# Patient Record
Sex: FEMALE | Race: BLACK OR AFRICAN AMERICAN | NOT HISPANIC OR LATINO | Employment: OTHER | URBAN - METROPOLITAN AREA
[De-identification: names, ages, dates, MRNs, and addresses within clinical notes are randomized per-mention and may not be internally consistent; named-entity substitution may affect disease eponyms.]

---

## 2018-08-09 ENCOUNTER — HOSPITAL ENCOUNTER (OUTPATIENT)
Dept: RADIOLOGY | Facility: OTHER | Age: 78
Discharge: HOME | End: 2018-08-09
Attending: ANESTHESIOLOGY
Payer: MEDICARE

## 2018-08-09 ENCOUNTER — OFFICE VISIT (OUTPATIENT)
Dept: PAIN MEDICINE | Facility: CLINIC | Age: 78
End: 2018-08-09
Attending: ANESTHESIOLOGY
Payer: MEDICARE

## 2018-08-09 VITALS
BODY MASS INDEX: 30.87 KG/M2 | HEART RATE: 82 BPM | HEIGHT: 62 IN | SYSTOLIC BLOOD PRESSURE: 134 MMHG | DIASTOLIC BLOOD PRESSURE: 80 MMHG | WEIGHT: 167.75 LBS | TEMPERATURE: 97 F

## 2018-08-09 DIAGNOSIS — M47.816 LUMBAR FACET ARTHROPATHY: ICD-10-CM

## 2018-08-09 DIAGNOSIS — M47.819 SPONDYLOSIS WITHOUT MYELOPATHY: ICD-10-CM

## 2018-08-09 DIAGNOSIS — M54.15 RADICULOPATHY OF THORACOLUMBAR REGION: ICD-10-CM

## 2018-08-09 DIAGNOSIS — M47.9 OSTEOARTHRITIS OF SPINE, UNSPECIFIED SPINAL OSTEOARTHRITIS COMPLICATION STATUS, UNSPECIFIED SPINAL REGION: ICD-10-CM

## 2018-08-09 DIAGNOSIS — M47.817 SPONDYLOSIS OF LUMBOSACRAL JOINT WITHOUT MYELOPATHY: ICD-10-CM

## 2018-08-09 DIAGNOSIS — M51.36 DDD (DEGENERATIVE DISC DISEASE), LUMBAR: ICD-10-CM

## 2018-08-09 DIAGNOSIS — M51.36 DDD (DEGENERATIVE DISC DISEASE), LUMBAR: Primary | ICD-10-CM

## 2018-08-09 DIAGNOSIS — M17.11 ARTHRITIS OF RIGHT KNEE: ICD-10-CM

## 2018-08-09 DIAGNOSIS — M46.1 SACROILIITIS: ICD-10-CM

## 2018-08-09 PROCEDURE — 72114 X-RAY EXAM L-S SPINE BENDING: CPT | Mod: TC,FY

## 2018-08-09 PROCEDURE — 73521 X-RAY EXAM HIPS BI 2 VIEWS: CPT | Mod: TC,FY

## 2018-08-09 PROCEDURE — 99213 OFFICE O/P EST LOW 20 MIN: CPT | Mod: GC,S$GLB,, | Performed by: ANESTHESIOLOGY

## 2018-08-09 PROCEDURE — 99999 PR PBB SHADOW E&M-EST. PATIENT-LVL III: CPT | Mod: PBBFAC,,, | Performed by: ANESTHESIOLOGY

## 2018-08-09 PROCEDURE — 72114 X-RAY EXAM L-S SPINE BENDING: CPT | Mod: 26,,, | Performed by: RADIOLOGY

## 2018-08-09 PROCEDURE — 72050 X-RAY EXAM NECK SPINE 4/5VWS: CPT | Mod: TC,FY

## 2018-08-09 PROCEDURE — 3079F DIAST BP 80-89 MM HG: CPT | Mod: CPTII,S$GLB,, | Performed by: ANESTHESIOLOGY

## 2018-08-09 PROCEDURE — 3075F SYST BP GE 130 - 139MM HG: CPT | Mod: CPTII,S$GLB,, | Performed by: ANESTHESIOLOGY

## 2018-08-09 PROCEDURE — 73521 X-RAY EXAM HIPS BI 2 VIEWS: CPT | Mod: 26,,, | Performed by: RADIOLOGY

## 2018-08-09 PROCEDURE — 72050 X-RAY EXAM NECK SPINE 4/5VWS: CPT | Mod: 26,,, | Performed by: RADIOLOGY

## 2018-08-09 RX ORDER — DICLOFENAC SODIUM 10 MG/G
2 GEL TOPICAL 4 TIMES DAILY PRN
Qty: 3 TUBE | Refills: 1 | Status: SHIPPED | OUTPATIENT
Start: 2018-08-09 | End: 2019-04-17

## 2018-08-09 RX ORDER — ALPRAZOLAM 0.25 MG/1
0.25 TABLET ORAL NIGHTLY PRN
COMMUNITY
End: 2018-11-06

## 2018-08-09 NOTE — PROGRESS NOTES
Chronic patient Established Note (Follow up visit)      SUBJECTIVE:    Tamiko Youssef presents to the clinic for a follow-up appointment for lower back pain. Since the last visit, Tamiko Youssef states the pain has been worsening. Current pain intensity is 8/10.      She had a bilateral L4 TFESI in march 2014 that provided significant relief. She has been doing home physical exercises 2-3 times per week. She states that she has difficulty completing the exercises 2/2 significant pain. Her increasing pain has been a barrier to her being able to complete her exercises. She states that she has worsening pain in the middle of her back that radiates down her left leg to her knee. She has been rubbing various creams including a lidocaine based cream.  Continues to take Gabapentin 300 mg BID.        Pain Disability Index Review:  Last 3 PDI Scores 8/9/2018 10/9/2015 9/9/2015   Pain Disability Index (PDI) 56 34 53       Pain Medications:    - Opioids: Lorcet (Hydrocodone/Acetaminophen)  - Adjuvant Medications: Neurontin (Gabapentin)  - Anti-Coagulants: Aspirin  - Others: See med list    Opioid Contract: yes     report:  Reviewed and consistent with medication use as prescribed.    Pain Procedures: 3/28/14, 3/13/13 Bilateral L4 TRANSFORAMINAL EPIDURAL STEROID INJECTION     Physical Therapy/Home Exercise: no    Imaging: X-Ray Lumbar Complete With Flex And Ext     Narrative     Lumbar spine 5 views with flexion and extension.    Findings: Seven views.  The lumbar spinal alignment is significant for grade 1 anterolisthesis of L4 in relation to L5.  There is mild loss of disk space height with degenerative endplate change identified from L3-4 through L5-S1 with accompanying facet   hypertrophy most pronounced at L5-S1.  There is no instability on flexion-extension.  There is no fracture, dislocation, or bone erosion.      Impression      As above.      Electronically signed by: PATRICIA EDGAR MD  Date: 09/09/15  Time: 09:50       MRI Thoracic Spine Without Contrast     Narrative     MRI thoracic spine without contrast.    Findings: There is a mild dextroscoliosis of the thoracic spine.  The vertebral body heights are satisfactorily maintained.  There is loss of disk height with degenerative endplate change throughout the thoracic spine.  There are mild disk bulges   identified at T2-3, T3-4, T4-5, T8-9, and a mild disk osteophyte complex at T11-12.  This is mild central canal narrowing at T11-12.  The visualized portion of the intra-abdominal content is significant for a 2.8-cm left-sided renal cyst.  There is no   evidence of an acute marrow replacement process such as tumor or infection.  There is no fracture.  The spinal cord has a normal appearance.  There is no intradural abnormality identified.      Impression      Mild multilevel degenerative change with mild central canal stenosis at T11-12.      Electronically signed by: PATRICIA EDGAR MD  Date: 03/27/14  Time: 14:57      MRI Lumbar Spine Without Contrast    Narrative     Procedure:  Non-contrast MRI of the lumbosacral spine    Technique: sagittal T1, T2 and STIR and axial T1 and T2 images of the lumbosacral spine without contrast.     Comparison: CT abdomen and pelvis on 1/11/3    Findings: Partially included in the  imaging is left renal lesion.  Please see CT report for further details.    There is trace 1-2 mm of anterior listhesis of L4 on L5 and L5 on S1.  . The lumbar vertebral body heights, contours and bone marrow signal is within normal limits and without evidence for acute fracture or subluxation. There is degenerative disk disease   with disk desiccation all levels there is present degenerative changes are more prominent in the lower thoracic spine partially included in this study specifically at T11/T12 level with moderate to severe height loss.        The distal spinal cord and conus is normal in signal and contour the tip of the conus approximates the inferior  [id number].    T11/T12 including the sagittal field only there is a posterior discussed by complex with slight retrolisthesis of T11 on T12 with probable mild center canal stenosis and mild bilateral neural foraminal stenosis.    T12/L1 through L1/L2: No significant disc bulge, central canal or neural foraminal stenosis.     L2/L3: No significant disc bulge, central canal or neural foraminal stenosis. No significant disc bulge, central canal or neural foraminal stenosis.    L3/L4: Small disk bulge with ligamentum flavum hypertrophy without significant central canal stenosis with mild neural foraminal stenosis bilaterally.    L4/L5: Bulging disk with ligamentum flavum hypertrophy and facet joint arthropathy without significant central canal stenosis with mild neural foraminal stenosis.      L5/S1: Bulging disk with facet joint arthropathy without significant central canal stenosis with mild bilateral foraminal stenosis.      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      Impression       Mild spondylo-degenerative change of the lumbosacral spine as detailed above without significant central canal stenosis.      Please note there is degenerative change at the lower thoracic spine specifically at the T11/T12 level with posterior disk osteophyte complex resulting in probable mild central canal and mild bilateral foraminal stenosis.    See above for additional details.      Electronically signed by: ALEKSANDER POLLACK DO  Date: 04/09/13  Time: 10:58          Allergies:   Review of patient's allergies indicates:   Allergen Reactions    Naprosyn [naproxen]     Norco [hydrocodone-acetaminophen]     Tramadol        Current Medications:   Current Outpatient Prescriptions   Medication Sig Dispense Refill    albuterol (PROVENTIL) 2.5 mg /3 mL (0.083 %) nebulizer solution Take 2.5 mg by nebulization every 6 (six) hours as needed.      ALPRAZolam (XANAX) 0.25 MG tablet Take 0.25 mg by mouth nightly as needed  for Anxiety.      amlodipine (NORVASC) 10 MG tablet Take 10 mg by mouth once daily.      aspirin (ECOTRIN) 81 MG EC tablet Take 81 mg by mouth once daily.      BOOSTRIX TDAP 2.5-8-5 Lf-mcg-Lf/0.5mL Syrg injection       diclofenac sodium (VOLTAREN) 1 % Gel Apply 2 g topically 4 (four) times daily as needed. 3 Tube 1    gabapentin (NEURONTIN) 300 MG capsule TAKE 1 CAPSULE BY MOUTH THREE TIMES DAILY 270 capsule 2    hydrocodone-acetaminophen 7.5-325mg (NORCO) 7.5-325 mg per tablet   0    latanoprost 0.005 % ophthalmic solution   3    mirtazapine (REMERON) 15 MG tablet   2    pravastatin (PRAVACHOL) 20 MG tablet   1    SYMBICORT 80-4.5 mcg/actuation HFAA   6    walker Misc 1 application by Misc.(Non-Drug; Combo Route) route once daily. 1 each 0     No current facility-administered medications for this visit.        REVIEW OF SYSTEMS:    Constitutional: Positive for unexpected weight change.   HENT: Positive for headache.   Respiratory: Positive for cough, shortness of breath and wheezing.  (asthma)  Gastrointestinal: Positive for nausea and constipation.   Musculoskeletal: Positive for back pain, gait problem and stiffness.   Neurological: Positive for tingling and numbness.     MEDICAL, SURGICAL, FAMILY, SOCIAL HX: reviewed    Past Medical History:  Past Medical History:   Diagnosis Date    Asthma     Hypertension        Past Surgical History:  Past Surgical History:   Procedure Laterality Date    CHOLECYSTECTOMY      EYE SURGERY      HYSTERECTOMY      NECK SURGERY      cyst removed       Family History:  History reviewed. No pertinent family history.    Social History:  Social History     Social History    Marital status: Single     Spouse name: N/A    Number of children: N/A    Years of education: N/A     Social History Main Topics    Smoking status: Never Smoker    Smokeless tobacco: None    Alcohol use No    Drug use: Unknown    Sexual activity: Not Asked     Other Topics Concern    None  "    Social History Narrative    None       OBJECTIVE:    /80   Pulse 82   Temp 97.3 °F (36.3 °C)   Ht 5' 2" (1.575 m)   Wt 76.1 kg (167 lb 12.3 oz)   BMI 30.69 kg/m²     PHYSICAL EXAMINATION:    Constitutional: She is oriented to person, place, and time. She appears well-developed and well-nourished.   HENT:   Head: Normocephalic and atraumatic.   Eyes: Conjunctivae and EOM are normal. Pupils are equal, round, and reactive to light.   Neck: Normal range of motion. Neck supple.   Cardiovascular: Normal rate and regular rhythm.    Pulmonary/Chest: Effort normal and breath sounds normal.   Abdominal: Soft.   Musculoskeletal:        Right hip: She exhibits decreased range of motion and tenderness.        Left hip: She exhibits decreased range of motion and tenderness.        Lumbar back: She exhibits decreased range of motion, tenderness, bony tenderness and pain (facet loading b/l, L>R).   Positive FABERS b/l   Neurological: She is alert and oriented to person, place, and time. She has normal strength and normal reflexes.   Skin: Skin is warm and dry. No rash noted. No erythema.   Psychiatric: She has a normal mood and affect. Her behavior is normal. Judgment and thought content normal.     ASSESSMENT: 77 y.o. year old female with Back, hip, and cervical pain is consistent with      1. DDD (degenerative disc disease), lumbar  X-Ray Lumbar Complete With Flex And Ext    X-Ray Cervical Spine Complete 5 view    X-Ray Hips Bilateral 2 View Incl AP Pelvis   2. Radiculopathy of thoracolumbar region  X-Ray Lumbar Complete With Flex And Ext    X-Ray Cervical Spine Complete 5 view    X-Ray Hips Bilateral 2 View Incl AP Pelvis   3. Osteoarthritis of spine, unspecified spinal osteoarthritis complication status, unspecified spinal region  X-Ray Lumbar Complete With Flex And Ext    X-Ray Cervical Spine Complete 5 view    X-Ray Hips Bilateral 2 View Incl AP Pelvis   4. Spondylosis without myelopathy  X-Ray Lumbar Complete " With Flex And Ext    X-Ray Cervical Spine Complete 5 view    X-Ray Hips Bilateral 2 View Incl AP Pelvis   5. 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 Lumbar facet arthropathy  diclofenac sodium (VOLTAREN) 1 % Gel   7. Sacroiliitis  diclofenac sodium (VOLTAREN) 1 % Gel   8. Arthritis of right knee  diclofenac sodium (VOLTAREN) 1 % Gel         PLAN:     - I have stressed the importance of physical activity and a home exercise plan to help with pain and improve health.  - Patient can continue with medications for now since they are providing benefits, using them appropriately, and without side effects.  - Plan for repeat bilateral L4-5 TFESI  - RTC 2-3 weeks after injection  - Increase gabapentin to three tablets of 300 mg qday. Discussed that she can trial taking one before dinner and two before bed time to reduce daytime drowsiness.   - Continue physical therapy exercises.   - Xrays of her hip and lumbar/cervical spine.   - Counseled patient regarding the importance of activity modification, constant sleeping habits and physical therapy.    The above plan and management options were discussed at length with patient. Patient is in agreement with the above and verbalized understanding.    Jacobo Russo    I have personally reviewed the history and exam of this patient and agree with the resident/fellow/NPs note as stated above.    Obdulio Leon MD    08/09/2018

## 2018-08-09 NOTE — H&P (VIEW-ONLY)
Chronic patient Established Note (Follow up visit)      SUBJECTIVE:    Tamiko Youssef presents to the clinic for a follow-up appointment for lower back pain. Since the last visit, Tamiko Youssef states the pain has been worsening. Current pain intensity is 8/10.      She had a bilateral L4 TFESI in march 2014 that provided significant relief. She has been doing home physical exercises 2-3 times per week. She states that she has difficulty completing the exercises 2/2 significant pain. Her increasing pain has been a barrier to her being able to complete her exercises. She states that she has worsening pain in the middle of her back that radiates down her left leg to her knee. She has been rubbing various creams including a lidocaine based cream.  Continues to take Gabapentin 300 mg BID.        Pain Disability Index Review:  Last 3 PDI Scores 8/9/2018 10/9/2015 9/9/2015   Pain Disability Index (PDI) 56 34 53       Pain Medications:    - Opioids: Lorcet (Hydrocodone/Acetaminophen)  - Adjuvant Medications: Neurontin (Gabapentin)  - Anti-Coagulants: Aspirin  - Others: See med list    Opioid Contract: yes     report:  Reviewed and consistent with medication use as prescribed.    Pain Procedures: 3/28/14, 3/13/13 Bilateral L4 TRANSFORAMINAL EPIDURAL STEROID INJECTION     Physical Therapy/Home Exercise: no    Imaging: X-Ray Lumbar Complete With Flex And Ext     Narrative     Lumbar spine 5 views with flexion and extension.    Findings: Seven views.  The lumbar spinal alignment is significant for grade 1 anterolisthesis of L4 in relation to L5.  There is mild loss of disk space height with degenerative endplate change identified from L3-4 through L5-S1 with accompanying facet   hypertrophy most pronounced at L5-S1.  There is no instability on flexion-extension.  There is no fracture, dislocation, or bone erosion.      Impression      As above.      Electronically signed by: PATRICIA EDGAR MD  Date: 09/09/15  Time: 09:50       MRI Thoracic Spine Without Contrast     Narrative     MRI thoracic spine without contrast.    Findings: There is a mild dextroscoliosis of the thoracic spine.  The vertebral body heights are satisfactorily maintained.  There is loss of disk height with degenerative endplate change throughout the thoracic spine.  There are mild disk bulges   identified at T2-3, T3-4, T4-5, T8-9, and a mild disk osteophyte complex at T11-12.  This is mild central canal narrowing at T11-12.  The visualized portion of the intra-abdominal content is significant for a 2.8-cm left-sided renal cyst.  There is no   evidence of an acute marrow replacement process such as tumor or infection.  There is no fracture.  The spinal cord has a normal appearance.  There is no intradural abnormality identified.      Impression      Mild multilevel degenerative change with mild central canal stenosis at T11-12.      Electronically signed by: PATRICIA EDGAR MD  Date: 03/27/14  Time: 14:57      MRI Lumbar Spine Without Contrast    Narrative     Procedure:  Non-contrast MRI of the lumbosacral spine    Technique: sagittal T1, T2 and STIR and axial T1 and T2 images of the lumbosacral spine without contrast.     Comparison: CT abdomen and pelvis on 1/11/3    Findings: Partially included in the  imaging is left renal lesion.  Please see CT report for further details.    There is trace 1-2 mm of anterior listhesis of L4 on L5 and L5 on S1.  . The lumbar vertebral body heights, contours and bone marrow signal is within normal limits and without evidence for acute fracture or subluxation. There is degenerative disk disease   with disk desiccation all levels there is present degenerative changes are more prominent in the lower thoracic spine partially included in this study specifically at T11/T12 level with moderate to severe height loss.        The distal spinal cord and conus is normal in signal and contour the tip of the conus approximates the inferior  [id number].    T11/T12 including the sagittal field only there is a posterior discussed by complex with slight retrolisthesis of T11 on T12 with probable mild center canal stenosis and mild bilateral neural foraminal stenosis.    T12/L1 through L1/L2: No significant disc bulge, central canal or neural foraminal stenosis.     L2/L3: No significant disc bulge, central canal or neural foraminal stenosis. No significant disc bulge, central canal or neural foraminal stenosis.    L3/L4: Small disk bulge with ligamentum flavum hypertrophy without significant central canal stenosis with mild neural foraminal stenosis bilaterally.    L4/L5: Bulging disk with ligamentum flavum hypertrophy and facet joint arthropathy without significant central canal stenosis with mild neural foraminal stenosis.      L5/S1: Bulging disk with facet joint arthropathy without significant central canal stenosis with mild bilateral foraminal stenosis.      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      Impression       Mild spondylo-degenerative change of the lumbosacral spine as detailed above without significant central canal stenosis.      Please note there is degenerative change at the lower thoracic spine specifically at the T11/T12 level with posterior disk osteophyte complex resulting in probable mild central canal and mild bilateral foraminal stenosis.    See above for additional details.      Electronically signed by: ALEKSANDER POLLACK DO  Date: 04/09/13  Time: 10:58          Allergies:   Review of patient's allergies indicates:   Allergen Reactions    Naprosyn [naproxen]     Norco [hydrocodone-acetaminophen]     Tramadol        Current Medications:   Current Outpatient Prescriptions   Medication Sig Dispense Refill    albuterol (PROVENTIL) 2.5 mg /3 mL (0.083 %) nebulizer solution Take 2.5 mg by nebulization every 6 (six) hours as needed.      ALPRAZolam (XANAX) 0.25 MG tablet Take 0.25 mg by mouth nightly as needed  for Anxiety.      amlodipine (NORVASC) 10 MG tablet Take 10 mg by mouth once daily.      aspirin (ECOTRIN) 81 MG EC tablet Take 81 mg by mouth once daily.      BOOSTRIX TDAP 2.5-8-5 Lf-mcg-Lf/0.5mL Syrg injection       diclofenac sodium (VOLTAREN) 1 % Gel Apply 2 g topically 4 (four) times daily as needed. 3 Tube 1    gabapentin (NEURONTIN) 300 MG capsule TAKE 1 CAPSULE BY MOUTH THREE TIMES DAILY 270 capsule 2    hydrocodone-acetaminophen 7.5-325mg (NORCO) 7.5-325 mg per tablet   0    latanoprost 0.005 % ophthalmic solution   3    mirtazapine (REMERON) 15 MG tablet   2    pravastatin (PRAVACHOL) 20 MG tablet   1    SYMBICORT 80-4.5 mcg/actuation HFAA   6    walker Misc 1 application by Misc.(Non-Drug; Combo Route) route once daily. 1 each 0     No current facility-administered medications for this visit.        REVIEW OF SYSTEMS:    Constitutional: Positive for unexpected weight change.   HENT: Positive for headache.   Respiratory: Positive for cough, shortness of breath and wheezing.  (asthma)  Gastrointestinal: Positive for nausea and constipation.   Musculoskeletal: Positive for back pain, gait problem and stiffness.   Neurological: Positive for tingling and numbness.     MEDICAL, SURGICAL, FAMILY, SOCIAL HX: reviewed    Past Medical History:  Past Medical History:   Diagnosis Date    Asthma     Hypertension        Past Surgical History:  Past Surgical History:   Procedure Laterality Date    CHOLECYSTECTOMY      EYE SURGERY      HYSTERECTOMY      NECK SURGERY      cyst removed       Family History:  History reviewed. No pertinent family history.    Social History:  Social History     Social History    Marital status: Single     Spouse name: N/A    Number of children: N/A    Years of education: N/A     Social History Main Topics    Smoking status: Never Smoker    Smokeless tobacco: None    Alcohol use No    Drug use: Unknown    Sexual activity: Not Asked     Other Topics Concern    None  "    Social History Narrative    None       OBJECTIVE:    /80   Pulse 82   Temp 97.3 °F (36.3 °C)   Ht 5' 2" (1.575 m)   Wt 76.1 kg (167 lb 12.3 oz)   BMI 30.69 kg/m²     PHYSICAL EXAMINATION:    Constitutional: She is oriented to person, place, and time. She appears well-developed and well-nourished.   HENT:   Head: Normocephalic and atraumatic.   Eyes: Conjunctivae and EOM are normal. Pupils are equal, round, and reactive to light.   Neck: Normal range of motion. Neck supple.   Cardiovascular: Normal rate and regular rhythm.    Pulmonary/Chest: Effort normal and breath sounds normal.   Abdominal: Soft.   Musculoskeletal:        Right hip: She exhibits decreased range of motion and tenderness.        Left hip: She exhibits decreased range of motion and tenderness.        Lumbar back: She exhibits decreased range of motion, tenderness, bony tenderness and pain (facet loading b/l, L>R).   Positive FABERS b/l   Neurological: She is alert and oriented to person, place, and time. She has normal strength and normal reflexes.   Skin: Skin is warm and dry. No rash noted. No erythema.   Psychiatric: She has a normal mood and affect. Her behavior is normal. Judgment and thought content normal.     ASSESSMENT: 77 y.o. year old female with Back, hip, and cervical pain is consistent with      1. DDD (degenerative disc disease), lumbar  X-Ray Lumbar Complete With Flex And Ext    X-Ray Cervical Spine Complete 5 view    X-Ray Hips Bilateral 2 View Incl AP Pelvis   2. Radiculopathy of thoracolumbar region  X-Ray Lumbar Complete With Flex And Ext    X-Ray Cervical Spine Complete 5 view    X-Ray Hips Bilateral 2 View Incl AP Pelvis   3. Osteoarthritis of spine, unspecified spinal osteoarthritis complication status, unspecified spinal region  X-Ray Lumbar Complete With Flex And Ext    X-Ray Cervical Spine Complete 5 view    X-Ray Hips Bilateral 2 View Incl AP Pelvis   4. Spondylosis without myelopathy  X-Ray Lumbar Complete " With Flex And Ext    X-Ray Cervical Spine Complete 5 view    X-Ray Hips Bilateral 2 View Incl AP Pelvis   5. 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 Lumbar facet arthropathy  diclofenac sodium (VOLTAREN) 1 % Gel   7. Sacroiliitis  diclofenac sodium (VOLTAREN) 1 % Gel   8. Arthritis of right knee  diclofenac sodium (VOLTAREN) 1 % Gel         PLAN:     - I have stressed the importance of physical activity and a home exercise plan to help with pain and improve health.  - Patient can continue with medications for now since they are providing benefits, using them appropriately, and without side effects.  - Plan for repeat bilateral L4-5 TFESI  - RTC 2-3 weeks after injection  - Increase gabapentin to three tablets of 300 mg qday. Discussed that she can trial taking one before dinner and two before bed time to reduce daytime drowsiness.   - Continue physical therapy exercises.   - Xrays of her hip and lumbar/cervical spine.   - Counseled patient regarding the importance of activity modification, constant sleeping habits and physical therapy.    The above plan and management options were discussed at length with patient. Patient is in agreement with the above and verbalized understanding.    Jacobo Russo    I have personally reviewed the history and exam of this patient and agree with the resident/fellow/NPs note as stated above.    Obdulio Leon MD    08/09/2018

## 2018-08-22 ENCOUNTER — HOSPITAL ENCOUNTER (OUTPATIENT)
Facility: OTHER | Age: 78
Discharge: HOME | End: 2018-08-22
Attending: ANESTHESIOLOGY | Admitting: ANESTHESIOLOGY
Payer: MEDICARE

## 2018-08-22 VITALS
RESPIRATION RATE: 18 BRPM | BODY MASS INDEX: 30.91 KG/M2 | HEIGHT: 62 IN | OXYGEN SATURATION: 97 % | SYSTOLIC BLOOD PRESSURE: 129 MMHG | WEIGHT: 168 LBS | DIASTOLIC BLOOD PRESSURE: 63 MMHG | TEMPERATURE: 98 F | HEART RATE: 75 BPM

## 2018-08-22 DIAGNOSIS — G89.29 CHRONIC PAIN: ICD-10-CM

## 2018-08-22 DIAGNOSIS — M47.817 SPONDYLOSIS OF LUMBOSACRAL JOINT WITHOUT MYELOPATHY: ICD-10-CM

## 2018-08-22 DIAGNOSIS — M51.36 DDD (DEGENERATIVE DISC DISEASE), LUMBAR: Primary | ICD-10-CM

## 2018-08-22 DIAGNOSIS — M54.16 LUMBAR RADICULOPATHY: ICD-10-CM

## 2018-08-22 PROCEDURE — 25500020 PHARM REV CODE 255: Performed by: ANESTHESIOLOGY

## 2018-08-22 PROCEDURE — 63600175 PHARM REV CODE 636 W HCPCS: Performed by: ANESTHESIOLOGY

## 2018-08-22 PROCEDURE — 64483 NJX AA&/STRD TFRM EPI L/S 1: CPT | Mod: 50 | Performed by: ANESTHESIOLOGY

## 2018-08-22 PROCEDURE — 99152 MOD SED SAME PHYS/QHP 5/>YRS: CPT | Mod: ,,, | Performed by: ANESTHESIOLOGY

## 2018-08-22 PROCEDURE — 64483 NJX AA&/STRD TFRM EPI L/S 1: CPT | Mod: 50,,, | Performed by: ANESTHESIOLOGY

## 2018-08-22 PROCEDURE — 25000003 PHARM REV CODE 250: Performed by: STUDENT IN AN ORGANIZED HEALTH CARE EDUCATION/TRAINING PROGRAM

## 2018-08-22 PROCEDURE — 25000003 PHARM REV CODE 250: Performed by: ANESTHESIOLOGY

## 2018-08-22 RX ORDER — MIDAZOLAM HYDROCHLORIDE 1 MG/ML
INJECTION INTRAMUSCULAR; INTRAVENOUS
Status: DISCONTINUED | OUTPATIENT
Start: 2018-08-22 | End: 2018-08-22 | Stop reason: HOSPADM

## 2018-08-22 RX ORDER — LIDOCAINE HYDROCHLORIDE 10 MG/ML
INJECTION INFILTRATION; PERINEURAL
Status: DISCONTINUED | OUTPATIENT
Start: 2018-08-22 | End: 2018-08-22 | Stop reason: HOSPADM

## 2018-08-22 RX ORDER — LIDOCAINE HYDROCHLORIDE 5 MG/ML
INJECTION, SOLUTION INFILTRATION; INTRAVENOUS
Status: DISCONTINUED | OUTPATIENT
Start: 2018-08-22 | End: 2018-08-22 | Stop reason: HOSPADM

## 2018-08-22 RX ORDER — FENTANYL CITRATE 50 UG/ML
INJECTION, SOLUTION INTRAMUSCULAR; INTRAVENOUS
Status: DISCONTINUED | OUTPATIENT
Start: 2018-08-22 | End: 2018-08-22 | Stop reason: HOSPADM

## 2018-08-22 RX ORDER — DEXAMETHASONE SODIUM PHOSPHATE 10 MG/ML
INJECTION INTRAMUSCULAR; INTRAVENOUS
Status: DISCONTINUED | OUTPATIENT
Start: 2018-08-22 | End: 2018-08-22 | Stop reason: HOSPADM

## 2018-08-22 RX ORDER — SODIUM CHLORIDE 9 MG/ML
500 INJECTION, SOLUTION INTRAVENOUS CONTINUOUS
Status: DISCONTINUED | OUTPATIENT
Start: 2018-08-22 | End: 2018-08-22 | Stop reason: HOSPADM

## 2018-08-22 NOTE — INTERVAL H&P NOTE
The patient has been examined and the H&P has been reviewed:    I concur with the findings and no changes have occurred since H&P was written.    Anesthesia/Surgery risks, benefits and alternative options discussed and understood by patient/family.          Active Hospital Problems    Diagnosis  POA    Chronic pain [G89.29]  Yes      Resolved Hospital Problems   No resolved problems to display.

## 2018-08-22 NOTE — DISCHARGE INSTRUCTIONS
Thank you for allowing us to care for you today. You may receive a survey about the care we provided. Your feedback is valuable and helps us provide excellent care throughout the community.     Home Care Instructions for Pain Management:    1. DIET:   You may resume your normal diet today.   2. BATHING:   You may shower with luke warm water. No tub baths or anything that will soak injection sites under water for the next 24 hours.  3. DRESSING:   You may remove your bandage today.   4. ACTIVITY LEVEL:   You may resume your normal activities 24 hrs after your procedure. Nothing strenuous today.  5. MEDICATIONS:   You may resume your normal medications today. To restart blood thinners, ask your doctor.  6. DRIVING    If you have received any sedatives by mouth today, you may not drive for 12 hours.    If you have received any sedation through your IV, you may not drive for 24 hrs.   7. SPECIAL INSTRUCTIONS:   No heat to the injection site for 24 hrs including, hot bath or shower, heating pad, moist heat, or hot tubs.    Use ice pack to injection site for any pain or discomfort.  Apply ice packs for 20 minute intervals as needed.    IF you have diabetes, be sure to monitor your blood sugar more closely. IF your injection contained steroids your blood sugar levels may become higher than normal.    If you are still having pain upon discharge:  Your pain may improve over the next 48 hours. The anesthetic (numbing medication) works immediately to 48 hours. IF your injection contained a steroid (anti-inflammatory medication), it takes approximately 3 days to start feeling relief and 7-10 days to see your greatest results from the medication. It is possible you may need subsequent injections. This would be discussed at your follow up appointment with pain management or your referring doctor.      PLEASE CALL YOUR DOCTOR IF:  1. Redness or swelling around the injection site.  2. Fever of 101 degrees or more  3. Drainage  (pus) from the injection site.  4. For any continuous bleeding (some dried blood over the incision is normal.)    FOR EMERGENCIES:   If any unusual problems or difficulties occur during clinic hours, call [phone] or [phone]. Adult Procedural Sedation Instructions    Recovery After Procedural Sedation (Adult)  You have been given medicine by vein to make you sleep during your surgery. This may have included both a pain medicine and sleeping medicine. Most of the effects have worn off. But you may still have some drowsiness for the next 6 to 8 hours.  Home care  Follow these guidelines when you get home:  · For the next 8 hours, you should be watched by a responsible adult. This person should make sure your condition is not getting worse.  · Don't drink any alcohol for the next 24 hours.  · Don't drive, operate dangerous machinery, or make important business or personal decisions during the next 24 hours.  Note: Your healthcare provider may tell you not to take any medicine by mouth for pain or sleep in the next 4 hours. These medicines may react with the medicines you were given in the hospital. This could cause a much stronger response than usual.  Follow-up care  Follow up with your healthcare provider if you are not alert and back to your usual level of activity within 12 hours.  When to seek medical advice  Call your healthcare provider right away if any of these occur:  · Drowsiness gets worse  · Weakness or dizziness gets worse  · Repeated vomiting  · You can't be awakened   Date Last Reviewed: 10/18/2016  © 1071-8222 [employer]. [address] [place] [address], [place] [address]. All rights reserved. This information is not intended as a substitute for professional medical care. Always follow your healthcare professional's instructions.

## 2018-08-22 NOTE — OP NOTE
Patient Name: Tamiko Youssef  MRN: [id number]    INFORMED CONSENT: The procedure, risks, benefits and options were discussed with patient. There are no contraindications to the procedure. The patient expressed understanding and agreed to proceed. The personnel performing the procedure was discussed. I verify that I personally obtained Tamiko's consent prior to the start of the procedure and the signed consent can be found on the patient's chart.    Procedure Date: 08/22/2018    Anesthesia: Topical    Pre Procedure diagnosis: Lumbar radiculopathy [M54.16]  DDD (degenerative disc disease), lumbar [M51.36]  1. DDD (degenerative disc disease), lumbar    2. 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 Lumbar radiculopathy    4. Chronic pain      Post-Procedure diagnosis: SAME      Sedation: Yes - Fentanyl 50 mcg and Midazolam 1 mg    PROCEDURE:Bilateral L4-5   TRANSFORAMINAL EPIDURAL STEROID INJECTION        DESCRIPTION OF PROCEDURE: The patient was brought to the procedure room. After performing time out IV access was obtained prior to the procedure. The patient was positioned prone on the fluoroscopy table. Continuous hemodynamic monitoring was initiated including blood pressure, EKG, and pulse oximetry. . The skin was prepped with chlorhexidine three times and draped in a sterile fashion. Skin anesthesia was achieved using 3 mL of lidocaine 1% over the respective injection site.     An oblique fluoroscopic view was obtained, with the superior articular process of the inferior vertebral body aligned with the pedicle. The tip of a 22-gauge 3.5-inch Quincke-type spinal needle was advanced toward the 6 oclock position of the pedicle under intermittent fluoroscopic guidance. Confirmation of proper needle position was made with AP, oblique, and lateral fluoroscopic views. Negative aspiration for blood or CSF was confirmed. 2 mL of Omnipaque 300 was injected. Live fluoroscopic imaging revealed a clear outline of the  spinal nerve with proximal spread of agent through the neural foramen into the anterior epidural space. A total combination of 3 mL of Lidocaine 0.5% and 10 mg decadron was injected at each level. Contrast spread was noted from L3 to L5 level. There was no pain on injection. The needle was removed and bleeding was nil.  A sterile dressing was applied. Tamiko was taken back to the recovery room for further observation.     Blood Loss: Nill  Specimen: None    Obdulio Leon MD

## 2018-08-22 NOTE — DISCHARGE SUMMARY
Discharge Note  Short Stay      SUMMARY     Admit Date: 8/22/2018    Attending Physician: Obdulio Leon      Discharge Physician: Obdulio Leon      Discharge Date: 8/22/2018 12:03 PM    Procedure(s) (LRB):  INJECTION,STEROID,EPIDURAL,TRANSFORAMINAL APPROACH (Bilateral)    Final Diagnosis: Lumbar radiculopathy [M54.16]  DDD (degenerative disc disease), lumbar [M51.36]    Disposition: Home or self care    Patient Instructions:   Current Discharge Medication List      CONTINUE these medications which have NOT CHANGED    Details   albuterol (PROVENTIL) 2.5 mg /3 mL (0.083 %) nebulizer solution Take 2.5 mg by nebulization every 6 (six) hours as needed.      ALPRAZolam (XANAX) 0.25 MG tablet Take 0.25 mg by mouth nightly as needed for Anxiety.      amlodipine (NORVASC) 10 MG tablet Take 10 mg by mouth once daily.      aspirin (ECOTRIN) 81 MG EC tablet Take 81 mg by mouth once daily.      BOOSTRIX TDAP 2.5-8-5 Lf-mcg-Lf/0.5mL Syrg injection       diclofenac sodium (VOLTAREN) 1 % Gel Apply 2 g topically 4 (four) times daily as needed.  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; Lumbar facet arthropathy; Sacroiliitis; Arthritis of right knee      gabapentin (NEURONTIN) 300 MG capsule TAKE 1 CAPSULE BY MOUTH THREE TIMES DAILY  Qty: 270 capsule, Refills: 2    Comments: **Patient requests 90 days supply**      hydrocodone-acetaminophen 7.5-325mg (NORCO) 7.5-325 mg per tablet Refills: 0      latanoprost 0.005 % ophthalmic solution Refills: 3      mirtazapine (REMERON) 15 MG tablet Refills: 2      pravastatin (PRAVACHOL) 20 MG tablet Refills: 1      SYMBICORT 80-4.5 mcg/actuation HFAA Refills: 6      walker Misc 1 application by Misc.(Non-Drug; Combo Route) route once daily.  Qty: 1 each, Refills: 0    Comments: lumbosacral neuritis wheelchair walker with a seat                 Discharge Diagnosis: Lumbar radiculopathy [M54.16]  DDD (degenerative disc disease),  lumbar [M51.36]  Condition on Discharge: Stable with no complications to procedure   Diet on Discharge: Same as before.  Activity: as per instruction sheet.  Discharge to: Home with a responsible adult.  Follow up: 2-4 weeks

## 2018-08-22 NOTE — INTERVAL H&P NOTE
"The patient has been examined and the H&P has been reviewed:    I concur with the findings and no changes have occurred since H&P was written.    Anesthesia/Surgery risks, benefits and alternative options discussed and understood by patient/family.    HPI    Mrs. Youssef is a 77 year old female with a past medical history of lumbar DDD, thoracolumbar radiculopathy, osteoarthritis, spondylosis without myelopathy, spondylosis of the lumbosacral joint, lumbar facet arthropathy, and sacroiliitis who presents for b/l TF @ L4.     PMHx, PSHx, Allergies, Medications reviewed in epic    ROS negative except pain complaints in HPI    OBJECTIVE:    BP (!) 161/80 (BP Location: Right arm, Patient Position: Lying)   Pulse 88   Temp 98.2 °F (36.8 °C) (Oral)   Resp 18   Ht 5' 2" (1.575 m)   Wt 76.2 kg (168 lb)   SpO2 98%   BMI 30.73 kg/m²     PHYSICAL EXAMINATION:    GENERAL: Well appearing, in no acute distress, alert and oriented x3.  PSYCH:  Mood and affect appropriate.  SKIN: Skin color, texture, turgor normal, no rashes or lesions.  CV: RRR with palpation of the radial artery.  PULM: No evidence of respiratory difficulty, symmetric chest rise. Clear to auscultation.  NEURO: Cranial nerves grossly intact.    Plan:    Proceed with procedure as planned    Jacobo Russo  08/22/2018        Active Hospital Problems    Diagnosis  POA    Chronic pain [G89.29]  Yes      Resolved Hospital Problems   No resolved problems to display.     "

## 2018-09-06 ENCOUNTER — OFFICE VISIT (OUTPATIENT)
Dept: SPINE | Facility: CLINIC | Age: 78
End: 2018-09-06
Attending: ANESTHESIOLOGY
Payer: MEDICARE

## 2018-09-06 VITALS — HEIGHT: 62 IN | WEIGHT: 168 LBS | BODY MASS INDEX: 30.91 KG/M2

## 2018-09-06 DIAGNOSIS — M53.3 SACROILIAC JOINT PAIN: ICD-10-CM

## 2018-09-06 DIAGNOSIS — M47.816 LUMBAR SPONDYLOSIS: Primary | ICD-10-CM

## 2018-09-06 DIAGNOSIS — M51.36 DDD (DEGENERATIVE DISC DISEASE), LUMBAR: ICD-10-CM

## 2018-09-06 PROCEDURE — 1101F PT FALLS ASSESS-DOCD LE1/YR: CPT | Mod: CPTII,,, | Performed by: NURSE PRACTITIONER

## 2018-09-06 PROCEDURE — 99999 PR PBB SHADOW E&M-EST. PATIENT-LVL III: CPT | Mod: PBBFAC,,, | Performed by: NURSE PRACTITIONER

## 2018-09-06 PROCEDURE — 99214 OFFICE O/P EST MOD 30 MIN: CPT | Mod: S$PBB,,, | Performed by: NURSE PRACTITIONER

## 2018-09-06 PROCEDURE — 99213 OFFICE O/P EST LOW 20 MIN: CPT | Mod: PBBFAC | Performed by: NURSE PRACTITIONER

## 2018-09-06 RX ORDER — MELOXICAM 7.5 MG/1
7.5 TABLET ORAL DAILY
Qty: 30 TABLET | Refills: 2 | Status: SHIPPED | OUTPATIENT
Start: 2018-09-06 | End: 2018-11-06

## 2018-09-06 NOTE — PROGRESS NOTES
Chronic patient Established Note (Follow up visit)      SUBJECTIVE:    Tamiko Youssef presents to the clinic for a follow-up appointment for lower back pain.  She is s/p bilateral L4 TF SYLVIA with 75% relief of leg pain.  Today, she is complaining of an aching pain across the back.  She denies any radiation.  It prevents her from standing for prolonged periods of time.  She does report that she completed PT in the past which helped.  She tried increasing her Gabapentin but states that it causes significant sedation.  Since the last visit, Tamiko Youssef states the pain has been worsening. Current pain intensity is 8/10.    Pain Disability Index Review:  Last 3 PDI Scores 9/6/2018 8/9/2018 10/9/2015   Pain Disability Index (PDI) 56 56 34       Pain Medications:  Gabapentin 300 mg BID    Opioid Contract: yes     report:  Reviewed and consistent with medication use as prescribed.    Pain Procedures:   3/28/14, 3/13/13 Bilateral L4 TF SYLVIA  8/22/18 Bilateral L4 TF SYLVIA- 75% relief    Physical Therapy/Home Exercise: no    Imaging:     Narrative     EXAMINATION:  XR LUMBAR SPINE 5 VIEW WITH FLEX AND EXT    CLINICAL HISTORY:  Low back pain, >6wks conservative tx, persistent-progressive sx, surgical candidate;  Other intervertebral disc degeneration, lumbar region    TECHNIQUE:  Five views of the lumbar spine plus flexion extension views were performed.    COMPARISON:  09/09/2015.    FINDINGS:  There is 3 mm anterolisthesis of L3 on L4, L4 on L5, and L5 on S1.  No translational instability is noted on the flexion and extension radiographs.  Vertebral body heights are well maintained.  There is mild disc space narrowing present at the L3-4, L4-5, and L5-S1 levels.  There is facet arthropathy within the mid to lower lumbar spine and lumbosacral junction.  There is no evidence for spondylolysis.  No abnormal paraspinal masses are evident.  Sacroiliac joints appear unremarkable.      Impression       3 mm anterolisthesis of L3  on L4, L4 and L5, and L5 on S1.  This appears to be degenerative in etiology with no evidence for translational instability.    Lumbar spondylosis.-     Narrative     EXAMINATION:  XR HIPS BILATERAL 2 VIEW INCL AP PELVIS    CLINICAL HISTORY:  Other intervertebral disc degeneration, lumbar region    TECHNIQUE:  AP view of the pelvis and frogleg lateral views of both hips were performed.    COMPARISON:  03/20/2014.    FINDINGS:  The bones are intact.  There is no evidence for acute fracture or bone destruction.  There are mild symmetric degenerative changes of the hips.  Sacroiliac joints appear unremarkable.  There are degenerative changes within the lower lumbar spine.  Soft tissues are unremarkable.      Impression       No evidence for acute fracture, bone destruction, or dislocation.         MRI Thoracic Spine Without Contrast     Narrative     MRI thoracic spine without contrast.    Findings: There is a mild dextroscoliosis of the thoracic spine.  The vertebral body heights are satisfactorily maintained.  There is loss of disk height with degenerative endplate change throughout the thoracic spine.  There are mild disk bulges   identified at T2-3, T3-4, T4-5, T8-9, and a mild disk osteophyte complex at T11-12.  This is mild central canal narrowing at T11-12.  The visualized portion of the intra-abdominal content is significant for a 2.8-cm left-sided renal cyst.  There is no   evidence of an acute marrow replacement process such as tumor or infection.  There is no fracture.  The spinal cord has a normal appearance.  There is no intradural abnormality identified.      Impression      Mild multilevel degenerative change with mild central canal stenosis at T11-12.      Electronically signed by: PATRICIA EDGAR MD  Date: 03/27/14  Time: 14:57      MRI Lumbar Spine Without Contrast    Narrative     Procedure:  Non-contrast MRI of the lumbosacral spine    Technique: sagittal T1, T2 and STIR and axial T1 and T2 images of the  lumbosacral spine without contrast.     Comparison: CT abdomen and pelvis on 1/11/3    Findings: Partially included in the  imaging is left renal lesion.  Please see CT report for further details.    There is trace 1-2 mm of anterior listhesis of L4 on L5 and L5 on S1.  . The lumbar vertebral body heights, contours and bone marrow signal is within normal limits and without evidence for acute fracture or subluxation. There is degenerative disk disease   with disk desiccation all levels there is present degenerative changes are more prominent in the lower thoracic spine partially included in this study specifically at T11/T12 level with moderate to severe height loss.        The distal spinal cord and conus is normal in signal and contour the tip of the conus approximates the inferior [id number].    T11/T12 including the sagittal field only there is a posterior discussed by complex with slight retrolisthesis of T11 on T12 with probable mild center canal stenosis and mild bilateral neural foraminal stenosis.    T12/L1 through L1/L2: No significant disc bulge, central canal or neural foraminal stenosis.     L2/L3: No significant disc bulge, central canal or neural foraminal stenosis. No significant disc bulge, central canal or neural foraminal stenosis.    L3/L4: Small disk bulge with ligamentum flavum hypertrophy without significant central canal stenosis with mild neural foraminal stenosis bilaterally.    L4/L5: Bulging disk with ligamentum flavum hypertrophy and facet joint arthropathy without significant central canal stenosis with mild neural foraminal stenosis.      L5/S1: Bulging disk with facet joint arthropathy without significant central canal stenosis with mild bilateral foraminal stenosis.      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      Impression       Mild spondylo-degenerative change of the lumbosacral spine as detailed above without significant central canal  stenosis.      Please note there is degenerative change at the lower thoracic spine specifically at the T11/T12 level with posterior disk osteophyte complex resulting in probable mild central canal and mild bilateral foraminal stenosis.    See above for additional details.      Electronically signed by: ALEKSANDER POLLACK DO  Date: 04/09/13  Time: 10:58          Allergies:   Review of patient's allergies indicates:   Allergen Reactions    Naprosyn [naproxen]     Norco [hydrocodone-acetaminophen]     Tramadol        Current Medications:   Current Outpatient Medications   Medication Sig Dispense Refill    albuterol (PROVENTIL) 2.5 mg /3 mL (0.083 %) nebulizer solution Take 2.5 mg by nebulization every 6 (six) hours as needed.      ALPRAZolam (XANAX) 0.25 MG tablet Take 0.25 mg by mouth nightly as needed for Anxiety.      amlodipine (NORVASC) 10 MG tablet Take 10 mg by mouth once daily.      BOOSTRIX TDAP 2.5-8-5 Lf-mcg-Lf/0.5mL Syrg injection       gabapentin (NEURONTIN) 300 MG capsule TAKE 1 CAPSULE BY MOUTH THREE TIMES DAILY 270 capsule 2    hydrocodone-acetaminophen 7.5-325mg (NORCO) 7.5-325 mg per tablet   0    latanoprost 0.005 % ophthalmic solution   3    mirtazapine (REMERON) 15 MG tablet   2    pravastatin (PRAVACHOL) 20 MG tablet   1    SYMBICORT 80-4.5 mcg/actuation HFAA   6    walker Misc 1 application by Misc.(Non-Drug; Combo Route) route once daily. 1 each 0    aspirin (ECOTRIN) 81 MG EC tablet Take 81 mg by mouth once daily.      diclofenac sodium (VOLTAREN) 1 % Gel Apply 2 g topically 4 (four) times daily as needed. 3 Tube 1     No current facility-administered medications for this visit.        REVIEW OF SYSTEMS:    Constitutional: Positive for unexpected weight change.   HENT: Positive for headache.   Respiratory: Positive for cough, shortness of breath and wheezing.  (asthma)  Gastrointestinal: Positive for constipation.   Musculoskeletal: Positive for back pain, gait problem and stiffness.  "    MEDICAL, SURGICAL, FAMILY, SOCIAL HX: reviewed    Past Medical History:  Past Medical History:   Diagnosis Date    Asthma     Hypertension        Past Surgical History:  Past Surgical History:   Procedure Laterality Date    CHOLECYSTECTOMY      EYE SURGERY      HYSTERECTOMY      NECK SURGERY      cyst removed       Family History:  History reviewed. No pertinent family history.    Social History:  Social History     Socioeconomic History    Marital status: Single     Spouse name: None    Number of children: None    Years of education: None    Highest education level: None   Social Needs    Financial resource strain: None    Food insecurity - worry: None    Food insecurity - inability: None    Transportation needs - medical: None    Transportation needs - non-medical: None   Occupational History    None   Tobacco Use    Smoking status: Never Smoker   Substance and Sexual Activity    Alcohol use: No    Drug use: None    Sexual activity: None   Other Topics Concern    None   Social History Narrative    None       OBJECTIVE:    Ht 5' 2" (1.575 m)   Wt 76.2 kg (168 lb)   BMI 30.73 kg/m²     PHYSICAL EXAMINATION:    OBJECTIVE:    Ht 5' 2" (1.575 m)   Wt 76.2 kg (168 lb)   BMI 30.73 kg/m²     PHYSICAL EXAMINATION:    GENERAL: Well appearing, in no acute distress, alert and oriented x3.  PSYCH:  Mood and affect appropriate.  SKIN: Skin color, texture, turgor normal, no rashes or lesions.  HEAD/FACE:  Normocephalic, atraumatic. Cranial nerves grossly intact.  CV: RRR with palpation of the radial artery.  PULM: No evidence of respiratory difficulty, symmetric chest rise.  GI:  Soft and non-tender.  BACK: Straight leg raising in the sitting and supine positions is negative to radicular pain.  There is pain with palpation over lumbar facet joints.  Limited ROM on extension.  Positive facet loading bilaterally.  There is pain with palpation to bilateral SI joints.  REGINA is positive on the " right.  EXTREMITIES: Peripheral joint ROM is full and pain free without obvious instability or laxity in all four extremities. No deformities, edema, or skin discoloration. Good capillary refill.  MUSCULOSKELETAL:  Bilateral upper and lower extremity strength is normal and symmetric.  No atrophy or tone abnormalities are noted.  NEURO: Bilateral upper and lower extremity coordination and muscle stretch reflexes are physiologic and symmetric.  Plantar response are downgoing. No clonus.  No loss of sensation is noted.  GAIT: Antalgic.      ASSESSMENT: 77 y.o. year old female with lower back pain consistent with the following diagnoses:     1. Lumbar spondylosis  Ambulatory consult to [facility]   2. Sacroiliac joint pain  Ambulatory consult to [facility]   3. DDD (degenerative disc disease), lumbar  Ambulatory consult to [facility]         PLAN:     - I have stressed the importance of physical activity and a home exercise plan to help with pain and improve health.    - Recent XRAY results reviewed with patient today.    - She is s/p bilateral L4 TF SYLVIA with significant benefit for leg pain.  Her pain today seems mainly due to facet arthropathy.  We discussed MBBs to be followed by [facility] if diagnostic.  She declined at this time secondary to financial obligations.    - I will start the patient in the [facility] Program.    - Can continue Gabapentin 300 mg BID.    - Start Mobic 7.5 mg QD PRN daily.  Recommend lowest dose for shortest duration possible. Pt denies hx of GI ulcers or bleeds, no blood thinners, so significant known cardiac disease, no kidney disease.     - Counseled patient regarding the importance of activity modification, constant sleeping habits and physical therapy.    - RTC in 2-3 months or sooner if needed.      The above plan and management options were discussed at length with patient. Patient is in agreement with the above and verbalized understanding.    Nicole ROJAS  FERNANDO Avery  09/06/2018

## 2018-09-17 ENCOUNTER — TELEPHONE (OUTPATIENT)
Dept: PAIN MEDICINE | Facility: CLINIC | Age: 78
End: 2018-09-17

## 2018-09-17 NOTE — TELEPHONE ENCOUNTER
Staff contacted and spoke to patient regarding her message.     She reports she took the medication three times and the third time. She notice the rash on her thighs. She stopped the medication on Thursday and her rash has went away.

## 2018-09-17 NOTE — TELEPHONE ENCOUNTER
----- Message from Sonali Brown sent at 9/17/2018  1:20 PM CDT -----            Name of Who is Calling:GARY VILLANUEVA [[id number]]      What is the request in detail:meloxicam (MOBIC) 7.5 MG tablet, after taking this medication the pt broke out in a rash. Please call and advise      Can the clinic reply by [facility]: no    What Number to Call Back if not in [facility]: [phone]

## 2018-10-09 ENCOUNTER — CLINICAL SUPPORT (OUTPATIENT)
Dept: REHABILITATION | Facility: OTHER | Age: 78
End: 2018-10-09
Attending: NURSE PRACTITIONER
Payer: MEDICARE

## 2018-10-09 DIAGNOSIS — R53.1 WEAKNESS: ICD-10-CM

## 2018-10-09 DIAGNOSIS — M53.86 DECREASED ROM OF LUMBAR SPINE: ICD-10-CM

## 2018-10-09 DIAGNOSIS — Z74.09 MOBILITY IMPAIRED: ICD-10-CM

## 2018-10-09 PROCEDURE — G8978 MOBILITY CURRENT STATUS: HCPCS | Mod: CL

## 2018-10-09 PROCEDURE — 97162 PT EVAL MOD COMPLEX 30 MIN: CPT

## 2018-10-09 PROCEDURE — 97110 THERAPEUTIC EXERCISES: CPT

## 2018-10-09 PROCEDURE — G8979 MOBILITY GOAL STATUS: HCPCS | Mod: CL

## 2018-10-09 NOTE — PLAN OF CARE
[facility]     Name: Tamiko Youssef  Clinic Number: [id number]      Diagnosis:   Encounter Diagnoses   Name Primary?    Mobility impaired     Decreased ROM of lumbar spine     Weakness         Medical Diagnoses:  M47.816 (ICD-10-CM) - Lumbar spondylosis  M53.3 (ICD-10-CM) - Sacroiliac joint pain  M51.36 (ICD-10-CM) - DDD (degenerative disc disease), lumbar        Physician: Nicole Avery,*     Treatment Orders: PT Eval and Treat    Past Medical History:   Diagnosis Date    Asthma     Hypertension      Current Outpatient Medications   Medication Sig    albuterol (PROVENTIL) 2.5 mg /3 mL (0.083 %) nebulizer solution Take 2.5 mg by nebulization every 6 (six) hours as needed.    ALPRAZolam (XANAX) 0.25 MG tablet Take 0.25 mg by mouth nightly as needed for Anxiety.    amlodipine (NORVASC) 10 MG tablet Take 10 mg by mouth once daily.    aspirin (ECOTRIN) 81 MG EC tablet Take 81 mg by mouth once daily.    BOOSTRIX TDAP 2.5-8-5 Lf-mcg-Lf/0.5mL Syrg injection     diclofenac sodium (VOLTAREN) 1 % Gel Apply 2 g topically 4 (four) times daily as needed.    gabapentin (NEURONTIN) 300 MG capsule TAKE 1 CAPSULE BY MOUTH THREE TIMES DAILY    hydrocodone-acetaminophen 7.5-325mg (NORCO) 7.5-325 mg per tablet     latanoprost 0.005 % ophthalmic solution     meloxicam (MOBIC) 7.5 MG tablet Take 1 tablet (7.5 mg total) by mouth once daily. Take with food.    mirtazapine (REMERON) 15 MG tablet     pravastatin (PRAVACHOL) 20 MG tablet     SYMBICORT 80-4.5 mcg/actuation HFAA     walker Misc 1 application by Misc.(Non-Drug; Combo Route) route once daily.     No current facility-administered medications for this visit.      Review of patient's allergies indicates:   Allergen Reactions    Mobic [meloxicam] Rash    Naprosyn [naproxen]     Norco [hydrocodone-acetaminophen]     Tramadol        Precautions: Fall;  3 mm anterolisthesis of L3 on L4, L4 on L5, and L5 on S1      Pattern of pain determined: 1 PEN    Evaluation Date: 10/9/2018  Authorization Period Expiration: 10/09/2018 to 12/09/2018    Plan of Care Expiration: 10/9/18 to 1/9/19  Reassessment Due: 11/9/18  Visit # / Visits authorized: 1/ 12    Time In: 1315  Time Out: 1430  Total Billable Time: 75 minutes     HISTORY     History of Present Illness: Patient arrives to PT and c/o of severe back pain. It has been going for a long time, and it was gotten a lot worst. She recently had a steroid injection (see below), and she does not have sharp like like before but she has significant aching now. She reports sharp pain down to L thigh and numbness on her feet (but she can feel the ground when she walks).  Patient c/o a soft but very tender knot on her anterior R  Shin region (TTP). She c/o of soreness at B GS (TTP). She has severe pain and difficulty with sit<>stand transfers, bed mobility, and walking (uses a SPC today, but has a RW and the FWW)). She sleeps better on her sides, laying on her back hurts a lot. Patient lives in a 6 story building (on the 5th floor) and has an elevator to go up and down (very severe difficulty with stairs). She takes Hydrocodone to relieve the pain slight, [facility] puts her to sleep. Patient has a 3 mm anterolisthesis of L3 on L4, L4 on L5, and L5 on S1, per imaging studies. She feels comfortable sitting with a pillow behind her back in a chair.       MD's Notes:  Tamiko Youssef presents to the clinic for a follow-up appointment for lower back pain.  She is s/p bilateral L4 TF SYLVIA (transforaminal epidural steroid injection ) with 75% relief of leg pain.  Today, she is complaining of an aching pain across the back.  She denies any radiation.  It prevents her from standing for prolonged periods of time.  She does report that she completed PT in the past which helped.  She tried increasing her Gabapentin but states that it causes significant sedation.  Since the last visit, Tamiko Youssef states the  pain has been worsening. Current pain intensity is 8/10.    Diagnostic Tests:    XR LUMBAR SPINE 5 VIEW WITH FLEX AND EXT  CLINICAL HISTORY:  Low back pain, >6wks conservative tx, persistent-progressive sx, surgical candidate;  Other intervertebral disc degeneration, lumbar region    TECHNIQUE:  Five views of the lumbar spine plus flexion extension views were performed.    COMPARISON:  09/09/2015.    FINDINGS:  There is 3 mm anterolisthesis of L3 on L4, L4 on L5, and L5 on S1.  No translational instability is noted on the flexion and extension radiographs.  Vertebral body heights are well maintained.  There is mild disc space narrowing present at the L3-4, L4-5, and L5-S1 levels.  There is facet arthropathy within the mid to lower lumbar spine and lumbosacral junction.  There is no evidence for spondylolysis.  No abnormal paraspinal masses are evident.  Sacroiliac joints appear unremarkable.     Impression      3 mm anterolisthesis of L3 on L4, L4 and L5, and L5 on S1.  This appears to be degenerative in etiology with no evidence for translational instability.    Lumbar spondylosis.-       XR HIPS BILATERAL 2 VIEW INCL AP PELVIS    CLINICAL HISTORY:  Other intervertebral disc degeneration, lumbar region    TECHNIQUE:  AP view of the pelvis and frogleg lateral views of both hips were performed.    COMPARISON:  03/20/2014.    FINDINGS:  The bones are intact.  There is no evidence for acute fracture or bone destruction.  There are mild symmetric degenerative changes of the hips.  Sacroiliac joints appear unremarkable.  There are degenerative changes within the lower lumbar spine.  Soft tissues are unremarkable.     Impression      No evidence for acute fracture, bone destruction, or dislocation.     MRI Thoracic Spine Without Contrast       Findings: There is a mild dextroscoliosis of the thoracic spine.  The vertebral body heights are satisfactorily maintained.  There is loss of disk height with degenerative endplate  change throughout the thoracic spine.  There are mild disk bulges   identified at T2-3, T3-4, T4-5, T8-9, and a mild disk osteophyte complex at T11-12.  This is mild central canal narrowing at T11-12.  The visualized portion of the intra-abdominal content is significant for a 2.8-cm left-sided renal cyst.  There is no   evidence of an acute marrow replacement process such as tumor or infection.  There is no fracture.  The spinal cord has a normal appearance.  There is no intradural abnormality identified.     Impression      Mild multilevel degenerative change with mild central canal stenosis at T11-12.    Electronically signed by: PATRICIA EDGAR MD  Date: 03/27/14  Time: 14:57      MRI Lumbar Spine Without Contrast      Procedure:  Non-contrast MRI of the lumbosacral spine    Technique: sagittal T1, T2 and STIR and axial T1 and T2 images of the lumbosacral spine without contrast.     Comparison: CT abdomen and pelvis on 1/11/3    Findings: Partially included in the  imaging is left renal lesion.  Please see CT report for further details.    There is trace 1-2 mm of anterior listhesis of L4 on L5 and L5 on S1.  . The lumbar vertebral body heights, contours and bone marrow signal is within normal limits and without evidence for acute fracture or subluxation. There is degenerative disk disease   with disk desiccation all levels there is present degenerative changes are more prominent in the lower thoracic spine partially included in this study specifically at T11/T12 level with moderate to severe height loss.      The distal spinal cord and conus is normal in signal and contour the tip of the conus approximates the inferior F1mpoyx.    T11/T12 including the sagittal field only there is a posterior discussed by complex with slight retrolisthesis of T11 on T12 with probable mild center canal stenosis and mild bilateral neural foraminal stenosis.    T12/L1 through L1/L2: No significant disc bulge, central canal or  neural foraminal stenosis.     L2/L3: No significant disc bulge, central canal or neural foraminal stenosis. No significant disc bulge, central canal or neural foraminal stenosis.    L3/L4: Small disk bulge with ligamentum flavum hypertrophy without significant central canal stenosis with mild neural foraminal stenosis bilaterally.    L4/L5: Bulging disk with ligamentum flavum hypertrophy and facet joint arthropathy without significant central canal stenosis with mild neural foraminal stenosis.      L5/S1: Bulging disk with facet joint arthropathy without significant central canal stenosis with mild bilateral foraminal stenosis.      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     Impression       Mild spondylo-degenerative change of the lumbosacral spine as detailed above without significant central canal stenosis.      Please note there is degenerative change at the lower thoracic spine specifically at the T11/T12 level with posterior disk osteophyte complex resulting in probable mild central canal and mild bilateral foraminal stenosis.    See above for additional details.       Pain Scale: Tamiko rates pain on a scale of 0-10 to be 10 at worst; 8 currently; 6 at best using VAS.   Pain location: Low back is in more pain constantly.    Aggravating factors: Prolonged sitting, standing, walking, supine laying.  Easing Factors: medication, laying on side, ice pack, heat (less effective).  Disturbed Sleep: Severely (2-3 hours of sleep a night)    Pattern of pain questions:  1.  Where is your pain the worst? Low back  2.  Is your pain constant or intermittent? Constant   3.  Does bending forward make your typical pain worse? yes  4.  Since the start of your back pain, has there been a change in your bowel or bladder? Yes, but does not feel like it is related to back (longtime).  5.  What can't you do now that you use to be able to do? Sleep, walk, basic transfers    Prior Treatment: low back and  Hip, 4-5 years ago.   Prior functional status: Had limitations but manageable.   DME owned/used: SPC, RW and FWW  Occupation:  Retired   Leisure: sewing, crotchet and read                      Pts goals:  Be able to be more active and move better with less pain.     Red Flag Screening:   Cough  Sneeze  Strain: (+)  Bladder/ bowel: (+)  Falls: (+) Last year crossing the street  Night pain: (+)  Unexplained weight loss: (--)  General health: Fair    OBJECTIVE     Postural examination/scapula alignment: Slouched posture     Joint integrity: 3 mm anterolisthesis of L3 on L4, L4 on L5, and L5 on S1    Skin integrity/palpation: No bruises or cuts; however soft but very tender knot on her anterior R  Shin region (TTP). She c/o of soreness at B GS (TTP).    Edema: None today; (she takes fluid pills)  Sitting: Slouch  Standing: forward trunk lean  Correction of posture: better with lumbar roll, but placed at mid back region uncomfortable at lumbar region)    MOVEMENT LOSS    ROM Loss   Flexion major loss   Extension major loss   Side bending Right major loss   Side bending Left major loss   Rotation Right major loss   Rotation Left major loss     Gross Lower Extremity Strength: 3+/5 to 4/5    5x Sit<>stand test for functional legs strength: 24 seconds with B UE's use; limited by LBP, LE's weakness and balance disturbance.     Timed Up and Go for mobility, balance, leg strength and falls risk: 25 seconds with SPC, and displayed mod balance disturbance with transition, and mod instability with turns and pivots.      GAIT:  Assistive Device used: straight cane  Level of Assistance: independent  Patient displays the following gait deviations:  unsteady gait, decreased step length, decreased weight shift, antalgic gait and decreased pelvic rotation and stride length..     Special Tests:   Test Name  Test Result   Prone Instability Test (+)   SI Joint Provocation Test (+)   Straight Leg Raise  DNT   Neural Tension Test  DNT    Crossed Straight Leg Raise DNT   Walking on toes (--)   Walking on heels  (--)       NEUROLOGICAL SCREENING     Sensory deficit: Normal sensation B LE's    Reflexes:    Left Right   Patella Tendon 1+ 1+   Achilles Tendon 1+ 1+     Babinski and Clonus: DNT    REPEATED TEST MOVEMENTS:  Repeated Flexion in Standing produced   Repeated Extension in Standing produced   Repeated Flexion in lying Unable to lying on back due to pain   Repeated Extension in lying  Unable to lying on stomach due to pain       STATIC TESTS   Sitting slouched  no worse   Sitting erect no worse  no better   Standing slouched no worse  no better   Standing erect  no worse   Lying prone in extension  Unable to position   Long sitting   worse       Baseline Isometric Testing on Med X equipment: Testing administered by PT  Date of testing: 10/9/18  ROM 9-30 deg   Max Peak Torque 66    Min Peak Torque 18    Flex/Ext Ratio 3.66   % below normative data 70   Counter weight 301   femur 6   Seat pad 2       CMS Impairment/Limitation/Restriction for FOTO Lumbar Survey    Therapist reviewed FOTO scores for Taimko Youssef on 10/9/2018.   FOTO documents entered into [facility] - see Media section.    Limitation Score: 76%  Category: Mobility    Current : CL = least 60% but < 80% impaired, limited or restricted  Goal: CL = least 60% but < 80% impaired, limited or restricted 60%  Discharge:            Treatment       PT Evaluation Completed? Yes  Discussed Plan of Care with patient: Yes      Home Exercise Program as follows:   Handouts were given to the patient. Pt demo fair understanding of the education provided. Tamiko demonstrated fair return demonstration of activities.     - Patient received education regarding proper posture and body mechanics.  Patient was given top 10 tips handout which discusses posture seated, standing, lifting correctly, components of exercise, importance of nutrition and hydration, and importance of sleep.  - [place],  recommended, and purchase information was provided.    - Patient received a handout regarding anticipated muscular soreness following the isometric test and strategies for management were reviewed with patient including stretching, using ice and scheduled rest.     HEP:  Seated Icing 2-3x/day  Seated Tr A activation with 5 sec Holds 10 reps 2-3x/day    Pt was instructed in and performed the following:   Cardiovascular exercise and therapeutic exercise to improve posture, lumbar/cervical ROM, strength, and muscular endurance as follows:     Tamiko received therapeutic exercises to develop/improve posture, lumbar/cervical ROM, strength and muscular endurance for 30 minutes including the following exercises:     [facility] 10/9/2018   Visit Number 1   VAS Pain Rating 6   Lumbar Extension Seat Pad 2   Femur Restraint 6   Top Dead Center 24   Counterweight 301   Lumbar Flexion 30   Lumbar Extension 9   Lumbar Peak Torque 66   Min Torque 18   Test Percent Below Normative Data 70   Lumbar Weight 30   Repetitions 0   Rating of Perceived Exertion 0   Ice - Sitting 10       Tamiko received the following manual therapy techniques:  were applied to the: low back for 00 minutes.      Assessment     This is a 77 y.o. female referred to [facility] and presents with a medical diagnoses of Lumbar spondylosis, Sacroiliac joint pain, and DDD (degenerative disc disease), lumbar. She has a 3 mm anterolisthesis of L3 on L4, L4 on L5, and L5 on S1, per imaging studies and demonstrates limitations as described below in the problem list. Pt rehab potential is Fair to good. Pt presents with moderate to severe difficulty and pain with basic transfers, ambulation capacity, and bed mobility. Patient presents with major limitations with Lumbar active ROM, gross LE's strength and functional legs strength. She completed the 5x Sit<>stand test for functional legs strength at 24 seconds, with B UE's use; and reported increased LBP,  displaying LE's weakness and balance disturbances. She completed the Timed Up and Go for mobility, balance, leg strength and falls risk at 25 seconds with SPC, and displayed mod balance disturbance with transitions, and mod instability with turns and pivots. Patient is a high falls risk given the above findings. She uses her SPC outside of her house, but she also owns a RW with a sit and a FWW. Sh was instructed to use the RW while outside of her home (she lives on a 5th floor of an apartment building and uses elevators). Patient presented with significant back extensor weakness, at 70% below normative data. Her peak torque was 66 ft-lbs and min was at 18 ft-lbs, indication poor activation and strength. Patient will benefit greatly from this program, in order to improve capacity for mobility/ambulation, improve functional activity tolerance and functional legs strength and balance.     Pain Pattern: 1 PEN    Patient received education on the [facility] program, purpose of the isometric test, progression of back strengthening as well as wellness approach and systemic strengthening.  Details of the program were discussed.  Reviewed that patient should feel support/pressure from med ex restraints but no pain or discomfort and patient expressed understanding.    Based on the above history and physical examination an active physical therapy program is recommended.  Pt will continue to benefit from skilled outpatient physical therapy to address the deficits listed below in the chart, provide pt/family education and to maximize pt's level of independence in the home and community environment. .     No environmental, cultural, spiritual, developmental or education needs expressed or noted    Medical necessity is demonstrated by the following problem list.    Pt presents with the following impairments:     History  Co-morbidities and personal factors that may impact the plan of care Co-morbidities:   advanced age ;  Multiple co-morbidities.    Thoracic or lumbosacral neuritis or radiculitis     Spondylolysis of lumbar region     Spondylosis of lumbosacral joint without myelopathy     Lumbar facet arthropathy     Sacroiliitis     3 mm anterolisthesis of L3 on L4, L4 on L5, and L5 on S1    Lumbar radiculopathy     Arthritis of right knee     Chronic pain           Personal Factors:   age  lifestyle; sedentary     moderate   Examination  Body Structures and Functions, activity limitations and participation restrictions that may impact the plan of care Body Regions:   back  lower extremities  trunk    Body Systems:    ROM  strength  balance  gait  transfers  transitions    Participation Restrictions:   Mod to Severely limited with mobility    Activity limitations:   Learning and applying knowledge  no deficits    General Tasks and Commands  no deficits    Communication  no deficits    Mobility  lifting and carrying objects  walking  driving (bike, car, motorcycle)  Basic transfers, stairs.    Self care  washing oneself (bathing, drying, washing hands)  caring for body parts (brushing teeth, shaving, grooming)  toileting  dressing  looking after one's health    Domestic Life  shopping  cooking  doing house work (cleaning house, washing dishes, laundry)  assisting others    Interactions/Relationships  basic interpersonal interactions    Life Areas  no deficits    Community and Social Life  recreation and leisure         moderate   Clinical Presentation stable and uncomplicated moderate   Decision Making/ Complexity Score: moderate       GOALS: Pt is in agreement with the following goals.    Short term goals:  6 weeks or 10 visits   1.  Pt will demonstrate increased lumbar ROM by at least 3 degrees from the initial ROM value with improvements noted in functional ROM and ability to perform ADLs  2.  Pt will demonstrate increased maximum isometric torque value by 10% when compared to the initial value resulting in improved ability to  "perform bending, lifting, and carrying activities safely, confidently.    3.  Patient report a reduction in worst pain score by 1-2 points for improved tolerance during work and recreational activities  4.  Pt able to perform HEP correctly with minimal cueing or supervision for therapist      Long term goals: 13 weeks or 20 visits   1. Pt will demonstrate increased lumbar ROM by at least 6 degrees from initial ROM value, resulting in improved ability to perform functional fwd bending while standing and sitting.   2. Pt will demonstrate increased maximum isometric torque value by 20% when compared to the initial value resulting in improved ability to perform bending, lifting, and carrying activities safely, confidently.  3. Pt to demonstrate ability to independently control and reduce their pain through posture positioning and mechanical movements throughout a typical day.  4.  Patient will demonstrate improved overall function per FOTO Survey to CL = least 60% but < 80% impaired, limited or restricted score or less.  5. Patient will improve 5x STS test for functional mobility at 18 seconds or less w/o UE's, in order to demonstrate improve strength  6. Patient will improve Tmed Up and Go for mobility, balance, leg strength and falls risk to 20 seconds or less, with least restrictive AD, and displaying decreased balance disturbance with transitions, and decreased instability with turns and pivots.    Plan   Outpatient physical therapy 2x week for 13 weeks or 20 visits to include the following:   - Patient education  - Therapeutic exercise  - Manual therapy  - Performance testing   - Neuromuscular Re-education  - Therapeutic activity   - Modalities    Pt may be seen by PTA as part of the rehabilitation team.     Therapist: Renae Zarate, PT  10/9/2018    "I certify the need for these services furnished under this plan of treatment and while under my care."    ____________________________________  Physician/Referring " Practitioner    _______________  Date of Signature

## 2018-10-10 ENCOUNTER — OFFICE VISIT (OUTPATIENT)
Dept: OPTOMETRY | Facility: CLINIC | Age: 78
End: 2018-10-10
Payer: MEDICARE

## 2018-10-10 DIAGNOSIS — H40.1131 PRIMARY OPEN ANGLE GLAUCOMA (POAG) OF BOTH EYES, MILD STAGE: Primary | ICD-10-CM

## 2018-10-10 DIAGNOSIS — Z96.1 PSEUDOPHAKIA OF BOTH EYES: ICD-10-CM

## 2018-10-10 PROCEDURE — 99999 PR PBB SHADOW E&M-EST. PATIENT-LVL II: CPT | Mod: PBBFAC,,, | Performed by: OPTOMETRIST

## 2018-10-10 PROCEDURE — 99212 OFFICE O/P EST SF 10 MIN: CPT | Mod: PBBFAC | Performed by: OPTOMETRIST

## 2018-10-10 PROCEDURE — 92004 COMPRE OPH EXAM NEW PT 1/>: CPT | Mod: S$PBB,,, | Performed by: OPTOMETRIST

## 2018-10-10 RX ORDER — LATANOPROST 50 UG/ML
1 SOLUTION/ DROPS OPHTHALMIC NIGHTLY
Qty: 3 BOTTLE | Refills: 3 | Status: SHIPPED | OUTPATIENT
Start: 2018-10-10

## 2018-10-10 NOTE — PROGRESS NOTES
Assessment /Plan     For exam results, see Encounter Report.    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  Dispense: 3 Bottle; Refill: 3    Pseudophakia of both eyes          1.  Continue Latanoprost QHS OU--refills sent to pharmacy.  Sent for old records.    2.  Bifocal rx given.  Eye health normal OU.        RTC 3 months for IOP check and review old records.                 10/12/18:  Received old records.  Last OCT done 5/30/18.  Last HVF done 7/14/18.

## 2018-10-12 ENCOUNTER — CLINICAL SUPPORT (OUTPATIENT)
Dept: REHABILITATION | Facility: OTHER | Age: 78
End: 2018-10-12
Attending: NURSE PRACTITIONER
Payer: MEDICARE

## 2018-10-12 DIAGNOSIS — Z74.09 MOBILITY IMPAIRED: ICD-10-CM

## 2018-10-12 DIAGNOSIS — R53.1 WEAKNESS: ICD-10-CM

## 2018-10-12 DIAGNOSIS — M53.86 DECREASED ROM OF LUMBAR SPINE: ICD-10-CM

## 2018-10-12 PROCEDURE — 97110 THERAPEUTIC EXERCISES: CPT

## 2018-10-12 NOTE — PROGRESS NOTES
[facility] Treatment      Name: Tamiko Youssef  Clinic Number: [id number]  Date of Treatment: 10/12/2018   Diagnosis:   Encounter Diagnoses   Name Primary?    Mobility impaired     Decreased ROM of lumbar spine     Weakness      Physician: Nicole Avery,*    Pain pattern determined: 1 PEN  Plan of care signed: 10/10/18   Time in: 1040am  Time Out: 1130am  Total Treatment time: 50  Precautions: Fall;  3 mm anterolisthesis of L3 on L4, L4 on L5, and L5 on S1, HTN  Visit #: 2    POC due:1/09/19  Reassessment due:11/09/18    Subjective   Tamiko reports she is having spasms on the left side today.   Pt reports she has tried to perform HEP    Patient reports their pain to be 8/10 on a 0-10 scale with 0 being no pain and 10 being the worst pain imaginable.    Pain Location: LLB/buttocks   Occupation:  Retired   Leisure: sewing, crotchet and read                      Pts goals:  Be able to be more active and move better with less pain.        Objective     Baseline Isometric Testing on Med X equipment: Testing administered by PT  Date of testing: 10/9/18  ROM 9-30 deg   Max Peak Torque 66    Min Peak Torque 18    Flex/Ext Ratio 3.66   % below normative data 70   Counter weight 301   femur 6   Seat pad 2         CMS Impairment/Limitation/Restriction for FOTO Lumbar Survey     Therapist reviewed FOTO scores for Tamiko Youssef on 10/9/2018.   FOTO documents entered into [employer] - see Media section.     Limitation Score: 76%  Category: Mobility     Current : CL = least 60% but < 80% impaired, limited or restricted  Goal: CL = least 60% but < 80% impaired, limited or restricted 60%  Discharge:              Treatment    Pt was instructed in and performed the following:     Tamiko received therapeutic exercises to develop/improved posture, cardiovascular endurance, muscular endurance, lumbar/cervical ROM, strength and muscular endurance for 30 minutes including the following exercises:     [facility]  Therapy 10/12/2018   Visit Number 2   VAS Pain Rating 8   Time 3   Flexion in Sitting 10   Lumbar Extension Seat Pad -   Femur Restraint -   Top Dead Center -   Counterweight -   Lumbar Flexion -   Lumbar Extension -   Lumbar Peak Torque -   Min Torque -   Test Percent Below Normative Data -   Lumbar Weight 30   Repetitions 15   Rating of Perceived Exertion 4   Ice - Sitting 10     Flex in sitting with ball 10x  Attempt seated trunk rotation    Peripheral muscle strengthening which included 1 set of 15-20 repetitions at a slow, controlled 7 second per rep pace focused on strengthening supporting musculature for improved body mechanics and functional mobility.  Pt and therapist focused on proper form during treatment to ensure optimal strengthening of each targeted muscle group.  Machines were utilized including torso rotation, leg extension, leg curl, chest press, upright row. Tricep extension, bicep curl, leg press, and hip abduction added on third visit.       Home Exercise Program as follows:   Seated Tr A activation with 5 sec Holds 10 reps 2-3x/day     Handouts were given to the patient. Pt demo fair understanding of the education provided. Tamiko demonstrated fair return demonstration of activities.     Lumbar roll use compliance: unknown  Additional exercises taught this treatment session: added seated flexion    Assessment     Pt tolerated treatment fair today.  Only able to tolerate 3 min of CV activity on the recumbent bike due to fatigue.  Reviewed TA exercise in sitting, max verbal/tactile cues needed . Continue to review.  Added gentle fwd flexion in sitting with swissball.  Encouraged pt to continue stretching at home and to ice frequently to help decrease pain.  Pt understood.  Pt had some SOB during peripheral strengthening, frequent rest breaks required.  Patient is making good progress towards established goals.  Pt will continue to benefit from skilled outpatient physical therapy to address the  deficits stated in the impairment chart, provide pt/family education and to maximize pt's level of independence in the home and community environment.       Pt's spiritual, cultural and educational needs considered and pt agreeable to plan of care and goals as stated below:     Medical necessity is demonstrated by the following problem list.    Pt presents with the following impairments:      History  Co-morbidities and personal factors that may impact the plan of care Co-morbidities:   advanced age ; Multiple co-morbidities.     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      Lumbar facet arthropathy      Sacroiliitis      3 mm anterolisthesis of L3 on L4, L4 on L5, and L5 on S1     Lumbar radiculopathy      Arthritis of right knee      Chronic pain               Personal Factors:   age  lifestyle; sedentary       moderate   Examination  Body Structures and Functions, activity limitations and participation restrictions that may impact the plan of care Body Regions:   back  lower extremities  trunk     Body Systems:    ROM  strength  balance  gait  transfers  transitions     Participation Restrictions:   Mod to Severely limited with mobility     Activity limitations:   Learning and applying knowledge  no deficits     General Tasks and Commands  no deficits     Communication  no deficits     Mobility  lifting and carrying objects  walking  driving (bike, car, motorcycle)  Basic transfers, stairs.     Self care  washing oneself (bathing, drying, washing hands)  caring for body parts (brushing teeth, shaving, grooming)  toileting  dressing  looking after one's health     Domestic Life  shopping  cooking  doing house work (cleaning house, washing dishes, laundry)  assisting others     Interactions/Relationships  basic interpersonal interactions     Life Areas  no deficits     Community and Social Life  recreation and  leisure             moderate   Clinical Presentation stable and uncomplicated moderate   Decision Making/ Complexity Score: moderate         GOALS: Pt is in agreement with the following goals.     Short term goals:  6 weeks or 10 visits   1.  Pt will demonstrate increased lumbar ROM by at least 3 degrees from the initial ROM value with improvements noted in functional ROM and ability to perform ADLs  2.  Pt will demonstrate increased maximum isometric torque value by 10% when compared to the initial value resulting in improved ability to perform bending, lifting, and carrying activities safely, confidently.     3.  Patient report a reduction in worst pain score by 1-2 points for improved tolerance during work and recreational activities  4.  Pt able to perform HEP correctly with minimal cueing or supervision for therapist        Long term goals: 13 weeks or 20 visits   1. Pt will demonstrate increased lumbar ROM by at least 6 degrees from initial ROM value, resulting in improved ability to perform functional fwd bending while standing and sitting.   2. Pt will demonstrate increased maximum isometric torque value by 20% when compared to the initial value resulting in improved ability to perform bending, lifting, and carrying activities safely, confidently.  3. Pt to demonstrate ability to independently control and reduce their pain through posture positioning and mechanical movements throughout a typical day.  4.  Patient will demonstrate improved overall function per FOTO Survey to CL = least 60% but < 80% impaired, limited or restricted score or less.  5. Patient will improve 5x STS test for functional mobility at 18 seconds or less w/o UE's, in order to demonstrate improve strength  6. Patient will improve Tmed Up and Go for mobility, balance, leg strength and falls risk to 20 seconds or less, with least restrictive AD, and displaying decreased balance disturbance with transitions, and decreased instability with  turns and pivots.         Plan   Continue with established Plan of Care towards established PT goals.

## 2018-11-06 ENCOUNTER — OFFICE VISIT (OUTPATIENT)
Dept: PAIN MEDICINE | Facility: CLINIC | Age: 78
End: 2018-11-06
Payer: MEDICARE

## 2018-11-06 VITALS
RESPIRATION RATE: 18 BRPM | BODY MASS INDEX: 31.65 KG/M2 | DIASTOLIC BLOOD PRESSURE: 76 MMHG | HEART RATE: 95 BPM | SYSTOLIC BLOOD PRESSURE: 139 MMHG | HEIGHT: 62 IN | TEMPERATURE: 98 F | WEIGHT: 172 LBS

## 2018-11-06 DIAGNOSIS — M54.16 LUMBAR RADICULOPATHY: ICD-10-CM

## 2018-11-06 DIAGNOSIS — M47.816 LUMBAR FACET ARTHROPATHY: ICD-10-CM

## 2018-11-06 DIAGNOSIS — M79.10 MYALGIA: ICD-10-CM

## 2018-11-06 DIAGNOSIS — M47.817 SPONDYLOSIS OF LUMBOSACRAL JOINT WITHOUT MYELOPATHY: Primary | ICD-10-CM

## 2018-11-06 DIAGNOSIS — M51.36 DDD (DEGENERATIVE DISC DISEASE), LUMBAR: ICD-10-CM

## 2018-11-06 PROCEDURE — 3078F DIAST BP <80 MM HG: CPT | Mod: CPTII,S$GLB,, | Performed by: NURSE PRACTITIONER

## 2018-11-06 PROCEDURE — 99999 PR PBB SHADOW E&M-EST. PATIENT-LVL III: CPT | Mod: PBBFAC,,, | Performed by: NURSE PRACTITIONER

## 2018-11-06 PROCEDURE — 20553 NJX 1/MLT TRIGGER POINTS 3/>: CPT | Mod: S$GLB,,, | Performed by: NURSE PRACTITIONER

## 2018-11-06 PROCEDURE — 99213 OFFICE O/P EST LOW 20 MIN: CPT | Mod: 25,S$GLB,, | Performed by: NURSE PRACTITIONER

## 2018-11-06 PROCEDURE — 1101F PT FALLS ASSESS-DOCD LE1/YR: CPT | Mod: CPTII,S$GLB,, | Performed by: NURSE PRACTITIONER

## 2018-11-06 PROCEDURE — 3075F SYST BP GE 130 - 139MM HG: CPT | Mod: CPTII,S$GLB,, | Performed by: NURSE PRACTITIONER

## 2018-11-06 RX ORDER — DOXEPIN HYDROCHLORIDE 50 MG/G
CREAM TOPICAL
COMMUNITY
Start: 2018-10-22 | End: 2019-04-17

## 2018-11-06 RX ORDER — BUPIVACAINE HYDROCHLORIDE 2.5 MG/ML
9 INJECTION, SOLUTION EPIDURAL; INFILTRATION; INTRACAUDAL ONCE
Status: COMPLETED | OUTPATIENT
Start: 2018-11-06 | End: 2018-11-06

## 2018-11-06 RX ORDER — METHYLPREDNISOLONE ACETATE 40 MG/ML
40 INJECTION, SUSPENSION INTRA-ARTICULAR; INTRALESIONAL; INTRAMUSCULAR; SOFT TISSUE ONCE
Status: COMPLETED | OUTPATIENT
Start: 2018-11-06 | End: 2018-11-06

## 2018-11-06 RX ORDER — CITALOPRAM 10 MG/1
TABLET ORAL
Refills: 2 | COMMUNITY
Start: 2018-07-30 | End: 2019-04-17

## 2018-11-06 RX ORDER — FUROSEMIDE 20 MG/1
TABLET ORAL
Refills: 2 | COMMUNITY
Start: 2018-10-25

## 2018-11-06 RX ORDER — ALBUTEROL SULFATE 90 UG/1
AEROSOL, METERED RESPIRATORY (INHALATION)
Refills: 1 | COMMUNITY
Start: 2018-10-22 | End: 2021-07-23 | Stop reason: SDUPTHER

## 2018-11-06 RX ADMIN — METHYLPREDNISOLONE ACETATE 40 MG: 40 INJECTION, SUSPENSION INTRA-ARTICULAR; INTRALESIONAL; INTRAMUSCULAR; SOFT TISSUE at 10:11

## 2018-11-06 RX ADMIN — BUPIVACAINE HYDROCHLORIDE 22.5 MG: 2.5 INJECTION, SOLUTION EPIDURAL; INFILTRATION; INTRACAUDAL at 10:11

## 2018-11-06 NOTE — PROGRESS NOTES
Chronic patient Established Note (Follow up visit)      SUBJECTIVE:    Tamiko Youssef presents to the clinic for a follow-up appointment for lower back pain.  Her pain today is primarily located across the lower and middle back.  She is not currently having radiation.  She previously had benefit with bilateral L4 TF SYLVIA with 75% relief of leg pain.  She recently started [facility] which she is tolerating well.  We discussed lumbar MBBs but she was worried about her out of pocket expense.  She stopped Mobic because it caused a rash.  Since the last visit, Tamiko Youssef states the pain has been worsening. Current pain intensity is 8/10.    Pain Disability Index Review:  Last 3 PDI Scores 11/6/2018 9/6/2018 8/9/2018   Pain Disability Index (PDI) 50 56 56       Pain Medications:  Gabapentin 300 mg BID    Opioid Contract: yes     report:  Reviewed and consistent with medication use as prescribed.    Pain Procedures:   3/28/14, 3/13/13 Bilateral L4 TF SYLVIA  8/22/18 Bilateral L4 TF SYLVIA- 75% relief    Physical Therapy/Home Exercise: no    Imaging:     Narrative     EXAMINATION:  XR LUMBAR SPINE 5 VIEW WITH FLEX AND EXT    CLINICAL HISTORY:  Low back pain, >6wks conservative tx, persistent-progressive sx, surgical candidate;  Other intervertebral disc degeneration, lumbar region    TECHNIQUE:  Five views of the lumbar spine plus flexion extension views were performed.    COMPARISON:  09/09/2015.    FINDINGS:  There is 3 mm anterolisthesis of L3 on L4, L4 on L5, and L5 on S1.  No translational instability is noted on the flexion and extension radiographs.  Vertebral body heights are well maintained.  There is mild disc space narrowing present at the L3-4, L4-5, and L5-S1 levels.  There is facet arthropathy within the mid to lower lumbar spine and lumbosacral junction.  There is no evidence for spondylolysis.  No abnormal paraspinal masses are evident.  Sacroiliac joints appear unremarkable.      Impression       3 mm  anterolisthesis of L3 on L4, L4 and L5, and L5 on S1.  This appears to be degenerative in etiology with no evidence for translational instability.    Lumbar spondylosis.-     Narrative     EXAMINATION:  XR HIPS BILATERAL 2 VIEW INCL AP PELVIS    CLINICAL HISTORY:  Other intervertebral disc degeneration, lumbar region    TECHNIQUE:  AP view of the pelvis and frogleg lateral views of both hips were performed.    COMPARISON:  03/20/2014.    FINDINGS:  The bones are intact.  There is no evidence for acute fracture or bone destruction.  There are mild symmetric degenerative changes of the hips.  Sacroiliac joints appear unremarkable.  There are degenerative changes within the lower lumbar spine.  Soft tissues are unremarkable.      Impression       No evidence for acute fracture, bone destruction, or dislocation.         MRI Thoracic Spine Without Contrast     Narrative     MRI thoracic spine without contrast.    Findings: There is a mild dextroscoliosis of the thoracic spine.  The vertebral body heights are satisfactorily maintained.  There is loss of disk height with degenerative endplate change throughout the thoracic spine.  There are mild disk bulges   identified at T2-3, T3-4, T4-5, T8-9, and a mild disk osteophyte complex at T11-12.  This is mild central canal narrowing at T11-12.  The visualized portion of the intra-abdominal content is significant for a 2.8-cm left-sided renal cyst.  There is no   evidence of an acute marrow replacement process such as tumor or infection.  There is no fracture.  The spinal cord has a normal appearance.  There is no intradural abnormality identified.      Impression      Mild multilevel degenerative change with mild central canal stenosis at T11-12.      Electronically signed by: PATRICIA EDGAR MD  Date: 03/27/14  Time: 14:57      MRI Lumbar Spine Without Contrast    Narrative     Procedure:  Non-contrast MRI of the lumbosacral spine    Technique: sagittal T1, T2 and STIR and axial  T1 and T2 images of the lumbosacral spine without contrast.     Comparison: CT abdomen and pelvis on 1/11/3    Findings: Partially included in the  imaging is left renal lesion.  Please see CT report for further details.    There is trace 1-2 mm of anterior listhesis of L4 on L5 and L5 on S1.  . The lumbar vertebral body heights, contours and bone marrow signal is within normal limits and without evidence for acute fracture or subluxation. There is degenerative disk disease   with disk desiccation all levels there is present degenerative changes are more prominent in the lower thoracic spine partially included in this study specifically at T11/T12 level with moderate to severe height loss.        The distal spinal cord and conus is normal in signal and contour the tip of the conus approximates the inferior [id number].    T11/T12 including the sagittal field only there is a posterior discussed by complex with slight retrolisthesis of T11 on T12 with probable mild center canal stenosis and mild bilateral neural foraminal stenosis.    T12/L1 through L1/L2: No significant disc bulge, central canal or neural foraminal stenosis.     L2/L3: No significant disc bulge, central canal or neural foraminal stenosis. No significant disc bulge, central canal or neural foraminal stenosis.    L3/L4: Small disk bulge with ligamentum flavum hypertrophy without significant central canal stenosis with mild neural foraminal stenosis bilaterally.    L4/L5: Bulging disk with ligamentum flavum hypertrophy and facet joint arthropathy without significant central canal stenosis with mild neural foraminal stenosis.      L5/S1: Bulging disk with facet joint arthropathy without significant central canal stenosis with mild bilateral foraminal stenosis.      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      Impression       Mild spondylo-degenerative change of the lumbosacral spine as detailed above without significant  central canal stenosis.      Please note there is degenerative change at the lower thoracic spine specifically at the T11/T12 level with posterior disk osteophyte complex resulting in probable mild central canal and mild bilateral foraminal stenosis.    See above for additional details.      Electronically signed by: ALEKSANDER POLLACK DO  Date: 04/09/13  Time: 10:58          Allergies:   Review of patient's allergies indicates:   Allergen Reactions    Naprosyn [naproxen]     Norco [hydrocodone-acetaminophen]     Tramadol        Current Medications:   Current Outpatient Medications   Medication Sig Dispense Refill    albuterol (PROVENTIL) 2.5 mg /3 mL (0.083 %) nebulizer solution Take 2.5 mg by nebulization every 6 (six) hours as needed.      ALPRAZolam (XANAX) 0.25 MG tablet Take 0.25 mg by mouth nightly as needed for Anxiety.      amlodipine (NORVASC) 10 MG tablet Take 10 mg by mouth once daily.      aspirin (ECOTRIN) 81 MG EC tablet Take 81 mg by mouth once daily.      BOOSTRIX TDAP 2.5-8-5 Lf-mcg-Lf/0.5mL Syrg injection       diclofenac sodium (VOLTAREN) 1 % Gel Apply 2 g topically 4 (four) times daily as needed. 3 Tube 1    gabapentin (NEURONTIN) 300 MG capsule TAKE 1 CAPSULE BY MOUTH THREE TIMES DAILY 270 capsule 2    hydrocodone-acetaminophen 7.5-325mg (NORCO) 7.5-325 mg per tablet   0    latanoprost 0.005 % ophthalmic solution Place 1 drop into both eyes every evening. 3 Bottle 3    meloxicam (MOBIC) 7.5 MG tablet Take 1 tablet (7.5 mg total) by mouth once daily. Take with food. 30 tablet 2    mirtazapine (REMERON) 15 MG tablet   2    pravastatin (PRAVACHOL) 20 MG tablet   1    SYMBICORT 80-4.5 mcg/actuation HFAA   6    walker Misc 1 application by Misc.(Non-Drug; Combo Route) route once daily. 1 each 0     No current facility-administered medications for this visit.        REVIEW OF SYSTEMS:    Constitutional: Positive for unexpected weight change.   HENT: Positive for headache.   Respiratory:  "Positive for intermittent shortness of breath and wheezing.  (asthma)  Gastrointestinal: Positive for constipation.   Musculoskeletal: Positive for back pain, gait problem and stiffness.     MEDICAL, SURGICAL, FAMILY, SOCIAL HX: reviewed    Past Medical History:  Past Medical History:   Diagnosis Date    Asthma     Hypertension        Past Surgical History:  Past Surgical History:   Procedure Laterality Date    CHOLECYSTECTOMY      SYLVIA-TRANSFORAMINAL Bilateral 3/28/2014    [facility]    EYE SURGERY      HYSTERECTOMY      INJECTION,STEROID,EPIDURAL,TRANSFORAMINAL APPROACH Bilateral 8/22/2018    [facility] at [facility]    INJECTION-STEROID-EPIDURAL-TRANSFORAMINAL Bilateral 3/13/2013    Performed by Uri [facility] at [facility]    NECK SURGERY      cyst removed       Family History:  History reviewed. No pertinent family history.    Social History:  Social History     Socioeconomic History    Marital status: Single     Spouse name: None    Number of children: None    Years of education: None    Highest education level: None   Social Needs    Financial resource strain: None    Food insecurity - worry: None    Food insecurity - inability: None    Transportation needs - medical: None    Transportation needs - non-medical: None   Occupational History    None   Tobacco Use    Smoking status: Never Smoker   Substance and Sexual Activity    Alcohol use: No    Drug use: None    Sexual activity: None   Other Topics Concern    None   Social History Narrative    None       OBJECTIVE:    /76   Pulse 95   Temp 98.3 °F (36.8 °C) (Oral)   Resp 18   Ht 5' 2" (1.575 m)   Wt 78 kg (172 lb)   BMI 31.46 kg/m²     PHYSICAL EXAMINATION:    GENERAL: Well appearing, in no acute distress, alert and oriented x3.  PSYCH:  Mood and affect appropriate.  SKIN: Skin color, texture, turgor normal, no rashes or lesions.  HEAD/FACE:  " Normocephalic, atraumatic. Cranial nerves grossly intact.  CV: RRR with palpation of the radial artery.  PULM: No evidence of respiratory difficulty, symmetric chest rise.  GI:  Soft and non-tender.  BACK: Straight leg raising in the sitting and supine positions is negative to radicular pain.  Painful palpation to thoracic paraspinals.  There is pain with palpation over lumbar facet joints and paraspinals.  Limited ROM on extension and flexion.  Positive facet loading bilaterally.  There is pain with palpation to bilateral SI joints.  REGINA is positive bilaterally.  EXTREMITIES: Peripheral joint ROM is full and pain free without obvious instability or laxity in all four extremities. No deformities, edema, or skin discoloration. Good capillary refill.  MUSCULOSKELETAL:  Bilateral upper and lower extremity strength is normal and symmetric.  No atrophy or tone abnormalities are noted.  NEURO: Bilateral upper and lower extremity coordination and muscle stretch reflexes are physiologic and symmetric.  Plantar response are downgoing. No clonus.  No loss of sensation is noted.  GAIT: Antalgic- ambulates with walker.      ASSESSMENT: 77 y.o. year old female with lower back pain consistent with the following diagnoses:     1. 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 Lumbar radiculopathy     3. Lumbar facet arthropathy     4. DDD (degenerative disc disease), lumbar           PLAN:     - I have stressed the importance of physical activity and a home exercise plan to help with pain and improve health.    - Previous imaging was reviewed and discussed with the patient today.    - She will continue with [facility] Program.    - Can continue Gabapentin 300 mg BID.    - Will give TPIs today as below.  Consider bilateral L2,3,4,5 MBBs which she declined today.    - Counseled patient regarding the importance of activity modification, constant sleeping habits and physical therapy.    - RTC in 2 months or sooner if  needed.      The above plan and management options were discussed at length with patient. Patient is in agreement with the above and verbalized understanding.    FERNANDO Navarrete  11/06/2018     Trigger Point Injection:   The procedure was discussed with the patient including complications of nerve damage,  bleeding, infection, and failure of pain relief.   Trigger points were identified by palpation and marked. Alcohol prep of sites done. A mixture of 9mL 0.25% bupivacaine +40mg Depo-Medrol was prepared (10 mL total).   A 27-gauge needle was advanced to the point of maximal tenderness, and medication was injected after negative aspiration. All sites done in the same manner. Patient tolerated the procedure well and without complications. Sites injected included: bilateral thoracic paraspinals, bilateral lumbar paraspinals and area surrounding SI joints (6 sites total).

## 2018-11-08 ENCOUNTER — CLINICAL SUPPORT (OUTPATIENT)
Dept: REHABILITATION | Facility: OTHER | Age: 78
End: 2018-11-08
Attending: NURSE PRACTITIONER
Payer: MEDICARE

## 2018-11-08 DIAGNOSIS — Z74.09 MOBILITY IMPAIRED: ICD-10-CM

## 2018-11-08 DIAGNOSIS — R53.1 WEAKNESS: ICD-10-CM

## 2018-11-08 DIAGNOSIS — M53.86 DECREASED ROM OF LUMBAR SPINE: ICD-10-CM

## 2018-11-08 PROCEDURE — 97110 THERAPEUTIC EXERCISES: CPT

## 2018-11-08 NOTE — PROGRESS NOTES
[facility]      Name: Tamiko Youssef  Clinic Number: [id number]  Date of Treatment: 11/08/2018   Diagnosis:   No diagnosis found.  Physician: Nicole Avery,*    Pain pattern determined: 1 PEN  Plan of care signed: 10/10/18   Time in: 1030  Time Out: 1130  Total Treatment time: 60  Precautions: Fall;  3 mm anterolisthesis of L3 on L4, L4 on L5, and L5 on S1, HTN  Visit #: 3    POC due:1/09/19  Reassessment due:11/09/18 Completed 11/8/18  Next Due: 12/8/18      Subjective   Tamiko arrives to PT today and reports having cortisone injections at her back this past Tuesday. She reports feeling much better. She rates pain at 5/10 currently.     Patient reports their pain to be 5/10 on a 0-10 scale with 0 being no pain and 10 being the worst pain imaginable.    Pain Location: LLB/buttocks   Occupation:  Retired   Leisure: sewing, crotchet and read                      Pts goals:  Be able to be more active and move better with less pain.        Objective     MOVEMENT LOSS 11/8/18     ROM Loss   Flexion min loss   Extension major loss   Side bending Right mod loss   Side bending Left mod loss   Rotation Right min loss   Rotation Left min loss      11/8/18  Gross Lower Extremity Strength: 3+/5 to 4/5  5x Sit<>stand test for functional legs strength: 24 seconds with B UE's use; limited by LBP, LE's weakness and balance disturbance.     Baseline Isometric Testing on Med X equipment: Testing administered by PT  Date of testing: 10/9/18  ROM 9-30 deg   Max Peak Torque 66    Min Peak Torque 18    Flex/Ext Ratio 3.66   % below normative data 70   Counter weight 301   femur 6   Seat pad 2         CMS Impairment/Limitation/Restriction for FOTO Lumbar Survey     Therapist reviewed FOTO scores for Tamiko Youssef on 10/9/2018.   FOTO documents entered into [employer] - see Media section.     Limitation Score: 76%  Category: Mobility     Current : CL = least 60% but < 80% impaired, limited or  restricted  Goal: CL = least 60% but < 80% impaired, limited or restricted 60%  Discharge:              Treatment    Pt was instructed in and performed the following:     Tamiko received therapeutic exercises to develop/improved posture, cardiovascular endurance, muscular endurance, lumbar/cervical ROM, strength and muscular endurance for 60 minutes including the following exercises:     [facility] 11/8/2018   Visit Number 3   VAS Pain Rating 2   Time 5   Flexion in Sitting 10   Lumbar Extension Seat Pad -   Femur Restraint -   Top Dead Center -   Counterweight -   Lumbar Flexion -   Lumbar Extension -   Lumbar Peak Torque -   Min Torque -   Test Percent Below Normative Data -   Lumbar Weight 30   Repetitions 20   Rating of Perceived Exertion 3   Ice - Sitting 10         Flex in sitting with ball 10x  Attempt seated trunk rotation    Peripheral muscle strengthening which included 1 set of 15-20 repetitions at a slow, controlled 7 second per rep pace focused on strengthening supporting musculature for improved body mechanics and functional mobility.  Pt and therapist focused on proper form during treatment to ensure optimal strengthening of each targeted muscle group.  Machines were utilized including torso rotation, leg extension, leg curl, chest press, upright row. Tricep extension, bicep curl, leg press, and hip abduction added on third visit.       Home Exercise Program as follows:   Seated Tr A activation with 5 sec Holds 10 reps 2-3x/day     Handouts were given to the patient. Pt demo fair understanding of the education provided. Tamiko demonstrated fair return demonstration of activities.     Lumbar roll use compliance: unknown  Additional exercises taught this treatment session: added seated flexion    Assessment       Patient displayed improved exercise tolerance today. She had an injection on her back a few days ago and she is feeling less pain and more mobile at the low back and hips. Pain decreased  to 2/10 following warm up and FIS with ball. She completed 20 reps today, at 30# with an RPE of 3. She had no new concerns apart from SOB with bike. She displayed good improvement with lumbar spine AROM. No changes were observed with functional legs strength with 5x STS test.     Patient is making good progress towards established goals.  Pt will continue to benefit from skilled outpatient physical therapy to address the deficits stated in the impairment chart, provide pt/family education and to maximize pt's level of independence in the home and community environment.       Pt's spiritual, cultural and educational needs considered and pt agreeable to plan of care and goals as stated below:     Medical necessity is demonstrated by the following problem list.    Pt presents with the following impairments:      History  Co-morbidities and personal factors that may impact the plan of care Co-morbidities:   advanced age ; Multiple co-morbidities.     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      Lumbar facet arthropathy      Sacroiliitis      3 mm anterolisthesis of L3 on L4, L4 on L5, and L5 on S1     Lumbar radiculopathy      Arthritis of right knee      Chronic pain               Personal Factors:   age  lifestyle; sedentary       moderate   Examination  Body Structures and Functions, activity limitations and participation restrictions that may impact the plan of care Body Regions:   back  lower extremities  trunk     Body Systems:    ROM  strength  balance  gait  transfers  transitions     Participation Restrictions:   Mod to Severely limited with mobility     Activity limitations:   Learning and applying knowledge  no deficits     General Tasks and Commands  no deficits     Communication  no deficits     Mobility  lifting and carrying objects  walking  driving (bike, car, motorcycle)  Basic transfers,  stairs.     Self care  washing oneself (bathing, drying, washing hands)  caring for body parts (brushing teeth, shaving, grooming)  toileting  dressing  looking after one's health     Domestic Life  shopping  cooking  doing house work (cleaning house, washing dishes, laundry)  assisting others     Interactions/Relationships  basic interpersonal interactions     Life Areas  no deficits     Community and Social Life  recreation and leisure             moderate   Clinical Presentation stable and uncomplicated moderate   Decision Making/ Complexity Score: moderate         GOALS: Pt is in agreement with the following goals.     Short term goals:  6 weeks or 10 visits   1.  Pt will demonstrate increased lumbar ROM by at least 3 degrees from the initial ROM value with improvements noted in functional ROM and ability to perform ADLs  2.  Pt will demonstrate increased maximum isometric torque value by 10% when compared to the initial value resulting in improved ability to perform bending, lifting, and carrying activities safely, confidently.     3.  Patient report a reduction in worst pain score by 1-2 points for improved tolerance during work and recreational activities  4.  Pt able to perform HEP correctly with minimal cueing or supervision for therapist        Long term goals: 13 weeks or 20 visits   1. Pt will demonstrate increased lumbar ROM by at least 6 degrees from initial ROM value, resulting in improved ability to perform functional fwd bending while standing and sitting.   2. Pt will demonstrate increased maximum isometric torque value by 20% when compared to the initial value resulting in improved ability to perform bending, lifting, and carrying activities safely, confidently.  3. Pt to demonstrate ability to independently control and reduce their pain through posture positioning and mechanical movements throughout a typical day.  4.  Patient will demonstrate improved overall function per FOTO Survey to CL =  least 60% but < 80% impaired, limited or restricted score or less.  5. Patient will improve 5x STS test for functional mobility at 18 seconds or less w/o UE's, in order to demonstrate improve strength  6. Patient will improve Tmed Up and Go for mobility, balance, leg strength and falls risk to 20 seconds or less, with least restrictive AD, and displaying decreased balance disturbance with transitions, and decreased instability with turns and pivots.         Plan   Continue with established Plan of Care towards established PT goals.

## 2018-11-13 ENCOUNTER — CLINICAL SUPPORT (OUTPATIENT)
Dept: REHABILITATION | Facility: OTHER | Age: 78
End: 2018-11-13
Attending: NURSE PRACTITIONER
Payer: MEDICARE

## 2018-11-13 DIAGNOSIS — Z74.09 MOBILITY IMPAIRED: ICD-10-CM

## 2018-11-13 DIAGNOSIS — M53.86 DECREASED ROM OF LUMBAR SPINE: ICD-10-CM

## 2018-11-13 DIAGNOSIS — R53.1 WEAKNESS: ICD-10-CM

## 2018-11-13 PROCEDURE — 97110 THERAPEUTIC EXERCISES: CPT

## 2018-11-13 NOTE — PROGRESS NOTES
[facility]      Name: Tamiko Youssef  Clinic Number: [id number]  Date of Treatment: 11/13/2018   Diagnosis:   Encounter Diagnoses   Name Primary?    Mobility impaired     Decreased ROM of lumbar spine     Weakness      Physician: Nicole Avery,*    Pain pattern determined: 1 PEN  Plan of care signed: 10/10/18   Time in: 930  Time Out: 1030  Total Treatment time: 60  Precautions: Fall;  3 mm anterolisthesis of L3 on L4, L4 on L5, and L5 on S1, HTN  Visit #: 4    POC due:1/09/19  Reassessment due:11/09/18 Completed 11/8/18  Next Due: 12/8/18      Subjective   Tamiko arrives to PT today and reports she is tired.  Pain still decreased following shot.    Patient reports their pain to be 3/10 on a 0-10 scale with 0 being no pain and 10 being the worst pain imaginable.    Pain Location: LLB/buttocks   Occupation:  Retired   Leisure: sewing, crotchet and read                      Pts goals:  Be able to be more active and move better with less pain.        Objective     MOVEMENT LOSS 11/8/18     ROM Loss   Flexion min loss   Extension major loss   Side bending Right mod loss   Side bending Left mod loss   Rotation Right min loss   Rotation Left min loss      11/8/18  Gross Lower Extremity Strength: 3+/5 to 4/5  5x Sit<>stand test for functional legs strength: 24 seconds with B UE's use; limited by LBP, LE's weakness and balance disturbance.     Baseline Isometric Testing on Med X equipment: Testing administered by PT  Date of testing: 10/9/18  ROM 9-30 deg   Max Peak Torque 66    Min Peak Torque 18    Flex/Ext Ratio 3.66   % below normative data 70   Counter weight 301   femur 6   Seat pad 2         CMS Impairment/Limitation/Restriction for FOTO Lumbar Survey     Therapist reviewed FOTO scores for Tamiko Youssef on 10/9/2018.   FOTO documents entered into [employer] - see Media section.     Limitation Score: 76%  Category: Mobility     Current : CL = least 60% but < 80% impaired,  limited or restricted  Goal: CL = least 60% but < 80% impaired, limited or restricted 60%  Discharge:              Treatment    Pt was instructed in and performed the following:     Tamiko received therapeutic exercises to develop/improved posture, cardiovascular endurance, muscular endurance, lumbar/cervical ROM, strength and muscular endurance for 60 minutes including the following exercises:       [facility] 11/13/2018   Visit Number 4   VAS Pain Rating 4   Time 6   Flexion in Sitting -   Lumbar Extension Seat Pad -   Femur Restraint -   Top Dead Center -   Counterweight -   Lumbar Flexion -   Lumbar Extension -   Lumbar Peak Torque -   Min Torque -   Test Percent Below Normative Data -   Lumbar Weight 33   Repetitions 20   Rating of Perceived Exertion 3   Ice - Sitting 10         Flex in sitting with ball 10x   seatedTrunk rotation 10x    Peripheral muscle strengthening which included 1 set of 15-20 repetitions at a slow, controlled 7 second per rep pace focused on strengthening supporting musculature for improved body mechanics and functional mobility.  Pt and therapist focused on proper form during treatment to ensure optimal strengthening of each targeted muscle group.  Machines were utilized including torso rotation, leg extension, leg curl, chest press, upright row. Tricep extension, bicep curl, leg press, and hip abduction added on third visit.       Home Exercise Program as follows:   Seated Tr A activation with 5 sec Holds 10 reps 2-3x/day     Handouts were given to the patient. Pt demo fair understanding of the education provided. Tamiko demonstrated fair return demonstration of activities.     Lumbar roll use compliance: unknown  Additional exercises taught this treatment session: added seated flexion    Assessment   Pt tolerated treatment well.  Increased 10% on Med X machine with pt completing 20 reps.  Pt has minor SOB after riding the bike.  Pt increased to 6 minutes today on bike.  COntinue  to gradually progress as tolerated.  Patient is making good progress towards established goals.  Pt will continue to benefit from skilled outpatient physical therapy to address the deficits stated in the impairment chart, provide pt/family education and to maximize pt's level of independence in the home and community environment.       Pt's spiritual, cultural and educational needs considered and pt agreeable to plan of care and goals as stated below:     Medical necessity is demonstrated by the following problem list.    Pt presents with the following impairments:      History  Co-morbidities and personal factors that may impact the plan of care Co-morbidities:   advanced age ; Multiple co-morbidities.     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      Lumbar facet arthropathy      Sacroiliitis      3 mm anterolisthesis of L3 on L4, L4 on L5, and L5 on S1     Lumbar radiculopathy      Arthritis of right knee      Chronic pain               Personal Factors:   age  lifestyle; sedentary       moderate   Examination  Body Structures and Functions, activity limitations and participation restrictions that may impact the plan of care Body Regions:   back  lower extremities  trunk     Body Systems:    ROM  strength  balance  gait  transfers  transitions     Participation Restrictions:   Mod to Severely limited with mobility     Activity limitations:   Learning and applying knowledge  no deficits     General Tasks and Commands  no deficits     Communication  no deficits     Mobility  lifting and carrying objects  walking  driving (bike, car, motorcycle)  Basic transfers, stairs.     Self care  washing oneself (bathing, drying, washing hands)  caring for body parts (brushing teeth, shaving, grooming)  toileting  dressing  looking after one's health     Domestic Life  shopping  cooking  doing house work (cleaning house, washing  dishes, laundry)  assisting others     Interactions/Relationships  basic interpersonal interactions     Life Areas  no deficits     Community and Social Life  recreation and leisure             moderate   Clinical Presentation stable and uncomplicated moderate   Decision Making/ Complexity Score: moderate         GOALS: Pt is in agreement with the following goals.     Short term goals:  6 weeks or 10 visits   1.  Pt will demonstrate increased lumbar ROM by at least 3 degrees from the initial ROM value with improvements noted in functional ROM and ability to perform ADLs  2.  Pt will demonstrate increased maximum isometric torque value by 10% when compared to the initial value resulting in improved ability to perform bending, lifting, and carrying activities safely, confidently.     3.  Patient report a reduction in worst pain score by 1-2 points for improved tolerance during work and recreational activities  4.  Pt able to perform HEP correctly with minimal cueing or supervision for therapist        Long term goals: 13 weeks or 20 visits   1. Pt will demonstrate increased lumbar ROM by at least 6 degrees from initial ROM value, resulting in improved ability to perform functional fwd bending while standing and sitting.   2. Pt will demonstrate increased maximum isometric torque value by 20% when compared to the initial value resulting in improved ability to perform bending, lifting, and carrying activities safely, confidently.  3. Pt to demonstrate ability to independently control and reduce their pain through posture positioning and mechanical movements throughout a typical day.  4.  Patient will demonstrate improved overall function per FOTO Survey to CL = least 60% but < 80% impaired, limited or restricted score or less.  5. Patient will improve 5x STS test for functional mobility at 18 seconds or less w/o UE's, in order to demonstrate improve strength  6. Patient will improve Tmed Up and Go for mobility, balance,  leg strength and falls risk to 20 seconds or less, with least restrictive AD, and displaying decreased balance disturbance with transitions, and decreased instability with turns and pivots.         Plan   Continue with established Plan of Care towards established PT goals.

## 2018-11-15 ENCOUNTER — CLINICAL SUPPORT (OUTPATIENT)
Dept: REHABILITATION | Facility: OTHER | Age: 78
End: 2018-11-15
Attending: NURSE PRACTITIONER
Payer: MEDICARE

## 2018-11-15 DIAGNOSIS — R53.1 WEAKNESS: ICD-10-CM

## 2018-11-15 DIAGNOSIS — Z74.09 MOBILITY IMPAIRED: ICD-10-CM

## 2018-11-15 DIAGNOSIS — M53.86 DECREASED ROM OF LUMBAR SPINE: ICD-10-CM

## 2018-11-15 PROCEDURE — 97110 THERAPEUTIC EXERCISES: CPT

## 2018-11-15 NOTE — PROGRESS NOTES
[facility] Back Physical Therapy Treatment      Name: Tamiko Youssef  Clinic Number: [id number]  Date of Treatment: 11/15/2018   Diagnosis:   Encounter Diagnoses   Name Primary?    Mobility impaired     Decreased ROM of lumbar spine     Weakness      Physician: Nicole Avery,*    Pain pattern determined: 1 PEN  Plan of care signed: 10/10/18   Time in: 1035  Time Out: 1130  Total Treatment time: 55  Precautions: Fall;  3 mm anterolisthesis of L3 on L4, L4 on L5, and L5 on S1, HTN  Visit #: 5    POC due:1/09/19  Reassessment due:11/09/18 Completed 11/8/18  Next Due: 12/8/18      Subjective   Tamiko reports that she was actives yesterday; she did some groceries and laundry, just basics. He LBP is a little aggravated she reports 5/10 today.    Patient reports their pain to be 5/10 on a 0-10 scale with 0 being no pain and 10 being the worst pain imaginable.    Pain Location: LLB/buttocks   Occupation:  Retired   Leisure: sewing, crotchet and read                      Pts goals:  Be able to be more active and move better with less pain.        Objective     MOVEMENT LOSS 11/8/18     ROM Loss   Flexion min loss   Extension major loss   Side bending Right mod loss   Side bending Left mod loss   Rotation Right min loss   Rotation Left min loss      11/8/18  Gross Lower Extremity Strength: 3+/5 to 4/5  5x Sit<>stand test for functional legs strength: 24 seconds with B UE's use; limited by LBP, LE's weakness and balance disturbance.     Baseline Isometric Testing on Med X equipment: Testing administered by PT  Date of testing: 10/9/18  ROM 9-30 deg   Max Peak Torque 66    Min Peak Torque 18    Flex/Ext Ratio 3.66   % below normative data 70   Counter weight 301   femur 6   Seat pad 2         CMS Impairment/Limitation/Restriction for FOTO Lumbar Survey     Therapist reviewed FOTO scores for Tamiko Youssef on 10/9/2018.   FOTO documents entered into "[email or web address] - see Media section.     Limitation Score: 76%  Category:  Mobility     Current : CL = least 60% but < 80% impaired, limited or restricted  Goal: CL = least 60% but < 80% impaired, limited or restricted 60%  Discharge:          CMS Impairment/Limitation/Restriction for FOTO Lumbar Spine Survey                          Status Limitation            G-Code CMS Severity Modifier  Intake                    24% 76%  Predicted              40% 60%                  Goal Status+ CL - At least 60 percent but less than 80 percent  11/15/2018            44% 56% (Visit 5)   Current Status CK - At least 40 percent but less than 60 percent                                                                 D/C Status CK **only report if this is discharge survey    Treatment    Pt was instructed in and performed the following:     Tamiko received therapeutic exercises to develop/improved posture, cardiovascular endurance, muscular endurance, lumbar/cervical ROM, strength and muscular endurance for 55 minutes including the following exercises:     [facility] 11/15/2018   Visit Number 5   VAS Pain Rating 5   Time 5   Flexion in Sitting 10   Lumbar Extension Seat Pad -   Femur Restraint -   Top Dead Center -   Counterweight -   Lumbar Flexion -   Lumbar Extension -   Lumbar Peak Torque -   Min Torque -   Test Percent Below Normative Data -   Lumbar Weight 35   Repetitions 20   Rating of Perceived Exertion 3   Ice - Sitting 10       Flex in sitting with ball 10x   seatedTrunk rotation 10x  Sit<>Stand x8 with 1 UE assist    Peripheral muscle strengthening which included 1 set of 15-20 repetitions at a slow, controlled 7 second per rep pace focused on strengthening supporting musculature for improved body mechanics and functional mobility.  Pt and therapist focused on proper form during treatment to ensure optimal strengthening of each targeted muscle group.  Machines were utilized including torso rotation, leg extension, leg curl, chest press, upright row. Tricep extension, bicep curl, leg  press, and hip abduction added on third visit.       Home Exercise Program as follows:   Seated Tr A activation with 5 sec Holds 10 reps 2-3x/day     Handouts were given to the patient. Pt demo fair understanding of the education provided. Tamiko demonstrated fair return demonstration of activities.     Lumbar roll use compliance: unknown  Additional exercises taught this treatment session: added seated flexion    Assessment     Patient displayed continuous increase of exercise tolerance. She had ~ 5% wt increase to 35# today. She completed 20 reps with an RPE of 3 today. No new c/o were reported at this time. FOTO improved to 56%, from 76%.    Patient is making good progress towards established goals.  Pt will continue to benefit from skilled outpatient physical therapy to address the deficits stated in the impairment chart, provide pt/family education and to maximize pt's level of independence in the home and community environment.       Pt's spiritual, cultural and educational needs considered and pt agreeable to plan of care and goals as stated below:     Medical necessity is demonstrated by the following problem list.    Pt presents with the following impairments:      History  Co-morbidities and personal factors that may impact the plan of care Co-morbidities:   advanced age ; Multiple co-morbidities.     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      Lumbar facet arthropathy      Sacroiliitis      3 mm anterolisthesis of L3 on L4, L4 on L5, and L5 on S1     Lumbar radiculopathy      Arthritis of right knee      Chronic pain               Personal Factors:   age  lifestyle; sedentary       moderate   Examination  Body Structures and Functions, activity limitations and participation restrictions that may impact the plan of care Body Regions:   back  lower extremities  trunk     Body Systems:     ROM  strength  balance  gait  transfers  transitions     Participation Restrictions:   Mod to Severely limited with mobility     Activity limitations:   Learning and applying knowledge  no deficits     General Tasks and Commands  no deficits     Communication  no deficits     Mobility  lifting and carrying objects  walking  driving (bike, car, motorcycle)  Basic transfers, stairs.     Self care  washing oneself (bathing, drying, washing hands)  caring for body parts (brushing teeth, shaving, grooming)  toileting  dressing  looking after one's health     Domestic Life  shopping  cooking  doing house work (cleaning house, washing dishes, laundry)  assisting others     Interactions/Relationships  basic interpersonal interactions     Life Areas  no deficits     Community and Social Life  recreation and leisure             moderate   Clinical Presentation stable and uncomplicated moderate   Decision Making/ Complexity Score: moderate         GOALS: Pt is in agreement with the following goals.     Short term goals:  6 weeks or 10 visits   1.  Pt will demonstrate increased lumbar ROM by at least 3 degrees from the initial ROM value with improvements noted in functional ROM and ability to perform ADLs  2.  Pt will demonstrate increased maximum isometric torque value by 10% when compared to the initial value resulting in improved ability to perform bending, lifting, and carrying activities safely, confidently.     3.  Patient report a reduction in worst pain score by 1-2 points for improved tolerance during work and recreational activities  4.  Pt able to perform HEP correctly with minimal cueing or supervision for therapist        Long term goals: 13 weeks or 20 visits   1. Pt will demonstrate increased lumbar ROM by at least 6 degrees from initial ROM value, resulting in improved ability to perform functional fwd bending while standing and sitting.   2. Pt will demonstrate increased maximum isometric torque value by 20%  when compared to the initial value resulting in improved ability to perform bending, lifting, and carrying activities safely, confidently.  3. Pt to demonstrate ability to independently control and reduce their pain through posture positioning and mechanical movements throughout a typical day.  4.  Patient will demonstrate improved overall function per FOTO Survey to CL = least 60% but < 80% impaired, limited or restricted score or less.  5. Patient will improve 5x STS test for functional mobility at 18 seconds or less w/o UE's, in order to demonstrate improve strength  6. Patient will improve Tmed Up and Go for mobility, balance, leg strength and falls risk to 20 seconds or less, with least restrictive AD, and displaying decreased balance disturbance with transitions, and decreased instability with turns and pivots.         Plan   Continue with established Plan of Care towards established PT goals.

## 2018-11-27 ENCOUNTER — CLINICAL SUPPORT (OUTPATIENT)
Dept: REHABILITATION | Facility: OTHER | Age: 78
End: 2018-11-27
Attending: NURSE PRACTITIONER
Payer: MEDICARE

## 2018-11-27 DIAGNOSIS — R53.1 WEAKNESS: ICD-10-CM

## 2018-11-27 DIAGNOSIS — Z74.09 MOBILITY IMPAIRED: ICD-10-CM

## 2018-11-27 DIAGNOSIS — M53.86 DECREASED ROM OF LUMBAR SPINE: ICD-10-CM

## 2018-11-27 PROCEDURE — 97110 THERAPEUTIC EXERCISES: CPT

## 2018-11-27 NOTE — PROGRESS NOTES
[facility] Back Physical Therapy Treatment      Name: Tamiko Youssef  Clinic Number: [id number]  Date of Treatment: 11/27/2018   Diagnosis:   Encounter Diagnoses   Name Primary?    Mobility impaired     Decreased ROM of lumbar spine     Weakness      Physician: Nicole Avery,*    Pain pattern determined: 1 PEN  Plan of care signed: 10/10/18   Time in: 1:30  Time Out: 2:30  Total Treatment time: 55  Precautions: Fall;  3 mm anterolisthesis of L3 on L4, L4 on L5, and L5 on S1, HTN  Visit #: 6    POC due:1/09/19  Reassessment due:11/09/18 Completed 11/8/18  Next Due: 12/8/18    Face to Face discussion of patient was done between PT and PTA.     Subjective   Tamiko reports that she had no LBP today. She is not doing her HEP daily.  Patient reports their pain to be 5/10 on a 0-10 scale with 0 being no pain and 10 being the worst pain imaginable.    Pain Location: LLB/buttocks   Occupation:  Retired   Leisure: sewing, crotchet and read                      Pts goals:  Be able to be more active and move better with less pain.        Objective     MOVEMENT LOSS 11/8/18     ROM Loss   Flexion min loss   Extension major loss   Side bending Right mod loss   Side bending Left mod loss   Rotation Right min loss   Rotation Left min loss      11/8/18  Gross Lower Extremity Strength: 3+/5 to 4/5  5x Sit<>stand test for functional legs strength: 24 seconds with B UE's use; limited by LBP, LE's weakness and balance disturbance.     Baseline Isometric Testing on Med X equipment: Testing administered by PT  Date of testing: 10/9/18  ROM 9-30 deg   Max Peak Torque 66    Min Peak Torque 18    Flex/Ext Ratio 3.66   % below normative data 70   Counter weight 301   femur 6   Seat pad 2         CMS Impairment/Limitation/Restriction for FOTO Lumbar Survey     Therapist reviewed FOTO scores for Tamiko Youssef on 10/9/2018.   FOTO documents entered into [employer] - see Media section.     Limitation Score: 76%  Category:  Mobility     Current : CL = least 60% but < 80% impaired, limited or restricted  Goal: CL = least 60% but < 80% impaired, limited or restricted 60%  Discharge:          CMS Impairment/Limitation/Restriction for FOTO Lumbar Spine Survey                          Status Limitation            G-Code CMS Severity Modifier  Intake                    24% 76%  Predicted              40% 60%                  Goal Status+ CL - At least 60 percent but less than 80 percent  11/15/2018            44% 56% (Visit 5)   Current Status CK - At least 40 percent but less than 60 percent                                                                 D/C Status CK **only report if this is discharge survey    Treatment    Pt was instructed in and performed the following:     Tamiko received therapeutic exercises to develop/improved posture, cardiovascular endurance, muscular endurance, lumbar/cervical ROM, strength and muscular endurance for 55 minutes including the following exercises:       [facility] 11/27/2018   Visit Number 6   VAS Pain Rating 0   Recumbent Bike Seat Pos. 11   Time 5   Flexion in Sitting 10   Lumbar Extension Seat Pad -   Femur Restraint -   Top Dead Center -   Counterweight -   Lumbar Flexion -   Lumbar Extension -   Lumbar Peak Torque -   Min Torque -   Test Percent Below Normative Data -   Lumbar Weight 38   Repetitions 18   Rating of Perceived Exertion 4   Ice - Sitting 10       Flex in sitting with ball 10x  SeatedTrunk rotation 10x  Sit<>Stand x10 with with use of chair and SBA, no walker    Peripheral muscle strengthening which included 1 set of 15-20 repetitions at a slow, controlled 7 second per rep pace focused on strengthening supporting musculature for improved body mechanics and functional mobility.  Pt and therapist focused on proper form during treatment to ensure optimal strengthening of each targeted muscle group.  Machines were utilized including torso rotation, leg extension, leg curl,  chest press, upright row. Tricep extension, bicep curl, leg press, and hip abduction added on third visit.       Home Exercise Program as follows:   Seated Tr A activation with 5 sec Holds 10 reps 2-3x/day  Seated flexion 10x with theraball  Flex in sitting with ball 10x  SeatedTrunk rotation 10x    Handouts were given to the patient. Pt demo fair understanding of the education provided. Tamiko demonstrated fair return demonstration of activities.     Lumbar roll use compliance: unknown  Additional exercises taught this treatment session: none    Assessment     Patient with no LBP with session. She had ~ 5% wt increase to 38# today. She completed 18 reps with an RPE of 4 today. No new c/o were reported at this time. Educated her on the importance of performing daily stretching. She understood.     Patient is making good progress towards established goals.  Pt will continue to benefit from skilled outpatient physical therapy to address the deficits stated in the impairment chart, provide pt/family education and to maximize pt's level of independence in the home and community environment.       Pt's spiritual, cultural and educational needs considered and pt agreeable to plan of care and goals as stated below:     Medical necessity is demonstrated by the following problem list.    Pt presents with the following impairments:      History  Co-morbidities and personal factors that may impact the plan of care Co-morbidities:   advanced age ; Multiple co-morbidities.     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      Lumbar facet arthropathy      Sacroiliitis      3 mm anterolisthesis of L3 on L4, L4 on L5, and L5 on S1     Lumbar radiculopathy      Arthritis of right knee      Chronic pain               Personal Factors:   age  lifestyle; sedentary       moderate   Examination  Body Structures and Functions, activity  limitations and participation restrictions that may impact the plan of care Body Regions:   back  lower extremities  trunk     Body Systems:    ROM  strength  balance  gait  transfers  transitions     Participation Restrictions:   Mod to Severely limited with mobility     Activity limitations:   Learning and applying knowledge  no deficits     General Tasks and Commands  no deficits     Communication  no deficits     Mobility  lifting and carrying objects  walking  driving (bike, car, motorcycle)  Basic transfers, stairs.     Self care  washing oneself (bathing, drying, washing hands)  caring for body parts (brushing teeth, shaving, grooming)  toileting  dressing  looking after one's health     Domestic Life  shopping  cooking  doing house work (cleaning house, washing dishes, laundry)  assisting others     Interactions/Relationships  basic interpersonal interactions     Life Areas  no deficits     Community and Social Life  recreation and leisure             moderate   Clinical Presentation stable and uncomplicated moderate   Decision Making/ Complexity Score: moderate         GOALS: Pt is in agreement with the following goals.     Short term goals:  6 weeks or 10 visits   1.  Pt will demonstrate increased lumbar ROM by at least 3 degrees from the initial ROM value with improvements noted in functional ROM and ability to perform ADLs  2.  Pt will demonstrate increased maximum isometric torque value by 10% when compared to the initial value resulting in improved ability to perform bending, lifting, and carrying activities safely, confidently.     3.  Patient report a reduction in worst pain score by 1-2 points for improved tolerance during work and recreational activities  4.  Pt able to perform HEP correctly with minimal cueing or supervision for therapist        Long term goals: 13 weeks or 20 visits   1. Pt will demonstrate increased lumbar ROM by at least 6 degrees from initial ROM value, resulting in improved  ability to perform functional fwd bending while standing and sitting.   2. Pt will demonstrate increased maximum isometric torque value by 20% when compared to the initial value resulting in improved ability to perform bending, lifting, and carrying activities safely, confidently.  3. Pt to demonstrate ability to independently control and reduce their pain through posture positioning and mechanical movements throughout a typical day.  4.  Patient will demonstrate improved overall function per FOTO Survey to CL = least 60% but < 80% impaired, limited or restricted score or less.  5. Patient will improve 5x STS test for functional mobility at 18 seconds or less w/o UE's, in order to demonstrate improve strength  6. Patient will improve Tmed Up and Go for mobility, balance, leg strength and falls risk to 20 seconds or less, with least restrictive AD, and displaying decreased balance disturbance with transitions, and decreased instability with turns and pivots.         Plan   Continue with established Plan of Care towards established PT goals.

## 2018-11-29 ENCOUNTER — CLINICAL SUPPORT (OUTPATIENT)
Dept: REHABILITATION | Facility: OTHER | Age: 78
End: 2018-11-29
Attending: NURSE PRACTITIONER
Payer: MEDICARE

## 2018-11-29 DIAGNOSIS — R53.1 WEAKNESS: ICD-10-CM

## 2018-11-29 DIAGNOSIS — M53.86 DECREASED ROM OF LUMBAR SPINE: ICD-10-CM

## 2018-11-29 DIAGNOSIS — Z74.09 MOBILITY IMPAIRED: ICD-10-CM

## 2018-11-29 PROCEDURE — 97110 THERAPEUTIC EXERCISES: CPT

## 2018-11-29 NOTE — PROGRESS NOTES
[facility]      Name: Tamiko Youssef  Clinic Number: [id number]  Date of Treatment: 11/29/2018   Diagnosis:   Encounter Diagnoses   Name Primary?    Mobility impaired     Decreased ROM of lumbar spine     Weakness      Physician: Nicole Avery,*    Pain pattern determined: 1 PEN  Plan of care signed: 10/10/18   Time in: 1:30  Time Out: 2:20  Total Treatment time: 60  Precautions: Fall;  3 mm anterolisthesis of L3 on L4, L4 on L5, and L5 on S1, HTN  Visit #: 7    POC due:1/09/19  Reassessment due:11/09/18 Completed 11/8/18  Next Due: 12/8/18    Face to Face discussion of patient was done between PT and PTA.     Subjective   Tamiko reports that she had no LBP today, but her anterior shin and knees are hurting today from the peripheral leg machines last visit.  Patient reports their pain to be 5/10 on a 0-10 scale with 0 being no pain and 10 being the worst pain imaginable.    Pain Location: LLB/buttocks   Occupation:  Retired   Leisure: sewing, crotchet and read                      Pts goals:  Be able to be more active and move better with less pain.        Objective     MOVEMENT LOSS 11/8/18     ROM Loss   Flexion min loss   Extension major loss   Side bending Right mod loss   Side bending Left mod loss   Rotation Right min loss   Rotation Left min loss      11/8/18  Gross Lower Extremity Strength: 3+/5 to 4/5  5x Sit<>stand test for functional legs strength: 24 seconds with B UE's use; limited by LBP, LE's weakness and balance disturbance.     Baseline Isometric Testing on Med X equipment: Testing administered by PT  Date of testing: 10/9/18  ROM 9-30 deg   Max Peak Torque 66    Min Peak Torque 18    Flex/Ext Ratio 3.66   % below normative data 70   Counter weight 301   femur 6   Seat pad 2         CMS Impairment/Limitation/Restriction for FOTO Lumbar Survey     Therapist reviewed FOTO scores for Tamiko Youssef on 10/9/2018.   FOTO documents entered into [employer] - see Media  section.     Limitation Score: 76%  Category: Mobility     Current : CL = least 60% but < 80% impaired, limited or restricted  Goal: CL = least 60% but < 80% impaired, limited or restricted 60%  Discharge:          CMS Impairment/Limitation/Restriction for FOTO Lumbar Spine Survey                          Status Limitation            G-Code CMS Severity Modifier  Intake                    24% 76%  Predicted              40% 60%                  Goal Status+ CL - At least 60 percent but less than 80 percent  11/15/2018            44% 56% (Visit 5)   Current Status CK - At least 40 percent but less than 60 percent                                                                 D/C Status CK **only report if this is discharge survey    Treatment    Pt was instructed in and performed the following:     Tamiko received therapeutic exercises to develop/improved posture, cardiovascular endurance, muscular endurance, lumbar/cervical ROM, strength and muscular endurance for 50 minutes including the following exercises:     [facility] 11/29/2018   Visit Number 7   VAS Pain Rating 5   Recumbent Bike Seat Pos. 11   Time 10   Flexion in Sitting 10   Lumbar Extension Seat Pad -   Femur Restraint -   Top Dead Center -   Counterweight -   Lumbar Flexion -   Lumbar Extension -   Lumbar Peak Torque -   Min Torque -   Test Percent Below Normative Data -   Lumbar Weight 38   Repetitions 20   Rating of Perceived Exertion 3   Ice - Sitting 10       Flex in sitting with ball 10x  SeatedTrunk rotation 10x  Sit<>Stand x10 with with use of chair and SBA, no walker    Peripheral muscle strengthening which included 1 set of 15-20 repetitions at a slow, controlled 7 second per rep pace focused on strengthening supporting musculature for improved body mechanics and functional mobility.  Pt and therapist focused on proper form during treatment to ensure optimal strengthening of each targeted muscle group.  Machines were utilized including  torso rotation, leg extension, leg curl, chest press, upright row. Tricep extension, bicep curl, leg press, and hip abduction added on third visit.       Home Exercise Program as follows:   Seated Tr A activation with 5 sec Holds 10 reps 2-3x/day  Seated flexion 10x with theraball  Flex in sitting with ball 10x  SeatedTrunk rotation 10x    Handouts were given to the patient. Pt demo fair understanding of the education provided. Tamiko demonstrated fair return demonstration of activities.     Lumbar roll use compliance: unknown  Additional exercises taught this treatment session: none    Assessment     Pt tolerated treatment well today and states her LB is feeling good but her legs are hurting her.  Pt reports pain from anterior knees to anterior shin.  Pt did not perform LE ext or flex today secondary to pain. Pt able to complete 20 reps at 38ft/lbs, inc 5% next visit.  Patient is making good progress towards established goals.  Pt will continue to benefit from skilled outpatient physical therapy to address the deficits stated in the impairment chart, provide pt/family education and to maximize pt's level of independence in the home and community environment.       Pt's spiritual, cultural and educational needs considered and pt agreeable to plan of care and goals as stated below:     Medical necessity is demonstrated by the following problem list.    Pt presents with the following impairments:      History  Co-morbidities and personal factors that may impact the plan of care Co-morbidities:   advanced age ; Multiple co-morbidities.     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      Lumbar facet arthropathy      Sacroiliitis      3 mm anterolisthesis of L3 on L4, L4 on L5, and L5 on S1     Lumbar radiculopathy      Arthritis of right knee      Chronic pain               Personal Factors:   age  lifestyle; sedentary        moderate   Examination  Body Structures and Functions, activity limitations and participation restrictions that may impact the plan of care Body Regions:   back  lower extremities  trunk     Body Systems:    ROM  strength  balance  gait  transfers  transitions     Participation Restrictions:   Mod to Severely limited with mobility     Activity limitations:   Learning and applying knowledge  no deficits     General Tasks and Commands  no deficits     Communication  no deficits     Mobility  lifting and carrying objects  walking  driving (bike, car, motorcycle)  Basic transfers, stairs.     Self care  washing oneself (bathing, drying, washing hands)  caring for body parts (brushing teeth, shaving, grooming)  toileting  dressing  looking after one's health     Domestic Life  shopping  cooking  doing house work (cleaning house, washing dishes, laundry)  assisting others     Interactions/Relationships  basic interpersonal interactions     Life Areas  no deficits     Community and Social Life  recreation and leisure             moderate   Clinical Presentation stable and uncomplicated moderate   Decision Making/ Complexity Score: moderate         GOALS: Pt is in agreement with the following goals.     Short term goals:  6 weeks or 10 visits   1.  Pt will demonstrate increased lumbar ROM by at least 3 degrees from the initial ROM value with improvements noted in functional ROM and ability to perform ADLs  2.  Pt will demonstrate increased maximum isometric torque value by 10% when compared to the initial value resulting in improved ability to perform bending, lifting, and carrying activities safely, confidently.     3.  Patient report a reduction in worst pain score by 1-2 points for improved tolerance during work and recreational activities  4.  Pt able to perform HEP correctly with minimal cueing or supervision for therapist        Long term goals: 13 weeks or 20 visits   1. Pt will demonstrate increased lumbar ROM by  at least 6 degrees from initial ROM value, resulting in improved ability to perform functional fwd bending while standing and sitting.   2. Pt will demonstrate increased maximum isometric torque value by 20% when compared to the initial value resulting in improved ability to perform bending, lifting, and carrying activities safely, confidently.  3. Pt to demonstrate ability to independently control and reduce their pain through posture positioning and mechanical movements throughout a typical day.  4.  Patient will demonstrate improved overall function per FOTO Survey to CL = least 60% but < 80% impaired, limited or restricted score or less.  5. Patient will improve 5x STS test for functional mobility at 18 seconds or less w/o UE's, in order to demonstrate improve strength  6. Patient will improve Tmed Up and Go for mobility, balance, leg strength and falls risk to 20 seconds or less, with least restrictive AD, and displaying decreased balance disturbance with transitions, and decreased instability with turns and pivots.         Plan   Continue with established Plan of Care towards established PT goals.     [facility]      Name: Tamiko Alan [facility] Number: [id number]  Date of Treatment: 11/29/2018   Diagnosis:   Encounter Diagnoses   Name Primary?    Mobility impaired     Decreased ROM of lumbar spine     Weakness      Physician: Nicole Avery,*    Pain pattern determined: 1 PEN  Plan of care signed: 10/10/18   Time in: 1:30  Time Out: 2:30  Total Treatment time: 55  Precautions: Fall;  3 mm anterolisthesis of L3 on L4, L4 on L5, and L5 on S1, HTN  Visit #: 6    POC due:1/09/19  Reassessment due:11/09/18 Completed 11/8/18  Next Due: 12/8/18    Face to Face discussion of patient was done between PT and PTA.     Subjective   Tamiko reports that she had no LBP today. She is not doing her HEP daily.  Patient reports their pain to be 5/10 on a 0-10 scale with 0 being no pain  and 10 being the worst pain imaginable.    Pain Location: LLB/buttocks   Occupation:  Retired   Leisure: sewing, crotchet and read                      Pts goals:  Be able to be more active and move better with less pain.        Objective     MOVEMENT LOSS 11/8/18     ROM Loss   Flexion min loss   Extension major loss   Side bending Right mod loss   Side bending Left mod loss   Rotation Right min loss   Rotation Left min loss      11/8/18  Gross Lower Extremity Strength: 3+/5 to 4/5  5x Sit<>stand test for functional legs strength: 24 seconds with B UE's use; limited by LBP, LE's weakness and balance disturbance.     Baseline Isometric Testing on Med X equipment: Testing administered by PT  Date of testing: 10/9/18  ROM 9-30 deg   Max Peak Torque 66    Min Peak Torque 18    Flex/Ext Ratio 3.66   % below normative data 70   Counter weight 301   femur 6   Seat pad 2         CMS Impairment/Limitation/Restriction for FOTO Lumbar Survey     Therapist reviewed FOTO scores for Tamiko Youssef on 10/9/2018.   FOTO documents entered into [employer] - see Media section.     Limitation Score: 76%  Category: Mobility     Current : CL = least 60% but < 80% impaired, limited or restricted  Goal: CL = least 60% but < 80% impaired, limited or restricted 60%  Discharge:          CMS Impairment/Limitation/Restriction for FOTO Lumbar Spine Survey                          Status Limitation            G-Code CMS Severity Modifier  Intake                    24% 76%  Predicted              40% 60%                  Goal Status+ CL - At least 60 percent but less than 80 percent  11/15/2018            44% 56% (Visit 5)   Current Status CK - At least 40 percent but less than 60 percent                                                                 D/C Status CK **only report if this is discharge survey    Treatment    Pt was instructed in and performed the following:     Tamiko received therapeutic exercises to develop/improved posture,  cardiovascular endurance, muscular endurance, lumbar/cervical ROM, strength and muscular endurance for 55 minutes including the following exercises:       [facility] 11/27/2018   Visit Number 6   VAS Pain Rating 0   Recumbent Bike Seat Pos. 11   Time 5   Flexion in Sitting 10   Lumbar Extension Seat Pad -   Femur Restraint -   Top Dead Center -   Counterweight -   Lumbar Flexion -   Lumbar Extension -   Lumbar Peak Torque -   Min Torque -   Test Percent Below Normative Data -   Lumbar Weight 38   Repetitions 18   Rating of Perceived Exertion 4   Ice - Sitting 10       Flex in sitting with ball 10x  SeatedTrunk rotation 10x  Sit<>Stand x10 with with use of chair and SBA, no walker    Peripheral muscle strengthening which included 1 set of 15-20 repetitions at a slow, controlled 7 second per rep pace focused on strengthening supporting musculature for improved body mechanics and functional mobility.  Pt and therapist focused on proper form during treatment to ensure optimal strengthening of each targeted muscle group.  Machines were utilized including torso rotation, leg extension, leg curl, chest press, upright row. Tricep extension, bicep curl, leg press, and hip abduction added on third visit.       Home Exercise Program as follows:   Seated Tr A activation with 5 sec Holds 10 reps 2-3x/day  Seated flexion 10x with theraball  Flex in sitting with ball 10x  SeatedTrunk rotation 10x    Handouts were given to the patient. Pt demo fair understanding of the education provided. Tamiko demonstrated fair return demonstration of activities.     Lumbar roll use compliance: unknown  Additional exercises taught this treatment session: none    Assessment     Patient with no LBP with session. She had ~ 5% wt increase to 38# today. She completed 18 reps with an RPE of 4 today. No new c/o were reported at this time. Educated her on the importance of performing daily stretching. She understood.     Patient is making good  progress towards established goals.  Pt will continue to benefit from skilled outpatient physical therapy to address the deficits stated in the impairment chart, provide pt/family education and to maximize pt's level of independence in the home and community environment.       Pt's spiritual, cultural and educational needs considered and pt agreeable to plan of care and goals as stated below:     Medical necessity is demonstrated by the following problem list.    Pt presents with the following impairments:      History  Co-morbidities and personal factors that may impact the plan of care Co-morbidities:   advanced age ; Multiple co-morbidities.     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      Lumbar facet arthropathy      Sacroiliitis      3 mm anterolisthesis of L3 on L4, L4 on L5, and L5 on S1     Lumbar radiculopathy      Arthritis of right knee      Chronic pain               Personal Factors:   age  lifestyle; sedentary       moderate   Examination  Body Structures and Functions, activity limitations and participation restrictions that may impact the plan of care Body Regions:   back  lower extremities  trunk     Body Systems:    ROM  strength  balance  gait  transfers  transitions     Participation Restrictions:   Mod to Severely limited with mobility     Activity limitations:   Learning and applying knowledge  no deficits     General Tasks and Commands  no deficits     Communication  no deficits     Mobility  lifting and carrying objects  walking  driving (bike, car, motorcycle)  Basic transfers, stairs.     Self care  washing oneself (bathing, drying, washing hands)  caring for body parts (brushing teeth, shaving, grooming)  toileting  dressing  looking after one's health     Domestic Life  shopping  cooking  doing house work (cleaning house, washing dishes, laundry)  assisting  others     Interactions/Relationships  basic interpersonal interactions     Life Areas  no deficits     Community and Social Life  recreation and leisure             moderate   Clinical Presentation stable and uncomplicated moderate   Decision Making/ Complexity Score: moderate         GOALS: Pt is in agreement with the following goals.     Short term goals:  6 weeks or 10 visits   1.  Pt will demonstrate increased lumbar ROM by at least 3 degrees from the initial ROM value with improvements noted in functional ROM and ability to perform ADLs  2.  Pt will demonstrate increased maximum isometric torque value by 10% when compared to the initial value resulting in improved ability to perform bending, lifting, and carrying activities safely, confidently.     3.  Patient report a reduction in worst pain score by 1-2 points for improved tolerance during work and recreational activities  4.  Pt able to perform HEP correctly with minimal cueing or supervision for therapist        Long term goals: 13 weeks or 20 visits   1. Pt will demonstrate increased lumbar ROM by at least 6 degrees from initial ROM value, resulting in improved ability to perform functional fwd bending while standing and sitting.   2. Pt will demonstrate increased maximum isometric torque value by 20% when compared to the initial value resulting in improved ability to perform bending, lifting, and carrying activities safely, confidently.  3. Pt to demonstrate ability to independently control and reduce their pain through posture positioning and mechanical movements throughout a typical day.  4.  Patient will demonstrate improved overall function per FOTO Survey to CL = least 60% but < 80% impaired, limited or restricted score or less.  5. Patient will improve 5x STS test for functional mobility at 18 seconds or less w/o UE's, in order to demonstrate improve strength  6. Patient will improve Tmed Up and Go for mobility, balance, leg strength and falls risk  to 20 seconds or less, with least restrictive AD, and displaying decreased balance disturbance with transitions, and decreased instability with turns and pivots.         Plan   Continue with established Plan of Care towards established PT goals.     [facility]      Name: Tamiko Youssef  Clinic Number: [id number]  Date of Treatment: 11/29/2018   Diagnosis:   Encounter Diagnoses   Name Primary?    Mobility impaired     Decreased ROM of lumbar spine     Weakness      Physician: Nicole Avery,*    Pain pattern determined: 1 PEN  Plan of care signed: 10/10/18   Time in: 1:30  Time Out: 2:30  Total Treatment time: 55  Precautions: Fall;  3 mm anterolisthesis of L3 on L4, L4 on L5, and L5 on S1, HTN  Visit #: 6    POC due:1/09/19  Reassessment due:11/09/18 Completed 11/8/18  Next Due: 12/8/18    Face to Face discussion of patient was done between PT and PTA.     Subjective   Tamiko reports that she had no LBP today. She is not doing her HEP daily.  Patient reports their pain to be 5/10 on a 0-10 scale with 0 being no pain and 10 being the worst pain imaginable.    Pain Location: LLB/buttocks   Occupation:  Retired   Leisure: sewing, crotchet and read                      Pts goals:  Be able to be more active and move better with less pain.        Objective     MOVEMENT LOSS 11/8/18     ROM Loss   Flexion min loss   Extension major loss   Side bending Right mod loss   Side bending Left mod loss   Rotation Right min loss   Rotation Left min loss      11/8/18  Gross Lower Extremity Strength: 3+/5 to 4/5  5x Sit<>stand test for functional legs strength: 24 seconds with B UE's use; limited by LBP, LE's weakness and balance disturbance.     Baseline Isometric Testing on Med X equipment: Testing administered by PT  Date of testing: 10/9/18  ROM 9-30 deg   Max Peak Torque 66    Min Peak Torque 18    Flex/Ext Ratio 3.66   % below normative data 70   Counter weight 301   femur 6   Seat  pad 2         CMS Impairment/Limitation/Restriction for FOTO Lumbar Survey     Therapist reviewed FOTO scores for Tamiko Youssef on 10/9/2018.   FOTO documents entered into [employer] - see Media section.     Limitation Score: 76%  Category: Mobility     Current : CL = least 60% but < 80% impaired, limited or restricted  Goal: CL = least 60% but < 80% impaired, limited or restricted 60%  Discharge:          CMS Impairment/Limitation/Restriction for FOTO Lumbar Spine Survey                          Status Limitation            G-Code CMS Severity Modifier  Intake                    24% 76%  Predicted              40% 60%                  Goal Status+ CL - At least 60 percent but less than 80 percent  11/15/2018            44% 56% (Visit 5)   Current Status CK - At least 40 percent but less than 60 percent                                                                 D/C Status CK **only report if this is discharge survey    Treatment    Pt was instructed in and performed the following:     Tamiko received therapeutic exercises to develop/improved posture, cardiovascular endurance, muscular endurance, lumbar/cervical ROM, strength and muscular endurance for 55 minutes including the following exercises:       [facility] 11/27/2018   Visit Number 6   VAS Pain Rating 0   Recumbent Bike Seat Pos. 11   Time 5   Flexion in Sitting 10   Lumbar Extension Seat Pad -   Femur Restraint -   Top Dead Center -   Counterweight -   Lumbar Flexion -   Lumbar Extension -   Lumbar Peak Torque -   Min Torque -   Test Percent Below Normative Data -   Lumbar Weight 38   Repetitions 18   Rating of Perceived Exertion 4   Ice - Sitting 10       Flex in sitting with ball 10x  SeatedTrunk rotation 10x  Sit<>Stand x10 with with use of chair and SBA, no walker    Peripheral muscle strengthening which included 1 set of 15-20 repetitions at a slow, controlled 7 second per rep pace focused on strengthening supporting musculature for improved body  mechanics and functional mobility.  Pt and therapist focused on proper form during treatment to ensure optimal strengthening of each targeted muscle group.  Machines were utilized including torso rotation, leg extension, leg curl, chest press, upright row. Tricep extension, bicep curl, leg press, and hip abduction added on third visit.       Home Exercise Program as follows:   Seated Tr A activation with 5 sec Holds 10 reps 2-3x/day  Seated flexion 10x with theraball  Flex in sitting with ball 10x  SeatedTrunk rotation 10x    Handouts were given to the patient. Pt demo fair understanding of the education provided. Tamiko demonstrated fair return demonstration of activities.     Lumbar roll use compliance: unknown  Additional exercises taught this treatment session: none    Assessment     Patient with no LBP with session. She had ~ 5% wt increase to 38# today. She completed 18 reps with an RPE of 4 today. No new c/o were reported at this time. Educated her on the importance of performing daily stretching. She understood.     Patient is making good progress towards established goals.  Pt will continue to benefit from skilled outpatient physical therapy to address the deficits stated in the impairment chart, provide pt/family education and to maximize pt's level of independence in the home and community environment.       Pt's spiritual, cultural and educational needs considered and pt agreeable to plan of care and goals as stated below:     Medical necessity is demonstrated by the following problem list.    Pt presents with the following impairments:      History  Co-morbidities and personal factors that may impact the plan of care Co-morbidities:   advanced age ; Multiple co-morbidities.     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      Lumbar facet arthropathy      Sacroiliitis      3 mm anterolisthesis of L3 on L4, L4  on L5, and L5 on S1     Lumbar radiculopathy      Arthritis of right knee      Chronic pain               Personal Factors:   age  lifestyle; sedentary       moderate   Examination  Body Structures and Functions, activity limitations and participation restrictions that may impact the plan of care Body Regions:   back  lower extremities  trunk     Body Systems:    ROM  strength  balance  gait  transfers  transitions     Participation Restrictions:   Mod to Severely limited with mobility     Activity limitations:   Learning and applying knowledge  no deficits     General Tasks and Commands  no deficits     Communication  no deficits     Mobility  lifting and carrying objects  walking  driving (bike, car, motorcycle)  Basic transfers, stairs.     Self care  washing oneself (bathing, drying, washing hands)  caring for body parts (brushing teeth, shaving, grooming)  toileting  dressing  looking after one's health     Domestic Life  shopping  cooking  doing house work (cleaning house, washing dishes, laundry)  assisting others     Interactions/Relationships  basic interpersonal interactions     Life Areas  no deficits     Community and Social Life  recreation and leisure             moderate   Clinical Presentation stable and uncomplicated moderate   Decision Making/ Complexity Score: moderate         GOALS: Pt is in agreement with the following goals.     Short term goals:  6 weeks or 10 visits   1.  Pt will demonstrate increased lumbar ROM by at least 3 degrees from the initial ROM value with improvements noted in functional ROM and ability to perform ADLs  2.  Pt will demonstrate increased maximum isometric torque value by 10% when compared to the initial value resulting in improved ability to perform bending, lifting, and carrying activities safely, confidently.     3.  Patient report a reduction in worst pain score by 1-2 points for improved tolerance during work and recreational activities  4.  Pt able to perform  HEP correctly with minimal cueing or supervision for therapist        Long term goals: 13 weeks or 20 visits   1. Pt will demonstrate increased lumbar ROM by at least 6 degrees from initial ROM value, resulting in improved ability to perform functional fwd bending while standing and sitting.   2. Pt will demonstrate increased maximum isometric torque value by 20% when compared to the initial value resulting in improved ability to perform bending, lifting, and carrying activities safely, confidently.  3. Pt to demonstrate ability to independently control and reduce their pain through posture positioning and mechanical movements throughout a typical day.  4.  Patient will demonstrate improved overall function per FOTO Survey to CL = least 60% but < 80% impaired, limited or restricted score or less.  5. Patient will improve 5x STS test for functional mobility at 18 seconds or less w/o UE's, in order to demonstrate improve strength  6. Patient will improve Tmed Up and Go for mobility, balance, leg strength and falls risk to 20 seconds or less, with least restrictive AD, and displaying decreased balance disturbance with transitions, and decreased instability with turns and pivots.         Plan   Continue with established Plan of Care towards established PT goals.

## 2018-12-04 ENCOUNTER — CLINICAL SUPPORT (OUTPATIENT)
Dept: REHABILITATION | Facility: OTHER | Age: 78
End: 2018-12-04
Attending: NURSE PRACTITIONER
Payer: MEDICARE

## 2018-12-04 DIAGNOSIS — R53.1 WEAKNESS: ICD-10-CM

## 2018-12-04 DIAGNOSIS — Z74.09 MOBILITY IMPAIRED: ICD-10-CM

## 2018-12-04 DIAGNOSIS — M53.86 DECREASED ROM OF LUMBAR SPINE: ICD-10-CM

## 2018-12-04 PROCEDURE — 97110 THERAPEUTIC EXERCISES: CPT

## 2018-12-04 NOTE — PROGRESS NOTES
[facility] Back Physical Therapy Treatment      Name: Tamiko Youssef  Clinic Number: [id number]  Date of Treatment: 12/04/2018   Diagnosis:   Encounter Diagnoses   Name Primary?    Mobility impaired     Decreased ROM of lumbar spine     Weakness      Physician: Nicole Avery,*    Pain pattern determined: 1 PEN  Plan of care signed: 10/10/18   Time in: 1102am  Time Out: 1200pm  Total Treatment time: 58  Precautions: Fall;  3 mm anterolisthesis of L3 on L4, L4 on L5, and L5 on S1, HTN  Visit #: 8    POC due:1/09/19  Reassessment due:11/09/18 Completed 11/8/18  Next Due: 1/4/19    Face to Face discussion of patient was done between PT and PTA.     Subjective   Tamiko reports that she is feeling better today.  Pt able to ambulate today in clinic without walker  Patient reports their pain to be 0/10 on a 0-10 scale with 0 being no pain and 10 being the worst pain imaginable.    Pain Location: LLB/buttocks   Occupation:  Retired   Leisure: sewing, crotchet and read                      Pts goals:  Be able to be more active and move better with less pain.        Objective     MOVEMENT LOSS 11/8/18     ROM Loss   Flexion min loss   Extension major loss   Side bending Right mod loss   Side bending Left mod loss   Rotation Right min loss   Rotation Left min loss      11/8/18  Gross Lower Extremity Strength: 3+/5 to 4/5  5x Sit<>stand test for functional legs strength: 24 seconds with B UE's use; limited by LBP, LE's weakness and balance disturbance.     Baseline Isometric Testing on Med X equipment: Testing administered by PT  Date of testing: 10/9/18  ROM 9-30 deg   Max Peak Torque 66    Min Peak Torque 18    Flex/Ext Ratio 3.66   % below normative data 70   Counter weight 301   femur 6   Seat pad 2         CMS Impairment/Limitation/Restriction for FOTO Lumbar Survey     Therapist reviewed FOTO scores for Tamiko Youssef on 10/9/2018.   FOTO documents entered into [employer] - see Media section.     Limitation Score:  76%  Category: Mobility     Current : CL = least 60% but < 80% impaired, limited or restricted  Goal: CL = least 60% but < 80% impaired, limited or restricted 60%  Discharge:          CMS Impairment/Limitation/Restriction for FOTO Lumbar Spine Survey                          Status Limitation            G-Code CMS Severity Modifier  Intake                    24% 76%  Predicted              40% 60%                  Goal Status+ CL - At least 60 percent but less than 80 percent  11/15/2018            44% 56% (Visit 5)   Current Status CK - At least 40 percent but less than 60 percent                                                                 D/C Status CK **only report if this is discharge survey    Treatment    Pt was instructed in and performed the following:     Tamiko received therapeutic exercises to develop/improved posture, cardiovascular endurance, muscular endurance, lumbar/cervical ROM, strength and muscular endurance for 58 minutes including the following exercises:   [facility] 12/4/2018   Visit Number 8   VAS Pain Rating 0   Recumbent Bike Seat Pos. -   Time 5   Flexion in Sitting 10   Lumbar Extension Seat Pad -   Femur Restraint -   Top Dead Center -   Counterweight -   Lumbar Flexion -   Lumbar Extension -   Lumbar Peak Torque -   Min Torque -   Test Percent Below Normative Data -   Lumbar Weight 41   Repetitions 15   Rating of Perceived Exertion 3   Ice - Sitting 10         Flex in sitting with ball 10x  SeatedTrunk rotation 10x  Sit<>Stand x10 with with use of chair and SBA, no walker    Peripheral muscle strengthening which included 1 set of 15-20 repetitions at a slow, controlled 7 second per rep pace focused on strengthening supporting musculature for improved body mechanics and functional mobility.  Pt and therapist focused on proper form during treatment to ensure optimal strengthening of each targeted muscle group.  Machines were utilized including torso rotation, leg extension, leg  curl, chest press, upright row. Tricep extension, bicep curl, leg press, and hip abduction added on third visit.       Home Exercise Program as follows:   Seated Tr A activation with 5 sec Holds 10 reps 2-3x/day  Seated flexion 10x with theraball  Flex in sitting with ball 10x  SeatedTrunk rotation 10x    Handouts were given to the patient. Pt demo fair understanding of the education provided. Tamiko demonstrated fair return demonstration of activities.     Lumbar roll use compliance: unknown  Additional exercises taught this treatment session: none    Assessment     Pt tolerated treatment well today and was able to increase 10% on Med X and complete 15 reps.  Pt demonstrates improved posture and gait.  Pt reports she wishes to try LE machines again today.  Pt making excellent progress.  Pt will continue to benefit from skilled outpatient physical therapy to address the deficits stated in the impairment chart, provide pt/family education and to maximize pt's level of independence in the home and community environment.       Pt's spiritual, cultural and educational needs considered and pt agreeable to plan of care and goals as stated below:     Medical necessity is demonstrated by the following problem list.    Pt presents with the following impairments:      History  Co-morbidities and personal factors that may impact the plan of care Co-morbidities:   advanced age ; Multiple co-morbidities.     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      Lumbar facet arthropathy      Sacroiliitis      3 mm anterolisthesis of L3 on L4, L4 on L5, and L5 on S1     Lumbar radiculopathy      Arthritis of right knee      Chronic pain               Personal Factors:   age  lifestyle; sedentary       moderate   Examination  Body Structures and Functions, activity limitations and participation restrictions that may impact the plan of care  Body Regions:   back  lower extremities  trunk     Body Systems:    ROM  strength  balance  gait  transfers  transitions     Participation Restrictions:   Mod to Severely limited with mobility     Activity limitations:   Learning and applying knowledge  no deficits     General Tasks and Commands  no deficits     Communication  no deficits     Mobility  lifting and carrying objects  walking  driving (bike, car, motorcycle)  Basic transfers, stairs.     Self care  washing oneself (bathing, drying, washing hands)  caring for body parts (brushing teeth, shaving, grooming)  toileting  dressing  looking after one's health     Domestic Life  shopping  cooking  doing house work (cleaning house, washing dishes, laundry)  assisting others     Interactions/Relationships  basic interpersonal interactions     Life Areas  no deficits     Community and Social Life  recreation and leisure             moderate   Clinical Presentation stable and uncomplicated moderate   Decision Making/ Complexity Score: moderate         GOALS: Pt is in agreement with the following goals.     Short term goals:  6 weeks or 10 visits   1.  Pt will demonstrate increased lumbar ROM by at least 3 degrees from the initial ROM value with improvements noted in functional ROM and ability to perform ADLs  2.  Pt will demonstrate increased maximum isometric torque value by 10% when compared to the initial value resulting in improved ability to perform bending, lifting, and carrying activities safely, confidently.     3.  Patient report a reduction in worst pain score by 1-2 points for improved tolerance during work and recreational activities  4.  Pt able to perform HEP correctly with minimal cueing or supervision for therapist        Long term goals: 13 weeks or 20 visits   1. Pt will demonstrate increased lumbar ROM by at least 6 degrees from initial ROM value, resulting in improved ability to perform functional fwd bending while standing and sitting.   2. Pt  will demonstrate increased maximum isometric torque value by 20% when compared to the initial value resulting in improved ability to perform bending, lifting, and carrying activities safely, confidently.  3. Pt to demonstrate ability to independently control and reduce their pain through posture positioning and mechanical movements throughout a typical day.  4.  Patient will demonstrate improved overall function per FOTO Survey to CL = least 60% but < 80% impaired, limited or restricted score or less.  5. Patient will improve 5x STS test for functional mobility at 18 seconds or less w/o UE's, in order to demonstrate improve strength  6. Patient will improve Tmed Up and Go for mobility, balance, leg strength and falls risk to 20 seconds or less, with least restrictive AD, and displaying decreased balance disturbance with transitions, and decreased instability with turns and pivots.         Plan   Continue with established Plan of Care towards established PT goals.     [facility]      Name: Tamiko Youssef  [facility] Number: [id number]  Date of Treatment: 12/04/2018   Diagnosis:   Encounter Diagnoses   Name Primary?    Mobility impaired     Decreased ROM of lumbar spine     Weakness      Physician: Nicole Avery,*    Pain pattern determined: 1 PEN  Plan of care signed: 10/10/18   Time in: 1:30  Time Out: 2:30  Total Treatment time: 55  Precautions: Fall;  3 mm anterolisthesis of L3 on L4, L4 on L5, and L5 on S1, HTN  Visit #: 6    POC due:1/09/19  Reassessment due:11/09/18 Completed 11/8/18  Next Due: 12/8/18    Face to Face discussion of patient was done between PT and PTA.     Subjective   Tamiko reports that she had no LBP today. She is not doing her HEP daily.  Patient reports their pain to be 5/10 on a 0-10 scale with 0 being no pain and 10 being the worst pain imaginable.    Pain Location: LLB/buttocks   Occupation:  Retired   Leisure: sewing, crotchet and read                       Pts goals:  Be able to be more active and move better with less pain.        Objective     MOVEMENT LOSS 11/8/18     ROM Loss   Flexion min loss   Extension major loss   Side bending Right mod loss   Side bending Left mod loss   Rotation Right min loss   Rotation Left min loss      11/8/18  Gross Lower Extremity Strength: 3+/5 to 4/5  5x Sit<>stand test for functional legs strength: 24 seconds with B UE's use; limited by LBP, LE's weakness and balance disturbance.     Baseline Isometric Testing on Med X equipment: Testing administered by PT  Date of testing: 10/9/18  ROM 9-30 deg   Max Peak Torque 66    Min Peak Torque 18    Flex/Ext Ratio 3.66   % below normative data 70   Counter weight 301   femur 6   Seat pad 2         CMS Impairment/Limitation/Restriction for FOTO Lumbar Survey     Therapist reviewed FOTO scores for Tamiko Youssef on 10/9/2018.   FOTO documents entered into [employer] - see Media section.     Limitation Score: 76%  Category: Mobility     Current : CL = least 60% but < 80% impaired, limited or restricted  Goal: CL = least 60% but < 80% impaired, limited or restricted 60%  Discharge:          CMS Impairment/Limitation/Restriction for FOTO Lumbar Spine Survey                          Status Limitation            G-Code CMS Severity Modifier  Intake                    24% 76%  Predicted              40% 60%                  Goal Status+ CL - At least 60 percent but less than 80 percent  11/15/2018            44% 56% (Visit 5)   Current Status CK - At least 40 percent but less than 60 percent                                                                 D/C Status CK **only report if this is discharge survey    Treatment    Pt was instructed in and performed the following:     Tamiko received therapeutic exercises to develop/improved posture, cardiovascular endurance, muscular endurance, lumbar/cervical ROM, strength and muscular endurance for 55 minutes including the following exercises:        [facility] 11/27/2018   Visit Number 6   VAS Pain Rating 0   Recumbent Bike Seat Pos. 11   Time 5   Flexion in Sitting 10   Lumbar Extension Seat Pad -   Femur Restraint -   Top Dead Center -   Counterweight -   Lumbar Flexion -   Lumbar Extension -   Lumbar Peak Torque -   Min Torque -   Test Percent Below Normative Data -   Lumbar Weight 38   Repetitions 18   Rating of Perceived Exertion 4   Ice - Sitting 10       Flex in sitting with ball 10x  SeatedTrunk rotation 10x  Sit<>Stand x10 with with use of chair and SBA, no walker    Peripheral muscle strengthening which included 1 set of 15-20 repetitions at a slow, controlled 7 second per rep pace focused on strengthening supporting musculature for improved body mechanics and functional mobility.  Pt and therapist focused on proper form during treatment to ensure optimal strengthening of each targeted muscle group.  Machines were utilized including torso rotation, leg extension, leg curl, chest press, upright row. Tricep extension, bicep curl, leg press, and hip abduction added on third visit.       Home Exercise Program as follows:   Seated Tr A activation with 5 sec Holds 10 reps 2-3x/day  Seated flexion 10x with theraball  Flex in sitting with ball 10x  SeatedTrunk rotation 10x    Handouts were given to the patient. Pt demo fair understanding of the education provided. Tamiko demonstrated fair return demonstration of activities.     Lumbar roll use compliance: unknown  Additional exercises taught this treatment session: none    Assessment     Patient with no LBP with session. She had ~ 5% wt increase to 38# today. She completed 18 reps with an RPE of 4 today. No new c/o were reported at this time. Educated her on the importance of performing daily stretching. She understood.     Patient is making good progress towards established goals.  Pt will continue to benefit from skilled outpatient physical therapy to address the deficits stated in the  impairment chart, provide pt/family education and to maximize pt's level of independence in the home and community environment.       Pt's spiritual, cultural and educational needs considered and pt agreeable to plan of care and goals as stated below:     Medical necessity is demonstrated by the following problem list.    Pt presents with the following impairments:      History  Co-morbidities and personal factors that may impact the plan of care Co-morbidities:   advanced age ; Multiple co-morbidities.     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      Lumbar facet arthropathy      Sacroiliitis      3 mm anterolisthesis of L3 on L4, L4 on L5, and L5 on S1     Lumbar radiculopathy      Arthritis of right knee      Chronic pain               Personal Factors:   age  lifestyle; sedentary       moderate   Examination  Body Structures and Functions, activity limitations and participation restrictions that may impact the plan of care Body Regions:   back  lower extremities  trunk     Body Systems:    ROM  strength  balance  gait  transfers  transitions     Participation Restrictions:   Mod to Severely limited with mobility     Activity limitations:   Learning and applying knowledge  no deficits     General Tasks and Commands  no deficits     Communication  no deficits     Mobility  lifting and carrying objects  walking  driving (bike, car, motorcycle)  Basic transfers, stairs.     Self care  washing oneself (bathing, drying, washing hands)  caring for body parts (brushing teeth, shaving, grooming)  toileting  dressing  looking after one's health     Domestic Life  shopping  cooking  doing house work (cleaning house, washing dishes, laundry)  assisting others     Interactions/Relationships  basic interpersonal interactions     Life Areas  no deficits     Community and Social Life  recreation and leisure             moderate    Clinical Presentation stable and uncomplicated moderate   Decision Making/ Complexity Score: moderate         GOALS: Pt is in agreement with the following goals.     Short term goals:  6 weeks or 10 visits   1.  Pt will demonstrate increased lumbar ROM by at least 3 degrees from the initial ROM value with improvements noted in functional ROM and ability to perform ADLs  2.  Pt will demonstrate increased maximum isometric torque value by 10% when compared to the initial value resulting in improved ability to perform bending, lifting, and carrying activities safely, confidently.     3.  Patient report a reduction in worst pain score by 1-2 points for improved tolerance during work and recreational activities  4.  Pt able to perform HEP correctly with minimal cueing or supervision for therapist        Long term goals: 13 weeks or 20 visits   1. Pt will demonstrate increased lumbar ROM by at least 6 degrees from initial ROM value, resulting in improved ability to perform functional fwd bending while standing and sitting.   2. Pt will demonstrate increased maximum isometric torque value by 20% when compared to the initial value resulting in improved ability to perform bending, lifting, and carrying activities safely, confidently.  3. Pt to demonstrate ability to independently control and reduce their pain through posture positioning and mechanical movements throughout a typical day.  4.  Patient will demonstrate improved overall function per FOTO Survey to CL = least 60% but < 80% impaired, limited or restricted score or less.  5. Patient will improve 5x STS test for functional mobility at 18 seconds or less w/o UE's, in order to demonstrate improve strength  6. Patient will improve Tmed Up and Go for mobility, balance, leg strength and falls risk to 20 seconds or less, with least restrictive AD, and displaying decreased balance disturbance with transitions, and decreased instability with turns and pivots.         Plan    Continue with established Plan of Care towards established PT goals.     [facility]      Name: Tamiko Youssef  Clinic Number: [id number]  Date of Treatment: 12/04/2018   Diagnosis:   Encounter Diagnoses   Name Primary?    Mobility impaired     Decreased ROM of lumbar spine     Weakness      Physician: Nicole Avery,*    Pain pattern determined: 1 PEN  Plan of care signed: 10/10/18   Time in: 1:30  Time Out: 2:30  Total Treatment time: 55  Precautions: Fall;  3 mm anterolisthesis of L3 on L4, L4 on L5, and L5 on S1, HTN  Visit #: 6    POC due:1/09/19  Reassessment due:11/09/18 Completed 11/8/18  Next Due: 12/8/18    Face to Face discussion of patient was done between PT and PTA.     Subjective   Tamiko reports that she had no LBP today. She is not doing her HEP daily.  Patient reports their pain to be 5/10 on a 0-10 scale with 0 being no pain and 10 being the worst pain imaginable.    Pain Location: LLB/buttocks   Occupation:  Retired   Leisure: sewing, crotchet and read                      Pts goals:  Be able to be more active and move better with less pain.        Objective     MOVEMENT LOSS 11/8/18     ROM Loss   Flexion min loss   Extension major loss   Side bending Right mod loss   Side bending Left mod loss   Rotation Right min loss   Rotation Left min loss      11/8/18  Gross Lower Extremity Strength: 3+/5 to 4/5  5x Sit<>stand test for functional legs strength: 24 seconds with B UE's use; limited by LBP, LE's weakness and balance disturbance.     Baseline Isometric Testing on Med X equipment: Testing administered by PT  Date of testing: 10/9/18  ROM 9-30 deg   Max Peak Torque 66    Min Peak Torque 18    Flex/Ext Ratio 3.66   % below normative data 70   Counter weight 301   femur 6   Seat pad 2         CMS Impairment/Limitation/Restriction for FOTO Lumbar Survey     Therapist reviewed FOTO scores for Tamiko Youssef on 10/9/2018.   FOTO documents entered into [employer]  - see Media section.     Limitation Score: 76%  Category: Mobility     Current : CL = least 60% but < 80% impaired, limited or restricted  Goal: CL = least 60% but < 80% impaired, limited or restricted 60%  Discharge:          CMS Impairment/Limitation/Restriction for FOTO Lumbar Spine Survey                          Status Limitation            G-Code CMS Severity Modifier  Intake                    24% 76%  Predicted              40% 60%                  Goal Status+ CL - At least 60 percent but less than 80 percent  11/15/2018            44% 56% (Visit 5)   Current Status CK - At least 40 percent but less than 60 percent                                                                 D/C Status CK **only report if this is discharge survey    Treatment    Pt was instructed in and performed the following:     Tamiko received therapeutic exercises to develop/improved posture, cardiovascular endurance, muscular endurance, lumbar/cervical ROM, strength and muscular endurance for 55 minutes including the following exercises:       [facility] 11/27/2018   Visit Number 6   VAS Pain Rating 0   Recumbent Bike Seat Pos. 11   Time 5   Flexion in Sitting 10   Lumbar Extension Seat Pad -   Femur Restraint -   Top Dead Center -   Counterweight -   Lumbar Flexion -   Lumbar Extension -   Lumbar Peak Torque -   Min Torque -   Test Percent Below Normative Data -   Lumbar Weight 38   Repetitions 18   Rating of Perceived Exertion 4   Ice - Sitting 10       Flex in sitting with ball 10x  SeatedTrunk rotation 10x  Sit<>Stand x10 with with use of chair and SBA, no walker    Peripheral muscle strengthening which included 1 set of 15-20 repetitions at a slow, controlled 7 second per rep pace focused on strengthening supporting musculature for improved body mechanics and functional mobility.  Pt and therapist focused on proper form during treatment to ensure optimal strengthening of each targeted muscle group.  Machines were  utilized including torso rotation, leg extension, leg curl, chest press, upright row. Tricep extension, bicep curl, leg press, and hip abduction added on third visit.       Home Exercise Program as follows:   Seated Tr A activation with 5 sec Holds 10 reps 2-3x/day  Seated flexion 10x with theraball  Flex in sitting with ball 10x  SeatedTrunk rotation 10x    Handouts were given to the patient. Pt demo fair understanding of the education provided. Tamiko demonstrated fair return demonstration of activities.     Lumbar roll use compliance: unknown  Additional exercises taught this treatment session: none    Assessment     Patient with no LBP with session. She had ~ 5% wt increase to 38# today. She completed 18 reps with an RPE of 4 today. No new c/o were reported at this time. Educated her on the importance of performing daily stretching. She understood.     Patient is making good progress towards established goals.  Pt will continue to benefit from skilled outpatient physical therapy to address the deficits stated in the impairment chart, provide pt/family education and to maximize pt's level of independence in the home and community environment.       Pt's spiritual, cultural and educational needs considered and pt agreeable to plan of care and goals as stated below:     Medical necessity is demonstrated by the following problem list.    Pt presents with the following impairments:      History  Co-morbidities and personal factors that may impact the plan of care Co-morbidities:   advanced age ; Multiple co-morbidities.     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      Lumbar facet arthropathy      Sacroiliitis      3 mm anterolisthesis of L3 on L4, L4 on L5, and L5 on S1     Lumbar radiculopathy      Arthritis of right knee      Chronic pain               Personal Factors:   age  lifestyle; sedentary       moderate    Examination  Body Structures and Functions, activity limitations and participation restrictions that may impact the plan of care Body Regions:   back  lower extremities  trunk     Body Systems:    ROM  strength  balance  gait  transfers  transitions     Participation Restrictions:   Mod to Severely limited with mobility     Activity limitations:   Learning and applying knowledge  no deficits     General Tasks and Commands  no deficits     Communication  no deficits     Mobility  lifting and carrying objects  walking  driving (bike, car, motorcycle)  Basic transfers, stairs.     Self care  washing oneself (bathing, drying, washing hands)  caring for body parts (brushing teeth, shaving, grooming)  toileting  dressing  looking after one's health     Domestic Life  shopping  cooking  doing house work (cleaning house, washing dishes, laundry)  assisting others     Interactions/Relationships  basic interpersonal interactions     Life Areas  no deficits     Community and Social Life  recreation and leisure             moderate   Clinical Presentation stable and uncomplicated moderate   Decision Making/ Complexity Score: moderate         GOALS: Pt is in agreement with the following goals.     Short term goals:  6 weeks or 10 visits   1.  Pt will demonstrate increased lumbar ROM by at least 3 degrees from the initial ROM value with improvements noted in functional ROM and ability to perform ADLs  2.  Pt will demonstrate increased maximum isometric torque value by 10% when compared to the initial value resulting in improved ability to perform bending, lifting, and carrying activities safely, confidently.     3.  Patient report a reduction in worst pain score by 1-2 points for improved tolerance during work and recreational activities  4.  Pt able to perform HEP correctly with minimal cueing or supervision for therapist        Long term goals: 13 weeks or 20 visits   1. Pt will demonstrate increased lumbar ROM by at least 6  degrees from initial ROM value, resulting in improved ability to perform functional fwd bending while standing and sitting.   2. Pt will demonstrate increased maximum isometric torque value by 20% when compared to the initial value resulting in improved ability to perform bending, lifting, and carrying activities safely, confidently.  3. Pt to demonstrate ability to independently control and reduce their pain through posture positioning and mechanical movements throughout a typical day.  4.  Patient will demonstrate improved overall function per FOTO Survey to CL = least 60% but < 80% impaired, limited or restricted score or less.  5. Patient will improve 5x STS test for functional mobility at 18 seconds or less w/o UE's, in order to demonstrate improve strength  6. Patient will improve Tmed Up and Go for mobility, balance, leg strength and falls risk to 20 seconds or less, with least restrictive AD, and displaying decreased balance disturbance with transitions, and decreased instability with turns and pivots.         Plan   Continue with established Plan of Care towards established PT goals.

## 2018-12-11 ENCOUNTER — CLINICAL SUPPORT (OUTPATIENT)
Dept: REHABILITATION | Facility: OTHER | Age: 78
End: 2018-12-11
Attending: NURSE PRACTITIONER
Payer: MEDICARE

## 2018-12-11 DIAGNOSIS — R53.1 WEAKNESS: ICD-10-CM

## 2018-12-11 DIAGNOSIS — M53.86 DECREASED ROM OF LUMBAR SPINE: ICD-10-CM

## 2018-12-11 DIAGNOSIS — Z74.09 MOBILITY IMPAIRED: ICD-10-CM

## 2018-12-11 PROCEDURE — 97110 THERAPEUTIC EXERCISES: CPT

## 2018-12-11 NOTE — PROGRESS NOTES
[facility]      Name: Tamiko Youssef  Clinic Number: [id number]  Date of Treatment: 12/11/2018   Diagnosis:   No diagnosis found.  Physician: Nicole Avery,*    Pain pattern determined: 1 PEN  Plan of care signed: 10/10/18   Time in: 0945  Time Out: 1030  Total Treatment time: 445  Precautions: Fall;  3 mm anterolisthesis of L3 on L4, L4 on L5, and L5 on S1, HTN  Visit #: 9    POC due:1/09/19  Reassessment Next Due: 1/4/19    Face to Face discussion of patient was done between PT and PTA.     Subjective   Tamiko arrives to PT today and denies any LBP. She reports it is her knees and legs that are bothering her. She has an MRI for her legs on the 12/19/18 scheduled.    Patient reports their pain to be 0/10 on a 0-10 scale with 0 being no pain and 10 being the worst pain imaginable.    Pain Location: LLB/buttocks     Occupation:  Retired   Leisure: sewing, crotchet and read                      Pts goals:  Be able to be more active and move better with less pain.        Objective     MOVEMENT LOSS 11/8/18     ROM Loss   Flexion min loss   Extension major loss   Side bending Right mod loss   Side bending Left mod loss   Rotation Right min loss   Rotation Left min loss      11/8/18  Gross Lower Extremity Strength: 3+/5 to 4/5  5x Sit<>stand test for functional legs strength: 24 seconds with B UE's use; limited by LBP, LE's weakness and balance disturbance.     Baseline Isometric Testing on Med X equipment: Testing administered by PT  Date of testing: 10/9/18  ROM 9-30 deg   Max Peak Torque 66    Min Peak Torque 18    Flex/Ext Ratio 3.66   % below normative data 70   Counter weight 301   femur 6   Seat pad 2         CMS Impairment/Limitation/Restriction for FOTO Lumbar Survey     Therapist reviewed FOTO scores for Tamiko Youssef on 10/9/2018.   FOTO documents entered into [employer] - see Media section.     Limitation Score: 76%  Category: Mobility     Current : CL = least 60% but <  80% impaired, limited or restricted  Goal: CL = least 60% but < 80% impaired, limited or restricted 60%  Discharge:          CMS Impairment/Limitation/Restriction for FOTO Lumbar Spine Survey                          Status Limitation            G-Code CMS Severity Modifier  Intake                    24% 76%  Predicted              40% 60%                  Goal Status+ CL - At least 60 percent but less than 80 percent  11/15/2018            44% 56% (Visit 5)   Current Status CK - At least 40 percent but less than 60 percent                                                                 D/C Status CK **only report if this is discharge survey    Treatment    Pt was instructed in and performed the following:     Tamiko received therapeutic exercises to develop/improved posture, cardiovascular endurance, muscular endurance, lumbar/cervical ROM, strength and muscular endurance for 45 minutes including the following exercises:     [facility] 12/11/2018   Visit Number 9   VAS Pain Rating -   Recumbent Bike Seat Pos. -   Time -   Flexion in Sitting 10   Lumbar Extension Seat Pad -   Femur Restraint -   Top Dead Center -   Counterweight -   Lumbar Flexion -   Lumbar Extension -   Lumbar Peak Torque -   Min Torque -   Test Percent Below Normative Data -   Lumbar Weight 41   Repetitions 18   Rating of Perceived Exertion 3   Ice - Sitting 10         Flex in sitting with ball 10x  SeatedTrunk rotation 10x  Sit<>Stand x10 with with use of chair and SBA, no walker    Peripheral muscle strengthening which included 1 set of 15-20 repetitions at a slow, controlled 7 second per rep pace focused on strengthening supporting musculature for improved body mechanics and functional mobility.  Pt and therapist focused on proper form during treatment to ensure optimal strengthening of each targeted muscle group.  Machines were utilized including torso rotation, leg extension, leg curl, chest press, upright row. Tricep extension, bicep  curl, leg press, and hip abduction added on third visit.       Home Exercise Program as follows:   Seated Tr A activation with 5 sec Holds 10 reps 2-3x/day  Seated flexion 10x with theraball  Flex in sitting with ball 10x  SeatedTrunk rotation 10x    Handouts were given to the patient. Pt demo fair understanding of the education provided. Tamiko demonstrated fair return demonstration of activities.     Lumbar roll use compliance: unknown  Additional exercises taught this treatment session: none    Assessment     Tamiko displayed improved stability with mobility w/o RW and transfers from sitting, and reported feeling better and stronger overall. She completed 18 reps at 41#, with an RPE of 3 on the lumbar extension machine. She denies any back pain with the session today.  She is progressing well with program and exercise tolerance.    Pt will continue to benefit from skilled outpatient physical therapy to address the deficits stated in the impairment chart, provide pt/family education and to maximize pt's level of independence in the home and community environment.       Pt's spiritual, cultural and educational needs considered and pt agreeable to plan of care and goals as stated below:     Medical necessity is demonstrated by the following problem list.    Pt presents with the following impairments:      History  Co-morbidities and personal factors that may impact the plan of care Co-morbidities:   advanced age ; Multiple co-morbidities.     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      Lumbar facet arthropathy      Sacroiliitis      3 mm anterolisthesis of L3 on L4, L4 on L5, and L5 on S1     Lumbar radiculopathy      Arthritis of right knee      Chronic pain               Personal Factors:   age  lifestyle; sedentary       moderate   Examination  Body Structures and Functions, activity limitations and participation  restrictions that may impact the plan of care Body Regions:   back  lower extremities  trunk     Body Systems:    ROM  strength  balance  gait  transfers  transitions     Participation Restrictions:   Mod to Severely limited with mobility     Activity limitations:   Learning and applying knowledge  no deficits     General Tasks and Commands  no deficits     Communication  no deficits     Mobility  lifting and carrying objects  walking  driving (bike, car, motorcycle)  Basic transfers, stairs.     Self care  washing oneself (bathing, drying, washing hands)  caring for body parts (brushing teeth, shaving, grooming)  toileting  dressing  looking after one's health     Domestic Life  shopping  cooking  doing house work (cleaning house, washing dishes, laundry)  assisting others     Interactions/Relationships  basic interpersonal interactions     Life Areas  no deficits     Community and Social Life  recreation and leisure             moderate   Clinical Presentation stable and uncomplicated moderate   Decision Making/ Complexity Score: moderate         GOALS: Pt is in agreement with the following goals.     Short term goals:  6 weeks or 10 visits   1.  Pt will demonstrate increased lumbar ROM by at least 3 degrees from the initial ROM value with improvements noted in functional ROM and ability to perform ADLs  2.  Pt will demonstrate increased maximum isometric torque value by 10% when compared to the initial value resulting in improved ability to perform bending, lifting, and carrying activities safely, confidently.     3.  Patient report a reduction in worst pain score by 1-2 points for improved tolerance during work and recreational activities  4.  Pt able to perform HEP correctly with minimal cueing or supervision for therapist        Long term goals: 13 weeks or 20 visits   1. Pt will demonstrate increased lumbar ROM by at least 6 degrees from initial ROM value, resulting in improved ability to perform functional  fwd bending while standing and sitting.   2. Pt will demonstrate increased maximum isometric torque value by 20% when compared to the initial value resulting in improved ability to perform bending, lifting, and carrying activities safely, confidently.  3. Pt to demonstrate ability to independently control and reduce their pain through posture positioning and mechanical movements throughout a typical day.  4.  Patient will demonstrate improved overall function per FOTO Survey to CL = least 60% but < 80% impaired, limited or restricted score or less.  5. Patient will improve 5x STS test for functional mobility at 18 seconds or less w/o UE's, in order to demonstrate improve strength  6. Patient will improve Tmed Up and Go for mobility, balance, leg strength and falls risk to 20 seconds or less, with least restrictive AD, and displaying decreased balance disturbance with transitions, and decreased instability with turns and pivots.         Plan   Continue with established Plan of Care towards established PT goals.     [facility] Therapy Treatment      Name: Tamiko Youssef  [facility] Number: [id number]  Date of Treatment: 12/11/2018   Diagnosis:   No diagnosis found.  Physician: Nicole Avery,*    Pain pattern determined: 1 PEN  Plan of care signed: 10/10/18   Time in: 1:30  Time Out: 2:30  Total Treatment time: 55  Precautions: Fall;  3 mm anterolisthesis of L3 on L4, L4 on L5, and L5 on S1, HTN  Visit #: 6    POC due:1/09/19  Reassessment due:11/09/18 Completed 11/8/18  Next Due: 12/8/18    Face to Face discussion of patient was done between PT and PTA.     Subjective   Tamiko reports that she had no LBP today. She is not doing her HEP daily.  Patient reports their pain to be 5/10 on a 0-10 scale with 0 being no pain and 10 being the worst pain imaginable.    Pain Location: LLB/buttocks   Occupation:  Retired   Leisure: sewing, crotchet and read                      Pts goals:  Be able to be more  active and move better with less pain.        Objective     MOVEMENT LOSS 11/8/18     ROM Loss   Flexion min loss   Extension major loss   Side bending Right mod loss   Side bending Left mod loss   Rotation Right min loss   Rotation Left min loss      11/8/18  Gross Lower Extremity Strength: 3+/5 to 4/5  5x Sit<>stand test for functional legs strength: 24 seconds with B UE's use; limited by LBP, LE's weakness and balance disturbance.     Baseline Isometric Testing on Med X equipment: Testing administered by PT  Date of testing: 10/9/18  ROM 9-30 deg   Max Peak Torque 66    Min Peak Torque 18    Flex/Ext Ratio 3.66   % below normative data 70   Counter weight 301   femur 6   Seat pad 2         CMS Impairment/Limitation/Restriction for FOTO Lumbar Survey     Therapist reviewed FOTO scores for Tamiko Youssef on 10/9/2018.   FOTO documents entered into [employer] - see Media section.     Limitation Score: 76%  Category: Mobility     Current : CL = least 60% but < 80% impaired, limited or restricted  Goal: CL = least 60% but < 80% impaired, limited or restricted 60%  Discharge:          CMS Impairment/Limitation/Restriction for FOTO Lumbar Spine Survey                          Status Limitation            G-Code CMS Severity Modifier  Intake                    24% 76%  Predicted              40% 60%                  Goal Status+ CL - At least 60 percent but less than 80 percent  11/15/2018            44% 56% (Visit 5)   Current Status CK - At least 40 percent but less than 60 percent                                                                 D/C Status CK **only report if this is discharge survey    Treatment    Pt was instructed in and performed the following:     Tamiko received therapeutic exercises to develop/improved posture, cardiovascular endurance, muscular endurance, lumbar/cervical ROM, strength and muscular endurance for 55 minutes including the following exercises:       [facility] 11/27/2018   Visit  Number 6   VAS Pain Rating 0   Recumbent Bike Seat Pos. 11   Time 5   Flexion in Sitting 10   Lumbar Extension Seat Pad -   Femur Restraint -   Top Dead Center -   Counterweight -   Lumbar Flexion -   Lumbar Extension -   Lumbar Peak Torque -   Min Torque -   Test Percent Below Normative Data -   Lumbar Weight 38   Repetitions 18   Rating of Perceived Exertion 4   Ice - Sitting 10       Flex in sitting with ball 10x  SeatedTrunk rotation 10x  Sit<>Stand x10 with with use of chair and SBA, no walker    Peripheral muscle strengthening which included 1 set of 15-20 repetitions at a slow, controlled 7 second per rep pace focused on strengthening supporting musculature for improved body mechanics and functional mobility.  Pt and therapist focused on proper form during treatment to ensure optimal strengthening of each targeted muscle group.  Machines were utilized including torso rotation, leg extension, leg curl, chest press, upright row. Tricep extension, bicep curl, leg press, and hip abduction added on third visit.       Home Exercise Program as follows:   Seated Tr A activation with 5 sec Holds 10 reps 2-3x/day  Seated flexion 10x with theraball  Flex in sitting with ball 10x  SeatedTrunk rotation 10x    Handouts were given to the patient. Pt demo fair understanding of the education provided. Tamiko demonstrated fair return demonstration of activities.     Lumbar roll use compliance: unknown  Additional exercises taught this treatment session: none    Assessment     Patient with no LBP with session. She had ~ 5% wt increase to 38# today. She completed 18 reps with an RPE of 4 today. No new c/o were reported at this time. Educated her on the importance of performing daily stretching. She understood.     Patient is making good progress towards established goals.  Pt will continue to benefit from skilled outpatient physical therapy to address the deficits stated in the impairment chart, provide pt/family education and to  maximize pt's level of independence in the home and community environment.       Pt's spiritual, cultural and educational needs considered and pt agreeable to plan of care and goals as stated below:     Medical necessity is demonstrated by the following problem list.    Pt presents with the following impairments:      History  Co-morbidities and personal factors that may impact the plan of care Co-morbidities:   advanced age ; Multiple co-morbidities.     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      Lumbar facet arthropathy      Sacroiliitis      3 mm anterolisthesis of L3 on L4, L4 on L5, and L5 on S1     Lumbar radiculopathy      Arthritis of right knee      Chronic pain               Personal Factors:   age  lifestyle; sedentary       moderate   Examination  Body Structures and Functions, activity limitations and participation restrictions that may impact the plan of care Body Regions:   back  lower extremities  trunk     Body Systems:    ROM  strength  balance  gait  transfers  transitions     Participation Restrictions:   Mod to Severely limited with mobility     Activity limitations:   Learning and applying knowledge  no deficits     General Tasks and Commands  no deficits     Communication  no deficits     Mobility  lifting and carrying objects  walking  driving (bike, car, motorcycle)  Basic transfers, stairs.     Self care  washing oneself (bathing, drying, washing hands)  caring for body parts (brushing teeth, shaving, grooming)  toileting  dressing  looking after one's health     Domestic Life  shopping  cooking  doing house work (cleaning house, washing dishes, laundry)  assisting others     Interactions/Relationships  basic interpersonal interactions     Life Areas  no deficits     Community and Social Life  recreation and leisure             moderate   Clinical Presentation stable and uncomplicated  moderate   Decision Making/ Complexity Score: moderate         GOALS: Pt is in agreement with the following goals.     Short term goals:  6 weeks or 10 visits   1.  Pt will demonstrate increased lumbar ROM by at least 3 degrees from the initial ROM value with improvements noted in functional ROM and ability to perform ADLs  2.  Pt will demonstrate increased maximum isometric torque value by 10% when compared to the initial value resulting in improved ability to perform bending, lifting, and carrying activities safely, confidently.     3.  Patient report a reduction in worst pain score by 1-2 points for improved tolerance during work and recreational activities  4.  Pt able to perform HEP correctly with minimal cueing or supervision for therapist        Long term goals: 13 weeks or 20 visits   1. Pt will demonstrate increased lumbar ROM by at least 6 degrees from initial ROM value, resulting in improved ability to perform functional fwd bending while standing and sitting.   2. Pt will demonstrate increased maximum isometric torque value by 20% when compared to the initial value resulting in improved ability to perform bending, lifting, and carrying activities safely, confidently.  3. Pt to demonstrate ability to independently control and reduce their pain through posture positioning and mechanical movements throughout a typical day.  4.  Patient will demonstrate improved overall function per FOTO Survey to CL = least 60% but < 80% impaired, limited or restricted score or less.  5. Patient will improve 5x STS test for functional mobility at 18 seconds or less w/o UE's, in order to demonstrate improve strength  6. Patient will improve Tmed Up and Go for mobility, balance, leg strength and falls risk to 20 seconds or less, with least restrictive AD, and displaying decreased balance disturbance with transitions, and decreased instability with turns and pivots.         Plan   Continue with established Plan of Care towards  established PT goals.     [facility] Physical Therapy Treatment      Name: Tamiko Youssef  Clinic Number: [id number]  Date of Treatment: 12/11/2018   Diagnosis:   No diagnosis found.  Physician: Nicole Avery,*    Pain pattern determined: 1 PEN  Plan of care signed: 10/10/18   Time in: 1:30  Time Out: 2:30  Total Treatment time: 55  Precautions: Fall;  3 mm anterolisthesis of L3 on L4, L4 on L5, and L5 on S1, HTN  Visit #: 6    POC due:1/09/19  Reassessment due:11/09/18 Completed 11/8/18  Next Due: 12/8/18    Face to Face discussion of patient was done between PT and PTA.     Subjective   Tamiko reports that she had no LBP today. She is not doing her HEP daily.  Patient reports their pain to be 5/10 on a 0-10 scale with 0 being no pain and 10 being the worst pain imaginable.    Pain Location: LLB/buttocks   Occupation:  Retired   Leisure: sewing, crotchet and read                      Pts goals:  Be able to be more active and move better with less pain.        Objective     MOVEMENT LOSS 11/8/18     ROM Loss   Flexion min loss   Extension major loss   Side bending Right mod loss   Side bending Left mod loss   Rotation Right min loss   Rotation Left min loss      11/8/18  Gross Lower Extremity Strength: 3+/5 to 4/5  5x Sit<>stand test for functional legs strength: 24 seconds with B UE's use; limited by LBP, LE's weakness and balance disturbance.     Baseline Isometric Testing on Med X equipment: Testing administered by PT  Date of testing: 10/9/18  ROM 9-30 deg   Max Peak Torque 66    Min Peak Torque 18    Flex/Ext Ratio 3.66   % below normative data 70   Counter weight 301   femur 6   Seat pad 2         CMS Impairment/Limitation/Restriction for FOTO Lumbar Survey     Therapist reviewed FOTO scores for Tamiko Youssef on 10/9/2018.   FOTO documents entered into [employer] - see Media section.     Limitation Score: 76%  Category: Mobility     Current : CL = least 60% but < 80% impaired, limited or  restricted  Goal: CL = least 60% but < 80% impaired, limited or restricted 60%  Discharge:          CMS Impairment/Limitation/Restriction for FOTO Lumbar Spine Survey                          Status Limitation            G-Code CMS Severity Modifier  Intake                    24% 76%  Predicted              40% 60%                  Goal Status+ CL - At least 60 percent but less than 80 percent  11/15/2018            44% 56% (Visit 5)   Current Status CK - At least 40 percent but less than 60 percent                                                                 D/C Status CK **only report if this is discharge survey    Treatment    Pt was instructed in and performed the following:     Tamiko received therapeutic exercises to develop/improved posture, cardiovascular endurance, muscular endurance, lumbar/cervical ROM, strength and muscular endurance for 55 minutes including the following exercises:       [facility] 11/27/2018   Visit Number 6   VAS Pain Rating 0   Recumbent Bike Seat Pos. 11   Time 5   Flexion in Sitting 10   Lumbar Extension Seat Pad -   Femur Restraint -   Top Dead Center -   Counterweight -   Lumbar Flexion -   Lumbar Extension -   Lumbar Peak Torque -   Min Torque -   Test Percent Below Normative Data -   Lumbar Weight 38   Repetitions 18   Rating of Perceived Exertion 4   Ice - Sitting 10       Flex in sitting with ball 10x  SeatedTrunk rotation 10x  Sit<>Stand x10 with with use of chair and SBA, no walker    Peripheral muscle strengthening which included 1 set of 15-20 repetitions at a slow, controlled 7 second per rep pace focused on strengthening supporting musculature for improved body mechanics and functional mobility.  Pt and therapist focused on proper form during treatment to ensure optimal strengthening of each targeted muscle group.  Machines were utilized including torso rotation, leg extension, leg curl, chest press, upright row. Tricep extension, bicep curl, leg press, and  hip abduction added on third visit.       Home Exercise Program as follows:   Seated Tr A activation with 5 sec Holds 10 reps 2-3x/day  Seated flexion 10x with theraball  Flex in sitting with ball 10x  SeatedTrunk rotation 10x    Handouts were given to the patient. Pt demo fair understanding of the education provided. Tamiko demonstrated fair return demonstration of activities.     Lumbar roll use compliance: unknown  Additional exercises taught this treatment session: none    Assessment     Patient with no LBP with session. She had ~ 5% wt increase to 38# today. She completed 18 reps with an RPE of 4 today. No new c/o were reported at this time. Educated her on the importance of performing daily stretching. She understood.     Patient is making good progress towards established goals.  Pt will continue to benefit from skilled outpatient physical therapy to address the deficits stated in the impairment chart, provide pt/family education and to maximize pt's level of independence in the home and community environment.       Pt's spiritual, cultural and educational needs considered and pt agreeable to plan of care and goals as stated below:     Medical necessity is demonstrated by the following problem list.    Pt presents with the following impairments:      History  Co-morbidities and personal factors that may impact the plan of care Co-morbidities:   advanced age ; Multiple co-morbidities.     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      Lumbar facet arthropathy      Sacroiliitis      3 mm anterolisthesis of L3 on L4, L4 on L5, and L5 on S1     Lumbar radiculopathy      Arthritis of right knee      Chronic pain               Personal Factors:   age  lifestyle; sedentary       moderate   Examination  Body Structures and Functions, activity limitations and participation restrictions that may impact the plan of care Body  Regions:   back  lower extremities  trunk     Body Systems:    ROM  strength  balance  gait  transfers  transitions     Participation Restrictions:   Mod to Severely limited with mobility     Activity limitations:   Learning and applying knowledge  no deficits     General Tasks and Commands  no deficits     Communication  no deficits     Mobility  lifting and carrying objects  walking  driving (bike, car, motorcycle)  Basic transfers, stairs.     Self care  washing oneself (bathing, drying, washing hands)  caring for body parts (brushing teeth, shaving, grooming)  toileting  dressing  looking after one's health     Domestic Life  shopping  cooking  doing house work (cleaning house, washing dishes, laundry)  assisting others     Interactions/Relationships  basic interpersonal interactions     Life Areas  no deficits     Community and Social Life  recreation and leisure             moderate   Clinical Presentation stable and uncomplicated moderate   Decision Making/ Complexity Score: moderate         GOALS: Pt is in agreement with the following goals.     Short term goals:  6 weeks or 10 visits   1.  Pt will demonstrate increased lumbar ROM by at least 3 degrees from the initial ROM value with improvements noted in functional ROM and ability to perform ADLs  2.  Pt will demonstrate increased maximum isometric torque value by 10% when compared to the initial value resulting in improved ability to perform bending, lifting, and carrying activities safely, confidently.     3.  Patient report a reduction in worst pain score by 1-2 points for improved tolerance during work and recreational activities  4.  Pt able to perform HEP correctly with minimal cueing or supervision for therapist        Long term goals: 13 weeks or 20 visits   1. Pt will demonstrate increased lumbar ROM by at least 6 degrees from initial ROM value, resulting in improved ability to perform functional fwd bending while standing and sitting.   2. Pt will  demonstrate increased maximum isometric torque value by 20% when compared to the initial value resulting in improved ability to perform bending, lifting, and carrying activities safely, confidently.  3. Pt to demonstrate ability to independently control and reduce their pain through posture positioning and mechanical movements throughout a typical day.  4.  Patient will demonstrate improved overall function per FOTO Survey to CL = least 60% but < 80% impaired, limited or restricted score or less.  5. Patient will improve 5x STS test for functional mobility at 18 seconds or less w/o UE's, in order to demonstrate improve strength  6. Patient will improve Tmed Up and Go for mobility, balance, leg strength and falls risk to 20 seconds or less, with least restrictive AD, and displaying decreased balance disturbance with transitions, and decreased instability with turns and pivots.         Plan   Continue with established Plan of Care towards established PT goals.

## 2018-12-18 ENCOUNTER — CLINICAL SUPPORT (OUTPATIENT)
Dept: REHABILITATION | Facility: OTHER | Age: 78
End: 2018-12-18
Attending: NURSE PRACTITIONER
Payer: MEDICARE

## 2018-12-18 DIAGNOSIS — Z74.09 MOBILITY IMPAIRED: ICD-10-CM

## 2018-12-18 DIAGNOSIS — M53.86 DECREASED ROM OF LUMBAR SPINE: ICD-10-CM

## 2018-12-18 DIAGNOSIS — R53.1 WEAKNESS: ICD-10-CM

## 2018-12-18 PROCEDURE — G8979 MOBILITY GOAL STATUS: HCPCS | Mod: CL

## 2018-12-18 PROCEDURE — 97110 THERAPEUTIC EXERCISES: CPT

## 2018-12-18 PROCEDURE — G8978 MOBILITY CURRENT STATUS: HCPCS | Mod: CL

## 2018-12-18 NOTE — PROGRESS NOTES
"[facility]      Name: Tamiko Alan Youssef  Clinic Number: [id number]  Date of Treatment: 12/18/2018   Diagnosis:   Encounter Diagnoses   Name Primary?    Mobility impaired     Decreased ROM of lumbar spine     Weakness      Physician: Nicole Avery,*    Pain pattern determined: 1 PEN  Plan of care signed: 10/10/18   Time in: 0930  Time Out: 1030  Total Treatment time: 60 minutes  Precautions: Fall;  3 mm anterolisthesis of L3 on L4, L4 on L5, and L5 on S1, HTN  Visit #: 10 [Test day]    POC due:1/09/19  Reassessment Next Due: 1/4/19    Face to Face discussion of patient was done between PT and PTA.     Subjective   Tamiko denies back pain this date, reporting that it has been "feeling better." She does complain of consistent Right knee pain. She also present to therapy this date wearing a mask and reports that she has a "cold" but is willing to undergo testing on the MedX this date.     Patient reports their pain to be 0/10 on a 0-10 scale with 0 being no pain and 10 being the worst pain imaginable.    Pain Location: LLB/buttocks     Occupation:  Retired   Leisure: sewing, crotchet and read                      Pts goals:  Be able to be more active and move better with less pain.        Objective     MOVEMENT LOSS 11/8/18     ROM Loss   Flexion min loss   Extension major loss   Side bending Right mod loss   Side bending Left mod loss   Rotation Right min loss   Rotation Left min loss      11/8/18  Gross Lower Extremity Strength: 3+/5 to 4/5  5x Sit<>stand test for functional legs strength: 24 seconds with B UE's use; limited by LBP, LE's weakness and balance disturbance.     Baseline Isometric Testing on Med X equipment: Testing administered by PT  Date of testing: 10/9/18  ROM 9-30 deg   Max Peak Torque 66    Min Peak Torque 18    Flex/Ext Ratio 3.66   % below normative data 70   Counter weight 301   femur 6   Seat pad 2         CMS Impairment/Limitation/Restriction for FOTO " Lumbar Survey     Therapist reviewed FOTO scores for Tamiko Youssef on 10/9/2018.   FOTO documents entered into [employer] - see Media section.     Limitation Score: 76%  Category: Mobility     Current : CL = least 60% but < 80% impaired, limited or restricted  Goal: CL = least 60% but < 80% impaired, limited or restricted 60%  Discharge:          CMS Impairment/Limitation/Restriction for FOTO Lumbar Spine Survey                          Status      Limitation            G-Code CMS Severity Modifier  Intake                    24%    76%  Predicted              40%    60%                Goal Status+ CL - At least 60 percent but less than 80 percent  11/15/2018            44%   56% (Visit 5)   Current Status CK - At least 40 percent but less than 60 percent    -2018           36%   64%               Current Status CL - At least 60 percent but less than 80 percent                                                                 Goal Status+ CL - At least 60 percent but less than 80 percent    Mid-Point Isometric Testing on Med X equipment: Testing administered by PT  Date of testin19  [facility] 2018   Visit Number 10   VAS Pain Rating 0   Recumbent Bike Seat Pos. -   Time 5   Lumbar Flexion 30   Lumbar Extension 3   Lumbar Peak Torque 112   Min Torque 65   Test Percent Gain in Strength from Initial  196   Ice - Sitting 10     Treatment    Pt was instructed in and performed the following:     Tamiko received therapeutic exercises to develop/improved posture, cardiovascular endurance, muscular endurance, lumbar/cervical ROM, strength and muscular endurance for 45 minutes including the following exercises:     Flex in sitting with ball 10x  SeatedTrunk rotation 10x  Sit<>Stand x10 with with use of chair and SBA, no walker    **Mid-point testing as outlined above.     Peripheral muscle strengthening which included 1 set of 15-20 repetitions at a slow, controlled 7 second per rep pace focused on  "strengthening supporting musculature for improved body mechanics and functional mobility.  Pt and therapist focused on proper form during treatment to ensure optimal strengthening of each targeted muscle group.  Machines were utilized including torso rotation, leg extension, leg curl, chest press, upright row. Tricep extension, bicep curl, leg press, and hip abduction added on third visit.     Home Exercise Program as follows:   Seated Tr A activation with 5 sec Holds 10 reps 2-3x/day  Seated flexion 10x with theraball  Flex in sitting with ball 10x  SeatedTrunk rotation 10x  -Handouts were given to the patient. Pt demo fair understanding of the education provided. Tamiko demonstrated fair return demonstration of activities.     Lumbar roll use compliance: unknown  Additional exercises taught this treatment session: Reviewed HEP    Assessment     Tamiko Alan tolerated session very well this date despite "cold symptoms" and demonstrated good improvements with all MedX testing as listed below:  -Increased tolerable ROM from 9-30d to 3-30d  -196% increase from initial testing  -3% above norms  -Peak Torque at 112# with min at 65#  She is demonstrating improved functional strength with transfers and responds well to stretching/mobility exercises. She will benefit from continued progression of strengthening as tolerated. Due to continued knee pain, patient plans to have an MRI to assess any potential cause and she may benefit from another POC addressing LE symptoms.   Pt will continue to benefit from skilled outpatient physical therapy to address the deficits stated in the impairment chart, provide pt/family education and to maximize pt's level of independence in the home and community environment.       Pt's spiritual, cultural and educational needs considered and pt agreeable to plan of care and goals as stated below:     Medical necessity is demonstrated by the following problem list.    Pt presents with the following " impairments:      History  Co-morbidities and personal factors that may impact the plan of care Co-morbidities:   advanced age ; Multiple co-morbidities.     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      Lumbar facet arthropathy      Sacroiliitis      3 mm anterolisthesis of L3 on L4, L4 on L5, and L5 on S1     Lumbar radiculopathy      Arthritis of right knee      Chronic pain               Personal Factors:   age  lifestyle; sedentary       moderate   Examination  Body Structures and Functions, activity limitations and participation restrictions that may impact the plan of care Body Regions:   back  lower extremities  trunk     Body Systems:    ROM  strength  balance  gait  transfers  transitions     Participation Restrictions:   Mod to Severely limited with mobility     Activity limitations:   Learning and applying knowledge  no deficits     General Tasks and Commands  no deficits     Communication  no deficits     Mobility  lifting and carrying objects  walking  driving (bike, car, motorcycle)  Basic transfers, stairs.     Self care  washing oneself (bathing, drying, washing hands)  caring for body parts (brushing teeth, shaving, grooming)  toileting  dressing  looking after one's health     Domestic Life  shopping  cooking  doing house work (cleaning house, washing dishes, laundry)  assisting others     Interactions/Relationships  basic interpersonal interactions     Life Areas  no deficits     Community and Social Life  recreation and leisure             moderate   Clinical Presentation stable and uncomplicated moderate   Decision Making/ Complexity Score: moderate         GOALS: Pt is in agreement with the following goals.     Short term goals:  6 weeks or 10 visits   1.  Pt will demonstrate increased lumbar ROM by at least 3 degrees from the initial ROM value with improvements noted in functional ROM and  ability to perform ADLs  2.  Pt will demonstrate increased maximum isometric torque value by 10% when compared to the initial value resulting in improved ability to perform bending, lifting, and carrying activities safely, confidently.     3.  Patient report a reduction in worst pain score by 1-2 points for improved tolerance during work and recreational activities  4.  Pt able to perform HEP correctly with minimal cueing or supervision for therapist        Long term goals: 13 weeks or 20 visits   1. Pt will demonstrate increased lumbar ROM by at least 6 degrees from initial ROM value, resulting in improved ability to perform functional fwd bending while standing and sitting.   2. Pt will demonstrate increased maximum isometric torque value by 20% when compared to the initial value resulting in improved ability to perform bending, lifting, and carrying activities safely, confidently.  3. Pt to demonstrate ability to independently control and reduce their pain through posture positioning and mechanical movements throughout a typical day.  4.  Patient will demonstrate improved overall function per FOTO Survey to CL = least 60% but < 80% impaired, limited or restricted score or less.  5. Patient will improve 5x STS test for functional mobility at 18 seconds or less w/o UE's, in order to demonstrate improve strength  6. Patient will improve Tmed Up and Go for mobility, balance, leg strength and falls risk to 20 seconds or less, with least restrictive AD, and displaying decreased balance disturbance with transitions, and decreased instability with turns and pivots.         Plan   Continue with established Plan of Care towards established PT goals.

## 2018-12-20 ENCOUNTER — CLINICAL SUPPORT (OUTPATIENT)
Dept: REHABILITATION | Facility: OTHER | Age: 78
End: 2018-12-20
Attending: NURSE PRACTITIONER
Payer: MEDICARE

## 2018-12-20 DIAGNOSIS — M53.86 DECREASED ROM OF LUMBAR SPINE: ICD-10-CM

## 2018-12-20 DIAGNOSIS — Z74.09 MOBILITY IMPAIRED: ICD-10-CM

## 2018-12-20 DIAGNOSIS — R53.1 WEAKNESS: ICD-10-CM

## 2018-12-20 PROCEDURE — 97110 THERAPEUTIC EXERCISES: CPT

## 2018-12-20 NOTE — PROGRESS NOTES
"[facility]      Name: Tamiko Youssef  Clinic Number: [id number]  Date of Treatment: 12/20/2018   Diagnosis:   Encounter Diagnoses   Name Primary?    Mobility impaired     Decreased ROM of lumbar spine     Weakness      Physician: Nicole Avery,*    Pain pattern determined: 1 PEN  Plan of care signed: 10/10/18   Time in: 0930  Time Out: 1030  Total Treatment time: 60 minutes  Precautions: Fall;  3 mm anterolisthesis of L3 on L4, L4 on L5, and L5 on S1, HTN  Visit #: 11    POC due:1/09/19, Resent 12/20/18, extended POC to 1/19/18  Reassessment Next Due: 1/17/19    Face to Face discussion of patient was done between PT and PTA.     Subjective   Tamiko denies back pain this date, reporting that it has been "feeling better." She does complain of consistent Right knee pain. She reports she still has a cold but is feelingup to exercising as it did make her feel better last time. She denies SOB, lightheadedness, or fever symptoms reported throughout session. Pt liked addition of EIS with LE supported and with standing hip extension.     Patient reports their pain to be 0/10 on a 0-10 scale with 0 being no pain and 10 being the worst pain imaginable.    Pain Location: LLB/buttocks     Occupation:  Retired   Leisure: sewing, crotchet and read                      Pts goals:  Be able to be more active and move better with less pain.        Objective     MOVEMENT LOSS 11/8/18     ROM Loss   Flexion min loss   Extension major loss   Side bending Right mod loss   Side bending Left mod loss   Rotation Right min loss   Rotation Left min loss      11/8/18  Gross Lower Extremity Strength: 3+/5 to 4/5  5x Sit<>stand test for functional legs strength: 24 seconds with B UE's use; limited by LBP, LE's weakness and balance disturbance.     Baseline Isometric Testing on Med X equipment: Testing administered by PT  Date of testing: 10/9/18  ROM 9-30 deg   Max Peak Torque 66    Min Peak Torque 18  "   Flex/Ext Ratio 3.66   % below normative data 70   Counter weight 301   femur 6   Seat pad 2         CMS Impairment/Limitation/Restriction for FOTO Lumbar Survey     Therapist reviewed FOTO scores for Tamiko Youssef on 10/9/2018.   FOTO documents entered into [employer] - see Media section.     Limitation Score: 76%  Category: Mobility     Current : CL = least 60% but < 80% impaired, limited or restricted  Goal: CL = least 60% but < 80% impaired, limited or restricted 60%  Discharge:          CMS Impairment/Limitation/Restriction for FOTO Lumbar Spine Survey                          Status      Limitation            G-Code CMS Severity Modifier  Intake                    24%    76%  Predicted              40%    60%                Goal Status+ CL - At least 60 percent but less than 80 percent  11/15/2018            44%   56% (Visit 5)   Current Status CK - At least 40 percent but less than 60 percent    -2018           36%   64%               Current Status CL - At least 60 percent but less than 80 percent                                                                 Goal Status+ CL - At least 60 percent but less than 80 percent    Mid-Point Isometric Testing on Med X equipment: Testing administered by PT  Date of testin19  [facility] 2018   Visit Number 10   VAS Pain Rating 0   Recumbent Bike Seat Pos. -   Time 5   Lumbar Flexion 30   Lumbar Extension 3   Lumbar Peak Torque 112   Min Torque 65   Test Percent Gain in Strength from Initial  196   Ice - Sitting 10     Treatment    Pt was instructed in and performed the following:     Tamiko received therapeutic exercises to develop/improved posture, cardiovascular endurance, muscular endurance, lumbar/cervical ROM, strength and muscular endurance for 45 minutes including the following exercises:     Flex in sitting with ball 10x  SeatedTrunk rotation 10x  Sit<>Stand x10 with with use of chair and SBA, no walker  EIS with LE supported against  "Mat 10x   Standing hip extension 10x     [facility] 12/20/2018   Visit Number 11   VAS Pain Rating 0   Recumbent Bike Seat Pos. -   Time 5   Flexion in Sitting 10   Lumbar Extension Seat Pad -   Femur Restraint 7   Top Dead Center -   Counterweight -   Lumbar Flexion -   Lumbar Extension -   Lumbar Peak Torque -   Min Torque -   Test Percent Below Normative Data -   Test Percent Gain in Strength from Initial  -   Lumbar Weight 41   Repetitions 20   Rating of Perceived Exertion 7   Ice - Sitting 10           Peripheral muscle strengthening which included 1 set of 15-20 repetitions at a slow, controlled 7 second per rep pace focused on strengthening supporting musculature for improved body mechanics and functional mobility.  Pt and therapist focused on proper form during treatment to ensure optimal strengthening of each targeted muscle group.  Machines were utilized including torso rotation, leg extension, leg curl, chest press, upright row. Tricep extension, bicep curl, leg press, and hip abduction added on third visit.     Home Exercise Program as follows:   Seated Tr A activation with 5 sec Holds 10 reps 2-3x/day  Seated flexion 10x with theraball  Flex in sitting with ball 10x  SeatedTrunk rotation 10x  -Handouts were given to the patient. Pt demo fair understanding of the education provided. Tamiko demonstrated fair return demonstration of activities.     Lumbar roll use compliance: unknown  Additional exercises taught this treatment session  EIS with LE supported against Mat 10x   Standing hip extension 10x     Assessment     Tamiko Alan tolerated session very well this date despite "cold symptoms". Pt continues to demonstrate moderate forward flexion with ambulation. Pt demo'd improved ROM and upright posture following repeated EIS with LE supported. Pt also reported positive glut exertion with standing hip extensions. Pt also tolerated Med X lumbar extension exercise with femur increase to 7 with improved " paraspinal  exertion response but reported 7/10 [place] rating. Plan to maintain weight next session.   She is demonstrating improved functional strength with transfers and responds well to stretching/mobility exercises. She will benefit from continued progression of strengthening as tolerated. Due to continued knee pain, patient plans to have an MRI to assess any potential cause and she may benefit from another POC addressing LE symptoms.   Pt will continue to benefit from skilled outpatient physical therapy to address the deficits stated in the impairment chart, provide pt/family education and to maximize pt's level of independence in the home and community environment.       Pt's spiritual, cultural and educational needs considered and pt agreeable to plan of care and goals as stated below:     Medical necessity is demonstrated by the following problem list.    Pt presents with the following impairments:      History  Co-morbidities and personal factors that may impact the plan of care Co-morbidities:   advanced age ; Multiple co-morbidities.     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      Lumbar facet arthropathy      Sacroiliitis      3 mm anterolisthesis of L3 on L4, L4 on L5, and L5 on S1     Lumbar radiculopathy      Arthritis of right knee      Chronic pain               Personal Factors:   age  lifestyle; sedentary       moderate   Examination  Body Structures and Functions, activity limitations and participation restrictions that may impact the plan of care Body Regions:   back  lower extremities  trunk     Body Systems:    ROM  strength  balance  gait  transfers  transitions     Participation Restrictions:   Mod to Severely limited with mobility     Activity limitations:   Learning and applying knowledge  no deficits     General Tasks and Commands  no deficits     Communication  no  deficits     Mobility  lifting and carrying objects  walking  driving (bike, car, motorcycle)  Basic transfers, stairs.     Self care  washing oneself (bathing, drying, washing hands)  caring for body parts (brushing teeth, shaving, grooming)  toileting  dressing  looking after one's health     Domestic Life  shopping  cooking  doing house work (cleaning house, washing dishes, laundry)  assisting others     Interactions/Relationships  basic interpersonal interactions     Life Areas  no deficits     Community and Social Life  recreation and leisure             moderate   Clinical Presentation stable and uncomplicated moderate   Decision Making/ Complexity Score: moderate         GOALS: Pt is in agreement with the following goals.     Short term goals:  6 weeks or 10 visits   1.  Pt will demonstrate increased lumbar ROM by at least 3 degrees from the initial ROM value with improvements noted in functional ROM and ability to perform ADLs  2.  Pt will demonstrate increased maximum isometric torque value by 10% when compared to the initial value resulting in improved ability to perform bending, lifting, and carrying activities safely, confidently.     3.  Patient report a reduction in worst pain score by 1-2 points for improved tolerance during work and recreational activities  4.  Pt able to perform HEP correctly with minimal cueing or supervision for therapist        Long term goals: 13 weeks or 20 visits   1. Pt will demonstrate increased lumbar ROM by at least 6 degrees from initial ROM value, resulting in improved ability to perform functional fwd bending while standing and sitting.   2. Pt will demonstrate increased maximum isometric torque value by 20% when compared to the initial value resulting in improved ability to perform bending, lifting, and carrying activities safely, confidently.  3. Pt to demonstrate ability to independently control and reduce their pain through posture positioning and mechanical  movements throughout a typical day.  4.  Patient will demonstrate improved overall function per FOTO Survey to CL = least 60% but < 80% impaired, limited or restricted score or less.  5. Patient will improve 5x STS test for functional mobility at 18 seconds or less w/o UE's, in order to demonstrate improve strength  6. Patient will improve Tmed Up and Go for mobility, balance, leg strength and falls risk to 20 seconds or less, with least restrictive AD, and displaying decreased balance disturbance with transitions, and decreased instability with turns and pivots.         Plan   Continue with established Plan of Care towards established PT goals.

## 2018-12-20 NOTE — PLAN OF CARE
"[facility]      Name: Tamiko Youssef  Clinic Number: [id number]  Date of Treatment: 12/20/2018   Diagnosis:   Encounter Diagnoses   Name Primary?    Mobility impaired     Decreased ROM of lumbar spine     Weakness      Physician: Nicole Avery,*    Pain pattern determined: 1 PEN  Plan of care signed: 10/10/18   Time in: 0930  Time Out: 1030  Total Treatment time: 60 minutes  Precautions: Fall;  3 mm anterolisthesis of L3 on L4, L4 on L5, and L5 on S1, HTN  Visit #: 11    POC due:1/09/19, Resent 12/20/18, extended POC to 1/19/18  Reassessment Next Due: 1/17/19    Face to Face discussion of patient was done between PT and PTA.     Subjective   Tamiko denies back pain this date, reporting that it has been "feeling better." She does complain of consistent Right knee pain. She reports she still has a cold but is feelingup to exercising as it did make her feel better last time. She denies SOB, lightheadedness, or fever symptoms reported throughout session. Pt liked addition of EIS with LE supported and with standing hip extension.     Patient reports their pain to be 0/10 on a 0-10 scale with 0 being no pain and 10 being the worst pain imaginable.    Pain Location: LLB/buttocks     Occupation:  Retired   Leisure: sewing, crotchet and read                      Pts goals:  Be able to be more active and move better with less pain.        Objective     MOVEMENT LOSS 11/8/18     ROM Loss   Flexion min loss   Extension major loss   Side bending Right mod loss   Side bending Left mod loss   Rotation Right min loss   Rotation Left min loss      11/8/18  Gross Lower Extremity Strength: 3+/5 to 4/5  5x Sit<>stand test for functional legs strength: 24 seconds with B UE's use; limited by LBP, LE's weakness and balance disturbance.     Baseline Isometric Testing on Med X equipment: Testing administered by PT  Date of testing: 10/9/18  ROM 9-30 deg   Max Peak Torque 66    Min Peak Torque 18  "   Flex/Ext Ratio 3.66   % below normative data 70   Counter weight 301   femur 6   Seat pad 2         CMS Impairment/Limitation/Restriction for FOTO Lumbar Survey     Therapist reviewed FOTO scores for Tamiko Youssef on 10/9/2018.   FOTO documents entered into [employer] - see Media section.     Limitation Score: 76%  Category: Mobility     Current : CL = least 60% but < 80% impaired, limited or restricted  Goal: CL = least 60% but < 80% impaired, limited or restricted 60%  Discharge:          CMS Impairment/Limitation/Restriction for FOTO Lumbar Spine Survey                          Status      Limitation            G-Code CMS Severity Modifier  Intake                    24%    76%  Predicted              40%    60%                Goal Status+ CL - At least 60 percent but less than 80 percent  11/15/2018            44%   56% (Visit 5)   Current Status CK - At least 40 percent but less than 60 percent    -2018           36%   64%               Current Status CL - At least 60 percent but less than 80 percent                                                                 Goal Status+ CL - At least 60 percent but less than 80 percent    [facility] Isometric Testing on Med X equipment: Testing administered by PT  Date of testin19  [facility] 2018   Visit Number 10   VAS Pain Rating 0   Recumbent Bike Seat Pos. -   Time 5   Lumbar Flexion 30   Lumbar Extension 3   Lumbar Peak Torque 112   Min Torque 65   Test Percent Gain in Strength from Initial  196   Ice - Sitting 10     Treatment    Pt was instructed in and performed the following:     Tamiko received therapeutic exercises to develop/improved posture, cardiovascular endurance, muscular endurance, lumbar/cervical ROM, strength and muscular endurance for 45 minutes including the following exercises:     Flex in sitting with ball 10x  SeatedTrunk rotation 10x  Sit<>Stand x10 with with use of chair and SBA, no walker  EIS with LE supported against  "Mat 10x   Standing hip extension 10x     [facility] 12/20/2018   Visit Number 11   VAS Pain Rating 0   Recumbent Bike Seat Pos. -   Time 5   Flexion in Sitting 10   Lumbar Extension Seat Pad -   Femur Restraint 7   Top Dead Center -   Counterweight -   Lumbar Flexion -   Lumbar Extension -   Lumbar Peak Torque -   Min Torque -   Test Percent Below Normative Data -   Test Percent Gain in Strength from Initial  -   Lumbar Weight 41   Repetitions 20   Rating of Perceived Exertion 7   Ice - Sitting 10           Peripheral muscle strengthening which included 1 set of 15-20 repetitions at a slow, controlled 7 second per rep pace focused on strengthening supporting musculature for improved body mechanics and functional mobility.  Pt and therapist focused on proper form during treatment to ensure optimal strengthening of each targeted muscle group.  Machines were utilized including torso rotation, leg extension, leg curl, chest press, upright row. Tricep extension, bicep curl, leg press, and hip abduction added on third visit.     Home Exercise Program as follows:   Seated Tr A activation with 5 sec Holds 10 reps 2-3x/day  Seated flexion 10x with theraball  Flex in sitting with ball 10x  SeatedTrunk rotation 10x  -Handouts were given to the patient. Pt demo fair understanding of the education provided. Tamiko demonstrated fair return demonstration of activities.     Lumbar roll use compliance: unknown  Additional exercises taught this treatment session  EIS with LE supported against Mat 10x   Standing hip extension 10x     Assessment     Tamiko Alan tolerated session very well this date despite "cold symptoms". Pt continues to demonstrate moderate forward flexion with ambulation. Pt demo'd improved ROM and upright posture following repeated EIS with LE supported. Pt also reported positive glut exertion with standing hip extensions. Pt also tolerated Med X lumbar extension exercise with femur increase to 7 with improved " paraspinal  exertion response but reported 7/10 [place] rating. Plan to maintain weight next session.   She is demonstrating improved functional strength with transfers and responds well to stretching/mobility exercises. She will benefit from continued progression of strengthening as tolerated. Due to continued knee pain, patient plans to have an MRI to assess any potential cause and she may benefit from another POC addressing LE symptoms.   Pt will continue to benefit from skilled outpatient physical therapy to address the deficits stated in the impairment chart, provide pt/family education and to maximize pt's level of independence in the home and community environment.       Pt's spiritual, cultural and educational needs considered and pt agreeable to plan of care and goals as stated below:     Medical necessity is demonstrated by the following problem list.    Pt presents with the following impairments:      History  Co-morbidities and personal factors that may impact the plan of care Co-morbidities:   advanced age ; Multiple co-morbidities.     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      Lumbar facet arthropathy      Sacroiliitis      3 mm anterolisthesis of L3 on L4, L4 on L5, and L5 on S1     Lumbar radiculopathy      Arthritis of right knee      Chronic pain               Personal Factors:   age  lifestyle; sedentary       moderate   Examination  Body Structures and Functions, activity limitations and participation restrictions that may impact the plan of care Body Regions:   back  lower extremities  trunk     Body Systems:    ROM  strength  balance  gait  transfers  transitions     Participation Restrictions:   Mod to Severely limited with mobility     Activity limitations:   Learning and applying knowledge  no deficits     General Tasks and Commands  no deficits     Communication  no  deficits     Mobility  lifting and carrying objects  walking  driving (bike, car, motorcycle)  Basic transfers, stairs.     Self care  washing oneself (bathing, drying, washing hands)  caring for body parts (brushing teeth, shaving, grooming)  toileting  dressing  looking after one's health     Domestic Life  shopping  cooking  doing house work (cleaning house, washing dishes, laundry)  assisting others     Interactions/Relationships  basic interpersonal interactions     Life Areas  no deficits     Community and Social Life  recreation and leisure             moderate   Clinical Presentation stable and uncomplicated moderate   Decision Making/ Complexity Score: moderate         GOALS: Pt is in agreement with the following goals.     Short term goals:  6 weeks or 10 visits   1.  Pt will demonstrate increased lumbar ROM by at least 3 degrees from the initial ROM value with improvements noted in functional ROM and ability to perform ADLs  2.  Pt will demonstrate increased maximum isometric torque value by 10% when compared to the initial value resulting in improved ability to perform bending, lifting, and carrying activities safely, confidently.     3.  Patient report a reduction in worst pain score by 1-2 points for improved tolerance during work and recreational activities  4.  Pt able to perform HEP correctly with minimal cueing or supervision for therapist        Long term goals: 13 weeks or 20 visits   1. Pt will demonstrate increased lumbar ROM by at least 6 degrees from initial ROM value, resulting in improved ability to perform functional fwd bending while standing and sitting.   2. Pt will demonstrate increased maximum isometric torque value by 20% when compared to the initial value resulting in improved ability to perform bending, lifting, and carrying activities safely, confidently.  3. Pt to demonstrate ability to independently control and reduce their pain through posture positioning and mechanical  movements throughout a typical day.  4.  Patient will demonstrate improved overall function per FOTO Survey to CL = least 60% but < 80% impaired, limited or restricted score or less.  5. Patient will improve 5x STS test for functional mobility at 18 seconds or less w/o UE's, in order to demonstrate improve strength  6. Patient will improve Tmed Up and Go for mobility, balance, leg strength and falls risk to 20 seconds or less, with least restrictive AD, and displaying decreased balance disturbance with transitions, and decreased instability with turns and pivots.         Plan   Continue with established Plan of Care towards established PT goals.

## 2018-12-26 ENCOUNTER — CLINICAL SUPPORT (OUTPATIENT)
Dept: REHABILITATION | Facility: OTHER | Age: 78
End: 2018-12-26
Attending: NURSE PRACTITIONER
Payer: MEDICARE

## 2018-12-26 DIAGNOSIS — R53.1 WEAKNESS: ICD-10-CM

## 2018-12-26 DIAGNOSIS — Z74.09 MOBILITY IMPAIRED: ICD-10-CM

## 2018-12-26 DIAGNOSIS — M53.86 DECREASED ROM OF LUMBAR SPINE: ICD-10-CM

## 2018-12-26 PROCEDURE — 97110 THERAPEUTIC EXERCISES: CPT

## 2018-12-26 NOTE — PROGRESS NOTES
"[facility] Physical Therapy Treatment      Name: Tamiko Youssef  Clinic Number: [id number]  Date of Treatment: 12/26/2018   Diagnosis:   Encounter Diagnoses   Name Primary?    Mobility impaired     Decreased ROM of lumbar spine     Weakness      Physician: Nicole Avery,*    Pain pattern determined: 1 PEN  Plan of care signed: 10/10/18   Time in: 10:30  Time Out: 11:30  Total Treatment time: 60 minutes  Precautions: Fall;  3 mm anterolisthesis of L3 on L4, L4 on L5, and L5 on S1, HTN  Visit #: 13    POC due:1/09/19, Resent 12/20/18, extended POC to 1/19/18  Reassessment Next Due: 1/17/19    Face to Face discussion of patient was done between PT and PTA.     Subjective   Tamiko denies back pain this date, reporting that it has been "feeling better.1/10 R LE knee pain.    Patient reports their pain to be 0/10 on a 0-10 scale with 0 being no pain and 10 being the worst pain imaginable.    Pain Location: LLB/buttocks     Occupation:  Retired   Leisure: sewing, crotchet and read                      Pts goals:  Be able to be more active and move better with less pain.        Objective     MOVEMENT LOSS 11/8/18     ROM Loss   Flexion min loss   Extension major loss   Side bending Right mod loss   Side bending Left mod loss   Rotation Right min loss   Rotation Left min loss      11/8/18  Gross Lower Extremity Strength: 3+/5 to 4/5  5x Sit<>stand test for functional legs strength: 24 seconds with B UE's use; limited by LBP, LE's weakness and balance disturbance.     Baseline Isometric Testing on Med X equipment: Testing administered by PT  Date of testing: 10/9/18  ROM 9-30 deg   Max Peak Torque 66    Min Peak Torque 18    Flex/Ext Ratio 3.66   % below normative data 70   Counter weight 301   femur 6   Seat pad 2         CMS Impairment/Limitation/Restriction for FOTO Lumbar Survey     Therapist reviewed FOTO scores for Tamiko Youssef on 10/9/2018.   FOTO documents entered into [employer] - see Media " section.     Limitation Score: 76%  Category: Mobility     Current : CL = least 60% but < 80% impaired, limited or restricted  Goal: CL = least 60% but < 80% impaired, limited or restricted 60%  Discharge:          CMS Impairment/Limitation/Restriction for FOTO Lumbar Spine Survey                          Status      Limitation            G-Code CMS Severity Modifier  Intake                    24%    76%  Predicted              40%    60%                Goal Status+ CL - At least 60 percent but less than 80 percent  11/15/2018            44%   56% (Visit 5)   Current Status CK - At least 40 percent but less than 60 percent    -2018           36%   64%               Current Status CL - At least 60 percent but less than 80 percent                                                                 Goal Status+ CL - At least 60 percent but less than 80 percent    [facility] Isometric Testing on Med X equipment: Testing administered by PT  Date of testin19  [facility] 2018   Visit Number 10   VAS Pain Rating 0   Recumbent Bike Seat Pos. -   Time 5   Lumbar Flexion 30   Lumbar Extension 3   Lumbar Peak Torque 112   Min Torque 65   Test Percent Gain in Strength from Initial  196   Ice - Sitting 10     Treatment    Pt was instructed in and performed the following:     Tamiko received therapeutic exercises to develop/improved posture, cardiovascular endurance, muscular endurance, lumbar/cervical ROM, strength and muscular endurance for 45 minutes including the following exercises:   [facility] 2018   Visit Number 12   VAS Pain Rating 0   Treadmill Time (in min.) 10   Speed 5   Incline 0.7   Recumbent Bike Seat Pos. -   Time -   Flexion in Sitting 10   Lumbar Extension Seat Pad -   Femur Restraint -   Top Dead Center -   Counterweight -   Lumbar Flexion -   Lumbar Extension -   Lumbar Peak Torque -   Min Torque -   Test Percent Below Normative Data -   Test Percent Gain in Strength from  Initial  -   Lumbar Weight 45   Repetitions 15   Rating of Perceived Exertion 41   Ice - Sitting 10       Flex in sitting with ball 10x  SeatedTrunk rotation 10x  Sit<>Stand x10 with with use of chair and SBA, no walker  EIS with LE supported against Mat 10x   Standing hip extension 10x           Peripheral muscle strengthening which included 1 set of 15-20 repetitions at a slow, controlled 7 second per rep pace focused on strengthening supporting musculature for improved body mechanics and functional mobility.  Pt and therapist focused on proper form during treatment to ensure optimal strengthening of each targeted muscle group.  Machines were utilized including torso rotation, leg extension, leg curl, chest press, upright row. Tricep extension, bicep curl, leg press, and hip abduction added on third visit.     Home Exercise Program as follows:   Seated Tr A activation with 5 sec Holds 10 reps 2-3x/day  Seated flexion 10x with theraball  Flex in sitting with ball 10x  SeatedTrunk rotation 10x  -Handouts were given to the patient. Pt demo fair understanding of the education provided. Tamiko demonstrated fair return demonstration of activities.     Lumbar roll use compliance: unknown  Additional exercises taught this treatment session  EIS with LE supported against Mat 10x   Standing hip extension 10x     Assessment     Tamiko Alan tolerated session very well today with no increased LBP. Pt continues to demonstrate moderate forward flexion with ambulation.  Pt also tolerated Med X lumbar extension exercise with a weight increase and no c/o LBP.   She is demonstrating improved functional strength with transfers and responds well to stretching/mobility exercises. She will benefit from continued progression of strengthening as tolerated. Due to continued knee pain, patient plans to have an MRI to assess any potential cause and she may benefit from another POC addressing LE symptoms.   Pt will continue to benefit from skilled  outpatient physical therapy to address the deficits stated in the impairment chart, provide pt/family education and to maximize pt's level of independence in the home and community environment.       Pt's spiritual, cultural and educational needs considered and pt agreeable to plan of care and goals as stated below:     Medical necessity is demonstrated by the following problem list.    Pt presents with the following impairments:      History  Co-morbidities and personal factors that may impact the plan of care Co-morbidities:   advanced age ; Multiple co-morbidities.     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      Lumbar facet arthropathy      Sacroiliitis      3 mm anterolisthesis of L3 on L4, L4 on L5, and L5 on S1     Lumbar radiculopathy      Arthritis of right knee      Chronic pain               Personal Factors:   age  lifestyle; sedentary       moderate   Examination  Body Structures and Functions, activity limitations and participation restrictions that may impact the plan of care Body Regions:   back  lower extremities  trunk     Body Systems:    ROM  strength  balance  gait  transfers  transitions     Participation Restrictions:   Mod to Severely limited with mobility     Activity limitations:   Learning and applying knowledge  no deficits     General Tasks and Commands  no deficits     Communication  no deficits     Mobility  lifting and carrying objects  walking  driving (bike, car, motorcycle)  Basic transfers, stairs.     Self care  washing oneself (bathing, drying, washing hands)  caring for body parts (brushing teeth, shaving, grooming)  toileting  dressing  looking after one's health     Domestic Life  shopping  cooking  doing house work (cleaning house, washing dishes, laundry)  assisting others     Interactions/Relationships  basic interpersonal interactions     Life Areas  no  deficits     Community and Social Life  recreation and leisure             moderate   Clinical Presentation stable and uncomplicated moderate   Decision Making/ Complexity Score: moderate         GOALS: Pt is in agreement with the following goals.     Short term goals:  6 weeks or 10 visits   1.  Pt will demonstrate increased lumbar ROM by at least 3 degrees from the initial ROM value with improvements noted in functional ROM and ability to perform ADLs  2.  Pt will demonstrate increased maximum isometric torque value by 10% when compared to the initial value resulting in improved ability to perform bending, lifting, and carrying activities safely, confidently.     3.  Patient report a reduction in worst pain score by 1-2 points for improved tolerance during work and recreational activities  4.  Pt able to perform HEP correctly with minimal cueing or supervision for therapist        Long term goals: 13 weeks or 20 visits   1. Pt will demonstrate increased lumbar ROM by at least 6 degrees from initial ROM value, resulting in improved ability to perform functional fwd bending while standing and sitting.   2. Pt will demonstrate increased maximum isometric torque value by 20% when compared to the initial value resulting in improved ability to perform bending, lifting, and carrying activities safely, confidently.  3. Pt to demonstrate ability to independently control and reduce their pain through posture positioning and mechanical movements throughout a typical day.  4.  Patient will demonstrate improved overall function per FOTO Survey to CL = least 60% but < 80% impaired, limited or restricted score or less.  5. Patient will improve 5x STS test for functional mobility at 18 seconds or less w/o UE's, in order to demonstrate improve strength  6. Patient will improve Tmed Up and Go for mobility, balance, leg strength and falls risk to 20 seconds or less, with least restrictive AD, and displaying decreased balance  disturbance with transitions, and decreased instability with turns and pivots.         Plan   Continue with established Plan of Care towards established PT goals.

## 2019-02-06 ENCOUNTER — OFFICE VISIT (OUTPATIENT)
Dept: OPTOMETRY | Facility: CLINIC | Age: 79
End: 2019-02-06
Payer: MEDICARE

## 2019-02-06 DIAGNOSIS — H57.11 PAIN OF RIGHT EYE: ICD-10-CM

## 2019-02-06 DIAGNOSIS — H40.1131 PRIMARY OPEN ANGLE GLAUCOMA (POAG) OF BOTH EYES, MILD STAGE: Primary | ICD-10-CM

## 2019-02-06 PROCEDURE — 92012 PR EYE EXAM, EST PATIENT,INTERMED: ICD-10-PCS | Mod: S$GLB,,, | Performed by: OPTOMETRIST

## 2019-02-06 PROCEDURE — 92012 INTRM OPH EXAM EST PATIENT: CPT | Mod: S$GLB,,, | Performed by: OPTOMETRIST

## 2019-02-06 PROCEDURE — 99999 PR PBB SHADOW E&M-EST. PATIENT-LVL II: CPT | Mod: PBBFAC,,, | Performed by: OPTOMETRIST

## 2019-02-06 PROCEDURE — 99999 PR PBB SHADOW E&M-EST. PATIENT-LVL II: ICD-10-PCS | Mod: PBBFAC,,, | Performed by: OPTOMETRIST

## 2019-02-06 RX ORDER — FLUTICASONE PROPIONATE AND SALMETEROL 50; 250 UG/1; UG/1
POWDER RESPIRATORY (INHALATION)
Refills: 4 | COMMUNITY
Start: 2019-01-16 | End: 2019-05-15

## 2019-02-06 RX ORDER — PREDNISONE 10 MG/1
TABLET ORAL
Refills: 0 | COMMUNITY
Start: 2018-12-19 | End: 2019-03-25

## 2019-02-06 RX ORDER — ALPRAZOLAM 0.25 MG/1
TABLET ORAL
Refills: 3 | COMMUNITY
Start: 2019-01-23 | End: 2022-08-17 | Stop reason: SDUPTHER

## 2019-02-06 NOTE — PROGRESS NOTES
HPI     Glaucoma      Additional comments: 4 month IOP ck              Comments     Last eye exam was 10/10/18 with Dr. Lizarraga.  Patient states vision OD gets blurry off and on since last visit. Also   gets a sharp pain OD every now and then.   Patient denies diplopia, headaches, flashes/floaters, and pain.    Refresh QAM OU  Latanoprost QHS OU          Last edited by Lindsey Packer on 2/6/2019 10:22 AM. (History)            Assessment /Plan     For exam results, see Encounter Report.    Primary open angle glaucoma (POAG) of both eyes, mild stage    Pain of right eye          1.  Continue Latanoprost QHS OU-good response.  Did receive old records.  Last visual done in the fall of 2018-normal OU.  RTC 4 months for IOP check.  2.  Educated pt pain caused by dryness.  Need to use Refresh more through out the day.

## 2019-03-25 ENCOUNTER — OFFICE VISIT (OUTPATIENT)
Dept: OTOLARYNGOLOGY | Facility: CLINIC | Age: 79
End: 2019-03-25
Payer: MEDICARE

## 2019-03-25 ENCOUNTER — CLINICAL SUPPORT (OUTPATIENT)
Dept: OTOLARYNGOLOGY | Facility: CLINIC | Age: 79
End: 2019-03-25
Payer: MEDICARE

## 2019-03-25 VITALS
HEART RATE: 87 BPM | SYSTOLIC BLOOD PRESSURE: 127 MMHG | TEMPERATURE: 98 F | HEIGHT: 62 IN | DIASTOLIC BLOOD PRESSURE: 72 MMHG | BODY MASS INDEX: 32.64 KG/M2 | WEIGHT: 177.38 LBS

## 2019-03-25 DIAGNOSIS — H93.299 IMPAIRMENT OF SPEECH DISCRIMINATION: ICD-10-CM

## 2019-03-25 DIAGNOSIS — H93.299 REDUCED SPEECH DISCRIMINATION: ICD-10-CM

## 2019-03-25 DIAGNOSIS — H90.3 ASYMMETRICAL SENSORINEURAL HEARING LOSS: Primary | ICD-10-CM

## 2019-03-25 DIAGNOSIS — R29.2 ABNORMAL ACOUSTIC REFLEX: ICD-10-CM

## 2019-03-25 PROCEDURE — 92557 COMPREHENSIVE HEARING TEST: CPT | Mod: S$GLB,,, | Performed by: AUDIOLOGIST-HEARING AID FITTER

## 2019-03-25 PROCEDURE — 99204 OFFICE O/P NEW MOD 45 MIN: CPT | Mod: S$GLB,,, | Performed by: OTOLARYNGOLOGY

## 2019-03-25 PROCEDURE — 92557 PR COMPREHENSIVE HEARING TEST: ICD-10-PCS | Mod: S$GLB,,, | Performed by: AUDIOLOGIST-HEARING AID FITTER

## 2019-03-25 PROCEDURE — 99204 PR OFFICE/OUTPT VISIT, NEW, LEVL IV, 45-59 MIN: ICD-10-PCS | Mod: S$GLB,,, | Performed by: OTOLARYNGOLOGY

## 2019-03-25 PROCEDURE — 3074F SYST BP LT 130 MM HG: CPT | Mod: CPTII,S$GLB,, | Performed by: OTOLARYNGOLOGY

## 2019-03-25 PROCEDURE — 92550 PR TYMPANOMETRY AND REFLEX THRESHOLD MEASUREMENTS: ICD-10-PCS | Mod: S$GLB,,, | Performed by: AUDIOLOGIST-HEARING AID FITTER

## 2019-03-25 PROCEDURE — 1101F PT FALLS ASSESS-DOCD LE1/YR: CPT | Mod: CPTII,S$GLB,, | Performed by: OTOLARYNGOLOGY

## 2019-03-25 PROCEDURE — 3074F PR MOST RECENT SYSTOLIC BLOOD PRESSURE < 130 MM HG: ICD-10-PCS | Mod: CPTII,S$GLB,, | Performed by: OTOLARYNGOLOGY

## 2019-03-25 PROCEDURE — 1101F PR PT FALLS ASSESS DOC 0-1 FALLS W/OUT INJ PAST YR: ICD-10-PCS | Mod: CPTII,S$GLB,, | Performed by: OTOLARYNGOLOGY

## 2019-03-25 PROCEDURE — 3078F DIAST BP <80 MM HG: CPT | Mod: CPTII,S$GLB,, | Performed by: OTOLARYNGOLOGY

## 2019-03-25 PROCEDURE — 3078F PR MOST RECENT DIASTOLIC BLOOD PRESSURE < 80 MM HG: ICD-10-PCS | Mod: CPTII,S$GLB,, | Performed by: OTOLARYNGOLOGY

## 2019-03-25 PROCEDURE — 92550 TYMPANOMETRY & REFLEX THRESH: CPT | Mod: S$GLB,,, | Performed by: AUDIOLOGIST-HEARING AID FITTER

## 2019-03-29 ENCOUNTER — LAB VISIT (OUTPATIENT)
Dept: LAB | Facility: OTHER | Age: 79
End: 2019-03-29
Payer: MEDICARE

## 2019-03-29 DIAGNOSIS — H90.3 ASYMMETRICAL SENSORINEURAL HEARING LOSS: ICD-10-CM

## 2019-03-29 LAB
CREAT SERPL-MCNC: 0.8 MG/DL (ref 0.5–1.4)
EST. GFR  (AFRICAN AMERICAN): >60 ML/MIN/1.73 M^2
EST. GFR  (NON AFRICAN AMERICAN): >60 ML/MIN/1.73 M^2

## 2019-03-29 PROCEDURE — 82565 ASSAY OF CREATININE: CPT

## 2019-03-29 PROCEDURE — 36415 COLL VENOUS BLD VENIPUNCTURE: CPT

## 2019-04-02 ENCOUNTER — HOSPITAL ENCOUNTER (OUTPATIENT)
Dept: RADIOLOGY | Facility: OTHER | Age: 79
Discharge: HOME | End: 2019-04-02
Attending: OTOLARYNGOLOGY
Payer: MEDICARE

## 2019-04-02 DIAGNOSIS — H90.3 ASYMMETRICAL SENSORINEURAL HEARING LOSS: ICD-10-CM

## 2019-04-02 PROCEDURE — 70553 MRI BRAIN STEM W/O & W/DYE: CPT | Mod: TC

## 2019-04-02 PROCEDURE — A9585 GADOBUTROL INJECTION: HCPCS | Performed by: OTOLARYNGOLOGY

## 2019-04-02 PROCEDURE — 70553 MRI BRAIN STEM W/O & W/DYE: CPT | Mod: 26,,, | Performed by: RADIOLOGY

## 2019-04-02 PROCEDURE — 25500020 PHARM REV CODE 255: Performed by: OTOLARYNGOLOGY

## 2019-04-02 PROCEDURE — 70553 MRI IAC/TEMPORAL BONES W W/O CONTRAST: ICD-10-PCS | Mod: 26,,, | Performed by: RADIOLOGY

## 2019-04-02 RX ORDER — GADOBUTROL 604.72 MG/ML
8 INJECTION INTRAVENOUS
Status: COMPLETED | OUTPATIENT
Start: 2019-04-02 | End: 2019-04-02

## 2019-04-02 RX ADMIN — GADOBUTROL 8 ML: 604.72 INJECTION INTRAVENOUS at 12:04

## 2019-04-16 ENCOUNTER — HOSPITAL ENCOUNTER (EMERGENCY)
Facility: OTHER | Age: 79
Discharge: HOME | End: 2019-04-16
Attending: EMERGENCY MEDICINE
Payer: MEDICARE

## 2019-04-16 VITALS
SYSTOLIC BLOOD PRESSURE: 143 MMHG | HEART RATE: 88 BPM | RESPIRATION RATE: 18 BRPM | BODY MASS INDEX: 32.02 KG/M2 | OXYGEN SATURATION: 9 % | TEMPERATURE: 99 F | WEIGHT: 174 LBS | HEIGHT: 62 IN | DIASTOLIC BLOOD PRESSURE: 75 MMHG

## 2019-04-16 DIAGNOSIS — S90.31XD CONTUSION OF RIGHT FOOT, SUBSEQUENT ENCOUNTER: Primary | ICD-10-CM

## 2019-04-16 DIAGNOSIS — M79.673 FOOT PAIN: ICD-10-CM

## 2019-04-16 PROCEDURE — 99283 EMERGENCY DEPT VISIT LOW MDM: CPT

## 2019-04-16 NOTE — ED TRIAGE NOTES
Pt reports injury to R foot while at [facility] last Wednesday. Reports that pain to R foot is constant and severe radiating up to lower back. Pain at rest and with wt bearing. No obvious deformity noted. Pt to R lateral aspect of foot distal to R ankle. Denies taking anything for pain today.

## 2019-04-16 NOTE — DISCHARGE INSTRUCTIONS
1) PLEASE FOLLOW UP WITH YOUR PRIMARY CARE PROVIDER IN 3 DAYS IF YOU ARE NOT SIGNIFICANTLY IMPROVED  2) PLEASE TAKE YOUR MEDICATIONS AS PRESCRIBED. PLEASE USE ICE AND TOPICAL MELOXICAM AS WELL AS YOUR STIFF SOLED WALKING SHOE  EVERY 8 HOURS FOR PAIN FOR THE NEXT 5 DAYS  IT'S NORMAL TO HAVE MORE PAIN THE DAY AFTER THE ACCIDENT. PLEASE PUT ICE PACKS ON 20 minutes ON, and 20 minutes off FOR THE NEXT 3 DAYS  3) RETURN TO THE ED FOR WORSENING SYMPTOMS

## 2019-04-16 NOTE — ED PROVIDER NOTES
"CHIEF COMPLAINT  Chief Complaint   Patient presents with    Foot Pain     Pt c/o right lateral foot pain after having something dropped on her foot in the a store Wednesday. Pt states "That foot pain runs all the way to my back." Pt reports PMH of SYLVIA for lower back pain."       HPI  Tamiko Youssef is a 78 y.o. female who presents with right foot pain that began seven days ago. The patient reports that she was at [facility] and the metal part of a forklift landed on her foot. The patient has a blurry photo of her foot after the the accident, that shows a some ecchymosis to the right foot. The pain is exacerbated with walking. The patient states that at baseline she walks with a walker and that her gait hasn't changed.     Onset: eight days ago  Severity:  moderate  Duration: Constant  Worsened by: Walking  Associated symptoms:    This is the extent of the patient's complaints at this time.       PAST MEDICAL HISTORY  Past Medical History:   Diagnosis Date    Asthma     Cataract     Glaucoma     Hypertension        CURRENT MEDICATIONS    No current facility-administered medications for this encounter.     Current Outpatient Medications:     ADVAIR DISKUS 250-50 mcg/dose diskus inhaler, INHALE 1 PUFF PO INTO LUNGS PO BID. REPLACES SYMBICORT, Disp: , Rfl: 4    albuterol (PROVENTIL) 2.5 mg /3 mL (0.083 %) nebulizer solution, Take 2.5 mg by nebulization every 6 (six) hours as needed., Disp: , Rfl:     ALPRAZolam (XANAX) 0.25 MG tablet, TK 1 T PO QD PRA, Disp: , Rfl: 3    amlodipine (NORVASC) 10 MG tablet, Take 10 mg by mouth once daily., Disp: , Rfl:     aspirin (ECOTRIN) 81 MG EC tablet, Take 81 mg by mouth once daily., Disp: , Rfl:     citalopram (CELEXA) 10 MG tablet, TK 1 T PO ONCE A DAY FOR DEPRESSION, Disp: , Rfl: 2    furosemide (LASIX) 20 MG tablet, TK 1 T PO QD PRF FLUID, Disp: , Rfl: 2    gabapentin (NEURONTIN) 300 MG capsule, TAKE 1 CAPSULE BY MOUTH THREE TIMES DAILY, Disp: 270 capsule, Rfl: 2    " mirtazapine (REMERON) 15 MG tablet, , Disp: , Rfl: 2    pravastatin (PRAVACHOL) 20 MG tablet, , Disp: , Rfl: 1    SYMBICORT 80-4.5 mcg/actuation HFAA, , Disp: , Rfl: 6    VENTOLIN HFA 90 mcg/actuation inhaler, INL 2 PFS PO Q 4 H UTD PRF SOB, Disp: , Rfl: 1    BOOSTRIX TDAP 2.5-8-5 Lf-mcg-Lf/0.5mL Syrg injection, , Disp: , Rfl:     diclofenac sodium (VOLTAREN) 1 % Gel, Apply 2 g topically 4 (four) times daily as needed., Disp: 3 Tube, Rfl: 1    doxepin (ZONALON) 5 % cream, , Disp: , Rfl:     FLUZONE HIGH-DOSE 2018-19, PF, 180 mcg/0.5 mL vaccine, ADM 0.5ML IM UTD, Disp: , Rfl: 0    latanoprost 0.005 % ophthalmic solution, Place 1 drop into both eyes every evening., Disp: 3 Bottle, Rfl: 3    walker Misc, 1 application by Misc.(Non-Drug; Combo Route) route once daily., Disp: 1 each, Rfl: 0    ALLERGIES    Review of patient's allergies indicates:   Allergen Reactions    Mobic [meloxicam] Rash    Naprosyn [naproxen]     Norco [hydrocodone-acetaminophen]     Tramadol        SURGICAL HISTORY    Past Surgical History:   Procedure Laterality Date    CATARACT EXTRACTION W/  INTRAOCULAR LENS IMPLANT Bilateral     CHOLECYSTECTOMY      SYLVIA-TRANSFORAMINAL Bilateral 3/28/2014    [facility]    EYE SURGERY      HYSTERECTOMY      INJECTION,STEROID,EPIDURAL,TRANSFORAMINAL APPROACH Bilateral 8/22/2018    [facility] by [facility] at [facility]    INJECTION-STEROID-EPIDURAL-TRANSFORAMINAL Bilateral 3/13/2013    Performed by Uri [facility] at [facility]    NECK SURGERY      cyst removed       SOCIAL HISTORY    Social History     Socioeconomic History    Marital status: Single     Spouse name: Not on file    Number of children: Not on file    Years of education: Not on file    Highest education level: Not on file   Occupational History    Not on file   Social Needs    Financial resource strain: Not on file    Food insecurity:     Worry: Not on file      "Inability: Not on file    Transportation needs:     Medical: Not on file     Non-medical: Not on file   Tobacco Use    Smoking status: Never Smoker    Smokeless tobacco: Never Used   Substance and Sexual Activity    Alcohol use: No    Drug use: Never    Sexual activity: Not on file   Lifestyle    Physical activity:     Days per week: Not on file     Minutes per session: Not on file    Stress: Not on file   Relationships    Social connections:     Talks on phone: Not on file     Gets together: Not on file     Attends [employer] service: Not on file     Active member of club or organization: Not on file     Attends meetings of clubs or organizations: Not on file     Relationship status: Not on file   Other Topics Concern    Not on file   Social History Narrative    Not on file       FAMILY HISTORY    Family History   Problem Relation Age of Onset    Cataracts Son     Glaucoma Son     Macular degeneration Neg Hx        REVIEW OF SYSTEMS   Constitutional:  No fever or weakness.   Eyes:  No redness, pain, or discharge.   Musculoskeletal:  Right lateral foot pain. No injury; full range of motion.   Skin:  No rash, abscess, or laceration.  Psychiatric: No suicidal ideations, homicidal ideations, auditory or visual hallucinations  Neurologic:  No focal weakness or sensory changes.   All Systems otherwise negative except as noted in the Review of Systems and History of Present Illness.    PHYSICAL EXAM    VITAL SIGNS: BP (!) 143/75 (BP Location: Left arm, Patient Position: Sitting)   Pulse 88   Temp 99 °F (37.2 °C) (Oral)   Resp 18   Ht 5' 2" (1.575 m)   Wt 78.9 kg (174 lb)   SpO2 (!) 9%   BMI 31.83 kg/m²    Constitutional:  No acute distress.  Well developed, well nourished, alert & oriented x 3, non-toxic appearance.   HENT:  Normocephalic, atraumatic.  Normal ears, nose, and throat.  Eyes:  PERRL, EOMI, conjunctiva normal.  Neck: Normal range of motion, no tenderness, supple.  Respiratory:  Nonlabored " breathing with normal breath sounds; no respiratory distress.  Cardiovascular:  RRR with no pulse deficit.  GI:  Soft, nontender, no rebound or guarding.  Musculoskeletal: Tenderness over the dorsal aspect of the right lateral id foot. Mild diffuse tenderness of the medial and lateral malleolus. 5/5 dorsal flexion, plantar flexion, eversion, and inversion. No tenderness to the forefoot or to the calcaneous. No bruising to the plantar aspect of foot. Normal ROM, no tenderness, injury, edema.  Integument:  Warm, dry skin without infection or injury.  Neurologic:  Normal motor, sensation with no focal deficit.  Psychiatric:  Affect normal, Judgment normal, Mood normal. No SI, HI and not gravely disabled.    LABS  Pertinent labs reviewed. (See chart for details)   Labs Reviewed - No data to display      RADIOLOGY    X-Ray Foot Complete Right   Final Result      Mild degenerative change, without evidence of fracture or dislocation.         Electronically signed by: Tevin Johnson MD   Date:    04/16/2019   Time:    10:17        I reviewed the film independently and see no acute fracture.    PROCEDURES    Procedures    Medications - No data to display    ED COURSE & MEDICAL DECISION MAKING      Pertinent & Imaging studies reviewed. (See chart for details)    Differential Diagnosis: Most likely a contusion secondary to specific trauma. Less likely fracture. Less likely stress fracture with no trauma to the ankle, leg, or knee.     Initial Impression: Well appearing 78 y.o female s/p blunt trauma to foot eight days ago. Healing bruising with persistent pain. Will get an x-ray of the right foot. And place the foot in a hard sole shoe.           Discharge Medication List as of 4/16/2019 10:12 AM          Discharge Medication List as of 4/16/2019 10:12 AM            FINAL DIAGNOSIS  1. Contusion of right foot, subsequent encounter    2. Foot pain        DISPOSITION  Patient discharged in stable condition.     I discussed with  patient and/or family/caretaker that this evaluation in the ED does not suggest any emergent or life threatening condition medical condition requiring admission or immediate intervention beyond what was provided in the ED.  Regardless, an unremarkable evaluation in the ED does not preclude the development or presence of a serious or life threatening condition. As such, patient was instructed to return immediately for any worsening or change in current symptoms.       Wanda Davila MD  Emergency Medicine  [facility]  4/16/2019 9:59 AM    I have reviewed the notes, assessments, and/or procedures performed by Nic fonseca and agree with documentation of this patient    INic, scribed for, and in the presence of, Wanda Davila. I performed the above scribed service and the documentation accurately describes the services I performed. I attest to the accuracy of the note.     Please pardon typos or dictation errors, as this note was transcribed using [employer] direct dictation software.         Wanda Davila MD  04/16/19 [id number]

## 2019-04-17 ENCOUNTER — OFFICE VISIT (OUTPATIENT)
Dept: PAIN MEDICINE | Facility: CLINIC | Age: 79
End: 2019-04-17
Payer: MEDICARE

## 2019-04-17 VITALS
HEIGHT: 62 IN | WEIGHT: 174 LBS | DIASTOLIC BLOOD PRESSURE: 75 MMHG | BODY MASS INDEX: 32.02 KG/M2 | SYSTOLIC BLOOD PRESSURE: 129 MMHG | TEMPERATURE: 98 F | RESPIRATION RATE: 18 BRPM | HEART RATE: 88 BPM

## 2019-04-17 DIAGNOSIS — M47.817 SPONDYLOSIS OF LUMBOSACRAL JOINT WITHOUT MYELOPATHY: ICD-10-CM

## 2019-04-17 DIAGNOSIS — M53.3 SACROILIAC JOINT PAIN: ICD-10-CM

## 2019-04-17 DIAGNOSIS — M79.18 MYOFASCIAL PAIN: Primary | ICD-10-CM

## 2019-04-17 DIAGNOSIS — M51.36 DDD (DEGENERATIVE DISC DISEASE), LUMBAR: ICD-10-CM

## 2019-04-17 PROCEDURE — 99999 PR PBB SHADOW E&M-EST. PATIENT-LVL III: ICD-10-PCS | Mod: PBBFAC,,, | Performed by: NURSE PRACTITIONER

## 2019-04-17 PROCEDURE — 20553 PR INJECT TRIGGER POINTS, > 3: ICD-10-PCS | Mod: S$GLB,,, | Performed by: NURSE PRACTITIONER

## 2019-04-17 PROCEDURE — 20553 NJX 1/MLT TRIGGER POINTS 3/>: CPT | Mod: S$GLB,,, | Performed by: NURSE PRACTITIONER

## 2019-04-17 PROCEDURE — 99213 PR OFFICE/OUTPT VISIT, EST, LEVL III, 20-29 MIN: ICD-10-PCS | Mod: 25,S$GLB,, | Performed by: NURSE PRACTITIONER

## 2019-04-17 PROCEDURE — 3074F PR MOST RECENT SYSTOLIC BLOOD PRESSURE < 130 MM HG: ICD-10-PCS | Mod: CPTII,S$GLB,, | Performed by: NURSE PRACTITIONER

## 2019-04-17 PROCEDURE — 3074F SYST BP LT 130 MM HG: CPT | Mod: CPTII,S$GLB,, | Performed by: NURSE PRACTITIONER

## 2019-04-17 PROCEDURE — 99999 PR PBB SHADOW E&M-EST. PATIENT-LVL III: CPT | Mod: PBBFAC,,, | Performed by: NURSE PRACTITIONER

## 2019-04-17 PROCEDURE — 3078F DIAST BP <80 MM HG: CPT | Mod: CPTII,S$GLB,, | Performed by: NURSE PRACTITIONER

## 2019-04-17 PROCEDURE — 1100F PR PT FALLS ASSESS DOC 2+ FALLS/FALL W/INJURY/YR: ICD-10-PCS | Mod: CPTII,S$GLB,, | Performed by: NURSE PRACTITIONER

## 2019-04-17 PROCEDURE — 1100F PTFALLS ASSESS-DOCD GE2>/YR: CPT | Mod: CPTII,S$GLB,, | Performed by: NURSE PRACTITIONER

## 2019-04-17 PROCEDURE — 3288F PR FALLS RISK ASSESSMENT DOCUMENTED: ICD-10-PCS | Mod: CPTII,S$GLB,, | Performed by: NURSE PRACTITIONER

## 2019-04-17 PROCEDURE — 3288F FALL RISK ASSESSMENT DOCD: CPT | Mod: CPTII,S$GLB,, | Performed by: NURSE PRACTITIONER

## 2019-04-17 PROCEDURE — 99213 OFFICE O/P EST LOW 20 MIN: CPT | Mod: 25,S$GLB,, | Performed by: NURSE PRACTITIONER

## 2019-04-17 PROCEDURE — 3078F PR MOST RECENT DIASTOLIC BLOOD PRESSURE < 80 MM HG: ICD-10-PCS | Mod: CPTII,S$GLB,, | Performed by: NURSE PRACTITIONER

## 2019-04-17 RX ORDER — AMOXICILLIN AND CLAVULANATE POTASSIUM 875; 125 MG/1; MG/1
TABLET, FILM COATED ORAL
Refills: 0 | COMMUNITY
Start: 2019-04-04 | End: 2019-05-15

## 2019-04-17 RX ORDER — METHYLPREDNISOLONE ACETATE 40 MG/ML
40 INJECTION, SUSPENSION INTRA-ARTICULAR; INTRALESIONAL; INTRAMUSCULAR; SOFT TISSUE ONCE
Status: COMPLETED | OUTPATIENT
Start: 2019-04-17 | End: 2019-04-17

## 2019-04-17 RX ORDER — BUPIVACAINE HYDROCHLORIDE 2.5 MG/ML
9 INJECTION, SOLUTION EPIDURAL; INFILTRATION; INTRACAUDAL
Status: COMPLETED | OUTPATIENT
Start: 2019-04-17 | End: 2019-04-17

## 2019-04-17 RX ORDER — PREDNISONE 10 MG/1
TABLET ORAL
Refills: 0 | COMMUNITY
Start: 2019-04-04 | End: 2019-04-17

## 2019-04-17 RX ADMIN — METHYLPREDNISOLONE ACETATE 40 MG: 40 INJECTION, SUSPENSION INTRA-ARTICULAR; INTRALESIONAL; INTRAMUSCULAR; SOFT TISSUE at 10:04

## 2019-04-17 RX ADMIN — BUPIVACAINE HYDROCHLORIDE 22.5 MG: 2.5 INJECTION, SOLUTION EPIDURAL; INFILTRATION; INTRACAUDAL at 10:04

## 2019-04-17 NOTE — PROGRESS NOTES
Chronic patient Established Note (Follow up visit)      SUBJECTIVE:    Tamiko Youssef presents to the clinic for a follow-up appointment for lower back pain. She reports increased low back pain in the last week. She describes this pain as across her lower back and aching in nature. She denies any radiating leg pain. She reports good relief with previous trigger point injections. She would like to repeat this today. She continues to take Gabapentin with benefit. She denies any other health changes. She denies any bowel or bladder incontinence. Her pain today is 4/10.    Pain Disability Index Review:  Last 3 PDI Scores 11/6/2018 9/6/2018 8/9/2018   Pain Disability Index (PDI) 50 56 56       Pain Medications:  Gabapentin 300 mg BID    Opioid Contract: yes     report:  Reviewed and consistent with medication use as prescribed.    Pain Procedures:   3/28/14, 3/13/13 Bilateral L4 TF SYLVIA  8/22/18 Bilateral L4 TF SYLVIA- 75% relief    Physical Therapy/Home Exercise: no    Imaging:     Narrative     EXAMINATION:  XR LUMBAR SPINE 5 VIEW WITH FLEX AND EXT    CLINICAL HISTORY:  Low back pain, >6wks conservative tx, persistent-progressive sx, surgical candidate;  Other intervertebral disc degeneration, lumbar region    TECHNIQUE:  Five views of the lumbar spine plus flexion extension views were performed.    COMPARISON:  09/09/2015.    FINDINGS:  There is 3 mm anterolisthesis of L3 on L4, L4 on L5, and L5 on S1.  No translational instability is noted on the flexion and extension radiographs.  Vertebral body heights are well maintained.  There is mild disc space narrowing present at the L3-4, L4-5, and L5-S1 levels.  There is facet arthropathy within the mid to lower lumbar spine and lumbosacral junction.  There is no evidence for spondylolysis.  No abnormal paraspinal masses are evident.  Sacroiliac joints appear unremarkable.      Impression       3 mm anterolisthesis of L3 on L4, L4 and L5, and L5 on S1.  This appears to be  degenerative in etiology with no evidence for translational instability.    Lumbar spondylosis.-     Narrative     EXAMINATION:  XR HIPS BILATERAL 2 VIEW INCL AP PELVIS    CLINICAL HISTORY:  Other intervertebral disc degeneration, lumbar region    TECHNIQUE:  AP view of the pelvis and frogleg lateral views of both hips were performed.    COMPARISON:  03/20/2014.    FINDINGS:  The bones are intact.  There is no evidence for acute fracture or bone destruction.  There are mild symmetric degenerative changes of the hips.  Sacroiliac joints appear unremarkable.  There are degenerative changes within the lower lumbar spine.  Soft tissues are unremarkable.      Impression       No evidence for acute fracture, bone destruction, or dislocation.         MRI Thoracic Spine Without Contrast     Narrative     MRI thoracic spine without contrast.    Findings: There is a mild dextroscoliosis of the thoracic spine.  The vertebral body heights are satisfactorily maintained.  There is loss of disk height with degenerative endplate change throughout the thoracic spine.  There are mild disk bulges   identified at T2-3, T3-4, T4-5, T8-9, and a mild disk osteophyte complex at T11-12.  This is mild central canal narrowing at T11-12.  The visualized portion of the intra-abdominal content is significant for a 2.8-cm left-sided renal cyst.  There is no   evidence of an acute marrow replacement process such as tumor or infection.  There is no fracture.  The spinal cord has a normal appearance.  There is no intradural abnormality identified.      Impression      Mild multilevel degenerative change with mild central canal stenosis at T11-12.      Electronically signed by: PATRICIA EDGAR MD  Date: 03/27/14  Time: 14:57      MRI Lumbar Spine Without Contrast    Narrative     Procedure:  Non-contrast MRI of the lumbosacral spine    Technique: sagittal T1, T2 and STIR and axial T1 and T2 images of the lumbosacral spine without contrast.     Comparison:  CT abdomen and pelvis on 1/11/3    Findings: Partially included in the  imaging is left renal lesion.  Please see CT report for further details.    There is trace 1-2 mm of anterior listhesis of L4 on L5 and L5 on S1.  . The lumbar vertebral body heights, contours and bone marrow signal is within normal limits and without evidence for acute fracture or subluxation. There is degenerative disk disease   with disk desiccation all levels there is present degenerative changes are more prominent in the lower thoracic spine partially included in this study specifically at T11/T12 level with moderate to severe height loss.        The distal spinal cord and conus is normal in signal and contour the tip of the conus approximates the inferior Y0sactz.    T11/T12 including the sagittal field only there is a posterior discussed by complex with slight retrolisthesis of T11 on T12 with probable mild center canal stenosis and mild bilateral neural foraminal stenosis.    T12/L1 through L1/L2: No significant disc bulge, central canal or neural foraminal stenosis.     L2/L3: No significant disc bulge, central canal or neural foraminal stenosis. No significant disc bulge, central canal or neural foraminal stenosis.    L3/L4: Small disk bulge with ligamentum flavum hypertrophy without significant central canal stenosis with mild neural foraminal stenosis bilaterally.    L4/L5: Bulging disk with ligamentum flavum hypertrophy and facet joint arthropathy without significant central canal stenosis with mild neural foraminal stenosis.      L5/S1: Bulging disk with facet joint arthropathy without significant central canal stenosis with mild bilateral foraminal stenosis.      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      Impression       Mild spondylo-degenerative change of the lumbosacral spine as detailed above without significant central canal stenosis.      Please note there is degenerative change at the  lower thoracic spine specifically at the T11/T12 level with posterior disk osteophyte complex resulting in probable mild central canal and mild bilateral foraminal stenosis.    See above for additional details.      Electronically signed by: ALEKSANDER POLLACK DO  Date: 04/09/13  Time: 10:58          Allergies:   Review of patient's allergies indicates:   Allergen Reactions    Naprosyn [naproxen]     Norco [hydrocodone-acetaminophen]     Tramadol        Current Medications:   Current Outpatient Medications   Medication Sig Dispense Refill    ADVAIR DISKUS 250-50 mcg/dose diskus inhaler INHALE 1 PUFF PO INTO LUNGS PO BID. REPLACES SYMBICORT  4    albuterol (PROVENTIL) 2.5 mg /3 mL (0.083 %) nebulizer solution Take 2.5 mg by nebulization every 6 (six) hours as needed.      ALPRAZolam (XANAX) 0.25 MG tablet TK 1 T PO QD PRA  3    amlodipine (NORVASC) 10 MG tablet Take 10 mg by mouth once daily.      aspirin (ECOTRIN) 81 MG EC tablet Take 81 mg by mouth once daily.      BOOSTRIX TDAP 2.5-8-5 Lf-mcg-Lf/0.5mL Syrg injection       citalopram (CELEXA) 10 MG tablet TK 1 T PO ONCE A DAY FOR DEPRESSION  2    diclofenac sodium (VOLTAREN) 1 % Gel Apply 2 g topically 4 (four) times daily as needed. 3 Tube 1    doxepin (ZONALON) 5 % cream       FLUZONE HIGH-DOSE 2018-19, PF, 180 mcg/0.5 mL vaccine ADM 0.5ML IM UTD  0    furosemide (LASIX) 20 MG tablet TK 1 T PO QD PRF FLUID  2    gabapentin (NEURONTIN) 300 MG capsule TAKE 1 CAPSULE BY MOUTH THREE TIMES DAILY 270 capsule 2    latanoprost 0.005 % ophthalmic solution Place 1 drop into both eyes every evening. 3 Bottle 3    mirtazapine (REMERON) 15 MG tablet   2    pravastatin (PRAVACHOL) 20 MG tablet   1    SYMBICORT 80-4.5 mcg/actuation HFAA   6    VENTOLIN HFA 90 mcg/actuation inhaler INL 2 PFS PO Q 4 H UTD PRF SOB  1    walker Misc 1 application by Misc.(Non-Drug; Combo Route) route once daily. 1 each 0     No current facility-administered medications for this  visit.        REVIEW OF SYSTEMS:    Constitutional: Positive for unexpected weight change.   HENT: Positive for headache.   Respiratory: Positive for intermittent shortness of breath and wheezing.  (asthma)  Gastrointestinal: Positive for constipation.   Musculoskeletal: Positive for back pain, gait problem and stiffness.     MEDICAL, SURGICAL, FAMILY, SOCIAL HX: reviewed    Past Medical History:  Past Medical History:   Diagnosis Date    Asthma     Cataract     Glaucoma     Hypertension        Past Surgical History:  Past Surgical History:   Procedure Laterality Date    CATARACT EXTRACTION W/  INTRAOCULAR LENS IMPLANT Bilateral     CHOLECYSTECTOMY      SYLVIA-TRANSFORAMINAL Bilateral 3/28/2014    [facility]    EYE SURGERY      HYSTERECTOMY      INJECTION,STEROID,EPIDURAL,TRANSFORAMINAL APPROACH Bilateral 8/22/2018    [facility]    INJECTION-STEROID-EPIDURAL-TRANSFORAMINAL Bilateral 3/13/2013    [facility] by Uri Israel [facility] at [facility]    NECK SURGERY      cyst removed       Family History:  Family History   Problem Relation Age of Onset    Cataracts Son     Glaucoma Son     Macular degeneration Neg Hx        Social History:  Social History     Socioeconomic History    Marital status: Single     Spouse name: Not on file    Number of children: Not on file    Years of education: Not on file    Highest education level: Not on file   Occupational History    Not on file   Social Needs    Financial resource strain: Not on file    Food insecurity:     Worry: Not on file     Inability: Not on file    Transportation needs:     Medical: Not on file     Non-medical: Not on file   Tobacco Use    Smoking status: Never Smoker    Smokeless tobacco: Never Used   Substance and Sexual Activity    Alcohol use: No    Drug use: Never    Sexual activity: Not on file   Lifestyle    Physical activity:     Days per week: Not  "on file     Minutes per session: Not on file    Stress: Not on file   Relationships    Social connections:     Talks on phone: Not on file     Gets together: Not on file     Attends Orthodox service: Not on file     Active member of club or organization: Not on file     Attends meetings of clubs or organizations: Not on file     Relationship status: Not on file   Other Topics Concern    Not on file   Social History Narrative    Not on file       OBJECTIVE:    /75   Pulse 88   Temp 97.7 °F (36.5 °C)   Resp 18   Ht 5' 2" (1.575 m)   Wt 78.9 kg (174 lb)   BMI 31.83 kg/m²     PHYSICAL EXAMINATION:    GENERAL: Well appearing, in no acute distress, alert and oriented x3.  PSYCH:  Mood and affect appropriate.  SKIN: Skin color, texture, turgor normal, no rashes or lesions.  HEAD/FACE:  Normocephalic, atraumatic. Cranial nerves grossly intact.  CV: RRR with palpation of the radial artery.  PULM: No evidence of respiratory difficulty, symmetric chest rise.  GI:  Soft and non-tender.  BACK: Straight leg raising in the sitting and supine positions is negative to radicular pain.  Painful palpation to thoracic and lumbar paraspinals.  There is pain with palpation over lumbar facet joints.  Limited ROM on extension and flexion.  Positive facet loading bilaterally.  There is pain with palpation to bilateral SI joints.  REGINA is positive bilaterally.  EXTREMITIES: Peripheral joint ROM is full and pain free without obvious instability or laxity in all four extremities. No deformities, edema, or skin discoloration. Good capillary refill.  MUSCULOSKELETAL:  Bilateral upper and lower extremity strength is normal and symmetric.  No atrophy or tone abnormalities are noted.  NEURO: Bilateral upper and lower extremity coordination and muscle stretch reflexes are physiologic and symmetric.  Plantar response are downgoing. No clonus.  No loss of sensation is noted.  GAIT: Antalgic- ambulates with walker.      ASSESSMENT: 78 " y.o. year old female with lower back pain consistent with the following diagnoses:     1. Myofascial pain     2. 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 Sacroiliac joint pain     4. DDD (degenerative disc disease), lumbar           PLAN:     - I have stressed the importance of physical activity and a home exercise plan to help with pain and improve health.    - Previous imaging was reviewed and discussed with the patient today.    - Trigger point injections as per below.    - Consider lumbar MBB in the future.    - Can continue Gabapentin 300 mg BID.    - Counseled patient regarding the importance of activity modification, constant sleeping habits and physical therapy.    - RTC PRN.      The above plan and management options were discussed at length with patient. Patient is in agreement with the above and verbalized understanding.    Marina Gallegos NP  04/17/2019     Trigger Point Injection:   The procedure was discussed with the patient including complications of nerve damage,  bleeding, infection, and failure of pain relief.   Trigger points were identified by palpation and marked. Alcohol prep of sites done. A mixture of 9mL 0.25% bupivacaine +40mg Depo-Medrol was prepared (10 mL total).   A 27-gauge needle was advanced to the point of maximal tenderness, and medication was injected after negative aspiration. All sites done in the same manner. Patient tolerated the procedure well and without complications. Sites injected included: bilateral thoracic paraspinals x2, bilateral lumbar paraspinals x4. (6 total).

## 2019-04-24 ENCOUNTER — TELEPHONE (OUTPATIENT)
Dept: OTOLARYNGOLOGY | Facility: CLINIC | Age: 79
End: 2019-04-24

## 2019-05-05 NOTE — PROGRESS NOTES
Subjective:       Patient ID: Tamiko Youssef is a 78 y.o. female.    Chief Complaint: Hearing Loss (Losing hearing about 3 years/go over Audio)    She is a new patient for me states here today to have her ears and hearing checked.  She has noted gradual reduction in hearing over the past 3 years worse in the left ear.  Denies tinnitus, acoustic trauma, closed head trauma, antecedent respiratory infection.  No prior otologic history otherwise.  No family history of otologic disease.  No nasal or throat or other otolaryngologic complaints.  Medical history reviewed and includes hypertension, elevated cholesterol, glaucoma, arthritis, asthma, migraine.  Positive NSAID allergy.  Never smoker and no alcohol.          Review of Systems   Ears: Positive for hearing loss.  Negative for ear pressure, ringing in ear, ear discharge, ear infections, head trauma, taken gentramycin/streptomycin and family history of hearing loss.    Nose:  Negative for nosebleeds, nasal obstruction, nasal or sinus surgery, loss of smell and snoring.    Mouth/Throat: Negative for pain swallowing, throat mass and neck mass.   Constitutional: Negative for recent unexplained weight loss and fever.    Eyes:  Positive for history of glaucoma.   Cardiovascular:  Positive for history of high blood pressure. Negative for chest pain and palpitations.   Respiratory:  Positive for asthma. Negative for emphysema and history of tuberculosis.    Gastrointestinal:  Positive for acid reflux and indigestion. Negative for history of stomach ulcers or pain and blood in stool.   Other:  Positive for arthritis. Negative for kidney problem, bladder problem, slurred, swollen glands, anemia and persistent infections.           Objective:        Vitals:    03/25/19 1341   BP: 127/72   Pulse: 87   Temp: 97.7 °F (36.5 °C)     Body mass index is 32.45 kg/m².  Physical Exam   Constitutional: She is oriented to person, place, and time. She appears well-developed and  well-nourished. No distress.   HENT:   Head: Normocephalic and atraumatic.   Right Ear: Tympanic membrane, external ear and ear canal normal.   Left Ear: Tympanic membrane, external ear and ear canal normal.   Nose: No mucosal edema, rhinorrhea or nasal deformity. No epistaxis.   Mouth/Throat: Oropharynx is clear and moist and mucous membranes are normal. No oral lesions. No uvula swelling. No oropharyngeal exudate, posterior oropharyngeal edema or posterior oropharyngeal erythema.   Neck: Phonation normal. Neck supple. No tracheal deviation present.   Pulmonary/Chest: Effort normal. No respiratory distress.   Lymphadenopathy:     She has no cervical adenopathy.   Neurological: She is alert and oriented to person, place, and time.   Skin: Skin is warm and dry.   Psychiatric: She has a normal mood and affect. Her behavior is normal. Her speech is not slurred.       Tests / Results:                 Assessment:       1. Asymmetrical sensorineural hearing loss    2. Abnormal acoustic reflex    3. Impairment of speech discrimination        Plan:       Reviewed above audiogram with patient which reveals asymmetric sensorineural hearing loss worse AS with reduced speech discrimination AS and absent acoustic reflexes.  Discussed indications for MRI of brain and IAC's without and with contrast after creatinine level.  Then follow-up results and recheck in 2 weeks.

## 2019-05-06 ENCOUNTER — TELEPHONE (OUTPATIENT)
Dept: OTOLARYNGOLOGY | Facility: CLINIC | Age: 79
End: 2019-05-06

## 2019-05-06 NOTE — TELEPHONE ENCOUNTER
----- Message from Chacha Keita sent at 5/6/2019  1:06 PM CDT -----  Contact: GARY VILLANUEVA [[id number]]  Name of Who is Calling: GARY VILLANUEVA [[id number]]      What is the request in detail: results from MRI    Can the clinic reply by [facility]: no    What [facility]: [phone]

## 2019-05-08 ENCOUNTER — OFFICE VISIT (OUTPATIENT)
Dept: OTOLARYNGOLOGY | Facility: CLINIC | Age: 79
End: 2019-05-08
Payer: MEDICARE

## 2019-05-08 VITALS
SYSTOLIC BLOOD PRESSURE: 125 MMHG | HEART RATE: 92 BPM | TEMPERATURE: 98 F | WEIGHT: 175.88 LBS | DIASTOLIC BLOOD PRESSURE: 77 MMHG | HEIGHT: 62 IN | BODY MASS INDEX: 32.37 KG/M2

## 2019-05-08 DIAGNOSIS — H90.3 ASYMMETRICAL SENSORINEURAL HEARING LOSS: ICD-10-CM

## 2019-05-08 DIAGNOSIS — D33.3 VESTIBULAR SCHWANNOMA: ICD-10-CM

## 2019-05-08 DIAGNOSIS — K14.3 BLACK HAIRY TONGUE: ICD-10-CM

## 2019-05-08 PROCEDURE — 3074F SYST BP LT 130 MM HG: CPT | Mod: CPTII,S$GLB,, | Performed by: OTOLARYNGOLOGY

## 2019-05-08 PROCEDURE — 1100F PTFALLS ASSESS-DOCD GE2>/YR: CPT | Mod: CPTII,S$GLB,, | Performed by: OTOLARYNGOLOGY

## 2019-05-08 PROCEDURE — 3074F PR MOST RECENT SYSTOLIC BLOOD PRESSURE < 130 MM HG: ICD-10-PCS | Mod: CPTII,S$GLB,, | Performed by: OTOLARYNGOLOGY

## 2019-05-08 PROCEDURE — 99214 PR OFFICE/OUTPT VISIT, EST, LEVL IV, 30-39 MIN: ICD-10-PCS | Mod: S$GLB,,, | Performed by: OTOLARYNGOLOGY

## 2019-05-08 PROCEDURE — 3078F DIAST BP <80 MM HG: CPT | Mod: CPTII,S$GLB,, | Performed by: OTOLARYNGOLOGY

## 2019-05-08 PROCEDURE — 3078F PR MOST RECENT DIASTOLIC BLOOD PRESSURE < 80 MM HG: ICD-10-PCS | Mod: CPTII,S$GLB,, | Performed by: OTOLARYNGOLOGY

## 2019-05-08 PROCEDURE — 99214 OFFICE O/P EST MOD 30 MIN: CPT | Mod: S$GLB,,, | Performed by: OTOLARYNGOLOGY

## 2019-05-08 PROCEDURE — 1100F PR PT FALLS ASSESS DOC 2+ FALLS/FALL W/INJURY/YR: ICD-10-PCS | Mod: CPTII,S$GLB,, | Performed by: OTOLARYNGOLOGY

## 2019-05-08 PROCEDURE — 3288F PR FALLS RISK ASSESSMENT DOCUMENTED: ICD-10-PCS | Mod: CPTII,S$GLB,, | Performed by: OTOLARYNGOLOGY

## 2019-05-08 PROCEDURE — 3288F FALL RISK ASSESSMENT DOCD: CPT | Mod: CPTII,S$GLB,, | Performed by: OTOLARYNGOLOGY

## 2019-05-08 RX ORDER — CLOTRIMAZOLE 10 MG/1
LOZENGE ORAL; TOPICAL
Qty: 50 TROCHE | Refills: 1 | Status: SHIPPED | OUTPATIENT
Start: 2019-05-08 | End: 2019-08-07 | Stop reason: SDUPTHER

## 2019-05-08 NOTE — PATIENT INSTRUCTIONS
Repeat MRI in early August 2019, then follow up.    Trial of mycelex lozenges as prescribed and gentle tongue brushing and keep mouth moist and stay hydrated.    Rinse and gargle after each use of Symbicort.      See dermatologist re cheek lesions.

## 2019-05-12 NOTE — PROGRESS NOTES
Toñito Frausto, CCC-A  Audiologist - [facility] [address] [place] [address]  [email or web address]  [phone]    Patient: Tamiko Youssef   MRN: [id number]  : 1940  [place]: 3/25/2019      AUDIOLOGICAL EVALUATION    RECOMMENDATIONS:   It is recommended that she:  Follow up medically with a physician to assess asymmetrical hearing loss.  Receive binaural hearing aids to improve speech understanding, pending medical clearance.  Continue to receive audiological monitoring annually.  Use precaution and/or hearing protection in noisy environments.    If you should have any questions or concerns regarding the above information, please do not hesitate to contact me at [phone].      _______________________________  Toñito Frausto, SAMREEN-A  Audiologist

## 2019-05-15 ENCOUNTER — OFFICE VISIT (OUTPATIENT)
Dept: PAIN MEDICINE | Facility: CLINIC | Age: 79
End: 2019-05-15
Payer: MEDICARE

## 2019-05-15 VITALS
BODY MASS INDEX: 32.39 KG/M2 | TEMPERATURE: 98 F | SYSTOLIC BLOOD PRESSURE: 136 MMHG | DIASTOLIC BLOOD PRESSURE: 79 MMHG | WEIGHT: 176 LBS | HEIGHT: 62 IN | HEART RATE: 90 BPM | RESPIRATION RATE: 18 BRPM

## 2019-05-15 DIAGNOSIS — M47.816 LUMBAR SPONDYLOSIS: Primary | ICD-10-CM

## 2019-05-15 DIAGNOSIS — M79.18 MYOFASCIAL PAIN: ICD-10-CM

## 2019-05-15 DIAGNOSIS — M51.36 DDD (DEGENERATIVE DISC DISEASE), LUMBAR: ICD-10-CM

## 2019-05-15 DIAGNOSIS — M53.3 SACROILIAC JOINT PAIN: ICD-10-CM

## 2019-05-15 DIAGNOSIS — M47.816 LUMBAR FACET ARTHROPATHY: ICD-10-CM

## 2019-05-15 PROCEDURE — 3075F PR MOST RECENT SYSTOLIC BLOOD PRESS GE 130-139MM HG: ICD-10-PCS | Mod: CPTII,S$GLB,, | Performed by: NURSE PRACTITIONER

## 2019-05-15 PROCEDURE — 99999 PR PBB SHADOW E&M-EST. PATIENT-LVL III: ICD-10-PCS | Mod: PBBFAC,,, | Performed by: NURSE PRACTITIONER

## 2019-05-15 PROCEDURE — 1101F PT FALLS ASSESS-DOCD LE1/YR: CPT | Mod: CPTII,S$GLB,, | Performed by: NURSE PRACTITIONER

## 2019-05-15 PROCEDURE — 99999 PR PBB SHADOW E&M-EST. PATIENT-LVL III: CPT | Mod: PBBFAC,,, | Performed by: NURSE PRACTITIONER

## 2019-05-15 PROCEDURE — 3078F PR MOST RECENT DIASTOLIC BLOOD PRESSURE < 80 MM HG: ICD-10-PCS | Mod: CPTII,S$GLB,, | Performed by: NURSE PRACTITIONER

## 2019-05-15 PROCEDURE — 3078F DIAST BP <80 MM HG: CPT | Mod: CPTII,S$GLB,, | Performed by: NURSE PRACTITIONER

## 2019-05-15 PROCEDURE — 1101F PR PT FALLS ASSESS DOC 0-1 FALLS W/OUT INJ PAST YR: ICD-10-PCS | Mod: CPTII,S$GLB,, | Performed by: NURSE PRACTITIONER

## 2019-05-15 PROCEDURE — 99213 PR OFFICE/OUTPT VISIT, EST, LEVL III, 20-29 MIN: ICD-10-PCS | Mod: S$GLB,,, | Performed by: NURSE PRACTITIONER

## 2019-05-15 PROCEDURE — 3075F SYST BP GE 130 - 139MM HG: CPT | Mod: CPTII,S$GLB,, | Performed by: NURSE PRACTITIONER

## 2019-05-15 PROCEDURE — 99213 OFFICE O/P EST LOW 20 MIN: CPT | Mod: S$GLB,,, | Performed by: NURSE PRACTITIONER

## 2019-05-15 NOTE — PROGRESS NOTES
Chronic patient Established Note (Follow up visit)      SUBJECTIVE:    Tamiko Youssef presents to the clinic for a follow-up appointment for lower back pain. She reports trigger point injections at last visit provided significant relief for about 2-3 weeks. She continues to report mid and low back pain. She describes this pain as constant, sharp, and aching. She does report intermittent muscle spasms. She continues to take Gabapentin with benefit. She has recently seen her orthopedist who recommended physical therapy. She denies any other health changes. Her pain today is 9/10.      Pain Disability Index Review:  Last 3 PDI Scores 5/15/2019 4/17/2019 11/6/2018   Pain Disability Index (PDI) 58 [phone] 50       Pain Medications:  Gabapentin 300 mg BID    Opioid Contract: yes     report:  Reviewed and consistent with medication use as prescribed.    Pain Procedures:   3/28/14, 3/13/13 Bilateral L4 TF SYLVIA  8/22/18 Bilateral L4 TF SYLVIA- 75% relief    Physical Therapy/Home Exercise: no    Imaging:     Narrative     EXAMINATION:  XR LUMBAR SPINE 5 VIEW WITH FLEX AND EXT    CLINICAL HISTORY:  Low back pain, >6wks conservative tx, persistent-progressive sx, surgical candidate;  Other intervertebral disc degeneration, lumbar region    TECHNIQUE:  Five views of the lumbar spine plus flexion extension views were performed.    COMPARISON:  09/09/2015.    FINDINGS:  There is 3 mm anterolisthesis of L3 on L4, L4 on L5, and L5 on S1.  No translational instability is noted on the flexion and extension radiographs.  Vertebral body heights are well maintained.  There is mild disc space narrowing present at the L3-4, L4-5, and L5-S1 levels.  There is facet arthropathy within the mid to lower lumbar spine and lumbosacral junction.  There is no evidence for spondylolysis.  No abnormal paraspinal masses are evident.  Sacroiliac joints appear unremarkable.      Impression       3 mm anterolisthesis of L3 on L4, L4 and L5, and L5 on S1.  This  appears to be degenerative in etiology with no evidence for translational instability.    Lumbar spondylosis.-     Narrative     EXAMINATION:  XR HIPS BILATERAL 2 VIEW INCL AP PELVIS    CLINICAL HISTORY:  Other intervertebral disc degeneration, lumbar region    TECHNIQUE:  AP view of the pelvis and frogleg lateral views of both hips were performed.    COMPARISON:  03/20/2014.    FINDINGS:  The bones are intact.  There is no evidence for acute fracture or bone destruction.  There are mild symmetric degenerative changes of the hips.  Sacroiliac joints appear unremarkable.  There are degenerative changes within the lower lumbar spine.  Soft tissues are unremarkable.      Impression       No evidence for acute fracture, bone destruction, or dislocation.         MRI Thoracic Spine Without Contrast     Narrative     MRI thoracic spine without contrast.    Findings: There is a mild dextroscoliosis of the thoracic spine.  The vertebral body heights are satisfactorily maintained.  There is loss of disk height with degenerative endplate change throughout the thoracic spine.  There are mild disk bulges   identified at T2-3, T3-4, T4-5, T8-9, and a mild disk osteophyte complex at T11-12.  This is mild central canal narrowing at T11-12.  The visualized portion of the intra-abdominal content is significant for a 2.8-cm left-sided renal cyst.  There is no   evidence of an acute marrow replacement process such as tumor or infection.  There is no fracture.  The spinal cord has a normal appearance.  There is no intradural abnormality identified.      Impression      Mild multilevel degenerative change with mild central canal stenosis at T11-12.      Electronically signed by: PATRICIA EDGAR MD  Date: 03/27/14  Time: 14:57      MRI Lumbar Spine Without Contrast    Narrative     Procedure:  Non-contrast MRI of the lumbosacral spine    Technique: sagittal T1, T2 and STIR and axial T1 and T2 images of the lumbosacral spine without contrast.      Comparison: CT abdomen and pelvis on 1/11/3    Findings: Partially included in the  imaging is left renal lesion.  Please see CT report for further details.    There is trace 1-2 mm of anterior listhesis of L4 on L5 and L5 on S1.  . The lumbar vertebral body heights, contours and bone marrow signal is within normal limits and without evidence for acute fracture or subluxation. There is degenerative disk disease   with disk desiccation all levels there is present degenerative changes are more prominent in the lower thoracic spine partially included in this study specifically at T11/T12 level with moderate to severe height loss.        The distal spinal cord and conus is normal in signal and contour the tip of the conus approximates the inferior [id number].    T11/T12 including the sagittal field only there is a posterior discussed by complex with slight retrolisthesis of T11 on T12 with probable mild center canal stenosis and mild bilateral neural foraminal stenosis.    T12/L1 through L1/L2: No significant disc bulge, central canal or neural foraminal stenosis.     L2/L3: No significant disc bulge, central canal or neural foraminal stenosis. No significant disc bulge, central canal or neural foraminal stenosis.    L3/L4: Small disk bulge with ligamentum flavum hypertrophy without significant central canal stenosis with mild neural foraminal stenosis bilaterally.    L4/L5: Bulging disk with ligamentum flavum hypertrophy and facet joint arthropathy without significant central canal stenosis with mild neural foraminal stenosis.      L5/S1: Bulging disk with facet joint arthropathy without significant central canal stenosis with mild bilateral foraminal stenosis.      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      Impression       Mild spondylo-degenerative change of the lumbosacral spine as detailed above without significant central canal stenosis.      Please note there is degenerative  change at the lower thoracic spine specifically at the T11/T12 level with posterior disk osteophyte complex resulting in probable mild central canal and mild bilateral foraminal stenosis.    See above for additional details.      Electronically signed by: ALEKSANDER JAKI GARCIA  Date: 04/09/13  Time: 10:58          Allergies:   Review of patient's allergies indicates:   Allergen Reactions    Naprosyn [naproxen]     Norco [hydrocodone-acetaminophen]     Tramadol        Current Medications:   Current Outpatient Medications   Medication Sig Dispense Refill    ADVAIR DISKUS 250-50 mcg/dose diskus inhaler INHALE 1 PUFF PO INTO LUNGS PO BID. REPLACES SYMBICORT  4    albuterol (PROVENTIL) 2.5 mg /3 mL (0.083 %) nebulizer solution Take 2.5 mg by nebulization every 6 (six) hours as needed.      ALPRAZolam (XANAX) 0.25 MG tablet TK 1 T PO QD PRA  3    amlodipine (NORVASC) 10 MG tablet Take 10 mg by mouth once daily.      BOOSTRIX TDAP 2.5-8-5 Lf-mcg-Lf/0.5mL Syrg injection       FLUZONE HIGH-DOSE 2018-19, PF, 180 mcg/0.5 mL vaccine ADM 0.5ML IM UTD  0    furosemide (LASIX) 20 MG tablet TK 1 T PO QD PRF FLUID  2    gabapentin (NEURONTIN) 300 MG capsule TAKE 1 CAPSULE BY MOUTH THREE TIMES DAILY 270 capsule 2    latanoprost 0.005 % ophthalmic solution Place 1 drop into both eyes every evening. 3 Bottle 3    mirtazapine (REMERON) 15 MG tablet   2    pravastatin (PRAVACHOL) 20 MG tablet   1    SYMBICORT 80-4.5 mcg/actuation HFAA   6    VENTOLIN HFA 90 mcg/actuation inhaler INL 2 PFS PO Q 4 H UTD PRF SOB  1    walker Misc 1 application by Misc.(Non-Drug; Combo Route) route once daily. 1 each 0    amoxicillin-clavulanate 875-125mg (AUGMENTIN) 875-125 mg per tablet TK 1 T PO  BID  0    clotrimazole (MYCELEX) 10 mg nury Dissolve one lozenge in the mouth 5 times a day. 50 Nury 1     No current facility-administered medications for this visit.        REVIEW OF SYSTEMS:    Constitutional: Positive for unexpected weight  change.   HENT: Positive for headache.   Respiratory: Positive for intermittent shortness of breath and wheezing.  (asthma)  Gastrointestinal: Positive for constipation.   Musculoskeletal: Positive for back pain, gait problem and stiffness.     MEDICAL, SURGICAL, FAMILY, SOCIAL HX: reviewed    Past Medical History:  Past Medical History:   Diagnosis Date    Asthma     Cataract     Glaucoma     Hypertension        Past Surgical History:  Past Surgical History:   Procedure Laterality Date    CATARACT EXTRACTION W/  INTRAOCULAR LENS IMPLANT Bilateral     CHOLECYSTECTOMY      SYLVIA-TRANSFORAMINAL Bilateral 3/28/2014    [facility]    EYE SURGERY      HYSTERECTOMY      INJECTION,STEROID,EPIDURAL,TRANSFORAMINAL APPROACH Bilateral 8/22/2018    [facility]    INJECTION-STEROID-EPIDURAL-TRANSFORAMINAL Bilateral 3/13/2013    [facility] at [facility]    NECK SURGERY      cyst removed       Family History:  Family History   Problem Relation Age of Onset    Cataracts Son     Glaucoma Son     Macular degeneration Neg Hx        Social History:  Social History     Socioeconomic History    Marital status: Single     Spouse name: Not on file    Number of children: Not on file    Years of education: Not on file    Highest education level: Not on file   Occupational History    Not on file   Social Needs    Financial resource strain: Not on file    Food insecurity:     Worry: Not on file     Inability: Not on file    Transportation needs:     Medical: Not on file     Non-medical: Not on file   Tobacco Use    Smoking status: Never Smoker    Smokeless tobacco: Never Used   Substance and Sexual Activity    Alcohol use: No    Drug use: Never    Sexual activity: Not on file   Lifestyle    Physical activity:     Days per week: Not on file     Minutes per session: Not on file    Stress: Not on file   Relationships  "   Social connections:     Talks on phone: Not on file     Gets together: Not on file     Attends Yarsani service: Not on file     Active member of club or organization: Not on file     Attends meetings of clubs or organizations: Not on file     Relationship status: Not on file   Other Topics Concern    Not on file   Social History Narrative    Not on file       OBJECTIVE:    /79   Pulse 90   Temp 98 °F (36.7 °C)   Resp 18   Ht 5' 2" (1.575 m)   Wt 79.8 kg (176 lb)   BMI 32.19 kg/m²     PHYSICAL EXAMINATION:    GENERAL: Well appearing, in no acute distress, alert and oriented x3.  PSYCH:  Mood and affect appropriate.  SKIN: Skin color, texture, turgor normal, no rashes or lesions.  HEAD/FACE:  Normocephalic, atraumatic. Cranial nerves grossly intact.  CV: RRR with palpation of the radial artery.  PULM: No evidence of respiratory difficulty, symmetric chest rise.  GI:  Soft and non-tender.  BACK: Straight leg raising in the sitting and supine positions is negative to radicular pain.  Mild tenderness to palpation over thoracic and lumbar paraspinals.  There is pain with palpation over lumbar facet joints.  Limited ROM with pain on extension.  Positive facet loading bilaterally.  There is mild pain with palpation to bilateral SI joints.  REGINA is positive bilaterally.  EXTREMITIES: Peripheral joint ROM is full and pain free without obvious instability or laxity in all four extremities. No deformities, edema, or skin discoloration. Good capillary refill.  MUSCULOSKELETAL:  Bilateral lower extremity strength is normal and symmetric.  No atrophy or tone abnormalities are noted.  NEURO: Bilateral lower extremity coordination and muscle stretch reflexes are physiologic and symmetric.  Plantar response are downgoing. No clonus.  No loss of sensation is noted.  GAIT: Antalgic- ambulates with walker.      ASSESSMENT: 78 y.o. year old female with lower back pain consistent with the following diagnoses:     1. " Lumbar spondylosis     2. Lumbar facet arthropathy     3. DDD (degenerative disc disease), lumbar     4. Sacroiliac joint pain     5. Myofascial pain           PLAN:     - I have stressed the importance of physical activity and a home exercise plan to help with pain and improve health.    - Previous imaging was reviewed and discussed with the patient today.    - Consider lumbar MBB versus SI joint injections in the future. She would like to wait at this time.     - Can continue Gabapentin 300 mg BID.    - I encouraged the patient to complete physical therapy as ordered by her orthopedist. She is in agreement.     - Counseled patient regarding the importance of activity modification, constant sleeping habits and physical therapy.    - RTC in 2 months or sooner if needed.       The above plan and management options were discussed at length with patient. Patient is in agreement with the above and verbalized understanding.    Marina Gallegos NP  05/15/2019

## 2019-06-17 NOTE — PROGRESS NOTES
Subjective:       Patient ID: Tamiko Youssef is a 78 y.o. female.    Chief Complaint: Other (go over MRI)    Finally returns for results and follow-up.  After her last visit she was to obtain MRI and return in 2 weeks.  Since then multiple calls have been placed in an effort to contact her for follow-up and finally now returns.  She denies any change since her last visit and continues with decreased hearing on the left as compared to the right. Denies tinnitus or dizziness or headache or other neurologic symptoms.  New complaint of asymptomatic tongue abnormality.  States has noted darkening of the surface of her tongue which is somewhat thickened and furry or hairy.  No associated soreness or burning.  Not aware of any change in her mouth care or diet.  Has been on Symbicort inhaler for 4-5 years.        Review of Systems   Ears: Positive for hearing loss.  Negative for ear pressure, ringing in ear, ear discharge, ear infections, head trauma, taken gentramycin/streptomycin and family history of hearing loss.    Nose:  Negative for nosebleeds, nasal obstruction, nasal or sinus surgery, loss of smell and snoring.    Mouth/Throat: Negative for pain swallowing, throat mass and neck mass.   Constitutional: Negative for recent unexplained weight loss and fever.    Eyes:  Positive for history of glaucoma.   Cardiovascular:  Positive for history of high blood pressure. Negative for chest pain and palpitations.   Respiratory:  Positive for asthma. Negative for emphysema and history of tuberculosis.    Gastrointestinal:  Positive for acid reflux and indigestion. Negative for history of stomach ulcers or pain and blood in stool.   Other:  Positive for arthritis. Negative for kidney problem, bladder problem, slurred, swollen glands, anemia and persistent infections.           Objective:        Vitals:    05/08/19 1431   BP: 125/77   Pulse: 92   Temp: 97.7 °F (36.5 °C)     Body mass index is 32.17 kg/m².  Physical Exam    Constitutional: She is oriented to person, place, and time. She appears well-developed and well-nourished. No distress.   HENT:   Head: Normocephalic and atraumatic.   Right Ear: Tympanic membrane, external ear and ear canal normal.   Left Ear: Tympanic membrane, external ear and ear canal normal.   Nose: No mucosal edema, rhinorrhea or nasal deformity. No epistaxis.   Mouth/Throat: Oropharynx is clear and moist. No oral lesions. No uvula swelling. No oropharyngeal exudate, posterior oropharyngeal edema or posterior oropharyngeal erythema.   Dorsum of tongue is dark and somewhat thickened centrally consistent with black hairy tongue.   Neck: Phonation normal. Neck supple. No tracheal deviation present.   Pulmonary/Chest: Effort normal. No respiratory distress.   Lymphadenopathy:     She has no cervical adenopathy.   Neurological: She is alert and oriented to person, place, and time.   Skin: Skin is warm and dry.   Psychiatric: She has a normal mood and affect. Her behavior is normal. Her speech is not slurred.       Tests / Results:    MRI results reviewed with patient which is consistent with left IAC vestibular schwannoma measuring 4 x 5 x 5 mm and mild chronic ischemic changes as per report.        Assessment:       1. Asymmetrical sensorineural hearing loss    2. Vestibular schwannoma    3. Black hairy tongue        Plan:       Reviewed all above and considerations and recommendations and answered questions.    Discussed small IAC lesion consistent with vestibular schwannoma and options including observation with serial examination and imaging, stereotactic radiation, surgery as well as opinion from neuro otologist.  She would like to observe for now with repeat imaging in 4 months unless change or problems prior.  Orders placed as well as follow-up after repeat MRI.    Tongue appearance and considerations and recommendations reviewed including keep mouth moist and stay hydrated, rinse and gargle after each  Symbicort use, gentle tongue brushing daily, course of Mycelex lozenges.  Follow-up depending on response and as above.

## 2019-07-22 ENCOUNTER — OFFICE VISIT (OUTPATIENT)
Dept: URGENT CARE | Facility: CLINIC | Age: 79
End: 2019-07-22
Payer: MEDICARE

## 2019-07-22 VITALS
TEMPERATURE: 98 F | RESPIRATION RATE: 15 BRPM | OXYGEN SATURATION: 99 % | HEIGHT: 62 IN | HEART RATE: 94 BPM | BODY MASS INDEX: 32.2 KG/M2 | WEIGHT: 175 LBS | SYSTOLIC BLOOD PRESSURE: 155 MMHG | DIASTOLIC BLOOD PRESSURE: 77 MMHG

## 2019-07-22 DIAGNOSIS — R53.83 FATIGUE, UNSPECIFIED TYPE: ICD-10-CM

## 2019-07-22 DIAGNOSIS — M25.474 BILATERAL SWELLING OF FEET AND ANKLES: ICD-10-CM

## 2019-07-22 DIAGNOSIS — D33.3 VESTIBULAR SCHWANNOMA: Primary | ICD-10-CM

## 2019-07-22 DIAGNOSIS — R07.89 CHEST TIGHTNESS: Primary | ICD-10-CM

## 2019-07-22 DIAGNOSIS — M25.472 BILATERAL SWELLING OF FEET AND ANKLES: ICD-10-CM

## 2019-07-22 DIAGNOSIS — I10 HYPERTENSION, UNSPECIFIED TYPE: ICD-10-CM

## 2019-07-22 DIAGNOSIS — M25.471 BILATERAL SWELLING OF FEET AND ANKLES: ICD-10-CM

## 2019-07-22 DIAGNOSIS — M25.475 BILATERAL SWELLING OF FEET AND ANKLES: ICD-10-CM

## 2019-07-22 PROCEDURE — 1101F PT FALLS ASSESS-DOCD LE1/YR: CPT | Mod: CPTII,S$GLB,, | Performed by: NURSE PRACTITIONER

## 2019-07-22 PROCEDURE — 93010 EKG 12-LEAD: ICD-10-PCS | Mod: S$GLB,,, | Performed by: INTERNAL MEDICINE

## 2019-07-22 PROCEDURE — 3078F DIAST BP <80 MM HG: CPT | Mod: CPTII,S$GLB,, | Performed by: NURSE PRACTITIONER

## 2019-07-22 PROCEDURE — 93005 ELECTROCARDIOGRAM TRACING: CPT | Mod: S$GLB,,, | Performed by: NURSE PRACTITIONER

## 2019-07-22 PROCEDURE — 71046 XR CHEST PA AND LATERAL: ICD-10-PCS | Mod: S$GLB,,, | Performed by: RADIOLOGY

## 2019-07-22 PROCEDURE — 93010 ELECTROCARDIOGRAM REPORT: CPT | Mod: S$GLB,,, | Performed by: INTERNAL MEDICINE

## 2019-07-22 PROCEDURE — 71046 X-RAY EXAM CHEST 2 VIEWS: CPT | Mod: S$GLB,,, | Performed by: RADIOLOGY

## 2019-07-22 PROCEDURE — 93005 EKG 12-LEAD: ICD-10-PCS | Mod: S$GLB,,, | Performed by: NURSE PRACTITIONER

## 2019-07-22 PROCEDURE — 99214 PR OFFICE/OUTPT VISIT, EST, LEVL IV, 30-39 MIN: ICD-10-PCS | Mod: S$GLB,,, | Performed by: NURSE PRACTITIONER

## 2019-07-22 PROCEDURE — 3077F PR MOST RECENT SYSTOLIC BLOOD PRESSURE >= 140 MM HG: ICD-10-PCS | Mod: CPTII,S$GLB,, | Performed by: NURSE PRACTITIONER

## 2019-07-22 PROCEDURE — 3078F PR MOST RECENT DIASTOLIC BLOOD PRESSURE < 80 MM HG: ICD-10-PCS | Mod: CPTII,S$GLB,, | Performed by: NURSE PRACTITIONER

## 2019-07-22 PROCEDURE — 99214 OFFICE O/P EST MOD 30 MIN: CPT | Mod: S$GLB,,, | Performed by: NURSE PRACTITIONER

## 2019-07-22 PROCEDURE — 1101F PR PT FALLS ASSESS DOC 0-1 FALLS W/OUT INJ PAST YR: ICD-10-PCS | Mod: CPTII,S$GLB,, | Performed by: NURSE PRACTITIONER

## 2019-07-22 PROCEDURE — 3077F SYST BP >= 140 MM HG: CPT | Mod: CPTII,S$GLB,, | Performed by: NURSE PRACTITIONER

## 2019-07-22 NOTE — PATIENT INSTRUCTIONS
If your condition worsens or fails to improve we recommend that you receive another evaluation at the emergency room immediately or contact your primary medical clinic to discuss your concerns.   You must understand that you have received an Urgent Care treatment only and that you may be released before all of your medical problems are known or treated. You, the patient, will arrange for follow up care as instructed.   Please return here or go to the Emergency Department for any concerns or worsening of condition.  If you were prescribed antibiotics, please take them to completion.  If you were prescribed a narcotic medication, do not drive or operate heavy equipment or machinery while taking these medications.  Please follow up with your primary care doctor or specialist as needed.    If you  smoke, please stop smoking.  Referral #[phone]    Leg Swelling in Both Legs    Swelling of the feet, ankles, and legs is called edema. It is caused by excess fluid that has collected in the tissues. Extra fluid in the body settles in the lowest part because of gravity. This is why the legs and feet are most affected.  Some of the causes for edema include:  · Disease of the heart like congestive heart failure  · Standing or sitting for long periods of time  · Infection of the feet or legs  · Blood pooling in the veins of your legs (venous insufficiency)  · Dilated veins in your lower leg (varicose veins)  · Garters or other clothing that is tight on your legs. This will cause blood to pool in your legs because the clothing limits blood flow.  · Some medicines such as hormones like birth control pills, some blood pressure medicines like calcium channel blockers (amlodipine) and steroids, some antidepressants like MAO inhibitors and tricyclics  · Menstrual periods that cause you to retain fluids  · Many types of renal disease  · Liver failure or cirrhosis  · Pregnancy, some swelling is normal, but a sudden increase in leg  swelling or weight gain can be a sign of a dangerous complication of pregnancy  · Poor nutrition  · Thyroid disease  Medical treatment will depend on what is causing the swelling in your legs. Your healthcare provider may prescribe water pills (diuretics) to get rid of the extra fluid.  Home care  Follow these guidelines when caring for yourself at home:  · Don't wear clothing like garters that is tight on your legs.  · Keep your legs up while lying or sitting.  · If infection, injury, or recent surgery is causing the swelling, stay off your legs as much as possible until symptoms get better.  · If your healthcare provider says that your leg swelling is caused by venous insufficiency or varicose veins, don't sit or  one place for long periods of time. Take breaks and walk about every few hours. Brisk walking is a good exercise. It helps circulate the blood that has collected in your leg. Talk with your provider about using support stockings to stop daytime leg swelling.  · If your provider says that heart disease is causing your leg swelling, follow a low-salt diet to stop extra fluid from staying in your body. You may also need medicine.  Follow-up care  Follow up with your healthcare provider, or as advised.  When to seek medical advice  Call your healthcare provider right away if any of these occur:  · New shortness of breath or chest pain  · Shortness of breath or chest pain that gets worse  · Swelling in both legs or ankles that gets worse  · Swelling of the abdomen  · Redness, warmth, or swelling in one leg  · Fever of 100.4ºF (38ºC) or higher, or as directed by your healthcare provider  · Yellow color to your skin or eyes  · Rapid, unexplained weight gain  · Having to sleep upright or use an increased number of pillows  Date Last Reviewed: 3/31/2016  © 5664-8005 [employer]. [address] [place] [address], [place] [address]. All rights reserved. This information is not intended as a substitute  for professional medical care. Always follow your healthcare professional's instructions.

## 2019-07-22 NOTE — PROGRESS NOTES
"Subjective:       Patient ID: Tamiko Youssef is a 78 y.o. female.    Vitals:  height is 5' 2" (1.575 m) and weight is 79.4 kg (175 lb). Her temperature is 98.4 °F (36.9 °C). Her blood pressure is 155/77 (abnormal) and her pulse is 94. Her respiration is 15 and oxygen saturation is 99%.     Chief Complaint: Fatigue    Notice some ankle swelling (3weeks), has been taking her diuretic as directed but still having swelling.  Some chest discomfort( pressure) ( 1week). Saw her PCP on 07/08/19.    Fatigue   This is a new problem. The current episode started 1 to 4 weeks ago (3 weeks). The problem occurs constantly. The problem has been unchanged. Associated symptoms include fatigue, headaches and joint swelling. Pertinent negatives include no chills, congestion, diaphoresis, fever, myalgias, nausea, neck pain, rash, vertigo, vomiting or weakness. Associated symptoms comments: bilat ankle swelling and chest tightness.. Nothing aggravates the symptoms. Treatments tried: diuretics. The treatment provided mild relief.       Constitution: Positive for fatigue. Negative for chills, sweating and fever.   HENT: Negative for tinnitus, facial swelling, congestion and sinus pain.    Neck: Negative for neck pain and neck stiffness.   Cardiovascular:        Chest tightness   Eyes: Negative for eye pain, photophobia, vision loss, double vision and blurred vision.   Respiratory: Positive for chest tightness. Negative for shortness of breath, stridor and wheezing.    Gastrointestinal: Negative for nausea and vomiting.   Genitourinary: Negative for missed menses.   Musculoskeletal: Positive for joint swelling. Negative for trauma and muscle ache.   Skin: Negative for rash, wound, lesion and erythema.   Neurological: Positive for light-headedness and headaches. Negative for dizziness, history of vertigo, facial drooping, speech difficulty, coordination disturbances, loss of balance, history of migraines, disorientation and loss of " consciousness.   Psychiatric/Behavioral: Negative for disorientation, confusion, nervous/anxious, sleep disturbance and depression. The patient is not nervous/anxious.        Objective:      Physical Exam   Constitutional: She is oriented to person, place, and time. She appears well-developed and well-nourished. She is cooperative.  Non-toxic appearance. She does not appear ill. No distress.   hypertensive   HENT:   Head: Normocephalic and atraumatic.   Right Ear: Hearing, tympanic membrane, external ear and ear canal normal.   Left Ear: Hearing, tympanic membrane, external ear and ear canal normal.   Nose: Nose normal. No mucosal edema, rhinorrhea or nasal deformity. No epistaxis. Right sinus exhibits no maxillary sinus tenderness and no frontal sinus tenderness. Left sinus exhibits no maxillary sinus tenderness and no frontal sinus tenderness.   Mouth/Throat: Uvula is midline, oropharynx is clear and moist and mucous membranes are normal. No trismus in the jaw. Normal dentition. No uvula swelling. No posterior oropharyngeal erythema.   Eyes: Conjunctivae and lids are normal. Right eye exhibits no discharge. Left eye exhibits no discharge. No scleral icterus.   Sclera clear bilat   Neck: Trachea normal, normal range of motion, full passive range of motion without pain and phonation normal. Neck supple.   Cardiovascular: Normal rate, regular rhythm, normal heart sounds and intact distal pulses.   Pulses:       Radial pulses are 2+ on the right side, and 2+ on the left side.        Dorsalis pedis pulses are 1+ on the right side, and 1+ on the left side.        Posterior tibial pulses are 1+ on the right side, and 1+ on the left side.   NSR on EKG, 83bpm, NSTEMI   Pulmonary/Chest: Effort normal and breath sounds normal. No stridor. No respiratory distress. She has no decreased breath sounds. She has no wheezes.   Abdominal: Soft. Normal appearance and bowel sounds are normal. She exhibits no distension, no pulsatile  midline mass and no mass. There is no tenderness.   Musculoskeletal: Normal range of motion. She exhibits edema (bilat ankle swelling, +1 pitting edema). She exhibits no deformity.   Neurological: She is alert and oriented to person, place, and time. She exhibits normal muscle tone. Coordination normal.   Skin: Skin is warm, dry and intact. Capillary refill takes less than 2 seconds. She is not diaphoretic. No erythema. No pallor.   Psychiatric: She has a normal mood and affect. Her speech is normal and behavior is normal. Judgment and thought content normal. Cognition and memory are normal.   Nursing note and vitals reviewed.    Xr Chest Pa And Lateral    Result Date: 7/22/2019  EXAMINATION: XR CHEST PA AND LATERAL CLINICAL HISTORY: Other chest pain TECHNIQUE: PA and lateral views of the chest were performed. COMPARISON: None FINDINGS: The lungs are clear, with normal appearance of pulmonary vasculature and no pleural effusion or pneumothorax. The cardiac silhouette is normal in size. The hilar and mediastinal contours are unremarkable. Bones are intact. Atherosclerotic plaque of the aorta.     No acute abnormality. Electronically signed by: Ada Glover MD Date:    07/22/2019 Time:    18:43  Assessment:       1. Chest tightness    2. Bilateral swelling of feet and ankles    3. Fatigue, unspecified type    4. Hypertension, unspecified type        Plan:         Chest tightness  -     IN OFFICE EKG 12-LEAD (to [place])  -     XR CHEST PA AND LATERAL; Future; Expected date: 07/22/2019  -     Ambulatory referral to Cardiology    Bilateral swelling of feet and ankles  -     Ambulatory referral to Cardiology    Fatigue, unspecified type  -     Ambulatory referral to Cardiology    Hypertension, unspecified type  -     Ambulatory referral to Cardiology      Patient Instructions     If your condition worsens or fails to improve we recommend that you receive another evaluation at the emergency room immediately or  contact your primary medical clinic to discuss your concerns.   You must understand that you have received an Urgent Care treatment only and that you may be released before all of your medical problems are known or treated. You, the patient, will arrange for follow up care as instructed.   Please return here or go to the Emergency Department for any concerns or worsening of condition.  If you were prescribed antibiotics, please take them to completion.  If you were prescribed a narcotic medication, do not drive or operate heavy equipment or machinery while taking these medications.  Please follow up with your primary care doctor or specialist as needed.    If you  smoke, please stop smoking.  Referral #[phone]    Leg Swelling in Both Legs    Swelling of the feet, ankles, and legs is called edema. It is caused by excess fluid that has collected in the tissues. Extra fluid in the body settles in the lowest part because of gravity. This is why the legs and feet are most affected.  Some of the causes for edema include:  · Disease of the heart like congestive heart failure  · Standing or sitting for long periods of time  · Infection of the feet or legs  · Blood pooling in the veins of your legs (venous insufficiency)  · Dilated veins in your lower leg (varicose veins)  · Garters or other clothing that is tight on your legs. This will cause blood to pool in your legs because the clothing limits blood flow.  · Some medicines such as hormones like birth control pills, some blood pressure medicines like calcium channel blockers (amlodipine) and steroids, some antidepressants like MAO inhibitors and tricyclics  · Menstrual periods that cause you to retain fluids  · Many types of renal disease  · Liver failure or cirrhosis  · Pregnancy, some swelling is normal, but a sudden increase in leg swelling or weight gain can be a sign of a dangerous complication of pregnancy  · Poor nutrition  · Thyroid disease  Medical treatment  will depend on what is causing the swelling in your legs. Your healthcare provider may prescribe water pills (diuretics) to get rid of the extra fluid.  Home care  Follow these guidelines when caring for yourself at home:  · Don't wear clothing like garters that is tight on your legs.  · Keep your legs up while lying or sitting.  · If infection, injury, or recent surgery is causing the swelling, stay off your legs as much as possible until symptoms get better.  · If your healthcare provider says that your leg swelling is caused by venous insufficiency or varicose veins, don't sit or  one place for long periods of time. Take breaks and walk about every few hours. Brisk walking is a good exercise. It helps circulate the blood that has collected in your leg. Talk with your provider about using support stockings to stop daytime leg swelling.  · If your provider says that heart disease is causing your leg swelling, follow a low-salt diet to stop extra fluid from staying in your body. You may also need medicine.  Follow-up care  Follow up with your healthcare provider, or as advised.  When to seek medical advice  Call your healthcare provider right away if any of these occur:  · New shortness of breath or chest pain  · Shortness of breath or chest pain that gets worse  · Swelling in both legs or ankles that gets worse  · Swelling of the abdomen  · Redness, warmth, or swelling in one leg  · Fever of 100.4ºF (38ºC) or higher, or as directed by your healthcare provider  · Yellow color to your skin or eyes  · Rapid, unexplained weight gain  · Having to sleep upright or use an increased number of pillows  Date Last Reviewed: 3/31/2016  © 0238-5086 [employer]. [address] [place] [address], [place] [address]. All rights reserved. This information is not intended as a substitute for professional medical care. Always follow your healthcare professional's instructions.

## 2019-07-24 ENCOUNTER — LAB VISIT (OUTPATIENT)
Dept: LAB | Facility: OTHER | Age: 79
End: 2019-07-24
Payer: MEDICARE

## 2019-07-24 DIAGNOSIS — D33.3 VESTIBULAR SCHWANNOMA: ICD-10-CM

## 2019-07-24 LAB
CREAT SERPL-MCNC: 0.7 MG/DL (ref 0.5–1.4)
EST. GFR  (AFRICAN AMERICAN): >60 ML/MIN/1.73 M^2
EST. GFR  (NON AFRICAN AMERICAN): >60 ML/MIN/1.73 M^2

## 2019-07-24 PROCEDURE — 82565 ASSAY OF CREATININE: CPT

## 2019-07-24 PROCEDURE — 36415 COLL VENOUS BLD VENIPUNCTURE: CPT

## 2019-08-01 ENCOUNTER — HOSPITAL ENCOUNTER (OUTPATIENT)
Dept: RADIOLOGY | Facility: OTHER | Age: 79
Discharge: HOME | End: 2019-08-01
Attending: OTOLARYNGOLOGY
Payer: MEDICARE

## 2019-08-01 DIAGNOSIS — H90.3 ASYMMETRICAL SENSORINEURAL HEARING LOSS: ICD-10-CM

## 2019-08-01 DIAGNOSIS — D33.3 VESTIBULAR SCHWANNOMA: ICD-10-CM

## 2019-08-01 PROCEDURE — 70553 MRI IAC/TEMPORAL BONES W W/O CONTRAST: ICD-10-PCS | Mod: 26,,, | Performed by: RADIOLOGY

## 2019-08-01 PROCEDURE — 25500020 PHARM REV CODE 255: Performed by: OTOLARYNGOLOGY

## 2019-08-01 PROCEDURE — A9585 GADOBUTROL INJECTION: HCPCS | Performed by: OTOLARYNGOLOGY

## 2019-08-01 PROCEDURE — 70553 MRI BRAIN STEM W/O & W/DYE: CPT | Mod: 26,,, | Performed by: RADIOLOGY

## 2019-08-01 PROCEDURE — 70553 MRI BRAIN STEM W/O & W/DYE: CPT | Mod: TC

## 2019-08-01 RX ORDER — GADOBUTROL 604.72 MG/ML
8 INJECTION INTRAVENOUS
Status: COMPLETED | OUTPATIENT
Start: 2019-08-01 | End: 2019-08-01

## 2019-08-01 RX ADMIN — GADOBUTROL 8 ML: 604.72 INJECTION INTRAVENOUS at 10:08

## 2019-08-07 ENCOUNTER — OFFICE VISIT (OUTPATIENT)
Dept: OTOLARYNGOLOGY | Facility: CLINIC | Age: 79
End: 2019-08-07
Payer: MEDICARE

## 2019-08-07 VITALS
SYSTOLIC BLOOD PRESSURE: 141 MMHG | HEART RATE: 85 BPM | HEIGHT: 62 IN | BODY MASS INDEX: 32.78 KG/M2 | DIASTOLIC BLOOD PRESSURE: 78 MMHG | TEMPERATURE: 99 F | WEIGHT: 178.13 LBS

## 2019-08-07 DIAGNOSIS — D33.3 VESTIBULAR SCHWANNOMA: Primary | ICD-10-CM

## 2019-08-07 DIAGNOSIS — H90.3 ASYMMETRICAL SENSORINEURAL HEARING LOSS: ICD-10-CM

## 2019-08-07 DIAGNOSIS — Z87.09 HISTORY OF ASTHMA: ICD-10-CM

## 2019-08-07 DIAGNOSIS — K14.3 BLACK HAIRY TONGUE: ICD-10-CM

## 2019-08-07 PROCEDURE — 3077F SYST BP >= 140 MM HG: CPT | Mod: CPTII,S$GLB,, | Performed by: OTOLARYNGOLOGY

## 2019-08-07 PROCEDURE — 3078F PR MOST RECENT DIASTOLIC BLOOD PRESSURE < 80 MM HG: ICD-10-PCS | Mod: CPTII,S$GLB,, | Performed by: OTOLARYNGOLOGY

## 2019-08-07 PROCEDURE — 99214 PR OFFICE/OUTPT VISIT, EST, LEVL IV, 30-39 MIN: ICD-10-PCS | Mod: S$GLB,,, | Performed by: OTOLARYNGOLOGY

## 2019-08-07 PROCEDURE — 3078F DIAST BP <80 MM HG: CPT | Mod: CPTII,S$GLB,, | Performed by: OTOLARYNGOLOGY

## 2019-08-07 PROCEDURE — 99214 OFFICE O/P EST MOD 30 MIN: CPT | Mod: S$GLB,,, | Performed by: OTOLARYNGOLOGY

## 2019-08-07 PROCEDURE — 3077F PR MOST RECENT SYSTOLIC BLOOD PRESSURE >= 140 MM HG: ICD-10-PCS | Mod: CPTII,S$GLB,, | Performed by: OTOLARYNGOLOGY

## 2019-08-07 PROCEDURE — 1101F PT FALLS ASSESS-DOCD LE1/YR: CPT | Mod: CPTII,S$GLB,, | Performed by: OTOLARYNGOLOGY

## 2019-08-07 PROCEDURE — 1101F PR PT FALLS ASSESS DOC 0-1 FALLS W/OUT INJ PAST YR: ICD-10-PCS | Mod: CPTII,S$GLB,, | Performed by: OTOLARYNGOLOGY

## 2019-08-07 RX ORDER — CLOTRIMAZOLE 10 MG/1
LOZENGE ORAL; TOPICAL
Qty: 50 TROCHE | Refills: 1 | Status: SHIPPED | OUTPATIENT
Start: 2019-08-07 | End: 2020-01-28

## 2019-08-07 RX ORDER — PREDNISONE 20 MG/1
20 TABLET ORAL 2 TIMES DAILY
COMMUNITY
End: 2020-01-28

## 2019-08-07 RX ORDER — PROMETHAZINE HYDROCHLORIDE AND CODEINE PHOSPHATE 6.25; 1 MG/5ML; MG/5ML
5 SOLUTION ORAL EVERY 4 HOURS PRN
COMMUNITY
End: 2020-01-28

## 2019-08-07 NOTE — PATIENT INSTRUCTIONS
Generic Mycelex lozenges reordered.  Rinse and gargle after each use of inhaler.  Keep mouth moist and stay hydrated.  OTC Biotene products as needed.  Set up hearing aid consultation.  Follow up with audiogram in March 2020 unless change or problems prior.

## 2019-08-09 ENCOUNTER — OFFICE VISIT (OUTPATIENT)
Dept: CARDIOLOGY | Facility: CLINIC | Age: 79
End: 2019-08-09
Payer: MEDICARE

## 2019-08-09 ENCOUNTER — TELEPHONE (OUTPATIENT)
Dept: CARDIOLOGY | Facility: CLINIC | Age: 79
End: 2019-08-09

## 2019-08-09 VITALS
SYSTOLIC BLOOD PRESSURE: 120 MMHG | HEIGHT: 62 IN | BODY MASS INDEX: 32.66 KG/M2 | WEIGHT: 177.5 LBS | DIASTOLIC BLOOD PRESSURE: 70 MMHG | HEART RATE: 82 BPM | OXYGEN SATURATION: 95 %

## 2019-08-09 DIAGNOSIS — R00.2 PALPITATIONS: Primary | ICD-10-CM

## 2019-08-09 DIAGNOSIS — I87.2 VENOUS INSUFFICIENCY OF RIGHT LEG: ICD-10-CM

## 2019-08-09 DIAGNOSIS — I83.91 VARICOSE VEINS OF RIGHT LOWER EXTREMITY, UNSPECIFIED WHETHER COMPLICATED: ICD-10-CM

## 2019-08-09 DIAGNOSIS — R07.89 ATYPICAL CHEST PAIN: ICD-10-CM

## 2019-08-09 DIAGNOSIS — J45.20 MILD INTERMITTENT ASTHMA WITHOUT COMPLICATION: ICD-10-CM

## 2019-08-09 PROCEDURE — 99999 PR PBB SHADOW E&M-EST. PATIENT-LVL III: CPT | Mod: PBBFAC,,, | Performed by: INTERNAL MEDICINE

## 2019-08-09 PROCEDURE — 1101F PR PT FALLS ASSESS DOC 0-1 FALLS W/OUT INJ PAST YR: ICD-10-PCS | Mod: CPTII,S$GLB,, | Performed by: INTERNAL MEDICINE

## 2019-08-09 PROCEDURE — 3074F PR MOST RECENT SYSTOLIC BLOOD PRESSURE < 130 MM HG: ICD-10-PCS | Mod: CPTII,S$GLB,, | Performed by: INTERNAL MEDICINE

## 2019-08-09 PROCEDURE — 99204 OFFICE O/P NEW MOD 45 MIN: CPT | Mod: S$GLB,,, | Performed by: INTERNAL MEDICINE

## 2019-08-09 PROCEDURE — 3074F SYST BP LT 130 MM HG: CPT | Mod: CPTII,S$GLB,, | Performed by: INTERNAL MEDICINE

## 2019-08-09 PROCEDURE — 3078F DIAST BP <80 MM HG: CPT | Mod: CPTII,S$GLB,, | Performed by: INTERNAL MEDICINE

## 2019-08-09 PROCEDURE — 99204 PR OFFICE/OUTPT VISIT, NEW, LEVL IV, 45-59 MIN: ICD-10-PCS | Mod: S$GLB,,, | Performed by: INTERNAL MEDICINE

## 2019-08-09 PROCEDURE — 3078F PR MOST RECENT DIASTOLIC BLOOD PRESSURE < 80 MM HG: ICD-10-PCS | Mod: CPTII,S$GLB,, | Performed by: INTERNAL MEDICINE

## 2019-08-09 PROCEDURE — 1101F PT FALLS ASSESS-DOCD LE1/YR: CPT | Mod: CPTII,S$GLB,, | Performed by: INTERNAL MEDICINE

## 2019-08-09 PROCEDURE — 99999 PR PBB SHADOW E&M-EST. PATIENT-LVL III: ICD-10-PCS | Mod: PBBFAC,,, | Performed by: INTERNAL MEDICINE

## 2019-08-09 NOTE — PROGRESS NOTES
"Subjective:    Patient ID:  Tamiko Youssef is a 78 y.o. female who presents for evaluation of dependent edema and chest pain.    HPI     The patient is a 78 year old female referred by Dr Alvarado[ [facility]] for evaluation of a few weeks of ankle edema, chest tightness ,fatigue and hypertension. EKG 7/22/19 was normal as was chest xray. She reports 1 year of occasional brief "throbbing" or "sharp" pain that is not related to exertion. She reports SOB for years that is related ot asthma. She has been left ankle edema for 3 months and appears to have been restricting salt in her diet.  Lab Results   Component Value Date     12/24/2009    K 3.9 12/24/2009     12/24/2009    CO2 20 (L) 12/24/2009    BUN 8 12/24/2009    CREATININE 0.7 07/24/2019    GLU 98 12/24/2009    WBC 6.91 12/24/2009    HGB 13.1 12/24/2009    HCT 40.0 12/24/2009    MCV 82.6 12/24/2009     12/24/2009    INR 1.0 12/24/2009         No results found for: CHOL, HDL, TRIG    No results found for: LDLCALC    Past Medical History:   Diagnosis Date    Asthma     Cataract     Glaucoma     Hypertension        Current Outpatient Medications:     albuterol (PROVENTIL) 2.5 mg /3 mL (0.083 %) nebulizer solution, Take 2.5 mg by nebulization every 6 (six) hours as needed., Disp: , Rfl:     ALPRAZolam (XANAX) 0.25 MG tablet, TK 1 T PO QD PRA, Disp: , Rfl: 3    amlodipine (NORVASC) 10 MG tablet, Take 10 mg by mouth once daily., Disp: , Rfl:     clotrimazole (MYCELEX) 10 mg john, Dissolve one lozenge in the mouth 5 times a day., Disp: 50 John, Rfl: 1    furosemide (LASIX) 20 MG tablet, TK 1 T PO QD PRF FLUID, Disp: , Rfl: 2    gabapentin (NEURONTIN) 300 MG capsule, TAKE 1 CAPSULE BY MOUTH THREE TIMES DAILY, Disp: 270 capsule, Rfl: 2    latanoprost 0.005 % ophthalmic solution, Place 1 drop into both eyes every evening., Disp: 3 Bottle, Rfl: 3    mirtazapine (REMERON) 15 MG tablet, , Disp: , Rfl: 2    pravastatin (PRAVACHOL) 20 MG " tablet, , Disp: , Rfl: 1    predniSONE (DELTASONE) 20 MG tablet, Take 20 mg by mouth 2 (two) times daily., Disp: , Rfl:     promethazine-codeine 6.25-10 mg/5 ml (PHENERGAN WITH CODEINE) 6.25-10 mg/5 mL syrup, Take 5 mLs by mouth every 4 (four) hours as needed for Cough., Disp: , Rfl:     SYMBICORT 80-4.5 mcg/actuation HFAA, , Disp: , Rfl: 6    VENTOLIN HFA 90 mcg/actuation inhaler, INL 2 PFS PO Q 4 H UTD PRF SOB, Disp: , Rfl: 1    walker Misc, 1 application by Misc.(Non-Drug; Combo Route) route once daily., Disp: 1 each, Rfl: 0          Review of Systems   Constitution: Negative for decreased appetite, diaphoresis, fever, malaise/fatigue, weight gain and weight loss.   HENT: Negative for congestion, ear discharge, ear pain and nosebleeds.    Eyes: Negative for blurred vision, double vision and visual disturbance.   Cardiovascular: Positive for dyspnea on exertion and leg swelling (right). Negative for chest pain, claudication, cyanosis, irregular heartbeat, near-syncope, orthopnea, palpitations, paroxysmal nocturnal dyspnea and syncope.   Respiratory: Positive for shortness of breath. Negative for cough, hemoptysis, sleep disturbances due to breathing, snoring, sputum production and wheezing.    Endocrine: Negative for polydipsia, polyphagia and polyuria.   Hematologic/Lymphatic: Negative for adenopathy and bleeding problem. Does not bruise/bleed easily.   Skin: Negative for color change, nail changes, poor wound healing and rash.   Musculoskeletal: Negative for muscle cramps and muscle weakness.   Gastrointestinal: Negative for abdominal pain, anorexia, change in bowel habit, hematochezia, nausea and vomiting.   Genitourinary: Negative for dysuria, frequency and hematuria.   Neurological: Negative for brief paralysis, difficulty with concentration, excessive daytime sleepiness, dizziness, focal weakness, headaches, light-headedness, seizures, vertigo and weakness.   Psychiatric/Behavioral: Negative for altered  "mental status and depression.   Allergic/Immunologic: Negative for persistent infections.        Objective:/70   Pulse 82   Ht 5' 2" (1.575 m)   Wt 80.5 kg (177 lb 7.5 oz)   SpO2 95%   BMI 32.46 kg/m²             Physical Exam   Constitutional: She is oriented to person, place, and time. She appears well-developed and well-nourished.   HENT:   Head: Normocephalic.   Right Ear: External ear normal.   Left Ear: External ear normal.   Nose: Nose normal.   Inspection of lips, teeth and gums normal   Eyes: Pupils are equal, round, and reactive to light. Conjunctivae and EOM are normal. No scleral icterus.   Neck: Normal range of motion. No JVD present. No tracheal deviation present. No thyromegaly present.   Cardiovascular: Normal rate, regular rhythm, normal heart sounds and intact distal pulses. Exam reveals no gallop and no friction rub.   No murmur heard.  Pulmonary/Chest: Effort normal and breath sounds normal. No respiratory distress. She has no wheezes. She has no rales. She exhibits no tenderness.   Abdominal: Soft. Bowel sounds are normal. She exhibits no distension. There is no hepatosplenomegaly. There is no tenderness. There is no guarding.   Musculoskeletal: Normal range of motion. She exhibits no edema or tenderness.   Lymphadenopathy:   Palpation of lymph nodes of neck and groin normal   Neurological: She is oriented to person, place, and time. No cranial nerve deficit. She exhibits normal muscle tone. Coordination normal.   Skin: Skin is warm and dry. No rash noted. No erythema. No pallor.        Palpation of skin normal   Psychiatric: She has a normal mood and affect. Her behavior is normal. Judgment and thought content normal.         Assessment:       1. Atypical chest pain    2. Mild intermittent asthma without complication    3. Venous insufficiency of right leg    4. Varicose veins of right lower extremity, unspecified whether complicated         Plan:       Tamiko was seen today for chest " pain.    Diagnoses and all orders for this visit:    Atypical chest pain    Mild intermittent asthma without complication    Venous insufficiency of right leg  -     COMPRESSION STOCKINGS    Varicose veins of right lower extremity, unspecified whether complicated  -     COMPRESSION STOCKINGS

## 2019-08-09 NOTE — LETTER
August 9, 2019      Chacha Alvarado, WHITNEY  [address] [place] [address]           [facility] - Cardiology  [address], [address]  [facility] [address]  Phone: [phone]          Patient: Tamiko Youssef   MR Number: [id number]   YOB: 1940   Date of Visit: 8/9/2019       Dear Chacha Alvarado:    Thank you for referring Tamiko Youssef to me for evaluation. Attached you will find relevant portions of my assessment and plan of care.    If you have questions, please do not hesitate to call me. I look forward to following Tamiko Youssef along with you.    Sincerely,    Alexis Melvin MD    Enclosure  CC:  No Recipients    If you would like to receive this communication electronically, please contact [email or web address] or [phone] to request more information on [employer] access.    For providers and/or their staff who would like to refer a patient to [facility], please contact us through our one-stop-shop provider referral line, Cornel Zuleta, at [phone].    If you feel you have received this communication in error or would no longer like to receive these types of communications, please e-mail [email or web address]

## 2019-09-09 ENCOUNTER — CLINICAL SUPPORT (OUTPATIENT)
Dept: OTOLARYNGOLOGY | Facility: CLINIC | Age: 79
End: 2019-09-09
Payer: MEDICARE

## 2019-09-09 DIAGNOSIS — Z71.89 ENCOUNTER FOR HEARING AID CONSULTATION: Primary | ICD-10-CM

## 2019-09-09 PROCEDURE — 99499 NO LOS: ICD-10-PCS | Mod: S$GLB,,, | Performed by: AUDIOLOGIST-HEARING AID FITTER

## 2019-09-09 PROCEDURE — 99499 UNLISTED E&M SERVICE: CPT | Mod: S$GLB,,, | Performed by: AUDIOLOGIST-HEARING AID FITTER

## 2019-09-09 NOTE — PROGRESS NOTES
Toñito Frausto, CCC-A  Audiologist - [facility] [address] [place] [address]  [email or web address]  [phone]    Patient: Tamiko Youssef   MRN: [id number]  [address]  [id number]  Home Phone [phone]   Work Phone Not on file.   Mobile [phone]   : 1940  [place]: 2019      HEARING AID CONSULT      Hearing aid prices and styles were discussed with Tamiko Youssef at length.  She prefers the KAREL option over the custom style hearing aid.  It is recommended that she purchase 2 hearing aids, but we did discuss at length the limited speech discrimination ability of the Left ear.  She would like to consider her financial options with her insurance and will call the clinic if she decides to place an order.  If she does place an order, she would like to use the Care Credit option for financing.        _______________________________  Toñito Frausto, SAMREEN-A  Audiologist

## 2019-09-25 NOTE — PROGRESS NOTES
Subjective:       Patient ID: Tamiko Youssef is a 78 y.o. female.    Chief Complaint: Follow-up (Had repeat MRI last week)    Returns for results and follow-up.  Had follow-up MRI of brain and IAC's without and with contrast last week to monitor left vestibular schwannoma.  Continues with decreased hearing on the left as compared to the right, but without subjective change since last visit.  Denies tinnitus or dizziness or headache or other neurologic symptoms.  Previously complained of discoloration of the surface of her tongue darkening and somewhat thickened and furry or hairy appearing but no associated symptoms.  Has been rinsing after Symbicort inhaler regularly.  Took the course of Mycelex after her last visit and states this helped.  No new complaints.        Review of Systems   Ears: Positive for hearing loss.  Negative for ear pressure, ringing in ear, ear discharge, ear infections, head trauma, taken gentramycin/streptomycin and family history of hearing loss.    Nose:  Negative for nosebleeds, nasal obstruction, nasal or sinus surgery, loss of smell and snoring.    Mouth/Throat: Negative for pain swallowing, throat mass and neck mass.   Constitutional: Negative for recent unexplained weight loss and fever.    Eyes:  Positive for history of glaucoma.   Cardiovascular:  Positive for history of high blood pressure. Negative for chest pain and palpitations.   Respiratory:  Positive for asthma. Negative for emphysema and history of tuberculosis.    Gastrointestinal:  Positive for acid reflux and indigestion. Negative for history of stomach ulcers or pain and blood in stool.   Other:  Positive for arthritis. Negative for kidney problem, bladder problem, slurred, swollen glands, anemia and persistent infections.           Objective:        Vitals:    08/07/19 1029   BP: (!) 141/78   Pulse: 85   Temp: 98.8 °F (37.1 °C)     Body mass index is 32.57 kg/m².  Physical Exam   Constitutional: She is oriented to  person, place, and time. She appears well-developed and well-nourished. No distress.   HENT:   Head: Normocephalic and atraumatic.   Right Ear: Tympanic membrane, external ear and ear canal normal.   Left Ear: Tympanic membrane, external ear and ear canal normal.   Nose: No mucosal edema, rhinorrhea or nasal deformity. No epistaxis.   Mouth/Throat: Uvula is midline and oropharynx is clear and moist. No oral lesions. No trismus in the jaw. No uvula swelling. No oropharyngeal exudate, posterior oropharyngeal edema or posterior oropharyngeal erythema.   Dorsum of tongue consistent with black hairy tongue but improved.   Neck: Phonation normal. Neck supple. No tracheal deviation present.   Pulmonary/Chest: Effort normal. No respiratory distress.   Lymphadenopathy:     She has no cervical adenopathy.   Neurological: She is alert and oriented to person, place, and time.   Skin: Skin is warm and dry.   Psychiatric: She has a normal mood and affect. Her behavior is normal. Her speech is not slurred.       Tests / Results:    Reviewed recent follow-up MRI which is stable and demonstrates no change in left vestibular schwannoma.        Assessment:       1. Vestibular schwannoma    2. Asymmetrical sensorineural hearing loss    3. Black hairy tongue    4. History of asthma        Plan:       Reviewed all above and options and pros and cons and answered questions.    Would like to continue with current plan of observation of vestibular schwannoma with serial examination and imaging.  Needs repeat audiogram in March 2020 with follow-up as discussed unless change or problems prior.  Hearing aid evaluation discussed and would like to proceed with consultation.    Black hairy tongue again reviewed and recommendations including keep mouth moist and stay hydrated, rinse and gargle after each Symbicort use, gentle tongue brushing daily.  Okay for additional course of Mycelex and reviewed use.    Follow-up depending on response and as  above.

## 2019-11-12 ENCOUNTER — HOSPITAL ENCOUNTER (OUTPATIENT)
Dept: RADIOLOGY | Facility: OTHER | Age: 79
Discharge: HOME | End: 2019-11-12
Attending: INTERNAL MEDICINE
Payer: MEDICARE

## 2019-11-12 DIAGNOSIS — R10.9 ABDOMINAL PAIN: Primary | ICD-10-CM

## 2019-11-12 DIAGNOSIS — R10.9 ABDOMINAL PAIN: ICD-10-CM

## 2019-11-12 PROCEDURE — 74019 RADEX ABDOMEN 2 VIEWS: CPT | Mod: TC,FY

## 2019-11-12 PROCEDURE — 74019 RADEX ABDOMEN 2 VIEWS: CPT | Mod: 26,,, | Performed by: RADIOLOGY

## 2019-11-12 PROCEDURE — 74019 XR ABDOMEN FLAT AND ERECT: ICD-10-PCS | Mod: 26,,, | Performed by: RADIOLOGY

## 2019-11-14 ENCOUNTER — TELEPHONE (OUTPATIENT)
Dept: PAIN MEDICINE | Facility: CLINIC | Age: 79
End: 2019-11-14

## 2019-11-15 ENCOUNTER — OFFICE VISIT (OUTPATIENT)
Dept: PAIN MEDICINE | Facility: CLINIC | Age: 79
End: 2019-11-15
Attending: ANESTHESIOLOGY
Payer: MEDICARE

## 2019-11-15 VITALS
HEIGHT: 62 IN | TEMPERATURE: 98 F | WEIGHT: 177.5 LBS | DIASTOLIC BLOOD PRESSURE: 78 MMHG | SYSTOLIC BLOOD PRESSURE: 134 MMHG | HEART RATE: 91 BPM | BODY MASS INDEX: 32.66 KG/M2

## 2019-11-15 DIAGNOSIS — M53.3 SACROILIAC JOINT PAIN: ICD-10-CM

## 2019-11-15 DIAGNOSIS — M54.16 LUMBAR RADICULOPATHY: Primary | ICD-10-CM

## 2019-11-15 DIAGNOSIS — M51.36 DDD (DEGENERATIVE DISC DISEASE), LUMBAR: ICD-10-CM

## 2019-11-15 DIAGNOSIS — M47.816 FACET ARTHRITIS OF LUMBAR REGION: ICD-10-CM

## 2019-11-15 DIAGNOSIS — M47.816 LUMBAR SPONDYLOSIS: ICD-10-CM

## 2019-11-15 DIAGNOSIS — M79.18 MYOFASCIAL PAIN: ICD-10-CM

## 2019-11-15 PROCEDURE — 3075F PR MOST RECENT SYSTOLIC BLOOD PRESS GE 130-139MM HG: ICD-10-PCS | Mod: CPTII,S$GLB,, | Performed by: NURSE PRACTITIONER

## 2019-11-15 PROCEDURE — 1101F PR PT FALLS ASSESS DOC 0-1 FALLS W/OUT INJ PAST YR: ICD-10-PCS | Mod: CPTII,S$GLB,, | Performed by: NURSE PRACTITIONER

## 2019-11-15 PROCEDURE — 99213 PR OFFICE/OUTPT VISIT, EST, LEVL III, 20-29 MIN: ICD-10-PCS | Mod: S$GLB,,, | Performed by: NURSE PRACTITIONER

## 2019-11-15 PROCEDURE — 3075F SYST BP GE 130 - 139MM HG: CPT | Mod: CPTII,S$GLB,, | Performed by: NURSE PRACTITIONER

## 2019-11-15 PROCEDURE — 99999 PR PBB SHADOW E&M-EST. PATIENT-LVL III: ICD-10-PCS | Mod: PBBFAC,,, | Performed by: NURSE PRACTITIONER

## 2019-11-15 PROCEDURE — 99213 OFFICE O/P EST LOW 20 MIN: CPT | Mod: S$GLB,,, | Performed by: NURSE PRACTITIONER

## 2019-11-15 PROCEDURE — 3078F PR MOST RECENT DIASTOLIC BLOOD PRESSURE < 80 MM HG: ICD-10-PCS | Mod: CPTII,S$GLB,, | Performed by: NURSE PRACTITIONER

## 2019-11-15 PROCEDURE — 99999 PR PBB SHADOW E&M-EST. PATIENT-LVL III: CPT | Mod: PBBFAC,,, | Performed by: NURSE PRACTITIONER

## 2019-11-15 PROCEDURE — 3078F DIAST BP <80 MM HG: CPT | Mod: CPTII,S$GLB,, | Performed by: NURSE PRACTITIONER

## 2019-11-15 PROCEDURE — 1101F PT FALLS ASSESS-DOCD LE1/YR: CPT | Mod: CPTII,S$GLB,, | Performed by: NURSE PRACTITIONER

## 2019-11-15 RX ORDER — MIRTAZAPINE 30 MG/1
30 TABLET, FILM COATED ORAL NIGHTLY
Refills: 3 | COMMUNITY
Start: 2019-10-03

## 2019-11-15 NOTE — PROGRESS NOTES
Chronic patient Established Note (Follow up visit)      SUBJECTIVE:    Tamiko Youssef presents to the clinic for a follow-up appointment for lower back pain. She reports increased low back pain that worsened over the last two weeks. She reports low back that radiates down the back of her both legs to the top of her feet, right greater than left. She describes this pain as shooting in nature. Her pain is worse with prolonged standing, walking, and activity. She continues to take Gabapentin with benefit. She denies any other health changes. Her pain today is 6/10.    Pain Disability Index Review:  Last 3 PDI Scores 11/15/2019 5/15/2019 4/17/2019   Pain Disability Index (PDI) 52 [phone] 44       Pain Medications:  Gabapentin 300 mg BID    Opioid Contract: yes     report:  Reviewed and consistent with medication use as prescribed.    Pain Procedures:   3/28/14, 3/13/13 Bilateral L4 TF SYLVIA  8/22/18 Bilateral L4 TF SYLVIA- 75% relief    Physical Therapy/Home Exercise: no    Imaging:     Narrative     EXAMINATION:  XR LUMBAR SPINE 5 VIEW WITH FLEX AND EXT    CLINICAL HISTORY:  Low back pain, >6wks conservative tx, persistent-progressive sx, surgical candidate;  Other intervertebral disc degeneration, lumbar region    TECHNIQUE:  Five views of the lumbar spine plus flexion extension views were performed.    COMPARISON:  09/09/2015.    FINDINGS:  There is 3 mm anterolisthesis of L3 on L4, L4 on L5, and L5 on S1.  No translational instability is noted on the flexion and extension radiographs.  Vertebral body heights are well maintained.  There is mild disc space narrowing present at the L3-4, L4-5, and L5-S1 levels.  There is facet arthropathy within the mid to lower lumbar spine and lumbosacral junction.  There is no evidence for spondylolysis.  No abnormal paraspinal masses are evident.  Sacroiliac joints appear unremarkable.      Impression       3 mm anterolisthesis of L3 on L4, L4 and L5, and L5 on S1.  This appears to be  degenerative in etiology with no evidence for translational instability.    Lumbar spondylosis.-     Narrative     EXAMINATION:  XR HIPS BILATERAL 2 VIEW INCL AP PELVIS    CLINICAL HISTORY:  Other intervertebral disc degeneration, lumbar region    TECHNIQUE:  AP view of the pelvis and frogleg lateral views of both hips were performed.    COMPARISON:  03/20/2014.    FINDINGS:  The bones are intact.  There is no evidence for acute fracture or bone destruction.  There are mild symmetric degenerative changes of the hips.  Sacroiliac joints appear unremarkable.  There are degenerative changes within the lower lumbar spine.  Soft tissues are unremarkable.      Impression       No evidence for acute fracture, bone destruction, or dislocation.         MRI Thoracic Spine Without Contrast     Narrative     MRI thoracic spine without contrast.    Findings: There is a mild dextroscoliosis of the thoracic spine.  The vertebral body heights are satisfactorily maintained.  There is loss of disk height with degenerative endplate change throughout the thoracic spine.  There are mild disk bulges   identified at T2-3, T3-4, T4-5, T8-9, and a mild disk osteophyte complex at T11-12.  This is mild central canal narrowing at T11-12.  The visualized portion of the intra-abdominal content is significant for a 2.8-cm left-sided renal cyst.  There is no   evidence of an acute marrow replacement process such as tumor or infection.  There is no fracture.  The spinal cord has a normal appearance.  There is no intradural abnormality identified.      Impression      Mild multilevel degenerative change with mild central canal stenosis at T11-12.      Electronically signed by: PATRICIA EDGAR MD  Date: 03/27/14  Time: 14:57      MRI Lumbar Spine Without Contrast    Narrative     Procedure:  Non-contrast MRI of the lumbosacral spine    Technique: sagittal T1, T2 and STIR and axial T1 and T2 images of the lumbosacral spine without contrast.     Comparison:  CT abdomen and pelvis on 1/11/3    Findings: Partially included in the  imaging is left renal lesion.  Please see CT report for further details.    There is trace 1-2 mm of anterior listhesis of L4 on L5 and L5 on S1.  . The lumbar vertebral body heights, contours and bone marrow signal is within normal limits and without evidence for acute fracture or subluxation. There is degenerative disk disease   with disk desiccation all levels there is present degenerative changes are more prominent in the lower thoracic spine partially included in this study specifically at T11/T12 level with moderate to severe height loss.        The distal spinal cord and conus is normal in signal and contour the tip of the conus approximates the inferior [id number].    T11/T12 including the sagittal field only there is a posterior discussed by complex with slight retrolisthesis of T11 on T12 with probable mild center canal stenosis and mild bilateral neural foraminal stenosis.    T12/L1 through L1/L2: No significant disc bulge, central canal or neural foraminal stenosis.     L2/L3: No significant disc bulge, central canal or neural foraminal stenosis. No significant disc bulge, central canal or neural foraminal stenosis.    L3/L4: Small disk bulge with ligamentum flavum hypertrophy without significant central canal stenosis with mild neural foraminal stenosis bilaterally.    L4/L5: Bulging disk with ligamentum flavum hypertrophy and facet joint arthropathy without significant central canal stenosis with mild neural foraminal stenosis.      L5/S1: Bulging disk with facet joint arthropathy without significant central canal stenosis with mild bilateral foraminal stenosis.      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      Impression       Mild spondylo-degenerative change of the lumbosacral spine as detailed above without significant central canal stenosis.      Please note there is degenerative change at the  lower thoracic spine specifically at the T11/T12 level with posterior disk osteophyte complex resulting in probable mild central canal and mild bilateral foraminal stenosis.    See above for additional details.      Electronically signed by: ALEKSANDER POLLACK DO  Date: 04/09/13  Time: 10:58          Allergies:   Review of patient's allergies indicates:   Allergen Reactions    Naprosyn [naproxen]     Norco [hydrocodone-acetaminophen]     Tramadol        Current Medications:   Current Outpatient Medications   Medication Sig Dispense Refill    albuterol (PROVENTIL) 2.5 mg /3 mL (0.083 %) nebulizer solution Take 2.5 mg by nebulization every 6 (six) hours as needed.      ALPRAZolam (XANAX) 0.25 MG tablet TK 1 T PO QD PRA  3    amlodipine (NORVASC) 10 MG tablet Take 10 mg by mouth once daily.      clotrimazole (MYCELEX) 10 mg john Dissolve one lozenge in the mouth 5 times a day. 50 John 1    gabapentin (NEURONTIN) 300 MG capsule TAKE 1 CAPSULE BY MOUTH THREE TIMES DAILY 270 capsule 2    latanoprost 0.005 % ophthalmic solution Place 1 drop into both eyes every evening. 3 Bottle 3    mirtazapine (REMERON) 30 MG tablet Take 30 mg by mouth nightly.  3    pravastatin (PRAVACHOL) 20 MG tablet   1    SYMBICORT 80-4.5 mcg/actuation HFAA   6    VENTOLIN HFA 90 mcg/actuation inhaler INL 2 PFS PO Q 4 H UTD PRF SOB  1    walker Misc 1 application by Misc.(Non-Drug; Combo Route) route once daily. 1 each 0    furosemide (LASIX) 20 MG tablet TK 1 T PO QD PRF FLUID  2    mirtazapine (REMERON) 15 MG tablet   2    predniSONE (DELTASONE) 20 MG tablet Take 20 mg by mouth 2 (two) times daily.      promethazine-codeine 6.25-10 mg/5 ml (PHENERGAN WITH CODEINE) 6.25-10 mg/5 mL syrup Take 5 mLs by mouth every 4 (four) hours as needed for Cough.       No current facility-administered medications for this visit.        REVIEW OF SYSTEMS:    Constitutional: Positive for unexpected weight change.   HENT: Positive for headache.    Respiratory: Positive for intermittent shortness of breath and wheezing.  (asthma)  Gastrointestinal: Positive for constipation.   Musculoskeletal: Positive for back pain, gait problem and stiffness.     MEDICAL, SURGICAL, FAMILY, SOCIAL HX: reviewed    Past Medical History:  Past Medical History:   Diagnosis Date    Asthma     Cataract     Glaucoma     Hypertension        Past Surgical History:  Past Surgical History:   Procedure Laterality Date    CATARACT EXTRACTION W/  INTRAOCULAR LENS IMPLANT Bilateral     CHOLECYSTECTOMY      EYE SURGERY      HYSTERECTOMY      NECK SURGERY      cyst removed       Family History:  Family History   Problem Relation Age of Onset    Cataracts Son     Glaucoma Son     No Known Problems Mother     No Known Problems Father     Macular degeneration Neg Hx        Social History:  Social History     Socioeconomic History    Marital status: Single     Spouse name: Not on file    Number of children: Not on file    Years of education: Not on file    Highest education level: Not on file   Occupational History    Not on file   Social Needs    Financial resource strain: Not on file    Food insecurity:     Worry: Not on file     Inability: Not on file    Transportation needs:     Medical: Not on file     Non-medical: Not on file   Tobacco Use    Smoking status: Never Smoker    Smokeless tobacco: Never Used   Substance and Sexual Activity    Alcohol use: No    Drug use: Never    Sexual activity: Not on file   Lifestyle    Physical activity:     Days per week: Not on file     Minutes per session: Not on file    Stress: Not on file   Relationships    Social connections:     Talks on phone: Not on file     Gets together: Not on file     Attends [employer] service: Not on file     Active member of club or organization: Not on file     Attends meetings of clubs or organizations: Not on file     Relationship status: Not on file   Other Topics Concern    Not on file  "  Social History Narrative    Not on file       OBJECTIVE:    /78   Pulse 91   Temp 98.3 °F (36.8 °C) (Oral)   Ht 5' 2" (1.575 m)   Wt 80.5 kg (177 lb 7.5 oz)   BMI 32.46 kg/m²     PHYSICAL EXAMINATION:    GENERAL: Well appearing, in no acute distress, alert and oriented x3.  PSYCH:  Mood and affect appropriate.  SKIN: Skin color, texture, turgor normal, no rashes or lesions.  HEAD/FACE:  Normocephalic, atraumatic. Cranial nerves grossly intact.  CV: RRR with palpation of the radial artery.  PULM: No evidence of respiratory difficulty, symmetric chest rise.  GI:  Soft and non-tender.  BACK: Straight leg raising in the sitting position is positive to radicular pain on the right, negative on the left.  Mild pain to palpation over lumbar paraspinals.  There is pain with palpation over lumbar facet joints.  Limited ROM with pain on extension.  Positive facet loading bilaterally.  There is mild pain with palpation to bilateral SI joints.  REGINA is positive bilaterally.  EXTREMITIES: Peripheral joint ROM is full and pain free without obvious instability or laxity in all four extremities. No deformities, edema, or skin discoloration. Good capillary refill.  MUSCULOSKELETAL:  Bilateral lower extremity strength is normal and symmetric.  No atrophy or tone abnormalities are noted.  NEURO: Bilateral lower extremity coordination and muscle stretch reflexes are physiologic and symmetric.  Plantar response are downgoing. No clonus.  No loss of sensation is noted.  GAIT: Antalgic- ambulates with walker.      ASSESSMENT: 78 y.o. year old female with lower back pain consistent with the following diagnoses:     1. Lumbar radiculopathy     2. Lumbar spondylosis     3. Facet arthritis of lumbar region     4. DDD (degenerative disc disease), lumbar     5. Sacroiliac joint pain     6. Myofascial pain           PLAN:     - Previous imaging was reviewed and discussed with the patient today.    - Schedule for bilateral L4 TF " SYLVIA.   The procedure, risks, benefits and options were discussed with patient. There are no contraindications to the procedure. The patient expressed understanding and agreed to proceed.  Consent obtained today.    - Consider lumbar MBB in the future.     - I have stressed the importance of physical activity and a home exercise plan to help with pain and improve health.    - Continue Gabapentin 300 mg BID.    - Counseled patient regarding the importance of activity modification, constant sleeping habits and physical therapy.    - RTC 2 weeks after above procedure.       The above plan and management options were discussed at length with patient. Patient is in agreement with the above and verbalized understanding.    Marina Gallegos NP  11/15/2019

## 2019-12-12 ENCOUNTER — TELEPHONE (OUTPATIENT)
Dept: PAIN MEDICINE | Facility: CLINIC | Age: 79
End: 2019-12-12

## 2019-12-12 NOTE — TELEPHONE ENCOUNTER
Staff returned call to pt regarding rescheduling procedure    Pt stated she is on an antibiotic for an infection and she she would like to have the procedure rescheduled    Staff informed pt that we will have the schedulers reach out her to reschedule       Pt gave verbal understanding

## 2019-12-12 NOTE — TELEPHONE ENCOUNTER
----- Message from Charley Chávez sent at 12/12/2019 11:40 AM CST -----  Contact: GARY VILLANUEVA [[id number]]  Contact: GARY VILLANUEVA [[id number]]    What is the request in detail:   In regards to her upcoming injection she states she needs to reschedule due to her having the flu and she doesn't think she will be well enough for the procedure     Can the clinic reply by [facility]:  No      What Number to Call Back if not in [facility]:    [phone]

## 2019-12-12 NOTE — TELEPHONE ENCOUNTER
----- Message from Juliane Munoz sent at 12/12/2019  1:23 PM CST -----  Procedure rescheduled from 12/18/19 to 1/8/20 with Dr. Leon.

## 2019-12-20 ENCOUNTER — OFFICE VISIT (OUTPATIENT)
Dept: URGENT CARE | Facility: CLINIC | Age: 79
End: 2019-12-20
Payer: MEDICARE

## 2019-12-20 ENCOUNTER — TELEPHONE (OUTPATIENT)
Dept: OPHTHALMOLOGY | Facility: CLINIC | Age: 79
End: 2019-12-20

## 2019-12-20 VITALS
SYSTOLIC BLOOD PRESSURE: 138 MMHG | RESPIRATION RATE: 18 BRPM | WEIGHT: 177.5 LBS | DIASTOLIC BLOOD PRESSURE: 71 MMHG | HEIGHT: 62 IN | HEART RATE: 100 BPM | TEMPERATURE: 99 F | BODY MASS INDEX: 32.66 KG/M2 | OXYGEN SATURATION: 97 %

## 2019-12-20 DIAGNOSIS — H57.11 ACUTE RIGHT EYE PAIN: Primary | ICD-10-CM

## 2019-12-20 DIAGNOSIS — H57.89 REDNESS OF RIGHT EYE: ICD-10-CM

## 2019-12-20 PROCEDURE — 99214 OFFICE O/P EST MOD 30 MIN: CPT | Mod: S$GLB,,, | Performed by: PHYSICIAN ASSISTANT

## 2019-12-20 PROCEDURE — 99214 PR OFFICE/OUTPT VISIT, EST, LEVL IV, 30-39 MIN: ICD-10-PCS | Mod: S$GLB,,, | Performed by: PHYSICIAN ASSISTANT

## 2019-12-20 RX ORDER — POLYMYXIN B SULFATE AND TRIMETHOPRIM 1; 10000 MG/ML; [USP'U]/ML
1 SOLUTION OPHTHALMIC EVERY 4 HOURS
Qty: 1 BOTTLE | Refills: 0 | Status: SHIPPED | OUTPATIENT
Start: 2019-12-20 | End: 2019-12-27

## 2019-12-20 NOTE — TELEPHONE ENCOUNTER
----- Message from Bryce Urbina sent at 12/20/2019  3:13 PM CST -----  Regarding: Patient Referral// Immediate Appt   Good afternoon,     Obdulio Engel PA-C would like to refer the following patient to the ophthalmology department. The patients diagnosis is acute right eye pain. I have scanned the patients referral and records into [employer]. If possible the referring provider would like the pt to be seen no later than Monday 12/23.     If there are any further questions in regards to the patient, please contact the referring office at, [phone].   Please let me know if I can help schedule in any way.  Thank you,   Bryce   Ext. [phone]  [facility], [place], operated by [facility]

## 2019-12-20 NOTE — PATIENT INSTRUCTIONS
- Rest.    - Drink plenty of fluids.    - Acetaminophen (tylenol) or Ibuprofen (advil,motrin) as directed as needed for fever/pain. Avoid tylenol if you have a history of liver disease. Do not take ibuprofen if you have a history of GI bleeding, kidney disease, or if you take blood thinners.     - Use drops as prescribed    - Follow up with eye doctor in the next 1-3 days.  You will receive a call to schedule an appointment.  You can call [phone] to schedule an appointment with the appropriate provider.    - Go to the ER or seek medical attention immediately if you develop new or worsening symptoms.    - You must understand that you have received an Urgent Care treatment only and that you may be released before all of your medical problems are known or treated.   - You, the patient, will arrange for follow up care as instructed.   - If your condition worsens or fails to improve we recommend that you receive another evaluation at the ER immediately or contact your PCP to discuss your concerns or return here.

## 2019-12-20 NOTE — TELEPHONE ENCOUNTER
Pt sees Dr. Leon (outside [facility])  Pt has an apt for Monday 12/23.  Pt verbalized that she will keep her apt with Dr. Leon and let us know if we can help her with anything.

## 2019-12-20 NOTE — PROGRESS NOTES
"Subjective:       Patient ID: Tamiko Youssef is a 79 y.o. female.    Vitals:  height is 5' 2" (1.575 m) and weight is 80.5 kg (177 lb 7.5 oz). Her oral temperature is 98.6 °F (37 °C). Her blood pressure is 138/71 and her pulse is 100. Her respiration is 18 and oxygen saturation is 97%.     Chief Complaint: Eye Pain (Right)    Patient presents with right eye pain and redness that began last night.  She denies injury or trauma that she knows of.  She does were glasses, denies contact use.  Does not recall anything getting in her eye.  No new recent exposures.  She does have associated photophobia.  Has had some watering of the eye.  No itching or purulent discharge. No swelling. She describes it as a sharp pain. Patient denies history of glaucoma, but has been using latanoprost drops since her cataract surgery 10 years ago, per patient.  She has had no change to her drops recently.    Eye Pain    The right eye is affected. This is a new problem. The current episode started yesterday. The problem has been gradually worsening. There was no injury mechanism. The pain is at a severity of 7/10. The pain is moderate. There is no known exposure to pink eye. She does not wear contacts. Associated symptoms include eye redness and photophobia. Pertinent negatives include no blurred vision, eye discharge, double vision, fever, foreign body sensation, itching, nausea, recent URI or vomiting. She has tried nothing for the symptoms. The treatment provided no relief.       Constitution: Negative for chills and fever.   HENT: Negative for congestion and sinus pain.    Eyes: Positive for eye pain, eye redness and photophobia. Negative for eye trauma, foreign body in eye, eye discharge, eye itching, vision loss, double vision, blurred vision and eyelid swelling.   Gastrointestinal: Negative for nausea and vomiting.   Genitourinary: Negative for history of kidney stones.   Skin: Negative for rash.   Allergic/Immunologic: Negative for " seasonal allergies and itching.   Neurological: Negative for headaches.       Objective:      Physical Exam   Constitutional: She is oriented to person, place, and time. She appears well-developed and well-nourished.  Non-toxic appearance. She does not have a sickly appearance. She does not appear ill. No distress.   HENT:   Head: Normocephalic and atraumatic.   Right Ear: External ear normal.   Left Ear: External ear normal.   Nose: Nose normal.   Mouth/Throat: Oropharynx is clear and moist.   Eyes: Pupils are equal, round, and reactive to light. EOM and lids are normal. Lids are everted and swept, no foreign bodies found. Right eye exhibits no chemosis, no discharge, no exudate and no hordeolum. No foreign body present in the right eye. Left eye exhibits no chemosis, no discharge, no exudate and no hordeolum. No foreign body present in the left eye. Right conjunctiva is injected. Right conjunctiva has no hemorrhage. Left conjunctiva is not injected. Left conjunctiva has no hemorrhage. No scleral icterus.   Patient complete resolution of pain with application of anesthetic drops.  Fluorescein exam reveals no abnormality. EOMI PERRLA.   Neck: Trachea normal, full passive range of motion without pain and phonation normal. Neck supple.   Musculoskeletal: Normal range of motion.   Neurological: She is alert and oriented to person, place, and time.   Skin: Skin is warm, dry and intact.   Psychiatric: She has a normal mood and affect. Her speech is normal and behavior is normal. Judgment and thought content normal. Cognition and memory are normal.   Nursing note and vitals reviewed.          Vision:  Right eye 20/30, left eye 20/30, both eyes 20/30 uncorrected.  Patient at complete resolution of pain and photophobia with application of proparacaine drops, however fluorescein exam does not reveal any lesion or corneal abrasion.  I do not appreciate a foreign body.  Will begin antibiotic drops and instructed close  follow-up with Ophthalmology.  I called clinic [facility] to get patient appointment schedule with Ophthalmology, there was nothing available on the schedule.  They sent a hyper ready message to Ophthalmology to see if patient could be seen as soon as possible, possibly today.  Patient does have a follow-up appointment with her ophthalmologist on Monday.  Strict  given ER precautions given if she develops new or worsening symptoms including worsening of pain or any changes in vision.  Patient agrees with plan expressed understanding.  Assessment:       1. Acute right eye pain    2. Redness of right eye        Plan:         Acute right eye pain  -     Ambulatory referral to Optometry  -     Ambulatory referral to Ophthalmology  -     polymyxin B sulf-trimethoprim (POLYTRIM) 10,000 unit- 1 mg/mL Drop; Place 1 drop into the right eye every 4 (four) hours. for 7 days  Dispense: 1 Bottle; Refill: 0    Redness of right eye  -     Ambulatory referral to Optometry  -     Ambulatory referral to Ophthalmology  -     polymyxin B sulf-trimethoprim (POLYTRIM) 10,000 unit- 1 mg/mL Drop; Place 1 drop into the right eye every 4 (four) hours. for 7 days  Dispense: 1 Bottle; Refill: 0      Patient Instructions   - Rest.    - Drink plenty of fluids.    - Acetaminophen (tylenol) or Ibuprofen (advil,motrin) as directed as needed for fever/pain. Avoid tylenol if you have a history of liver disease. Do not take ibuprofen if you have a history of GI bleeding, kidney disease, or if you take blood thinners.     - Use drops as prescribed    - Follow up with eye doctor in the next 1-3 days.  You will receive a call to schedule an appointment.  You can call [phone] to schedule an appointment with the appropriate provider.    - Go to the ER or seek medical attention immediately if you develop new or worsening symptoms.    - You must understand that you have received an Urgent Care treatment only and that you may be released before all of  your medical problems are known or treated.   - You, the patient, will arrange for follow up care as instructed.   - If your condition worsens or fails to improve we recommend that you receive another evaluation at the ER immediately or contact your PCP to discuss your concerns or return here.

## 2020-01-08 ENCOUNTER — HOSPITAL ENCOUNTER (OUTPATIENT)
Facility: OTHER | Age: 80
Discharge: HOME | End: 2020-01-08
Attending: ANESTHESIOLOGY | Admitting: ANESTHESIOLOGY
Payer: MEDICARE

## 2020-01-08 VITALS
DIASTOLIC BLOOD PRESSURE: 65 MMHG | SYSTOLIC BLOOD PRESSURE: 126 MMHG | OXYGEN SATURATION: 95 % | TEMPERATURE: 98 F | RESPIRATION RATE: 18 BRPM | HEART RATE: 88 BPM

## 2020-01-08 DIAGNOSIS — G89.29 CHRONIC PAIN: ICD-10-CM

## 2020-01-08 DIAGNOSIS — M43.06 SPONDYLOLYSIS OF LUMBAR REGION: Primary | ICD-10-CM

## 2020-01-08 DIAGNOSIS — M54.16 LUMBAR RADICULOPATHY: ICD-10-CM

## 2020-01-08 PROCEDURE — 64483 PR EPIDURAL INJ, ANES/STEROID, TRANSFORAMINAL, LUMB/SACR, SNGL LEVL: ICD-10-PCS | Mod: 50,,, | Performed by: ANESTHESIOLOGY

## 2020-01-08 PROCEDURE — 64483 NJX AA&/STRD TFRM EPI L/S 1: CPT | Mod: 50 | Performed by: ANESTHESIOLOGY

## 2020-01-08 PROCEDURE — 64483 NJX AA&/STRD TFRM EPI L/S 1: CPT | Mod: 50,,, | Performed by: ANESTHESIOLOGY

## 2020-01-08 PROCEDURE — 25500020 PHARM REV CODE 255: Performed by: ANESTHESIOLOGY

## 2020-01-08 PROCEDURE — 63600175 PHARM REV CODE 636 W HCPCS: Performed by: ANESTHESIOLOGY

## 2020-01-08 PROCEDURE — 99152 PR MOD CONSCIOUS SEDATION, SAME PHYS, 5+ YRS, FIRST 15 MIN: ICD-10-PCS | Mod: ,,, | Performed by: ANESTHESIOLOGY

## 2020-01-08 PROCEDURE — 25000003 PHARM REV CODE 250: Performed by: ANESTHESIOLOGY

## 2020-01-08 PROCEDURE — 99152 MOD SED SAME PHYS/QHP 5/>YRS: CPT | Mod: ,,, | Performed by: ANESTHESIOLOGY

## 2020-01-08 RX ORDER — LIDOCAINE HYDROCHLORIDE 5 MG/ML
INJECTION, SOLUTION INFILTRATION; INTRAVENOUS
Status: DISCONTINUED | OUTPATIENT
Start: 2020-01-08 | End: 2020-01-08 | Stop reason: HOSPADM

## 2020-01-08 RX ORDER — SODIUM CHLORIDE 9 MG/ML
500 INJECTION, SOLUTION INTRAVENOUS CONTINUOUS
Status: DISCONTINUED | OUTPATIENT
Start: 2020-01-08 | End: 2020-01-08 | Stop reason: HOSPADM

## 2020-01-08 RX ORDER — LIDOCAINE HYDROCHLORIDE 10 MG/ML
INJECTION INFILTRATION; PERINEURAL
Status: DISCONTINUED | OUTPATIENT
Start: 2020-01-08 | End: 2020-01-08 | Stop reason: HOSPADM

## 2020-01-08 RX ORDER — FENTANYL CITRATE 50 UG/ML
INJECTION, SOLUTION INTRAMUSCULAR; INTRAVENOUS
Status: DISCONTINUED | OUTPATIENT
Start: 2020-01-08 | End: 2020-01-08 | Stop reason: HOSPADM

## 2020-01-08 RX ORDER — MIDAZOLAM HYDROCHLORIDE 1 MG/ML
INJECTION INTRAMUSCULAR; INTRAVENOUS
Status: DISCONTINUED | OUTPATIENT
Start: 2020-01-08 | End: 2020-01-08 | Stop reason: HOSPADM

## 2020-01-08 RX ORDER — DEXAMETHASONE SODIUM PHOSPHATE 4 MG/ML
INJECTION, SOLUTION INTRA-ARTICULAR; INTRALESIONAL; INTRAMUSCULAR; INTRAVENOUS; SOFT TISSUE
Status: DISCONTINUED | OUTPATIENT
Start: 2020-01-08 | End: 2020-01-08 | Stop reason: HOSPADM

## 2020-01-08 NOTE — OP NOTE
Patient Name: Tamiko Youssef  MRN: [id number]    INFORMED CONSENT: The procedure, risks, benefits and options were discussed with patient. There are no contraindications to the procedure. The patient expressed understanding and agreed to proceed. The personnel performing the procedure was discussed. I verify that I personally obtained Tamiko's consent prior to the start of the procedure and the signed consent can be found on the patient's chart.    Procedure Date: 01/08/2020    Anesthesia: Topical    Pre Procedure diagnosis: Lumbar radiculopathy [M54.16]  DDD (degenerative disc disease), lumbar [M51.36]  1. Spondylolysis of lumbar region    2. Lumbar radiculopathy    3. Chronic pain      Post-Procedure diagnosis: SAME      Sedation: Yes - Fentanyl 50 mcg and Midazolam 2 mg    PROCEDURE:Bilateral L4/5   TRANSFORAMINAL EPIDURAL STEROID INJECTION        DESCRIPTION OF PROCEDURE: The patient was brought to the procedure room. After performing time out IV access was obtained prior to the procedure. The patient was positioned prone on the fluoroscopy table. Continuous hemodynamic monitoring was initiated including blood pressure, EKG, and pulse oximetry. . The skin was prepped with chlorhexidine three times and draped in a sterile fashion. Skin anesthesia was achieved using 3 mL of lidocaine 1% over the respective injection site.     An oblique fluoroscopic view was obtained, with the superior articular process of the inferior vertebral body aligned with the pedicle. The tip of a 22-gauge 3.5-inch Quincke-type spinal needle was advanced toward the 6 oclock position of the pedicle under intermittent fluoroscopic guidance. Confirmation of proper needle position was made with AP, oblique, and lateral fluoroscopic views. Negative aspiration for blood or CSF was confirmed. 2 mL of Omnipaque 300 was injected. Live fluoroscopic imaging revealed a clear outline of the spinal nerve with proximal spread of agent through the neural  foramen into the anterior epidural space. A total combination of 3 mL of Lidocaine 0.5% and 10 mg decadron was injected at each level. Contrast spread was noted from L3 to L5 level. There was no pain on injection. The needle was removed and bleeding was nil.  A sterile dressing was applied. Tamiko was taken back to the recovery room for further observation.     Blood Loss: Nill  Specimen: None    Obdulio Leon MD

## 2020-01-08 NOTE — H&P
HPI  Patient presenting for Procedure(s) (LRB):  INJECTION, STEROID, EPIDURAL, TRANSFORAMINAL APPROACH (Bilateral)     Patient on Anti-coagulation No    No health changes since previous encounter    Past Medical History:   Diagnosis Date    Asthma     Cataract     Glaucoma     Hypertension      Past Surgical History:   Procedure Laterality Date    CATARACT EXTRACTION W/  INTRAOCULAR LENS IMPLANT Bilateral     CHOLECYSTECTOMY      EYE SURGERY      HYSTERECTOMY      NECK SURGERY      cyst removed     Review of patient's allergies indicates:   Allergen Reactions    Mobic [meloxicam] Rash    Naprosyn [naproxen]     Norco [hydrocodone-acetaminophen]     Tramadol       No current facility-administered medications for this encounter.        PMHx, PSHx, Allergies, Medications reviewed in epic    ROS negative except pain complaints in HPI    OBJECTIVE:    There were no vitals taken for this visit.    PHYSICAL EXAMINATION:    GENERAL: Well appearing, in no acute distress, alert and oriented x3.  PSYCH:  Mood and affect appropriate.  SKIN: Skin color, texture, turgor normal, no rashes or lesions which will impact the procedure.  CV: RRR with palpation of the radial artery.  PULM: No evidence of respiratory difficulty, symmetric chest rise. Clear to auscultation.  NEURO: Cranial nerves grossly intact.    Plan:    Proceed with procedure as planned Procedure(s) (LRB):  INJECTION, STEROID, EPIDURAL, TRANSFORAMINAL APPROACH (Bilateral)    Jeannie Smart  01/08/2020

## 2020-01-08 NOTE — DISCHARGE INSTRUCTIONS

## 2020-01-08 NOTE — DISCHARGE SUMMARY
Discharge Note  Short Stay      SUMMARY     Admit Date: 1/8/2020    Attending Physician: Obdulio Leon      Discharge Physician: Obdulio Leon      Discharge Date: 1/8/2020 3:02 PM    Procedure(s) (LRB):  INJECTION, STEROID, EPIDURAL, TRANSFORAMINAL APPROACH (Bilateral)    Final Diagnosis: Lumbar radiculopathy [M54.16]  DDD (degenerative disc disease), lumbar [M51.36]    Disposition: Home or self care    Patient Instructions:   Discharge Medication List as of 1/8/2020  2:23 PM      CONTINUE these medications which have NOT CHANGED    Details   albuterol (PROVENTIL) 2.5 mg /3 mL (0.083 %) nebulizer solution Take 2.5 mg by nebulization every 6 (six) hours as needed., Until Discontinued, Historical Med      ALPRAZolam (XANAX) 0.25 MG tablet TK 1 T PO QD PRA, Historical Med      amlodipine (NORVASC) 10 MG tablet Take 10 mg by mouth once daily., Until Discontinued, Historical Med      clotrimazole (MYCELEX) 10 mg nury Dissolve one lozenge in the mouth 5 times a day., Normal      furosemide (LASIX) 20 MG tablet TK 1 T PO QD PRF FLUID, Historical Med      gabapentin (NEURONTIN) 300 MG capsule TAKE 1 CAPSULE BY MOUTH THREE TIMES DAILY, Normal      latanoprost 0.005 % ophthalmic solution Place 1 drop into both eyes every evening., Starting Wed 10/10/2018, Normal      !! mirtazapine (REMERON) 15 MG tablet Starting 7/21/2015, Until Discontinued, Historical Med      !! mirtazapine (REMERON) 30 MG tablet Take 30 mg by mouth nightly., Starting Thu 10/3/2019, Historical Med      pravastatin (PRAVACHOL) 20 MG tablet Starting 6/9/2015, Until Discontinued, Historical Med      predniSONE (DELTASONE) 20 MG tablet Take 20 mg by mouth 2 (two) times daily., Historical Med      promethazine-codeine 6.25-10 mg/5 ml (PHENERGAN WITH CODEINE) 6.25-10 mg/5 mL syrup Take 5 mLs by mouth every 4 (four) hours as needed for Cough., Historical Med      SYMBICORT 80-4.5 mcg/actuation HFAA Starting 8/30/2015, Until Discontinued, Historical Med       VENTOLIN HFA 90 mcg/actuation inhaler INL 2 PFS PO Q 4 H UTD PRF SOB, Historical Med      walker Misc 1 application by Misc.(Non-Drug; Combo Route) route once daily., Starting 6/25/2014, Until Discontinued, Normal       !! - Potential duplicate medications found. Please discuss with provider.              Discharge Diagnosis: Lumbar radiculopathy [M54.16]  DDD (degenerative disc disease), lumbar [M51.36]  Condition on Discharge: Stable with no complications to procedure   Diet on Discharge: Same as before.  Activity: as per instruction sheet.  Discharge to: Home with a responsible adult.  Follow up: 2-4 weeks       Please call my office or pager at [phone] if experienced any weakness or loss of sensation, fever > 101.5, pain uncontrolled with oral medications, persistent nausea/vomiting/or diarrhea, redness or drainage from the incisions, or any other worrisome concerns. If physician on call was not reached or could not communicate with our office for any reason please go to the nearest emergency department

## 2020-01-28 ENCOUNTER — OFFICE VISIT (OUTPATIENT)
Dept: PAIN MEDICINE | Facility: CLINIC | Age: 80
End: 2020-01-28
Payer: MEDICARE

## 2020-01-28 VITALS
HEIGHT: 62 IN | BODY MASS INDEX: 32.78 KG/M2 | WEIGHT: 178.13 LBS | HEART RATE: 90 BPM | DIASTOLIC BLOOD PRESSURE: 80 MMHG | OXYGEN SATURATION: 100 % | TEMPERATURE: 98 F | SYSTOLIC BLOOD PRESSURE: 140 MMHG | RESPIRATION RATE: 18 BRPM

## 2020-01-28 DIAGNOSIS — M43.06 SPONDYLOLYSIS OF LUMBAR REGION: Primary | ICD-10-CM

## 2020-01-28 DIAGNOSIS — M51.36 DDD (DEGENERATIVE DISC DISEASE), LUMBAR: ICD-10-CM

## 2020-01-28 DIAGNOSIS — M79.18 MYOFASCIAL PAIN: ICD-10-CM

## 2020-01-28 DIAGNOSIS — M47.816 FACET ARTHRITIS OF LUMBAR REGION: ICD-10-CM

## 2020-01-28 DIAGNOSIS — M53.3 SACROILIAC JOINT PAIN: ICD-10-CM

## 2020-01-28 PROCEDURE — 3077F PR MOST RECENT SYSTOLIC BLOOD PRESSURE >= 140 MM HG: ICD-10-PCS | Mod: CPTII,S$GLB,, | Performed by: NURSE PRACTITIONER

## 2020-01-28 PROCEDURE — 1159F PR MEDICATION LIST DOCUMENTED IN MEDICAL RECORD: ICD-10-PCS | Mod: S$GLB,,, | Performed by: NURSE PRACTITIONER

## 2020-01-28 PROCEDURE — 99213 PR OFFICE/OUTPT VISIT, EST, LEVL III, 20-29 MIN: ICD-10-PCS | Mod: S$GLB,,, | Performed by: NURSE PRACTITIONER

## 2020-01-28 PROCEDURE — 1101F PR PT FALLS ASSESS DOC 0-1 FALLS W/OUT INJ PAST YR: ICD-10-PCS | Mod: CPTII,S$GLB,, | Performed by: NURSE PRACTITIONER

## 2020-01-28 PROCEDURE — 3079F PR MOST RECENT DIASTOLIC BLOOD PRESSURE 80-89 MM HG: ICD-10-PCS | Mod: CPTII,S$GLB,, | Performed by: NURSE PRACTITIONER

## 2020-01-28 PROCEDURE — 1159F MED LIST DOCD IN RCRD: CPT | Mod: S$GLB,,, | Performed by: NURSE PRACTITIONER

## 2020-01-28 PROCEDURE — 1125F AMNT PAIN NOTED PAIN PRSNT: CPT | Mod: S$GLB,,, | Performed by: NURSE PRACTITIONER

## 2020-01-28 PROCEDURE — 1101F PT FALLS ASSESS-DOCD LE1/YR: CPT | Mod: CPTII,S$GLB,, | Performed by: NURSE PRACTITIONER

## 2020-01-28 PROCEDURE — 99999 PR PBB SHADOW E&M-EST. PATIENT-LVL III: CPT | Mod: PBBFAC,,, | Performed by: NURSE PRACTITIONER

## 2020-01-28 PROCEDURE — 99999 PR PBB SHADOW E&M-EST. PATIENT-LVL III: ICD-10-PCS | Mod: PBBFAC,,, | Performed by: NURSE PRACTITIONER

## 2020-01-28 PROCEDURE — 1125F PR PAIN SEVERITY QUANTIFIED, PAIN PRESENT: ICD-10-PCS | Mod: S$GLB,,, | Performed by: NURSE PRACTITIONER

## 2020-01-28 PROCEDURE — 99213 OFFICE O/P EST LOW 20 MIN: CPT | Mod: S$GLB,,, | Performed by: NURSE PRACTITIONER

## 2020-01-28 PROCEDURE — 3079F DIAST BP 80-89 MM HG: CPT | Mod: CPTII,S$GLB,, | Performed by: NURSE PRACTITIONER

## 2020-01-28 PROCEDURE — 3077F SYST BP >= 140 MM HG: CPT | Mod: CPTII,S$GLB,, | Performed by: NURSE PRACTITIONER

## 2020-01-28 NOTE — PROGRESS NOTES
Chronic patient Established Note (Follow up visit)      SUBJECTIVE:    Tamiko Youssef presents to the clinic for a follow-up appointment for lower back pain. She is s/p bilateral L4/5 TF SYLVIA on 1/8/2020. She reports 80% relief of his low back and leg pain. She continues to report low back pain that is aching in nature. She denies any radiating leg pain today. Her pain is worse with prolonged standing and walking. She continues to take Gabapentin with benefit. She continues to perform a home exercise routine. She denies any other health changes. Her pain today is 8/10.      Pain Disability Index Review:  Last 3 PDI Scores 1/28/2020 11/15/2019 5/15/2019   Pain Disability Index (PDI) 0 [phone] 58       Pain Medications:  Gabapentin 300 mg BID    Opioid Contract: yes     report:  Reviewed and consistent with medication use as prescribed.    Pain Procedures:   3/28/14, 3/13/13 Bilateral L4 TF SYLVIA  8/22/18 Bilateral L4 TF SYLVIA- 75% relief  1/8/2020- Bilateral L4/5 TF SYLVIA - 80% relief     Physical Therapy/Home Exercise: no    Imaging:     Narrative     EXAMINATION:  XR LUMBAR SPINE 5 VIEW WITH FLEX AND EXT    CLINICAL HISTORY:  Low back pain, >6wks conservative tx, persistent-progressive sx, surgical candidate;  Other intervertebral disc degeneration, lumbar region    TECHNIQUE:  Five views of the lumbar spine plus flexion extension views were performed.    COMPARISON:  09/09/2015.    FINDINGS:  There is 3 mm anterolisthesis of L3 on L4, L4 on L5, and L5 on S1.  No translational instability is noted on the flexion and extension radiographs.  Vertebral body heights are well maintained.  There is mild disc space narrowing present at the L3-4, L4-5, and L5-S1 levels.  There is facet arthropathy within the mid to lower lumbar spine and lumbosacral junction.  There is no evidence for spondylolysis.  No abnormal paraspinal masses are evident.  Sacroiliac joints appear unremarkable.      Impression       3 mm anterolisthesis of L3  on L4, L4 and L5, and L5 on S1.  This appears to be degenerative in etiology with no evidence for translational instability.    Lumbar spondylosis.-     Narrative     EXAMINATION:  XR HIPS BILATERAL 2 VIEW INCL AP PELVIS    CLINICAL HISTORY:  Other intervertebral disc degeneration, lumbar region    TECHNIQUE:  AP view of the pelvis and frogleg lateral views of both hips were performed.    COMPARISON:  03/20/2014.    FINDINGS:  The bones are intact.  There is no evidence for acute fracture or bone destruction.  There are mild symmetric degenerative changes of the hips.  Sacroiliac joints appear unremarkable.  There are degenerative changes within the lower lumbar spine.  Soft tissues are unremarkable.      Impression       No evidence for acute fracture, bone destruction, or dislocation.         MRI Thoracic Spine Without Contrast     Narrative     MRI thoracic spine without contrast.    Findings: There is a mild dextroscoliosis of the thoracic spine.  The vertebral body heights are satisfactorily maintained.  There is loss of disk height with degenerative endplate change throughout the thoracic spine.  There are mild disk bulges   identified at T2-3, T3-4, T4-5, T8-9, and a mild disk osteophyte complex at T11-12.  This is mild central canal narrowing at T11-12.  The visualized portion of the intra-abdominal content is significant for a 2.8-cm left-sided renal cyst.  There is no   evidence of an acute marrow replacement process such as tumor or infection.  There is no fracture.  The spinal cord has a normal appearance.  There is no intradural abnormality identified.      Impression      Mild multilevel degenerative change with mild central canal stenosis at T11-12.      Electronically signed by: PATRICIA EDGAR MD  Date: 03/27/14  Time: 14:57      MRI Lumbar Spine Without Contrast    Narrative     Procedure:  Non-contrast MRI of the lumbosacral spine    Technique: sagittal T1, T2 and STIR and axial T1 and T2 images of the  lumbosacral spine without contrast.     Comparison: CT abdomen and pelvis on 1/11/3    Findings: Partially included in the  imaging is left renal lesion.  Please see CT report for further details.    There is trace 1-2 mm of anterior listhesis of L4 on L5 and L5 on S1.  . The lumbar vertebral body heights, contours and bone marrow signal is within normal limits and without evidence for acute fracture or subluxation. There is degenerative disk disease   with disk desiccation all levels there is present degenerative changes are more prominent in the lower thoracic spine partially included in this study specifically at T11/T12 level with moderate to severe height loss.        The distal spinal cord and conus is normal in signal and contour the tip of the conus approximates the inferior E2bhius.    T11/T12 including the sagittal field only there is a posterior discussed by complex with slight retrolisthesis of T11 on T12 with probable mild center canal stenosis and mild bilateral neural foraminal stenosis.    T12/L1 through L1/L2: No significant disc bulge, central canal or neural foraminal stenosis.     L2/L3: No significant disc bulge, central canal or neural foraminal stenosis. No significant disc bulge, central canal or neural foraminal stenosis.    L3/L4: Small disk bulge with ligamentum flavum hypertrophy without significant central canal stenosis with mild neural foraminal stenosis bilaterally.    L4/L5: Bulging disk with ligamentum flavum hypertrophy and facet joint arthropathy without significant central canal stenosis with mild neural foraminal stenosis.      L5/S1: Bulging disk with facet joint arthropathy without significant central canal stenosis with mild bilateral foraminal stenosis.      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      Impression       Mild spondylo-degenerative change of the lumbosacral spine as detailed above without significant central canal  stenosis.      Please note there is degenerative change at the lower thoracic spine specifically at the T11/T12 level with posterior disk osteophyte complex resulting in probable mild central canal and mild bilateral foraminal stenosis.    See above for additional details.      Electronically signed by: ALEKSANDER POLLACK DO  Date: 04/09/13  Time: 10:58          Allergies:   Review of patient's allergies indicates:   Allergen Reactions    Naprosyn [naproxen]     Norco [hydrocodone-acetaminophen]     Tramadol        Current Medications:   Current Outpatient Medications   Medication Sig Dispense Refill    albuterol (PROVENTIL) 2.5 mg /3 mL (0.083 %) nebulizer solution Take 2.5 mg by nebulization every 6 (six) hours as needed.      ALPRAZolam (XANAX) 0.25 MG tablet TK 1 T PO QD PRA  3    amlodipine (NORVASC) 10 MG tablet Take 10 mg by mouth once daily.      clotrimazole (MYCELEX) 10 mg john Dissolve one lozenge in the mouth 5 times a day. 50 John 1    furosemide (LASIX) 20 MG tablet TK 1 T PO QD PRF FLUID  2    gabapentin (NEURONTIN) 300 MG capsule TAKE 1 CAPSULE BY MOUTH THREE TIMES DAILY 270 capsule 2    latanoprost 0.005 % ophthalmic solution Place 1 drop into both eyes every evening. 3 Bottle 3    mirtazapine (REMERON) 15 MG tablet   2    mirtazapine (REMERON) 30 MG tablet Take 30 mg by mouth nightly.  3    pravastatin (PRAVACHOL) 20 MG tablet   1    predniSONE (DELTASONE) 20 MG tablet Take 20 mg by mouth 2 (two) times daily.      promethazine-codeine 6.25-10 mg/5 ml (PHENERGAN WITH CODEINE) 6.25-10 mg/5 mL syrup Take 5 mLs by mouth every 4 (four) hours as needed for Cough.      SYMBICORT 80-4.5 mcg/actuation HFAA   6    VENTOLIN HFA 90 mcg/actuation inhaler INL 2 PFS PO Q 4 H UTD PRF SOB  1    walker Misc 1 application by Misc.(Non-Drug; Combo Route) route once daily. 1 each 0     No current facility-administered medications for this visit.        REVIEW OF SYSTEMS:    Constitutional: Positive for  unexpected weight change.   HENT: Positive for headache.   Respiratory: Positive for intermittent shortness of breath and wheezing.  (asthma)  Gastrointestinal: Positive for constipation.   Musculoskeletal: Positive for back pain, gait problem and stiffness.     MEDICAL, SURGICAL, FAMILY, SOCIAL HX: reviewed    Past Medical History:  Past Medical History:   Diagnosis Date    Asthma     Cataract     Glaucoma     Hypertension        Past Surgical History:  Past Surgical History:   Procedure Laterality Date    CATARACT EXTRACTION W/  INTRAOCULAR LENS IMPLANT Bilateral     CHOLECYSTECTOMY      EYE SURGERY      HYSTERECTOMY      NECK SURGERY      cyst removed    TRANSFORAMINAL EPIDURAL INJECTION OF STEROID Bilateral 1/8/2020    Procedure: INJECTION, STEROID, EPIDURAL, TRANSFORAMINAL APPROACH;  Surgeon: Obdulio Leon MD;  Location: [facility];  Service: Pain Management;  Laterality: Bilateral;  B/L TFESI L4       Family History:  Family History   Problem Relation Age of Onset    Cataracts Son     Glaucoma Son     No Known Problems Mother     No Known Problems Father     Macular degeneration Neg Hx        Social History:  Social History     Socioeconomic History    Marital status: Single     Spouse name: Not on file    Number of children: Not on file    Years of education: Not on file    Highest education level: Not on file   Occupational History    Not on file   Social Needs    Financial resource strain: Not on file    Food insecurity:     Worry: Not on file     Inability: Not on file    Transportation needs:     Medical: Not on file     Non-medical: Not on file   Tobacco Use    Smoking status: Never Smoker    Smokeless tobacco: Never Used   Substance and Sexual Activity    Alcohol use: No    Drug use: Never    Sexual activity: Not on file   Lifestyle    Physical activity:     Days per week: Not on file     Minutes per session: Not on file    Stress: Not on file   Relationships     "Social connections:     Talks on phone: Not on file     Gets together: Not on file     Attends [employer] service: Not on file     Active member of club or organization: Not on file     Attends meetings of clubs or organizations: Not on file     Relationship status: Not on file   Other Topics Concern    Not on file   Social History Narrative    Not on file       OBJECTIVE:    BP (!) 140/80   Pulse 90   Temp 98 °F (36.7 °C)   Resp 18   Ht 5' 2" (1.575 m)   Wt 80.8 kg (178 lb 2.1 oz)   SpO2 100%   BMI 32.58 kg/m²     PHYSICAL EXAMINATION:    GENERAL: Well appearing, in no acute distress, alert and oriented x3.  PSYCH:  Mood and affect appropriate.  SKIN: Skin color, texture, turgor normal, no rashes or lesions.  HEAD/FACE:  Normocephalic, atraumatic. Cranial nerves grossly intact.  CV: RRR with palpation of the radial artery.  PULM: No evidence of respiratory difficulty, symmetric chest rise.  GI:  Soft and non-tender.  BACK: Straight leg raising in the sitting position is negative for radicular pain bilaterally.  Mild pain to palpation over lumbar paraspinals.  There is pain with palpation over lumbar facet joints.  Limited ROM with pain on extension.  Positive facet loading bilaterally.  There is mild pain with palpation to bilateral SI joints.  REGINA is positive bilaterally.  EXTREMITIES: Peripheral joint ROM is full and pain free without obvious instability or laxity in all four extremities. No deformities, edema, or skin discoloration. Good capillary refill.  MUSCULOSKELETAL:  Bilateral lower extremity strength is normal and symmetric.  No atrophy or tone abnormalities are noted.  NEURO: Bilateral lower extremity coordination and muscle stretch reflexes are physiologic and symmetric.  Plantar response are downgoing. No clonus.  No loss of sensation is noted.  GAIT: Antalgic- ambulates with walker.      ASSESSMENT: 79 y.o. year old female with lower back pain consistent with the following diagnoses:     1. " Spondylolysis of lumbar region     2. Facet arthritis of lumbar region     3. DDD (degenerative disc disease), lumbar     4. Sacroiliac joint pain     5. Myofascial pain           PLAN:     - Previous imaging was reviewed and discussed with the patient today.    - We discussed medial branch blocks to target her facet mediated pain. She would like to wait at this time.     - She is s/p bilateral L4/5 TF SYLVIA with benefit. We can repeat this if her radicular pain returns.     - I have stressed the importance of physical activity and a home exercise plan to help with pain and improve health.    - Continue Gabapentin 300 mg BID.    - Counseled patient regarding the importance of activity modification, constant sleeping habits and physical therapy.    - RTC in 3 months or sooner if needed.     - Dr. Leon was consulted on the patient and agrees with this plan.      The above plan and management options were discussed at length with patient. Patient is in agreement with the above and verbalized understanding.    Marina Gallegos NP  01/28/2020

## 2020-02-12 ENCOUNTER — OFFICE VISIT (OUTPATIENT)
Dept: OTOLARYNGOLOGY | Facility: CLINIC | Age: 80
End: 2020-02-12
Payer: MEDICARE

## 2020-02-12 VITALS
SYSTOLIC BLOOD PRESSURE: 125 MMHG | TEMPERATURE: 99 F | DIASTOLIC BLOOD PRESSURE: 72 MMHG | WEIGHT: 180.63 LBS | BODY MASS INDEX: 33.24 KG/M2 | HEIGHT: 62 IN | HEART RATE: 89 BPM

## 2020-02-12 DIAGNOSIS — K14.3 BLACK HAIRY TONGUE: ICD-10-CM

## 2020-02-12 DIAGNOSIS — Z87.09 HISTORY OF ASTHMA: ICD-10-CM

## 2020-02-12 DIAGNOSIS — K21.9 GASTROESOPHAGEAL REFLUX DISEASE, ESOPHAGITIS PRESENCE NOT SPECIFIED: ICD-10-CM

## 2020-02-12 PROCEDURE — 3074F PR MOST RECENT SYSTOLIC BLOOD PRESSURE < 130 MM HG: ICD-10-PCS | Mod: CPTII,S$GLB,, | Performed by: OTOLARYNGOLOGY

## 2020-02-12 PROCEDURE — 99214 PR OFFICE/OUTPT VISIT, EST, LEVL IV, 30-39 MIN: ICD-10-PCS | Mod: S$GLB,,, | Performed by: OTOLARYNGOLOGY

## 2020-02-12 PROCEDURE — 1159F MED LIST DOCD IN RCRD: CPT | Mod: S$GLB,,, | Performed by: OTOLARYNGOLOGY

## 2020-02-12 PROCEDURE — 1101F PT FALLS ASSESS-DOCD LE1/YR: CPT | Mod: CPTII,S$GLB,, | Performed by: OTOLARYNGOLOGY

## 2020-02-12 PROCEDURE — 3078F DIAST BP <80 MM HG: CPT | Mod: CPTII,S$GLB,, | Performed by: OTOLARYNGOLOGY

## 2020-02-12 PROCEDURE — 1159F PR MEDICATION LIST DOCUMENTED IN MEDICAL RECORD: ICD-10-PCS | Mod: S$GLB,,, | Performed by: OTOLARYNGOLOGY

## 2020-02-12 PROCEDURE — 3078F PR MOST RECENT DIASTOLIC BLOOD PRESSURE < 80 MM HG: ICD-10-PCS | Mod: CPTII,S$GLB,, | Performed by: OTOLARYNGOLOGY

## 2020-02-12 PROCEDURE — 1101F PR PT FALLS ASSESS DOC 0-1 FALLS W/OUT INJ PAST YR: ICD-10-PCS | Mod: CPTII,S$GLB,, | Performed by: OTOLARYNGOLOGY

## 2020-02-12 PROCEDURE — 99214 OFFICE O/P EST MOD 30 MIN: CPT | Mod: S$GLB,,, | Performed by: OTOLARYNGOLOGY

## 2020-02-12 PROCEDURE — 3074F SYST BP LT 130 MM HG: CPT | Mod: CPTII,S$GLB,, | Performed by: OTOLARYNGOLOGY

## 2020-02-12 RX ORDER — FLUTICASONE PROPIONATE 50 MCG
SPRAY, SUSPENSION (ML) NASAL
Qty: 16 G | Refills: 1 | Status: SHIPPED | OUTPATIENT
Start: 2020-02-12 | End: 2020-07-23 | Stop reason: SDUPTHER

## 2020-02-12 RX ORDER — CLOTRIMAZOLE 10 MG/1
10 LOZENGE ORAL; TOPICAL
Qty: 50 TROCHE | Refills: 0 | Status: SHIPPED | OUTPATIENT
Start: 2020-02-12 | End: 2020-10-30

## 2020-02-12 RX ORDER — OMEPRAZOLE 20 MG/1
20 CAPSULE, DELAYED RELEASE ORAL 2 TIMES DAILY
Qty: 60 CAPSULE | Refills: 1 | Status: SHIPPED | OUTPATIENT
Start: 2020-02-12 | End: 2022-06-09

## 2020-02-12 NOTE — PATIENT INSTRUCTIONS
Generic Mycelex lozenges reordered.  Trial of fluticasone nasal spray 2 sprays in each nostril every evening.  Rinse and gargle after each use of inhaler.  Keep mouth moist and stay hydrated.  OTC Biotene products for dry mouth as needed.  Omeprazole as needed for acid reflux and follow up with GI.     Follow up in 2 -3 weeks.

## 2020-03-04 ENCOUNTER — LAB VISIT (OUTPATIENT)
Dept: LAB | Facility: OTHER | Age: 80
End: 2020-03-04
Attending: OTOLARYNGOLOGY
Payer: MEDICARE

## 2020-03-04 ENCOUNTER — OFFICE VISIT (OUTPATIENT)
Dept: OTOLARYNGOLOGY | Facility: CLINIC | Age: 80
End: 2020-03-04
Payer: MEDICARE

## 2020-03-04 VITALS
BODY MASS INDEX: 33.38 KG/M2 | WEIGHT: 181.38 LBS | SYSTOLIC BLOOD PRESSURE: 125 MMHG | DIASTOLIC BLOOD PRESSURE: 67 MMHG | TEMPERATURE: 98 F | HEART RATE: 87 BPM | HEIGHT: 62 IN

## 2020-03-04 DIAGNOSIS — K21.9 GASTROESOPHAGEAL REFLUX DISEASE, ESOPHAGITIS PRESENCE NOT SPECIFIED: ICD-10-CM

## 2020-03-04 DIAGNOSIS — D33.3 VESTIBULAR SCHWANNOMA: ICD-10-CM

## 2020-03-04 DIAGNOSIS — H90.3 ASYMMETRICAL SENSORINEURAL HEARING LOSS: ICD-10-CM

## 2020-03-04 DIAGNOSIS — Z87.09 HISTORY OF ASTHMA: ICD-10-CM

## 2020-03-04 DIAGNOSIS — K14.3 BLACK HAIRY TONGUE: ICD-10-CM

## 2020-03-04 PROCEDURE — 1159F PR MEDICATION LIST DOCUMENTED IN MEDICAL RECORD: ICD-10-PCS | Mod: S$GLB,,, | Performed by: OTOLARYNGOLOGY

## 2020-03-04 PROCEDURE — 1101F PT FALLS ASSESS-DOCD LE1/YR: CPT | Mod: CPTII,S$GLB,, | Performed by: OTOLARYNGOLOGY

## 2020-03-04 PROCEDURE — 3074F PR MOST RECENT SYSTOLIC BLOOD PRESSURE < 130 MM HG: ICD-10-PCS | Mod: CPTII,S$GLB,, | Performed by: OTOLARYNGOLOGY

## 2020-03-04 PROCEDURE — 99214 PR OFFICE/OUTPT VISIT, EST, LEVL IV, 30-39 MIN: ICD-10-PCS | Mod: S$GLB,,, | Performed by: OTOLARYNGOLOGY

## 2020-03-04 PROCEDURE — 1159F MED LIST DOCD IN RCRD: CPT | Mod: S$GLB,,, | Performed by: OTOLARYNGOLOGY

## 2020-03-04 PROCEDURE — 3078F PR MOST RECENT DIASTOLIC BLOOD PRESSURE < 80 MM HG: ICD-10-PCS | Mod: CPTII,S$GLB,, | Performed by: OTOLARYNGOLOGY

## 2020-03-04 PROCEDURE — 3074F SYST BP LT 130 MM HG: CPT | Mod: CPTII,S$GLB,, | Performed by: OTOLARYNGOLOGY

## 2020-03-04 PROCEDURE — 82565 ASSAY OF CREATININE: CPT

## 2020-03-04 PROCEDURE — 36415 COLL VENOUS BLD VENIPUNCTURE: CPT

## 2020-03-04 PROCEDURE — 3078F DIAST BP <80 MM HG: CPT | Mod: CPTII,S$GLB,, | Performed by: OTOLARYNGOLOGY

## 2020-03-04 PROCEDURE — 1101F PR PT FALLS ASSESS DOC 0-1 FALLS W/OUT INJ PAST YR: ICD-10-PCS | Mod: CPTII,S$GLB,, | Performed by: OTOLARYNGOLOGY

## 2020-03-04 PROCEDURE — 99214 OFFICE O/P EST MOD 30 MIN: CPT | Mod: S$GLB,,, | Performed by: OTOLARYNGOLOGY

## 2020-03-04 RX ORDER — NYSTATIN 100000 [USP'U]/ML
4 SUSPENSION ORAL
Qty: 160 ML | Refills: 1 | Status: SHIPPED | OUTPATIENT
Start: 2020-03-04 | End: 2022-06-09

## 2020-03-04 NOTE — PATIENT INSTRUCTIONS
Nystatin oral rinse as prescribed.  Keep mouth moist and stay hydrated.  Consider Biotene products for dry mouth.  Use alcohol free mouth wash.  Continue to rinse and gargle after each use of inhaler.  Continue fluticasone nasal spray 2 sprays in each nostril every evening.  Continue omeprazole twice daily.  Reflux precautions as reviewed.  Proceed with blood test and MRI.  Follow-up for recheck and audiogram and possible scope in next few weeks.

## 2020-03-04 NOTE — PROGRESS NOTES
"Subjective:       Patient ID: Tamiko Youssef is a 79 y.o. female.    Chief Complaint: Follow-up (tongue is starting to give problems again wants some more lozenges)    Here today complaining that her tongue is bothering her again.  States has gotten discolored darkened and furry again.  No associated symptoms including no soreness or burning or dryness.  Last seen in August 2019 and took course of Mycelex lozenges and states "went away but not completely".  Then noted recurrence over the past 10-14 days.  Has been gargling with peroxide as well as Listerine.  Gargles and rinses after Symbicort use.  Has GERD "all the time".  Last saw outside GI 3 or 4 months ago for abdominal pain.  Denies nasal congestion, mouth breathing, dry mouth versus slightly dry.            Review of Systems   Ears: Positive for hearing loss.  Negative for ear pressure, ringing in ear, ear discharge, ear infections, head trauma and family history of hearing loss.    Nose:  Negative for nosebleeds, nasal obstruction, nasal or sinus surgery, loss of smell and snoring.    Mouth/Throat: Negative for pain swallowing, throat mass and neck mass.   Constitutional: Negative for recent unexplained weight loss and fever.    Eyes:  Positive for history of glaucoma.   Cardiovascular:  Positive for history of high blood pressure. Negative for chest pain and palpitations.   Respiratory:  Positive for asthma. Negative for emphysema and history of tuberculosis.    Gastrointestinal:  Positive for acid reflux and indigestion. Negative for history of stomach ulcers or pain and blood in stool.   Other:  Positive for arthritis. Negative for kidney problem, bladder problem, slurred, swollen glands, anemia and persistent infections.           Objective:        Vitals:    02/12/20 1056   BP: 125/72   Pulse: 89   Temp: 98.5 °F (36.9 °C)     Body mass index is 33.03 kg/m².  Physical Exam   Constitutional: She is oriented to person, place, and time. She appears " "well-developed and well-nourished. No distress.   HENT:   Head: Normocephalic and atraumatic.   Right Ear: Tympanic membrane, external ear and ear canal normal.   Left Ear: Tympanic membrane, external ear and ear canal normal.   Nose: No mucosal edema, rhinorrhea or nasal deformity. No epistaxis.   Mouth/Throat: Uvula is midline. No trismus in the jaw. No uvula swelling. No oropharyngeal exudate, posterior oropharyngeal edema or posterior oropharyngeal erythema.   Central dorsum of tongue dark, thickened, hairy consistent with "black hairy tongue".   Neck: Phonation normal. Neck supple.   Pulmonary/Chest: Effort normal. No respiratory distress.   Lymphadenopathy:     She has no cervical adenopathy.   Neurological: She is alert and oriented to person, place, and time.   Skin: Skin is warm and dry.   Psychiatric: She has a normal mood and affect. Her behavior is normal. Her speech is not slurred.       Tests / Results:        Assessment:       1. Black hairy tongue    2. Gastroesophageal reflux disease, esophagitis presence not specified    3. History of asthma        Plan:       Reviewed above and options and recommendations and answered questions.  Course of Mycelex prescribed and reviewed use.  Keep mouth moist and stay hydrated.  Rinse and gargle after each Symbicort use.  Gentle tongue brushing daily.  Over-the-counter Biotene dry mouth products as needed.  Reflux precautions and take omeprazole 20 mg twice daily as needed and follow up with GI.  Trial of fluticasone nasal spray every evening.  Follow-up in 2-3 weeks.  Also due for follow-up audiogram end of March/April 2020.           "

## 2020-03-05 NOTE — PROGRESS NOTES
Subjective:       Patient ID: Tamiko Youssef is a 79 y.o. female.    Chief Complaint: Other (tongue was better but back again)    Returns for follow-up.  Took the Mycelex lozenges as prescribed and states helped her tongue but now recurring.  States her tongue is somewhat whitish and dark centrally and thickened.  Upset about this but no associated pain or soreness.  Just started the fluticasone nasal spray within the past week.  Mouth is dry in the morning.  Taking omeprazole twice daily and helping and believes GERD much better now.  Discussed reflux precautions again and has been taking mint lozenges, apple cider vinegar, orange juice, carbonated beverages.  Occasional cough and raspy vocal quality.  Continues on Symbicort and rinses and gargles after each use.  Due for follow-up MRI for monitoring vestibular schwannoma.  Denies headache or change in already poor hearing AS.  Continues to wear right hearing aid much of the time with benefit.        Review of Systems   Ears: Positive for hearing loss.  Negative for ear pressure, ringing in ear, ear discharge, ear infections, head trauma and family history of hearing loss.    Nose:  Negative for nosebleeds, nasal obstruction, nasal or sinus surgery, loss of smell and snoring.    Mouth/Throat: Negative for pain swallowing, throat mass and neck mass.   Constitutional: Negative for recent unexplained weight loss and fever.    Eyes:  Positive for history of glaucoma.   Cardiovascular:  Positive for history of high blood pressure. Negative for chest pain and palpitations.   Respiratory:  Positive for asthma. Negative for emphysema and history of tuberculosis.    Gastrointestinal:  Positive for acid reflux and indigestion. Negative for history of stomach ulcers or pain and blood in stool.   Other:  Positive for arthritis. Negative for kidney problem, bladder problem, slurred, swollen glands, anemia and persistent infections.           Objective:        Vitals:     03/04/20 1129   BP: 125/67   Pulse: 87   Temp: 97.5 °F (36.4 °C)     Body mass index is 33.18 kg/m².  Physical Exam   Constitutional: She is oriented to person, place, and time. She appears well-developed and well-nourished. No distress.   HENT:   Head: Normocephalic and atraumatic.   Right Ear: Tympanic membrane, external ear and ear canal normal.   Left Ear: Tympanic membrane, external ear and ear canal normal.   Nose: No mucosal edema, rhinorrhea or nasal deformity. No epistaxis.   Mouth/Throat: Uvula is midline. No trismus in the jaw. No uvula swelling. No oropharyngeal exudate, posterior oropharyngeal edema or posterior oropharyngeal erythema.   Tongue appearance is improved but some residual brownish discoloration and thickening centrally and no lesions.      Neck: Phonation normal. Neck supple.   Pulmonary/Chest: Effort normal. No respiratory distress.   Lymphadenopathy:     She has no cervical adenopathy.   Neurological: She is alert and oriented to person, place, and time.   Skin: Skin is warm and dry.   Psychiatric: She has a normal mood and affect. Her behavior is normal. Her speech is not slurred.       Tests / Results:        Assessment:       1. Black hairy tongue    2. Gastroesophageal reflux disease, esophagitis presence not specified    3. History of asthma    4. Vestibular schwannoma    5. Asymmetrical sensorineural hearing loss        Plan:        Again discussed her concerns about the appearance of her tongue and reassured her this is nothing serious, worrisome, or life-threatening.  Reviewed recommendations and would like course of nystatin as prescribed.  Again reviewed mouth care and gentle tongue brushing.  Keep mouth moist and stay hydrated.  Rinse and gargle after each use of Symbicort.  Avoid harsh mouthwash and try alcohol free as well as Biotene products.  Discontinue lozenges.  Reflux precautions reviewed.  Continue omeprazole twice daily for now.  Continue fluticasone nasal spray every  evening.  Due for follow-up MRI for monitoring vestibular schwannoma as ordered.  Follow-up in 3 weeks.

## 2020-03-16 ENCOUNTER — HOSPITAL ENCOUNTER (OUTPATIENT)
Dept: RADIOLOGY | Facility: OTHER | Age: 80
Discharge: HOME | End: 2020-03-16
Attending: OTOLARYNGOLOGY
Payer: MEDICARE

## 2020-03-16 DIAGNOSIS — D33.3 VESTIBULAR SCHWANNOMA: ICD-10-CM

## 2020-03-16 PROCEDURE — A9585 GADOBUTROL INJECTION: HCPCS | Performed by: OTOLARYNGOLOGY

## 2020-03-16 PROCEDURE — 25500020 PHARM REV CODE 255: Performed by: OTOLARYNGOLOGY

## 2020-03-16 PROCEDURE — 70553 MRI BRAIN STEM W/O & W/DYE: CPT | Mod: 26,,, | Performed by: RADIOLOGY

## 2020-03-16 PROCEDURE — 70553 MRI BRAIN STEM W/O & W/DYE: CPT | Mod: TC

## 2020-03-16 PROCEDURE — 70553 MRI IAC/TEMPORAL BONES W W/O CONTRAST: ICD-10-PCS | Mod: 26,,, | Performed by: RADIOLOGY

## 2020-03-16 RX ORDER — GADOBUTROL 604.72 MG/ML
8 INJECTION INTRAVENOUS
Status: COMPLETED | OUTPATIENT
Start: 2020-03-16 | End: 2020-03-16

## 2020-03-16 RX ADMIN — GADOBUTROL 8 ML: 604.72 INJECTION INTRAVENOUS at 11:03

## 2020-03-27 ENCOUNTER — TELEPHONE (OUTPATIENT)
Dept: PAIN MEDICINE | Facility: CLINIC | Age: 80
End: 2020-03-27

## 2020-03-27 NOTE — TELEPHONE ENCOUNTER
Staff was unable to leave a message with patient  in regards to patient appt originally schedule for 4/29/20 with CARLYN Marina Gallegos due to situation at hand CARLYN has been redeployed to another area in the hospital, staff informed patient they would have to convert patient appointment to provider schedule Dr. Leon for a telephone visit for 4/29/20

## 2020-05-04 ENCOUNTER — TELEPHONE (OUTPATIENT)
Dept: OTOLARYNGOLOGY | Facility: CLINIC | Age: 80
End: 2020-05-04

## 2020-05-04 NOTE — TELEPHONE ENCOUNTER
LM for patient to call me I have results of MRI Dr. Lieberman reviewed and I need to make appt. To get Audio and follow up visit

## 2020-05-04 NOTE — TELEPHONE ENCOUNTER
Please let her know that the MRI of her head showed the growth on her ear nerve is about the same.  She is due for a follow up audiogram however, so please help her set this up and a follow up.

## 2020-07-23 ENCOUNTER — OFFICE VISIT (OUTPATIENT)
Dept: OTOLARYNGOLOGY | Facility: CLINIC | Age: 80
End: 2020-07-23
Payer: MEDICARE

## 2020-07-23 VITALS
HEART RATE: 89 BPM | SYSTOLIC BLOOD PRESSURE: 126 MMHG | TEMPERATURE: 98 F | BODY MASS INDEX: 32.61 KG/M2 | HEIGHT: 62 IN | WEIGHT: 177.19 LBS | DIASTOLIC BLOOD PRESSURE: 74 MMHG

## 2020-07-23 DIAGNOSIS — K14.3 BLACK HAIRY TONGUE: ICD-10-CM

## 2020-07-23 DIAGNOSIS — H90.3 ASYMMETRICAL SENSORINEURAL HEARING LOSS: Primary | ICD-10-CM

## 2020-07-23 DIAGNOSIS — K21.9 GASTROESOPHAGEAL REFLUX DISEASE, ESOPHAGITIS PRESENCE NOT SPECIFIED: ICD-10-CM

## 2020-07-23 DIAGNOSIS — D33.3 VESTIBULAR SCHWANNOMA: ICD-10-CM

## 2020-07-23 DIAGNOSIS — Z87.09 HISTORY OF ASTHMA: ICD-10-CM

## 2020-07-23 PROCEDURE — 1159F MED LIST DOCD IN RCRD: CPT | Mod: S$GLB,,, | Performed by: OTOLARYNGOLOGY

## 2020-07-23 PROCEDURE — 99214 PR OFFICE/OUTPT VISIT, EST, LEVL IV, 30-39 MIN: ICD-10-PCS | Mod: S$GLB,,, | Performed by: OTOLARYNGOLOGY

## 2020-07-23 PROCEDURE — 3074F SYST BP LT 130 MM HG: CPT | Mod: CPTII,S$GLB,, | Performed by: OTOLARYNGOLOGY

## 2020-07-23 PROCEDURE — 1101F PT FALLS ASSESS-DOCD LE1/YR: CPT | Mod: CPTII,S$GLB,, | Performed by: OTOLARYNGOLOGY

## 2020-07-23 PROCEDURE — 3078F PR MOST RECENT DIASTOLIC BLOOD PRESSURE < 80 MM HG: ICD-10-PCS | Mod: CPTII,S$GLB,, | Performed by: OTOLARYNGOLOGY

## 2020-07-23 PROCEDURE — 99214 OFFICE O/P EST MOD 30 MIN: CPT | Mod: S$GLB,,, | Performed by: OTOLARYNGOLOGY

## 2020-07-23 PROCEDURE — 1159F PR MEDICATION LIST DOCUMENTED IN MEDICAL RECORD: ICD-10-PCS | Mod: S$GLB,,, | Performed by: OTOLARYNGOLOGY

## 2020-07-23 PROCEDURE — 1101F PR PT FALLS ASSESS DOC 0-1 FALLS W/OUT INJ PAST YR: ICD-10-PCS | Mod: CPTII,S$GLB,, | Performed by: OTOLARYNGOLOGY

## 2020-07-23 PROCEDURE — 3078F DIAST BP <80 MM HG: CPT | Mod: CPTII,S$GLB,, | Performed by: OTOLARYNGOLOGY

## 2020-07-23 PROCEDURE — 3074F PR MOST RECENT SYSTOLIC BLOOD PRESSURE < 130 MM HG: ICD-10-PCS | Mod: CPTII,S$GLB,, | Performed by: OTOLARYNGOLOGY

## 2020-07-23 RX ORDER — NYSTATIN 100000 [USP'U]/ML
SUSPENSION ORAL
Qty: 240 ML | Refills: 1 | Status: SHIPPED | OUTPATIENT
Start: 2020-07-23 | End: 2020-10-30

## 2020-07-23 RX ORDER — TIZANIDINE HYDROCHLORIDE 4 MG/1
CAPSULE, GELATIN COATED ORAL 3 TIMES DAILY PRN
COMMUNITY
End: 2021-07-23 | Stop reason: SDUPTHER

## 2020-07-23 RX ORDER — FLUTICASONE PROPIONATE 50 MCG
SPRAY, SUSPENSION (ML) NASAL
Qty: 16 G | Refills: 1 | Status: SHIPPED | OUTPATIENT
Start: 2020-07-23

## 2020-07-23 NOTE — PATIENT INSTRUCTIONS
Nystatin oral rinse as prescribed.  Keep mouth moist and stay hydrated.  Consider Biotene products for dry mouth.  Use alcohol free mouth wash.  Continue to rinse and gargle after each use of inhaler.  Continue fluticasone nasal spray 2 sprays in each nostril every evening.  Continue omeprazole twice daily.  Reflux precautions as reviewed.  Follow up with GI.  Needs follow-up for audiogram.  Otherwise one year unless change or problems prior.

## 2020-07-24 ENCOUNTER — OFFICE VISIT (OUTPATIENT)
Dept: PAIN MEDICINE | Facility: CLINIC | Age: 80
End: 2020-07-24
Payer: MEDICARE

## 2020-07-24 VITALS
HEIGHT: 62 IN | SYSTOLIC BLOOD PRESSURE: 129 MMHG | HEART RATE: 86 BPM | DIASTOLIC BLOOD PRESSURE: 76 MMHG | WEIGHT: 174 LBS | BODY MASS INDEX: 32.02 KG/M2

## 2020-07-24 DIAGNOSIS — M79.18 MYOFASCIAL PAIN: ICD-10-CM

## 2020-07-24 DIAGNOSIS — M47.816 LUMBAR SPONDYLOSIS: ICD-10-CM

## 2020-07-24 DIAGNOSIS — M51.36 DDD (DEGENERATIVE DISC DISEASE), LUMBAR: ICD-10-CM

## 2020-07-24 DIAGNOSIS — G89.4 CHRONIC PAIN DISORDER: ICD-10-CM

## 2020-07-24 DIAGNOSIS — M54.16 LUMBAR RADICULOPATHY: Primary | ICD-10-CM

## 2020-07-24 DIAGNOSIS — M53.3 SACROILIAC JOINT PAIN: ICD-10-CM

## 2020-07-24 PROCEDURE — 1159F PR MEDICATION LIST DOCUMENTED IN MEDICAL RECORD: ICD-10-PCS | Mod: S$GLB,,, | Performed by: NURSE PRACTITIONER

## 2020-07-24 PROCEDURE — 3288F PR FALLS RISK ASSESSMENT DOCUMENTED: ICD-10-PCS | Mod: CPTII,S$GLB,, | Performed by: NURSE PRACTITIONER

## 2020-07-24 PROCEDURE — 3074F PR MOST RECENT SYSTOLIC BLOOD PRESSURE < 130 MM HG: ICD-10-PCS | Mod: CPTII,S$GLB,, | Performed by: NURSE PRACTITIONER

## 2020-07-24 PROCEDURE — 1125F PR PAIN SEVERITY QUANTIFIED, PAIN PRESENT: ICD-10-PCS | Mod: S$GLB,,, | Performed by: NURSE PRACTITIONER

## 2020-07-24 PROCEDURE — 1100F PTFALLS ASSESS-DOCD GE2>/YR: CPT | Mod: CPTII,S$GLB,, | Performed by: NURSE PRACTITIONER

## 2020-07-24 PROCEDURE — 3078F DIAST BP <80 MM HG: CPT | Mod: CPTII,S$GLB,, | Performed by: NURSE PRACTITIONER

## 2020-07-24 PROCEDURE — 1159F MED LIST DOCD IN RCRD: CPT | Mod: S$GLB,,, | Performed by: NURSE PRACTITIONER

## 2020-07-24 PROCEDURE — 1100F PR PT FALLS ASSESS DOC 2+ FALLS/FALL W/INJURY/YR: ICD-10-PCS | Mod: CPTII,S$GLB,, | Performed by: NURSE PRACTITIONER

## 2020-07-24 PROCEDURE — 1125F AMNT PAIN NOTED PAIN PRSNT: CPT | Mod: S$GLB,,, | Performed by: NURSE PRACTITIONER

## 2020-07-24 PROCEDURE — 99213 PR OFFICE/OUTPT VISIT, EST, LEVL III, 20-29 MIN: ICD-10-PCS | Mod: S$GLB,,, | Performed by: NURSE PRACTITIONER

## 2020-07-24 PROCEDURE — 3074F SYST BP LT 130 MM HG: CPT | Mod: CPTII,S$GLB,, | Performed by: NURSE PRACTITIONER

## 2020-07-24 PROCEDURE — 3078F PR MOST RECENT DIASTOLIC BLOOD PRESSURE < 80 MM HG: ICD-10-PCS | Mod: CPTII,S$GLB,, | Performed by: NURSE PRACTITIONER

## 2020-07-24 PROCEDURE — 99213 OFFICE O/P EST LOW 20 MIN: CPT | Mod: S$GLB,,, | Performed by: NURSE PRACTITIONER

## 2020-07-24 PROCEDURE — 99999 PR PBB SHADOW E&M-EST. PATIENT-LVL IV: CPT | Mod: PBBFAC,,, | Performed by: NURSE PRACTITIONER

## 2020-07-24 PROCEDURE — 3288F FALL RISK ASSESSMENT DOCD: CPT | Mod: CPTII,S$GLB,, | Performed by: NURSE PRACTITIONER

## 2020-07-24 PROCEDURE — 99999 PR PBB SHADOW E&M-EST. PATIENT-LVL IV: ICD-10-PCS | Mod: PBBFAC,,, | Performed by: NURSE PRACTITIONER

## 2020-07-24 NOTE — PROGRESS NOTES
Chronic patient Established Note (Follow up visit)      SUBJECTIVE:    Interval History 7/24/2020:  The patient returns to clinic today for follow up of low back pain. She reports increased pain over the last two months. She reports low back pain that radiates down the lateral aspect of both legs to her knees, left greater than right. Her pain is worse with standing and walking. She continues to take Gabapentin. She denies any other health changes. Her pain today is 9/10.    Interval History 1/28/2020:  Tamiko Youssef presents to the clinic for a follow-up appointment for lower back pain. She is s/p bilateral L4/5 TF SYLVIA on 1/8/2020. She reports 80% relief of his low back and leg pain. She continues to report low back pain that is aching in nature. She denies any radiating leg pain today. Her pain is worse with prolonged standing and walking. She continues to take Gabapentin with benefit. She continues to perform a home exercise routine. She denies any other health changes. Her pain today is 8/10.      Pain Disability Index Review:  Last 3 PDI Scores 7/24/2020 1/28/2020 11/15/2019   Pain Disability Index (PDI) 48 0 52       Pain Medications:  Gabapentin 300 mg BID    Opioid Contract: yes     report:  Reviewed and consistent with medication use as prescribed.    Pain Procedures:   3/28/14, 3/13/13 Bilateral L4 TF SYLVIA  8/22/18 Bilateral L4 TF SYLVIA- 75% relief  1/8/2020- Bilateral L4/5 TF SYLVIA - 80% relief     Physical Therapy/Home Exercise: no    Imaging:     Narrative     EXAMINATION:  XR LUMBAR SPINE 5 VIEW WITH FLEX AND EXT    CLINICAL HISTORY:  Low back pain, >6wks conservative tx, persistent-progressive sx, surgical candidate;  Other intervertebral disc degeneration, lumbar region    TECHNIQUE:  Five views of the lumbar spine plus flexion extension views were performed.    COMPARISON:  09/09/2015.    FINDINGS:  There is 3 mm anterolisthesis of L3 on L4, L4 on L5, and L5 on S1.  No translational instability is  noted on the flexion and extension radiographs.  Vertebral body heights are well maintained.  There is mild disc space narrowing present at the L3-4, L4-5, and L5-S1 levels.  There is facet arthropathy within the mid to lower lumbar spine and lumbosacral junction.  There is no evidence for spondylolysis.  No abnormal paraspinal masses are evident.  Sacroiliac joints appear unremarkable.      Impression       3 mm anterolisthesis of L3 on L4, L4 and L5, and L5 on S1.  This appears to be degenerative in etiology with no evidence for translational instability.    Lumbar spondylosis.-     Narrative     EXAMINATION:  XR HIPS BILATERAL 2 VIEW INCL AP PELVIS    CLINICAL HISTORY:  Other intervertebral disc degeneration, lumbar region    TECHNIQUE:  AP view of the pelvis and frogleg lateral views of both hips were performed.    COMPARISON:  03/20/2014.    FINDINGS:  The bones are intact.  There is no evidence for acute fracture or bone destruction.  There are mild symmetric degenerative changes of the hips.  Sacroiliac joints appear unremarkable.  There are degenerative changes within the lower lumbar spine.  Soft tissues are unremarkable.      Impression       No evidence for acute fracture, bone destruction, or dislocation.         MRI Thoracic Spine Without Contrast     Narrative     MRI thoracic spine without contrast.    Findings: There is a mild dextroscoliosis of the thoracic spine.  The vertebral body heights are satisfactorily maintained.  There is loss of disk height with degenerative endplate change throughout the thoracic spine.  There are mild disk bulges   identified at T2-3, T3-4, T4-5, T8-9, and a mild disk osteophyte complex at T11-12.  This is mild central canal narrowing at T11-12.  The visualized portion of the intra-abdominal content is significant for a 2.8-cm left-sided renal cyst.  There is no   evidence of an acute marrow replacement process such as tumor or infection.  There is no fracture.  The  spinal cord has a normal appearance.  There is no intradural abnormality identified.      Impression      Mild multilevel degenerative change with mild central canal stenosis at T11-12.      Electronically signed by: PATRICIA EDGAR MD  Date: 03/27/14  Time: 14:57      MRI Lumbar Spine Without Contrast    Narrative     Procedure:  Non-contrast MRI of the lumbosacral spine    Technique: sagittal T1, T2 and STIR and axial T1 and T2 images of the lumbosacral spine without contrast.     Comparison: CT abdomen and pelvis on 1/11/3    Findings: Partially included in the  imaging is left renal lesion.  Please see CT report for further details.    There is trace 1-2 mm of anterior listhesis of L4 on L5 and L5 on S1.  . The lumbar vertebral body heights, contours and bone marrow signal is within normal limits and without evidence for acute fracture or subluxation. There is degenerative disk disease   with disk desiccation all levels there is present degenerative changes are more prominent in the lower thoracic spine partially included in this study specifically at T11/T12 level with moderate to severe height loss.        The distal spinal cord and conus is normal in signal and contour the tip of the conus approximates the inferior A6ptiup.    T11/T12 including the sagittal field only there is a posterior discussed by complex with slight retrolisthesis of T11 on T12 with probable mild center canal stenosis and mild bilateral neural foraminal stenosis.    T12/L1 through L1/L2: No significant disc bulge, central canal or neural foraminal stenosis.     L2/L3: No significant disc bulge, central canal or neural foraminal stenosis. No significant disc bulge, central canal or neural foraminal stenosis.    L3/L4: Small disk bulge with ligamentum flavum hypertrophy without significant central canal stenosis with mild neural foraminal stenosis bilaterally.    L4/L5: Bulging disk with ligamentum flavum hypertrophy and facet joint  arthropathy without significant central canal stenosis with mild neural foraminal stenosis.      L5/S1: Bulging disk with facet joint arthropathy without significant central canal stenosis with mild bilateral foraminal stenosis.      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      Impression       Mild spondylo-degenerative change of the lumbosacral spine as detailed above without significant central canal stenosis.      Please note there is degenerative change at the lower thoracic spine specifically at the T11/T12 level with posterior disk osteophyte complex resulting in probable mild central canal and mild bilateral foraminal stenosis.    See above for additional details.      Electronically signed by: ALEKSANDER POLLACK DO  Date: 04/09/13  Time: 10:58          Allergies:   Review of patient's allergies indicates:   Allergen Reactions    Naprosyn [naproxen]     Norco [hydrocodone-acetaminophen]     Tramadol        Current Medications:   Current Outpatient Medications   Medication Sig Dispense Refill    albuterol (PROVENTIL) 2.5 mg /3 mL (0.083 %) nebulizer solution Take 2.5 mg by nebulization every 6 (six) hours as needed.      ALPRAZolam (XANAX) 0.25 MG tablet TK 1 T PO QD PRA  3    amlodipine (NORVASC) 10 MG tablet Take 10 mg by mouth once daily.      clotrimazole (MYCELEX) 10 mg john Take 1 tablet (10 mg total) by mouth 5 (five) times daily. Dissolve one lozenge in the mouth 5 times a day. 50 John 0    fluticasone propionate (FLONASE) 50 mcg/actuation nasal spray 2 sprays in each nostril every evening. 16 g 1    furosemide (LASIX) 20 MG tablet TK 1 T PO QD PRF FLUID  2    gabapentin (NEURONTIN) 300 MG capsule TAKE 1 CAPSULE BY MOUTH THREE TIMES DAILY 270 capsule 2    latanoprost 0.005 % ophthalmic solution Place 1 drop into both eyes every evening. 3 Bottle 3    mirtazapine (REMERON) 30 MG tablet Take 30 mg by mouth nightly.  3    nystatin (MYCOSTATIN) 100,000 unit/mL  suspension Take 4 mLs (400,000 Units total) by mouth after meals as needed. 160 mL 1    nystatin (MYCOSTATIN) 100,000 unit/mL suspension Swish and gargle 4 times a day for 10 days.  . 240 mL 1    omeprazole (PRILOSEC) 20 MG capsule Take 1 capsule (20 mg total) by mouth 2 (two) times daily. 60 capsule 1    pravastatin (PRAVACHOL) 20 MG tablet   1    SYMBICORT 80-4.5 mcg/actuation HFAA   6    tiZANidine 4 mg Cap Take by mouth 3 (three) times daily as needed.      VENTOLIN HFA 90 mcg/actuation inhaler INL 2 PFS PO Q 4 H UTD PRF SOB  1    walker Misc 1 application by Misc.(Non-Drug; Combo Route) route once daily. 1 each 0     No current facility-administered medications for this visit.        REVIEW OF SYSTEMS:    Constitutional: Positive for unexpected weight change.   HENT: Positive for headache.   Respiratory: Positive for intermittent shortness of breath and wheezing.  (asthma)  Gastrointestinal: Positive for constipation.   Musculoskeletal: Positive for back pain, gait problem and stiffness.     MEDICAL, SURGICAL, FAMILY, SOCIAL HX: reviewed    Past Medical History:  Past Medical History:   Diagnosis Date    Asthma     Cataract     Glaucoma     Hypertension        Past Surgical History:  Past Surgical History:   Procedure Laterality Date    CATARACT EXTRACTION W/  INTRAOCULAR LENS IMPLANT Bilateral     CHOLECYSTECTOMY      EYE SURGERY      HYSTERECTOMY      NECK SURGERY      cyst removed    TRANSFORAMINAL EPIDURAL INJECTION OF STEROID Bilateral 1/8/2020    Procedure: INJECTION, STEROID, EPIDURAL, TRANSFORAMINAL APPROACH;  Surgeon: Obdulio Leon MD;  Location: [facility];  Service: Pain Management;  Laterality: Bilateral;  B/L TFESI L4       Family History:  Family History   Problem Relation Age of Onset    Cataracts Son     Glaucoma Son     No Known Problems Mother     No Known Problems Father     Macular degeneration Neg Hx        Social History:  Social History     Socioeconomic  "History    Marital status: Single     Spouse name: Not on file    Number of children: Not on file    Years of education: Not on file    Highest education level: Not on file   Occupational History    Not on file   Social Needs    Financial resource strain: Not on file    Food insecurity     Worry: Not on file     Inability: Not on file    Transportation needs     Medical: Not on file     Non-medical: Not on file   Tobacco Use    Smoking status: Never Smoker    Smokeless tobacco: Never Used   Substance and Sexual Activity    Alcohol use: No    Drug use: Never    Sexual activity: Not on file   Lifestyle    Physical activity     Days per week: Not on file     Minutes per session: Not on file    Stress: Not on file   Relationships    Social connections     Talks on phone: Not on file     Gets together: Not on file     Attends Yazdanism service: Not on file     Active member of club or organization: Not on file     Attends meetings of clubs or organizations: Not on file     Relationship status: Not on file   Other Topics Concern    Not on file   Social History Narrative    Not on file       OBJECTIVE:    /76   Pulse 86   Ht 5' 2" (1.575 m)   Wt 78.9 kg (174 lb)   BMI 31.83 kg/m²     PHYSICAL EXAMINATION:    GENERAL: Well appearing, in no acute distress, alert and oriented x3.  PSYCH:  Mood and affect appropriate.  SKIN: Skin color, texture, turgor normal, no rashes or lesions.  HEAD/FACE:  Normocephalic, atraumatic. Cranial nerves grossly intact.  CV: RRR with palpation of the radial artery.  PULM: No evidence of respiratory difficulty, symmetric chest rise.  GI:  Soft and non-tender.  BACK: Straight leg raising in the sitting position is positive for radicular pain bilaterally.  Mild pain to palpation over lumbar paraspinals.  There is pain with palpation over lumbar facet joints.  Limited ROM with pain on flexion and extension.  Positive facet loading bilaterally.  There is pain with " palpation to bilateral SI joints.  REGINA is positive bilaterally.  EXTREMITIES: Peripheral joint ROM is full and pain free without obvious instability or laxity in all four extremities. No deformities, edema, or skin discoloration. Good capillary refill.  MUSCULOSKELETAL:  Bilateral lower extremity strength is normal and symmetric.  No atrophy or tone abnormalities are noted.  NEURO: Bilateral lower extremity coordination and muscle stretch reflexes are physiologic and symmetric.  Plantar response are downgoing. No clonus.  No loss of sensation is noted.  GAIT: Antalgic- ambulates with walker.      ASSESSMENT: 79 y.o. year old female with lower back pain consistent with the following diagnoses:     1. Lumbar radiculopathy     2. Lumbar spondylosis     3. DDD (degenerative disc disease), lumbar     4. Sacroiliac joint pain     5. Myofascial pain     6. Chronic pain disorder           PLAN:     - Previous imaging was reviewed and discussed with the patient today.    - Schedule for bilateral L4/5 TF SYLVIA.     - Consider medial branch blocks versus SI joint injections in the future.    - I have stressed the importance of physical activity and a home exercise plan to help with pain and improve health.    - Continue Gabapentin 300 mg BID.    - Counseled patient regarding the importance of activity modification, constant sleeping habits and physical therapy.    - RTC 2 weeks after above procedure.     - Dr. Leon was consulted on the patient and agrees with this plan.      The above plan and management options were discussed at length with patient. Patient is in agreement with the above and verbalized understanding.    Marina Gallegos NP  07/24/2020

## 2020-07-26 NOTE — PROGRESS NOTES
Subjective:       Patient ID: Tamiko Youssef is a 79 y.o. female.    Chief Complaint: Follow-up    Returns for follow-up, last seen 03/04/2020 and deferred follow-up until now due to Coronavirus outbreak.  States used nystatin after last visit and believes central darkening of tongue cleared up and would like refill.  Has been out of fluticasone nasal spray with some nasal congestion in a.m. and dry mouth.  Continues to rinse and gargle after using her Symbicort inhaler per PCP.  Continues on twice daily omeprazole and has not had followed up with GI yet.  Vague regarding compliance with GERD precautions and reviewed again.  Had MRI for monitoring of vestibular schwannoma since last visit here.  Also due for follow-up audiogram.  Has hearing aid for right ear through outside audiologist and considering obtaining follow-up audiogram there but not thus far.             Review of Systems   Ears: Positive for hearing loss.  Negative for ear pressure, ringing in ear, ear discharge, ear infections and family history of hearing loss.    Nose:  Negative for nosebleeds, nasal obstruction, nasal or sinus surgery, loss of smell and snoring.    Mouth/Throat: Negative for pain swallowing, throat mass and neck mass.   Constitutional: Negative for recent unexplained weight loss and fever.    Eyes:  Positive for history of glaucoma.   Cardiovascular:  Positive for history of high blood pressure. Negative for chest pain and palpitations.   Respiratory:  Positive for asthma. Negative for emphysema and history of tuberculosis.    Gastrointestinal:  Positive for acid reflux and indigestion. Negative for history of stomach ulcers or pain.   Other:  Positive for arthritis. Negative for kidney problem, bladder problem, slurred, swollen glands, anemia and persistent infections.           Objective:        Vitals:    07/23/20 1332   BP: 126/74   Pulse: 89   Temp: 97.8 °F (36.6 °C)     Body mass index is 32.41 kg/m².  Physical  Exam  Constitutional:       General: She is not in acute distress.     Appearance: She is well-developed.   HENT:      Head: Normocephalic and atraumatic.      Right Ear: Tympanic membrane, ear canal and external ear normal.      Left Ear: Tympanic membrane, ear canal and external ear normal.      Nose: No nasal deformity, mucosal edema or rhinorrhea.      Mouth/Throat:      Pharynx: No oropharyngeal exudate, posterior oropharyngeal erythema or uvula swelling.      Comments: Appearance of tongue remains stable with brownish discoloration and mild thickening of dorsum centrally consistent with black hairy tongue.  Edentulous and not wearing her dentures at present.  Neck:      Musculoskeletal: Neck supple.      Trachea: Phonation normal.   Pulmonary:      Effort: Pulmonary effort is normal. No respiratory distress.   Lymphadenopathy:      Cervical: No cervical adenopathy.   Skin:     General: Skin is warm and dry.   Neurological:      Mental Status: She is alert and oriented to person, place, and time.   Psychiatric:         Speech: Speech is not slurred.         Behavior: Behavior normal.         Tests / Results:    MRI of IAC's and temporal bones with contrast done 03/16/2020 report reviewed again and discussed with patient stable 5 6 mm left vestibular schwannoma.    Follow-up audiogram pending as above.        Assessment:       1. Asymmetrical sensorineural hearing loss    2. Vestibular schwannoma    3. Black hairy tongue    4. History of asthma    5. Gastroesophageal reflux disease, esophagitis presence not specified        Plan:       Reviewed above and considerations and recommendations and answered questions.  Nystatin oral rinse refilled and reviewed use.  Keep mouth moist and stay hydrated.  Consider Biotene products for dry mouth.  Gentle tongue brushing.  Alcohol free mouth care products.  Use / resume fluticasone nasal spray 2 sprays in each nostril every evening.  Continue omeprazole twice daily and  reviewed reflux precautions again.  Follow-up with GI.  Understands due for follow-up audiogram and again ordered vs outside audiogram and send/bring copy.  Otherwise follow-up 1 year unless change or problems prior.

## 2020-07-28 ENCOUNTER — TELEPHONE (OUTPATIENT)
Dept: ADMINISTRATIVE | Facility: OTHER | Age: 80
End: 2020-07-28

## 2020-07-29 ENCOUNTER — TELEPHONE (OUTPATIENT)
Dept: ADMINISTRATIVE | Facility: OTHER | Age: 80
End: 2020-07-29

## 2020-07-29 NOTE — TELEPHONE ENCOUNTER
----- Message from Chacha Keita sent at 7/29/2020  2:06 PM CDT -----  Contact: GARY VILLANUEVA [[id number]]  Name of Who is Calling: GARY VILLANUEVA [[id number]]      What is the request in detail: Patient states she is returning a call from office to schedule procedure. Please call      Can the clinic reply by [facility]: no      What Number to Call Back if not in [facility]: [phone] (home)

## 2020-08-06 ENCOUNTER — TELEPHONE (OUTPATIENT)
Dept: PAIN MEDICINE | Facility: CLINIC | Age: 80
End: 2020-08-06

## 2020-08-06 NOTE — TELEPHONE ENCOUNTER
----- Message from Marya Munson sent at 8/6/2020 10:38 AM CDT -----  Regarding: Appointment Access  Name of Who is Calling:  Tamiko Youssef      What is the request in detail:  Patient called requesting to schedule an injection for back pain. Please give a call back at your earliest convenience and further advise.      Thanks!      Reply by [facility]:  no    Call Back:  [phone]

## 2020-08-06 NOTE — TELEPHONE ENCOUNTER
Staff returned call to pt  Regarding scheduling injection      Pt reported she started Nystatin 7/27/20 for an infection    Staff informed pt to call the office when she is done with antibiotic and the schedulers will  her 2 weeks from that day as she has to be cleared of any infection before procedure    Pt verbalized understanding

## 2020-08-10 ENCOUNTER — OFFICE VISIT (OUTPATIENT)
Dept: OTOLARYNGOLOGY | Facility: CLINIC | Age: 80
End: 2020-08-10
Payer: MEDICARE

## 2020-08-10 ENCOUNTER — CLINICAL SUPPORT (OUTPATIENT)
Dept: OTOLARYNGOLOGY | Facility: CLINIC | Age: 80
End: 2020-08-10
Payer: MEDICARE

## 2020-08-10 VITALS
TEMPERATURE: 98 F | HEART RATE: 85 BPM | SYSTOLIC BLOOD PRESSURE: 135 MMHG | HEIGHT: 62 IN | DIASTOLIC BLOOD PRESSURE: 80 MMHG | BODY MASS INDEX: 31.95 KG/M2 | WEIGHT: 173.63 LBS

## 2020-08-10 DIAGNOSIS — H93.299 REDUCED SPEECH DISCRIMINATION: ICD-10-CM

## 2020-08-10 DIAGNOSIS — D33.3 VESTIBULAR SCHWANNOMA: ICD-10-CM

## 2020-08-10 DIAGNOSIS — Z97.4 WEARS HEARING AID IN RIGHT EAR: ICD-10-CM

## 2020-08-10 DIAGNOSIS — Z87.09 HISTORY OF ASTHMA: ICD-10-CM

## 2020-08-10 DIAGNOSIS — H90.3 ASYMMETRICAL SENSORINEURAL HEARING LOSS: ICD-10-CM

## 2020-08-10 DIAGNOSIS — R29.2 ABNORMAL ACOUSTIC REFLEX: ICD-10-CM

## 2020-08-10 DIAGNOSIS — H90.3 ASYMMETRICAL SENSORINEURAL HEARING LOSS: Primary | ICD-10-CM

## 2020-08-10 DIAGNOSIS — K14.3 BLACK HAIRY TONGUE: ICD-10-CM

## 2020-08-10 DIAGNOSIS — Z87.19 HISTORY OF GASTROESOPHAGEAL REFLUX (GERD): ICD-10-CM

## 2020-08-10 PROCEDURE — 1159F MED LIST DOCD IN RCRD: CPT | Mod: S$GLB,,, | Performed by: OTOLARYNGOLOGY

## 2020-08-10 PROCEDURE — 1159F PR MEDICATION LIST DOCUMENTED IN MEDICAL RECORD: ICD-10-PCS | Mod: S$GLB,,, | Performed by: OTOLARYNGOLOGY

## 2020-08-10 PROCEDURE — 3075F PR MOST RECENT SYSTOLIC BLOOD PRESS GE 130-139MM HG: ICD-10-PCS | Mod: CPTII,S$GLB,, | Performed by: OTOLARYNGOLOGY

## 2020-08-10 PROCEDURE — 1100F PTFALLS ASSESS-DOCD GE2>/YR: CPT | Mod: CPTII,S$GLB,, | Performed by: OTOLARYNGOLOGY

## 2020-08-10 PROCEDURE — 92557 COMPREHENSIVE HEARING TEST: CPT | Mod: S$GLB,,, | Performed by: AUDIOLOGIST-HEARING AID FITTER

## 2020-08-10 PROCEDURE — 92557 PR COMPREHENSIVE HEARING TEST: ICD-10-PCS | Mod: S$GLB,,, | Performed by: AUDIOLOGIST-HEARING AID FITTER

## 2020-08-10 PROCEDURE — 3075F SYST BP GE 130 - 139MM HG: CPT | Mod: CPTII,S$GLB,, | Performed by: OTOLARYNGOLOGY

## 2020-08-10 PROCEDURE — 92550 TYMPANOMETRY & REFLEX THRESH: CPT | Mod: S$GLB,,, | Performed by: AUDIOLOGIST-HEARING AID FITTER

## 2020-08-10 PROCEDURE — 1100F PR PT FALLS ASSESS DOC 2+ FALLS/FALL W/INJURY/YR: ICD-10-PCS | Mod: CPTII,S$GLB,, | Performed by: OTOLARYNGOLOGY

## 2020-08-10 PROCEDURE — 3288F FALL RISK ASSESSMENT DOCD: CPT | Mod: CPTII,S$GLB,, | Performed by: OTOLARYNGOLOGY

## 2020-08-10 PROCEDURE — 3288F PR FALLS RISK ASSESSMENT DOCUMENTED: ICD-10-PCS | Mod: CPTII,S$GLB,, | Performed by: OTOLARYNGOLOGY

## 2020-08-10 PROCEDURE — 99214 PR OFFICE/OUTPT VISIT, EST, LEVL IV, 30-39 MIN: ICD-10-PCS | Mod: S$GLB,,, | Performed by: OTOLARYNGOLOGY

## 2020-08-10 PROCEDURE — 92550 PR TYMPANOMETRY AND REFLEX THRESHOLD MEASUREMENTS: ICD-10-PCS | Mod: S$GLB,,, | Performed by: AUDIOLOGIST-HEARING AID FITTER

## 2020-08-10 PROCEDURE — 3079F PR MOST RECENT DIASTOLIC BLOOD PRESSURE 80-89 MM HG: ICD-10-PCS | Mod: CPTII,S$GLB,, | Performed by: OTOLARYNGOLOGY

## 2020-08-10 PROCEDURE — 3079F DIAST BP 80-89 MM HG: CPT | Mod: CPTII,S$GLB,, | Performed by: OTOLARYNGOLOGY

## 2020-08-10 PROCEDURE — 99214 OFFICE O/P EST MOD 30 MIN: CPT | Mod: S$GLB,,, | Performed by: OTOLARYNGOLOGY

## 2020-08-10 NOTE — PATIENT INSTRUCTIONS
Hearing protection.  Recheck with audiogram one year unless change or problems prior.      Continue mouth care as reviewed including keep mouth moist and stay hydrated, gentle tongue brushing, no alcohol based or harsh mouth care products.    Continue Biotene products regularly or as needed.    Continue to rinse/gagrle after inhaler use.    Continue fluticasone nasal spray regularly or as needed.    Continue acid reflux precautions.    Reduce omeprazole to once a day.    Follow up in 6 weeks.

## 2020-08-10 NOTE — Clinical Note
Your patient, Tamiko Youssef, was recently seen for an audiogram.  My assessment and recommendations are enclosed.    If you should have any questions or concerns, please contact me at [phone].     Sincerely,  Toñito Frausto, CCC-A, F-AAA  Audiologist  [facility]

## 2020-08-10 NOTE — PROGRESS NOTES
Toñito Frausto, CCC-A  Audiologist - [facility] [address] [place] [address]  [email or web address]  [phone]    Patient: Tamiko Youssef   MRN: [id number]  [address] [facility]  [id number]   Home Phone [phone]   Work Phone Not on file.   Mobile [phone]   : 1940  [place]: 8/10/2020      AUDIOLOGICAL EVALUATION      RECOMMENDATIONS:   It is recommended that Tamiko Youssef:   Follow up medically with Dr. Lieberman to assess her asymmetrical hearing loss.   Continue wearing her Right hearing aid, and consider a CROS device in her Left ear to improve speech understanding.   Continue to receive audiological monitoring annually.   Use precaution and/or hearing protection in noisy environments.    If you should have any questions or concerns regarding the above information, please do not hesitate to contact me at [phone].      _______________________________  Toñito Frausto, CCC-A, F-AAA  Audiologist

## 2020-08-10 NOTE — PROGRESS NOTES
Subjective:       Patient ID: Tamiko Youssef is a 79 y.o. female.    Chief Complaint: Follow-up (and go over audio)    Returns for audiogram and follow-up.  Has hearing aid for the right ear through outside audiologist and was considering obtaining follow-up audiogram there, but returning here now for this.  Has had gradual decline in hearing over the years with no recent acute change.  Has known vestibular schwannoma on the left which has been stable.  Also followed for black hairy tongue which improves with local measures include hydration, Biotene products, gentle tongue brushing, periodic nystatin oral rinse.  Reminded of and states is careful to rinse and gargle after each use of Symbicort inhaler.  Continues on twice daily omeprazole and reflux precautions and states saw Dr. Thibodeaux since last visit here and told all okay.  Still occasional sour taste and breakthrough symptoms.        Review of Systems   Ears: Positive for hearing loss.  Negative for ear pressure, ringing in ear, ear discharge, ear infections and family history of hearing loss.    Nose:  Negative for nosebleeds, nasal obstruction, nasal or sinus surgery, loss of smell and snoring.    Mouth/Throat: Negative for pain swallowing, throat mass and neck mass.   Constitutional: Negative for recent unexplained weight loss and fever.    Eyes:  Positive for history of glaucoma.   Cardiovascular:  Positive for history of high blood pressure. Negative for chest pain and palpitations.   Respiratory:  Positive for asthma. Negative for emphysema and history of tuberculosis.    Gastrointestinal:  Positive for acid reflux. Negative for history of stomach ulcers or pain.   Other:  Positive for arthritis. Negative for kidney problem, bladder problem, slurred, swollen glands, anemia and persistent infections.           Objective:        Vitals:    08/10/20 1055   BP: 135/80   Pulse: 85   Temp: 97.9 °F (36.6 °C)     Body mass index is 31.75 kg/m².  Physical  Exam  Constitutional:       General: She is not in acute distress.     Appearance: She is well-developed.   HENT:      Head: Normocephalic and atraumatic.      Right Ear: Tympanic membrane, ear canal and external ear normal.      Left Ear: Tympanic membrane, ear canal and external ear normal.      Nose: No nasal deformity, mucosal edema or rhinorrhea.      Mouth/Throat:      Pharynx: No oropharyngeal exudate, posterior oropharyngeal erythema or uvula swelling.      Comments: Appearance of tongue improved today with much less discoloration and not thickened at central dorsum.     Neck:      Musculoskeletal: Neck supple.      Trachea: Phonation normal.   Pulmonary:      Effort: Pulmonary effort is normal. No respiratory distress.   Lymphadenopathy:      Cervical: No cervical adenopathy.   Skin:     General: Skin is warm and dry.   Neurological:      Mental Status: She is alert and oriented to person, place, and time.   Psychiatric:         Speech: Speech is not slurred.         Behavior: Behavior normal.         Tests / Results:                    Assessment:       1. Asymmetrical sensorineural hearing loss    2. Vestibular schwannoma    3. Wears hearing aid in right ear    4. Black hairy tongue    5. History of gastroesophageal reflux (GERD)    6. History of asthma        Plan:        Reviewed all above and considerations and recommendations and answered questions.  Will proceed as follows:  Okay to reduce omeprazole to once daily as tolerated.  Continue reflux precautions.  Continue mouth care, hydration, Biotene products.  Fluticasone nasal spray regularly vs as needed discussed.  Recheck 6 weeks.  Hearing protection.  One year follow-up audiogram unless change or problems prior.  CC audio today for her to bring to her hearing aid audiologist.  Consider CROS aid.

## 2020-08-13 ENCOUNTER — TELEPHONE (OUTPATIENT)
Dept: ADMINISTRATIVE | Facility: OTHER | Age: 80
End: 2020-08-13

## 2020-08-13 NOTE — TELEPHONE ENCOUNTER
----- Message from Marina Gallegos NP sent at 8/13/2020  8:58 AM CDT -----    Please schedule for bilateral L4/5 TF SYLVIA with Dr. Leon.     Marina   ----- Message -----  From: Nanci Coelho MA  Sent: 8/12/2020   6:41 PM CDT  To: Marina Gallegos NP      ----- Message -----  From: Juliane Munoz  Sent: 8/12/2020   4:24 PM CDT  To: Rosy Gray Staff    Please advise.  what procedure needed for pt.

## 2020-08-16 ENCOUNTER — TELEPHONE (OUTPATIENT)
Dept: ADMINISTRATIVE | Facility: OTHER | Age: 80
End: 2020-08-16

## 2020-08-19 ENCOUNTER — TELEPHONE (OUTPATIENT)
Dept: PAIN MEDICINE | Facility: CLINIC | Age: 80
End: 2020-08-19

## 2020-08-19 ENCOUNTER — TELEPHONE (OUTPATIENT)
Dept: ADMINISTRATIVE | Facility: OTHER | Age: 80
End: 2020-08-19

## 2020-08-19 NOTE — TELEPHONE ENCOUNTER
----- Message from Tamiko Waters sent at 8/19/2020 11:24 AM CDT -----  Type: Patient Call Back    Who called: pt     What is the request in detail: pt requesting a call back to schedule procedure.     Can the clinic reply by [facility]? No     Would the patient rather a call back or a response via [facility]? Call back     Best call back number: [phone]    Additional Information:

## 2020-08-19 NOTE — TELEPHONE ENCOUNTER
Staff returned call to pt    Pt stated she is ready to schedule her procedure. she states she thought the medication she was on, was a antibiotic but she was informed by her ENT provider that it wasn't    Staff informed pt we will have the schedulers reach out and have her procedure scheduled

## 2020-09-01 ENCOUNTER — TELEPHONE (OUTPATIENT)
Dept: ADMINISTRATIVE | Facility: OTHER | Age: 80
End: 2020-09-01

## 2020-09-01 NOTE — TELEPHONE ENCOUNTER
----- Message from Asa Alcazar sent at 9/1/2020  1:53 PM CDT -----  Regarding: Appointment  Contact: GARY VILLANUEVA [[id number]]  Name of Who is Calling: GARY VILLANUEVA [[id number]]      What is the request in detail: Would like to speak with staff in regards to scheduling injection. Please advise      Can the clinic reply by [facility]: no      What Number to Call Back [facility] not [facility]: [phone]

## 2020-09-11 ENCOUNTER — TELEPHONE (OUTPATIENT)
Dept: PAIN MEDICINE | Facility: CLINIC | Age: 80
End: 2020-09-11

## 2020-09-11 NOTE — TELEPHONE ENCOUNTER
Staff spoke with patient    Pt stated she has been having fever for the past 5 days and her pressure has been elevated    Pt declined going to ED and PCP, Pt declined offer for PCP appt    Pt said she just would like to cancel procedure for now and wait it over with the symptoms     Staff informed pt we will have the schedulers cancel the procedure

## 2020-09-11 NOTE — TELEPHONE ENCOUNTER
----- Message from Kinza Mcgrath sent at 9/11/2020 10:15 AM CDT -----  Contact: GARY VILLANUEVA [[id number]]  Type: Patient Call Back    Who called: GARY VILLANUEVA [[id number]]    What is the request in detail: Patient is requesting a call back. She states that she needs to cancel her injection and speak with someone in the office.   Please advise.    Can the clinic reply by [facility]? No    Best call back number:[phone]    Additional Information: N/A

## 2020-09-21 ENCOUNTER — TELEPHONE (OUTPATIENT)
Dept: OTOLARYNGOLOGY | Facility: CLINIC | Age: 80
End: 2020-09-21

## 2020-09-21 NOTE — TELEPHONE ENCOUNTER
Attempted to call pt today letting her know her appointment has been cancel due to Dr. Lieberman is out off the office. We attempted a few times to reach her and her daughter and there was no response back per Anjali. Spoke with pt today on her arrival an reschedule her visit for September 28, 2020 at 10:am.

## 2020-09-28 ENCOUNTER — OFFICE VISIT (OUTPATIENT)
Dept: OTOLARYNGOLOGY | Facility: CLINIC | Age: 80
End: 2020-09-28
Payer: MEDICARE

## 2020-09-28 VITALS
BODY MASS INDEX: 31.21 KG/M2 | HEIGHT: 62 IN | SYSTOLIC BLOOD PRESSURE: 128 MMHG | DIASTOLIC BLOOD PRESSURE: 77 MMHG | TEMPERATURE: 98 F | WEIGHT: 169.63 LBS | HEART RATE: 96 BPM

## 2020-09-28 DIAGNOSIS — Z97.4 WEARS HEARING AID IN RIGHT EAR: ICD-10-CM

## 2020-09-28 DIAGNOSIS — H90.3 ASYMMETRICAL SENSORINEURAL HEARING LOSS: ICD-10-CM

## 2020-09-28 DIAGNOSIS — D33.3 VESTIBULAR SCHWANNOMA: ICD-10-CM

## 2020-09-28 DIAGNOSIS — Z87.09 HISTORY OF ASTHMA: ICD-10-CM

## 2020-09-28 DIAGNOSIS — Z87.19 HISTORY OF GASTROESOPHAGEAL REFLUX (GERD): ICD-10-CM

## 2020-09-28 DIAGNOSIS — K14.3 BLACK HAIRY TONGUE: ICD-10-CM

## 2020-09-28 PROCEDURE — 1159F PR MEDICATION LIST DOCUMENTED IN MEDICAL RECORD: ICD-10-PCS | Mod: S$GLB,,, | Performed by: OTOLARYNGOLOGY

## 2020-09-28 PROCEDURE — 99214 OFFICE O/P EST MOD 30 MIN: CPT | Mod: S$GLB,,, | Performed by: OTOLARYNGOLOGY

## 2020-09-28 PROCEDURE — 99214 PR OFFICE/OUTPT VISIT, EST, LEVL IV, 30-39 MIN: ICD-10-PCS | Mod: S$GLB,,, | Performed by: OTOLARYNGOLOGY

## 2020-09-28 PROCEDURE — 1159F MED LIST DOCD IN RCRD: CPT | Mod: S$GLB,,, | Performed by: OTOLARYNGOLOGY

## 2020-09-28 PROCEDURE — 3078F DIAST BP <80 MM HG: CPT | Mod: CPTII,S$GLB,, | Performed by: OTOLARYNGOLOGY

## 2020-09-28 PROCEDURE — 3074F SYST BP LT 130 MM HG: CPT | Mod: CPTII,S$GLB,, | Performed by: OTOLARYNGOLOGY

## 2020-09-28 PROCEDURE — 3074F PR MOST RECENT SYSTOLIC BLOOD PRESSURE < 130 MM HG: ICD-10-PCS | Mod: CPTII,S$GLB,, | Performed by: OTOLARYNGOLOGY

## 2020-09-28 PROCEDURE — 3078F PR MOST RECENT DIASTOLIC BLOOD PRESSURE < 80 MM HG: ICD-10-PCS | Mod: CPTII,S$GLB,, | Performed by: OTOLARYNGOLOGY

## 2020-09-28 RX ORDER — DICYCLOMINE HYDROCHLORIDE 10 MG/1
10 CAPSULE ORAL
COMMUNITY
End: 2022-06-09

## 2020-09-28 NOTE — PATIENT INSTRUCTIONS
Hearing protection.  Recheck with audiogram one year unless change or problems prior.      Continue mouth care as reviewed including keep mouth moist and stay hydrated, gentle tongue brushing, no alcohol based or harsh mouth care products.    Continue Biotene products regularly or as needed.    Continue to rinse/gagrle after inhaler use.    Continue fluticasone nasal spray regularly or as needed.    Continue acid reflux precautions.    Continue acid reducer per GI/PCP.    Follow up if change or problems and with one year audio follow up.

## 2020-10-14 ENCOUNTER — HOSPITAL ENCOUNTER (OUTPATIENT)
Facility: OTHER | Age: 80
Discharge: HOME | End: 2020-10-14
Attending: ANESTHESIOLOGY | Admitting: ANESTHESIOLOGY
Payer: MEDICARE

## 2020-10-14 VITALS
HEIGHT: 62 IN | BODY MASS INDEX: 31.28 KG/M2 | DIASTOLIC BLOOD PRESSURE: 66 MMHG | SYSTOLIC BLOOD PRESSURE: 115 MMHG | WEIGHT: 170 LBS | TEMPERATURE: 99 F | HEART RATE: 76 BPM | RESPIRATION RATE: 16 BRPM | OXYGEN SATURATION: 98 %

## 2020-10-14 DIAGNOSIS — G89.29 CHRONIC PAIN: ICD-10-CM

## 2020-10-14 DIAGNOSIS — M54.16 LUMBAR RADICULOPATHY: Primary | ICD-10-CM

## 2020-10-14 PROCEDURE — 25000003 PHARM REV CODE 250: Performed by: ANESTHESIOLOGY

## 2020-10-14 PROCEDURE — 25500020 PHARM REV CODE 255: Performed by: ANESTHESIOLOGY

## 2020-10-14 PROCEDURE — 64483 PR EPIDURAL INJ, ANES/STEROID, TRANSFORAMINAL, LUMB/SACR, SNGL LEVL: ICD-10-PCS | Mod: 50,,, | Performed by: ANESTHESIOLOGY

## 2020-10-14 PROCEDURE — 63600175 PHARM REV CODE 636 W HCPCS: Performed by: ANESTHESIOLOGY

## 2020-10-14 PROCEDURE — 64483 NJX AA&/STRD TFRM EPI L/S 1: CPT | Mod: 50,,, | Performed by: ANESTHESIOLOGY

## 2020-10-14 PROCEDURE — 64483 NJX AA&/STRD TFRM EPI L/S 1: CPT | Mod: 50 | Performed by: ANESTHESIOLOGY

## 2020-10-14 RX ORDER — LIDOCAINE HYDROCHLORIDE 10 MG/ML
INJECTION INFILTRATION; PERINEURAL
Status: DISCONTINUED | OUTPATIENT
Start: 2020-10-14 | End: 2020-10-14 | Stop reason: HOSPADM

## 2020-10-14 RX ORDER — LIDOCAINE HYDROCHLORIDE 5 MG/ML
INJECTION, SOLUTION INFILTRATION; INTRAVENOUS
Status: DISCONTINUED | OUTPATIENT
Start: 2020-10-14 | End: 2020-10-14 | Stop reason: HOSPADM

## 2020-10-14 RX ORDER — MIDAZOLAM HYDROCHLORIDE 1 MG/ML
INJECTION INTRAMUSCULAR; INTRAVENOUS
Status: DISCONTINUED | OUTPATIENT
Start: 2020-10-14 | End: 2020-10-14 | Stop reason: HOSPADM

## 2020-10-14 RX ORDER — DEXAMETHASONE SODIUM PHOSPHATE 10 MG/ML
INJECTION INTRAMUSCULAR; INTRAVENOUS
Status: DISCONTINUED | OUTPATIENT
Start: 2020-10-14 | End: 2020-10-14 | Stop reason: HOSPADM

## 2020-10-14 RX ORDER — SODIUM CHLORIDE 9 MG/ML
500 INJECTION, SOLUTION INTRAVENOUS CONTINUOUS
Status: DISCONTINUED | OUTPATIENT
Start: 2020-10-14 | End: 2020-10-14 | Stop reason: HOSPADM

## 2020-10-14 RX ORDER — FENTANYL CITRATE 50 UG/ML
INJECTION, SOLUTION INTRAMUSCULAR; INTRAVENOUS
Status: DISCONTINUED | OUTPATIENT
Start: 2020-10-14 | End: 2020-10-14 | Stop reason: HOSPADM

## 2020-10-14 RX ADMIN — SODIUM CHLORIDE 500 ML: 0.9 INJECTION, SOLUTION INTRAVENOUS at 02:10

## 2020-10-14 NOTE — DISCHARGE SUMMARY
Discharge Note  Short Stay      SUMMARY     Admit Date: 10/14/2020    Attending Physician: Lazaro Bennett      Discharge Physician: Lazaro Bennett      Discharge Date: 10/14/2020 3:39 PM    Procedure(s) (LRB):  INJECTION, STEROID, EPIDURAL, TRANSFORAMINAL APPROACH, L4-L5 need consent (Bilateral)    Final Diagnosis: Lumbar radiculopathy [M54.16]    Disposition: Home or self care    Patient Instructions:   Current Discharge Medication List      CONTINUE these medications which have NOT CHANGED    Details   albuterol (PROVENTIL) 2.5 mg /3 mL (0.083 %) nebulizer solution Take 2.5 mg by nebulization every 6 (six) hours as needed.      ALPRAZolam (XANAX) 0.25 MG tablet TK 1 T PO QD PRA  Refills: 3      amlodipine (NORVASC) 10 MG tablet Take 10 mg by mouth once daily.      clotrimazole (MYCELEX) 10 mg john Take 1 tablet (10 mg total) by mouth 5 (five) times daily. Dissolve one lozenge in the mouth 5 times a day.  Qty: 50 John, Refills: 0      dicyclomine (BENTYL) 10 MG capsule Take 10 mg by mouth 4 (four) times daily before meals and nightly.      fluticasone propionate (FLONASE) 50 mcg/actuation nasal spray 2 sprays in each nostril every evening.  Qty: 16 g, Refills: 1      furosemide (LASIX) 20 MG tablet TK 1 T PO QD PRF FLUID  Refills: 2      gabapentin (NEURONTIN) 300 MG capsule TAKE 1 CAPSULE BY MOUTH THREE TIMES DAILY  Qty: 270 capsule, Refills: 2    Comments: **Patient requests 90 days supply**      latanoprost 0.005 % ophthalmic solution Place 1 drop into both eyes every evening.  Qty: 3 Bottle, Refills: 3    Associated Diagnoses: Primary open angle glaucoma (POAG) of both eyes, mild stage      mirtazapine (REMERON) 30 MG tablet Take 30 mg by mouth nightly.  Refills: 3      !! nystatin (MYCOSTATIN) 100,000 unit/mL suspension Take 4 mLs (400,000 Units total) by mouth after meals as needed.  Qty: 160 mL, Refills: 1      !! nystatin (MYCOSTATIN) 100,000 unit/mL suspension Swish and gargle 4 times a day for  10 days.  .  Qty: 240 mL, Refills: 1      omeprazole (PRILOSEC) 20 MG capsule Take 1 capsule (20 mg total) by mouth 2 (two) times daily.  Qty: 60 capsule, Refills: 1      pravastatin (PRAVACHOL) 20 MG tablet Refills: 1      SYMBICORT 80-4.5 mcg/actuation HFAA Refills: 6      tiZANidine 4 mg Cap Take by mouth 3 (three) times daily as needed.      VENTOLIN HFA 90 mcg/actuation inhaler INL 2 PFS PO Q 4 H UTD PRF SOB  Refills: 1      walker Misc 1 application by Misc.(Non-Drug; Combo Route) route once daily.  Qty: 1 each, Refills: 0    Comments: lumbosacral neuritis wheelchair walker with a seat       !! - Potential duplicate medications found. Please discuss with provider.              Discharge Diagnosis: Lumbar radiculopathy [M54.16]  Condition on Discharge: Stable with no complications to procedure   Diet on Discharge: Same as before.  Activity: as per instruction sheet.  Discharge to: Home with a responsible adult.  Follow up: 2-4 weeks       Please call my office or pager at [phone] if experienced any weakness or loss of sensation, fever > 101.5, pain uncontrolled with oral medications, persistent nausea/vomiting/or diarrhea, redness or drainage from the incisions, or any other worrisome concerns. If physician on call was not reached or could not communicate with our office for any reason please go to the nearest emergency department

## 2020-10-14 NOTE — DISCHARGE INSTRUCTIONS

## 2020-10-14 NOTE — OP NOTE
Patient Name: Tamiko Youssef  MRN: [id number]    INFORMED CONSENT: The procedure, risks, benefits and options were discussed with patient. There are no contraindications to the procedure. The patient expressed understanding and agreed to proceed. The personnel performing the procedure was discussed. I verify that I personally obtained Tamiko's consent prior to the start of the procedure and the signed consent can be found on the patient's chart.    Procedure Date: 10/14/2020    Anesthesia: Topical    Pre Procedure diagnosis: Lumbar radiculopathy [M54.16]  1. Lumbar radiculopathy    2. Chronic pain      Post-Procedure diagnosis: SAME      Sedation: Yes - Fentanyl 50 mcg and Midazolam 2 mg    PROCEDURE:Bilateral L4/5   TRANSFORAMINAL EPIDURAL STEROID INJECTION        DESCRIPTION OF PROCEDURE: The patient was brought to the procedure room. After performing time out IV access was obtained prior to the procedure. The patient was positioned prone on the fluoroscopy table. Continuous hemodynamic monitoring was initiated including blood pressure, EKG, and pulse oximetry. . The skin was prepped with chlorhexidine three times and draped in a sterile fashion. Skin anesthesia was achieved using 3 mL of lidocaine 1% over the respective injection site.     An oblique fluoroscopic view was obtained, with the superior articular process of the inferior vertebral body aligned with the pedicle. The tip of a 22-gauge 5-inch Quincke-type spinal needle was advanced toward the 6 oclock position of the pedicle under intermittent fluoroscopic guidance. Confirmation of proper needle position was made with AP, oblique, and lateral fluoroscopic views. Negative aspiration for blood or CSF was confirmed. 2 mL of Omnipaque 300 was injected. Live fluoroscopic imaging revealed a clear outline of the spinal nerve with proximal spread of agent through the neural foramen into the anterior epidural space. A total combination of 3 mL of Lidocaine 0.5% and  10 mg decadron was injected at each level. Contrast spread was noted from L4 to L5 level. There was no pain on injection. The needle was removed and bleeding was nil.  A sterile dressing was applied. Tamiko was taken back to the recovery room for further observation.     Blood Loss: Nill  Specimen: None    Lazaro Bennett MD

## 2020-10-14 NOTE — H&P
HPI  Patient presenting for Procedure(s) (LRB):  INJECTION, STEROID, EPIDURAL, TRANSFORAMINAL APPROACH, L4-L5 need consent (Bilateral)     Patient on Anti-coagulation No    No health changes since previous encounter    Past Medical History:   Diagnosis Date    Asthma     Cataract     Glaucoma     Hypertension      Past Surgical History:   Procedure Laterality Date    CATARACT EXTRACTION W/  INTRAOCULAR LENS IMPLANT Bilateral     CHOLECYSTECTOMY      EYE SURGERY      HYSTERECTOMY      NECK SURGERY      cyst removed    TRANSFORAMINAL EPIDURAL INJECTION OF STEROID Bilateral 1/8/2020    Procedure: INJECTION, STEROID, EPIDURAL, TRANSFORAMINAL APPROACH;  Surgeon: Obdulio Leon MD;  Location: [facility];  Service: Pain Management;  Laterality: Bilateral;  B/L TFESI L4     Review of patient's allergies indicates:   Allergen Reactions    Mobic [meloxicam] Rash    Naprosyn [naproxen]     Norco [hydrocodone-acetaminophen]     Tramadol       No current facility-administered medications for this encounter.        PMHx, PSHx, Allergies, Medications reviewed in epic    ROS negative except pain complaints in HPI    OBJECTIVE:    There were no vitals taken for this visit.    PHYSICAL EXAMINATION:    GENERAL: Well appearing, in no acute distress, alert and oriented x3.  PSYCH:  Mood and affect appropriate.  SKIN: Skin color, texture, turgor normal, no rashes or lesions which will impact the procedure.  CV: RRR with palpation of the radial artery.  PULM: No evidence of respiratory difficulty, symmetric chest rise. Clear to auscultation.  NEURO: Cranial nerves grossly intact.    Plan:    Proceed with procedure as planned Procedure(s) (LRB):  INJECTION, STEROID, EPIDURAL, TRANSFORAMINAL APPROACH, L4-L5 need consent (Bilateral)    Lazaro Bennett  10/14/2020

## 2020-10-30 ENCOUNTER — OFFICE VISIT (OUTPATIENT)
Dept: PAIN MEDICINE | Facility: CLINIC | Age: 80
End: 2020-10-30
Payer: MEDICARE

## 2020-10-30 DIAGNOSIS — M79.18 MYOFASCIAL PAIN: ICD-10-CM

## 2020-10-30 DIAGNOSIS — M53.3 SACROILIAC JOINT PAIN: ICD-10-CM

## 2020-10-30 DIAGNOSIS — G89.4 CHRONIC PAIN DISORDER: ICD-10-CM

## 2020-10-30 DIAGNOSIS — M51.36 DDD (DEGENERATIVE DISC DISEASE), LUMBAR: ICD-10-CM

## 2020-10-30 DIAGNOSIS — M54.16 LUMBAR RADICULOPATHY: Primary | ICD-10-CM

## 2020-10-30 DIAGNOSIS — M43.06 SPONDYLOLYSIS OF LUMBAR REGION: ICD-10-CM

## 2020-10-30 PROCEDURE — 99443 PR PHYSICIAN TELEPHONE EVALUATION 21-30 MIN: CPT | Mod: 95,,, | Performed by: NURSE PRACTITIONER

## 2020-10-30 PROCEDURE — 99443 PR PHYSICIAN TELEPHONE EVALUATION 21-30 MIN: ICD-10-PCS | Mod: 95,,, | Performed by: NURSE PRACTITIONER

## 2020-10-30 RX ORDER — GABAPENTIN 300 MG/1
300 CAPSULE ORAL 2 TIMES DAILY
Qty: 60 CAPSULE | Refills: 2 | Status: SHIPPED | OUTPATIENT
Start: 2020-10-30 | End: 2020-11-03 | Stop reason: SDUPTHER

## 2020-10-30 NOTE — PROGRESS NOTES
Chronic patient Established Note (Follow up visit)    Chronic Pain-Tele-Medicine-Established Note (Follow up visit)        The patient location is: Home  The chief complaint leading to consultation is: pain  Visit type: Virtual visit with synchronous audio only. The reason for the audio only service rather than synchronous audio and video virtual visit was related to technical difficulties or patient preference/necessity.  Total time spent with patient: 25 min  Each patient to whom he or she provides medical services by telemedicine is:  (1) informed of the relationship between the physician and patient and the respective role of any other health care provider with respect to management of the patient; and (2) notified that he or she may decline to receive medical services by telemedicine and may withdraw from such care at any time.    SUBJECTIVE:    Interval History 10/30/2020:  The patient returns to clinic today via audio visit for follow up of back pain. She is s/p bilateral L4/5 TF SYLVIA on 10/14/2020. She reports limited relief of her pain. She reports increased low back pain that radiates down the lateral aspect of her left leg to her ankle. She denies any right leg pain today. Her pain is worse with prolonged standing, walking, and activity. She denies any weakness. She is currently taking Gabapentin once a day. She denies any other health changes.     Interval History 7/24/2020:  The patient returns to clinic today for follow up of low back pain. She reports increased pain over the last two months. She reports low back pain that radiates down the lateral aspect of both legs to her knees, left greater than right. Her pain is worse with standing and walking. She continues to take Gabapentin. She denies any other health changes. Her pain today is 9/10.    Interval History 1/28/2020:  Tamiko Youssef presents to the clinic for a follow-up appointment for lower back pain. She is s/p bilateral L4/5 TF SYLVIA on  1/8/2020. She reports 80% relief of his low back and leg pain. She continues to report low back pain that is aching in nature. She denies any radiating leg pain today. Her pain is worse with prolonged standing and walking. She continues to take Gabapentin with benefit. She continues to perform a home exercise routine. She denies any other health changes. Her pain today is 8/10.      Pain Disability Index Review:  Last 3 PDI Scores 7/24/2020 1/28/2020 11/15/2019   Pain Disability Index (PDI) 48 0 52       Pain Medications:  Gabapentin 300 mg daily    Opioid Contract: yes     report:  Reviewed and consistent with medication use as prescribed.    Pain Procedures:   3/28/14, 3/13/13 Bilateral L4 TF SYLVIA  8/22/18 Bilateral L4 TF SYLIVA- 75% relief  1/8/2020- Bilateral L4/5 TF SYLVIA - 80% relief   10/14/2020- Bilateral L4/5 TF SYLVIA    Physical Therapy/Home Exercise: no    Imaging:     Narrative     EXAMINATION:  XR LUMBAR SPINE 5 VIEW WITH FLEX AND EXT    CLINICAL HISTORY:  Low back pain, >6wks conservative tx, persistent-progressive sx, surgical candidate;  Other intervertebral disc degeneration, lumbar region    TECHNIQUE:  Five views of the lumbar spine plus flexion extension views were performed.    COMPARISON:  09/09/2015.    FINDINGS:  There is 3 mm anterolisthesis of L3 on L4, L4 on L5, and L5 on S1.  No translational instability is noted on the flexion and extension radiographs.  Vertebral body heights are well maintained.  There is mild disc space narrowing present at the L3-4, L4-5, and L5-S1 levels.  There is facet arthropathy within the mid to lower lumbar spine and lumbosacral junction.  There is no evidence for spondylolysis.  No abnormal paraspinal masses are evident.  Sacroiliac joints appear unremarkable.      Impression       3 mm anterolisthesis of L3 on L4, L4 and L5, and L5 on S1.  This appears to be degenerative in etiology with no evidence for translational instability.    Lumbar spondylosis.-      Narrative     EXAMINATION:  XR HIPS BILATERAL 2 VIEW INCL AP PELVIS    CLINICAL HISTORY:  Other intervertebral disc degeneration, lumbar region    TECHNIQUE:  AP view of the pelvis and frogleg lateral views of both hips were performed.    COMPARISON:  03/20/2014.    FINDINGS:  The bones are intact.  There is no evidence for acute fracture or bone destruction.  There are mild symmetric degenerative changes of the hips.  Sacroiliac joints appear unremarkable.  There are degenerative changes within the lower lumbar spine.  Soft tissues are unremarkable.      Impression       No evidence for acute fracture, bone destruction, or dislocation.         MRI Thoracic Spine Without Contrast     Narrative     MRI thoracic spine without contrast.    Findings: There is a mild dextroscoliosis of the thoracic spine.  The vertebral body heights are satisfactorily maintained.  There is loss of disk height with degenerative endplate change throughout the thoracic spine.  There are mild disk bulges   identified at T2-3, T3-4, T4-5, T8-9, and a mild disk osteophyte complex at T11-12.  This is mild central canal narrowing at T11-12.  The visualized portion of the intra-abdominal content is significant for a 2.8-cm left-sided renal cyst.  There is no   evidence of an acute marrow replacement process such as tumor or infection.  There is no fracture.  The spinal cord has a normal appearance.  There is no intradural abnormality identified.      Impression      Mild multilevel degenerative change with mild central canal stenosis at T11-12.      Electronically signed by: PATRICIA EDGAR MD  Date: 03/27/14  Time: 14:57      MRI Lumbar Spine Without Contrast    Narrative     Procedure:  Non-contrast MRI of the lumbosacral spine    Technique: sagittal T1, T2 and STIR and axial T1 and T2 images of the lumbosacral spine without contrast.     Comparison: CT abdomen and pelvis on 1/11/3    Findings: Partially included in the  imaging is left  renal lesion.  Please see CT report for further details.    There is trace 1-2 mm of anterior listhesis of L4 on L5 and L5 on S1.  . The lumbar vertebral body heights, contours and bone marrow signal is within normal limits and without evidence for acute fracture or subluxation. There is degenerative disk disease   with disk desiccation all levels there is present degenerative changes are more prominent in the lower thoracic spine partially included in this study specifically at T11/T12 level with moderate to severe height loss.        The distal spinal cord and conus is normal in signal and contour the tip of the conus approximates the inferior [id number].    T11/T12 including the sagittal field only there is a posterior discussed by complex with slight retrolisthesis of T11 on T12 with probable mild center canal stenosis and mild bilateral neural foraminal stenosis.    T12/L1 through L1/L2: No significant disc bulge, central canal or neural foraminal stenosis.     L2/L3: No significant disc bulge, central canal or neural foraminal stenosis. No significant disc bulge, central canal or neural foraminal stenosis.    L3/L4: Small disk bulge with ligamentum flavum hypertrophy without significant central canal stenosis with mild neural foraminal stenosis bilaterally.    L4/L5: Bulging disk with ligamentum flavum hypertrophy and facet joint arthropathy without significant central canal stenosis with mild neural foraminal stenosis.      L5/S1: Bulging disk with facet joint arthropathy without significant central canal stenosis with mild bilateral foraminal stenosis.      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      Impression       Mild spondylo-degenerative change of the lumbosacral spine as detailed above without significant central canal stenosis.      Please note there is degenerative change at the lower thoracic spine specifically at the T11/T12 level with posterior disk osteophyte complex  resulting in probable mild central canal and mild bilateral foraminal stenosis.    See above for additional details.      Electronically signed by: ALEKSANDER JAKI GARCIA  Date: 04/09/13  Time: 10:58          Allergies:   Review of patient's allergies indicates:   Allergen Reactions    Naprosyn [naproxen]     Norco [hydrocodone-acetaminophen]     Tramadol        Current Medications:   Current Outpatient Medications   Medication Sig Dispense Refill    albuterol (PROVENTIL) 2.5 mg /3 mL (0.083 %) nebulizer solution Take 2.5 mg by nebulization every 6 (six) hours as needed.      ALPRAZolam (XANAX) 0.25 MG tablet TK 1 T PO QD PRA  3    amlodipine (NORVASC) 10 MG tablet Take 10 mg by mouth once daily.      clotrimazole (MYCELEX) 10 mg nury Take 1 tablet (10 mg total) by mouth 5 (five) times daily. Dissolve one lozenge in the mouth 5 times a day. 50 Nury 0    dicyclomine (BENTYL) 10 MG capsule Take 10 mg by mouth 4 (four) times daily before meals and nightly.      fluticasone propionate (FLONASE) 50 mcg/actuation nasal spray 2 sprays in each nostril every evening. 16 g 1    furosemide (LASIX) 20 MG tablet TK 1 T PO QD PRF FLUID  2    gabapentin (NEURONTIN) 300 MG capsule TAKE 1 CAPSULE BY MOUTH THREE TIMES DAILY 270 capsule 2    latanoprost 0.005 % ophthalmic solution Place 1 drop into both eyes every evening. 3 Bottle 3    mirtazapine (REMERON) 30 MG tablet Take 30 mg by mouth nightly.  3    nystatin (MYCOSTATIN) 100,000 unit/mL suspension Take 4 mLs (400,000 Units total) by mouth after meals as needed. 160 mL 1    nystatin (MYCOSTATIN) 100,000 unit/mL suspension Swish and gargle 4 times a day for 10 days.  . (Patient not taking: Reported on 9/28/2020) 240 mL 1    omeprazole (PRILOSEC) 20 MG capsule Take 1 capsule (20 mg total) by mouth 2 (two) times daily. 60 capsule 1    pravastatin (PRAVACHOL) 20 MG tablet   1    SYMBICORT 80-4.5 mcg/actuation HFAA   6    tiZANidine 4 mg Cap Take by mouth 3 (three)  times daily as needed.      VENTOLIN HFA 90 mcg/actuation inhaler INL 2 PFS PO Q 4 H UTD PRF SOB  1    walker Misc 1 application by Misc.(Non-Drug; Combo Route) route once daily. 1 each 0     No current facility-administered medications for this visit.        REVIEW OF SYSTEMS:    Constitutional: Positive for unexpected weight change.   HENT: Positive for headache.   Respiratory: Positive for intermittent shortness of breath and wheezing.  (asthma)  Gastrointestinal: Positive for constipation.   Musculoskeletal: Positive for back pain, gait problem and stiffness.     MEDICAL, SURGICAL, FAMILY, SOCIAL HX: reviewed    Past Medical History:  Past Medical History:   Diagnosis Date    Asthma     Cataract     Glaucoma     Hypertension        Past Surgical History:  Past Surgical History:   Procedure Laterality Date    CATARACT EXTRACTION W/  INTRAOCULAR LENS IMPLANT Bilateral     CHOLECYSTECTOMY      EYE SURGERY      HYSTERECTOMY      NECK SURGERY      cyst removed    TRANSFORAMINAL EPIDURAL INJECTION OF STEROID Bilateral 1/8/2020    Procedure: INJECTION, STEROID, EPIDURAL, TRANSFORAMINAL APPROACH;  Surgeon: Obdulio Leon MD;  Location: [facility];  Service: Pain Management;  Laterality: Bilateral;  B/L TFESI L4    TRANSFORAMINAL EPIDURAL INJECTION OF STEROID Bilateral 10/14/2020    Procedure: INJECTION, STEROID, EPIDURAL, TRANSFORAMINAL APPROACH, L4-L5 need consent;  Surgeon: Obdulio Leon MD;  Location: [facility];  Service: Pain Management;  Laterality: Bilateral;       Family History:  Family History   Problem Relation Age of Onset    Cataracts Son     Glaucoma Son     No Known Problems Mother     No Known Problems Father     Macular degeneration Neg Hx        Social History:  Social History     Socioeconomic History    Marital status: Single     Spouse name: Not on file    Number of children: Not on file    Years of education: Not on file    Highest education level: Not on file    Occupational History    Not on file   Social Needs    Financial resource strain: Not on file    Food insecurity     Worry: Not on file     Inability: Not on file    Transportation needs     Medical: Not on file     Non-medical: Not on file   Tobacco Use    Smoking status: Never Smoker    Smokeless tobacco: Never Used   Substance and Sexual Activity    Alcohol use: No    Drug use: Never    Sexual activity: Not on file   Lifestyle    Physical activity     Days per week: Not on file     Minutes per session: Not on file    Stress: Not on file   Relationships    Social connections     Talks on phone: Not on file     Gets together: Not on file     Attends Gnosticist service: Not on file     Active member of club or organization: Not on file     Attends meetings of clubs or organizations: Not on file     Relationship status: Not on file   Other Topics Concern    Not on file   Social History Narrative    Not on file       OBJECTIVE:    Exam limited due to virtual:  PHYSICAL EXAMINATION:  Voice normal.  Normal affect without evidence of distress.    Previous physical exam:  There were no vitals taken for this visit.    PHYSICAL EXAMINATION:    GENERAL: Well appearing, in no acute distress, alert and oriented x3.  PSYCH:  Mood and affect appropriate.  SKIN: Skin color, texture, turgor normal, no rashes or lesions.  HEAD/FACE:  Normocephalic, atraumatic. Cranial nerves grossly intact.  CV: RRR with palpation of the radial artery.  PULM: No evidence of respiratory difficulty, symmetric chest rise.  GI:  Soft and non-tender.  BACK: Straight leg raising in the sitting position is positive for radicular pain bilaterally.  Mild pain to palpation over lumbar paraspinals.  There is pain with palpation over lumbar facet joints.  Limited ROM with pain on flexion and extension.  Positive facet loading bilaterally.  There is pain with palpation to bilateral SI joints.  REGINA is positive bilaterally.  EXTREMITIES:  Peripheral joint ROM is full and pain free without obvious instability or laxity in all four extremities. No deformities, edema, or skin discoloration. Good capillary refill.  MUSCULOSKELETAL:  Bilateral lower extremity strength is normal and symmetric.  No atrophy or tone abnormalities are noted.  NEURO: Bilateral lower extremity coordination and muscle stretch reflexes are physiologic and symmetric.  Plantar response are downgoing. No clonus.  No loss of sensation is noted.  GAIT: Antalgic- ambulates with walker.      ASSESSMENT: 79 y.o. year old female with lower back pain consistent with the following diagnoses:     1. Lumbar radiculopathy  CANCELED: CREATININE, SERUM    CANCELED: UREA NITROGEN (BUN)   2. Spondylolysis of lumbar region     3. DDD (degenerative disc disease), lumbar     4. Sacroiliac joint pain     5. Myofascial pain     6. Chronic pain disorder           PLAN:     - Previous imaging was reviewed and discussed with the patient today.    - She is s/p bilateral L4/5 TF SYLVIA with limited relief. We discussed left sided TF SYLVIA. She would like to wait at this time.      - Consider medial branch blocks versus SI joint injections in the future.    - I have stressed the importance of physical activity and a home exercise plan to help with pain and improve health.    - Increase Gabapentin to BID.     - Counseled patient regarding the importance of activity modification, constant sleeping habits and physical therapy.    - RTC in 2-3 weeks for in-person visit.     - Dr. Leon was consulted on the patient and agrees with this plan.      The above plan and management options were discussed at length with patient. Patient is in agreement with the above and verbalized understanding.    Marina Gallegos NP  10/30/2020

## 2020-11-03 RX ORDER — GABAPENTIN 300 MG/1
300 CAPSULE ORAL 2 TIMES DAILY
Qty: 60 CAPSULE | Refills: 2 | Status: SHIPPED | OUTPATIENT
Start: 2020-11-03 | End: 2022-06-08 | Stop reason: SDUPTHER

## 2020-11-13 ENCOUNTER — OFFICE VISIT (OUTPATIENT)
Dept: PAIN MEDICINE | Facility: CLINIC | Age: 80
End: 2020-11-13
Payer: MEDICARE

## 2020-11-13 VITALS
TEMPERATURE: 99 F | HEART RATE: 90 BPM | SYSTOLIC BLOOD PRESSURE: 127 MMHG | WEIGHT: 169 LBS | RESPIRATION RATE: 18 BRPM | HEIGHT: 62 IN | DIASTOLIC BLOOD PRESSURE: 64 MMHG | BODY MASS INDEX: 31.1 KG/M2

## 2020-11-13 DIAGNOSIS — M51.36 DDD (DEGENERATIVE DISC DISEASE), LUMBAR: ICD-10-CM

## 2020-11-13 DIAGNOSIS — M54.16 LUMBAR RADICULOPATHY: Primary | ICD-10-CM

## 2020-11-13 DIAGNOSIS — M53.3 SACROILIAC JOINT PAIN: ICD-10-CM

## 2020-11-13 DIAGNOSIS — M43.06 SPONDYLOLYSIS OF LUMBAR REGION: ICD-10-CM

## 2020-11-13 PROCEDURE — 1125F PR PAIN SEVERITY QUANTIFIED, PAIN PRESENT: ICD-10-PCS | Mod: S$GLB,,, | Performed by: NURSE PRACTITIONER

## 2020-11-13 PROCEDURE — 3078F DIAST BP <80 MM HG: CPT | Mod: CPTII,S$GLB,, | Performed by: NURSE PRACTITIONER

## 2020-11-13 PROCEDURE — 99213 OFFICE O/P EST LOW 20 MIN: CPT | Mod: S$GLB,,, | Performed by: NURSE PRACTITIONER

## 2020-11-13 PROCEDURE — 1101F PR PT FALLS ASSESS DOC 0-1 FALLS W/OUT INJ PAST YR: ICD-10-PCS | Mod: CPTII,S$GLB,, | Performed by: NURSE PRACTITIONER

## 2020-11-13 PROCEDURE — 1125F AMNT PAIN NOTED PAIN PRSNT: CPT | Mod: S$GLB,,, | Performed by: NURSE PRACTITIONER

## 2020-11-13 PROCEDURE — 3074F PR MOST RECENT SYSTOLIC BLOOD PRESSURE < 130 MM HG: ICD-10-PCS | Mod: CPTII,S$GLB,, | Performed by: NURSE PRACTITIONER

## 2020-11-13 PROCEDURE — 3288F PR FALLS RISK ASSESSMENT DOCUMENTED: ICD-10-PCS | Mod: CPTII,S$GLB,, | Performed by: NURSE PRACTITIONER

## 2020-11-13 PROCEDURE — 1159F MED LIST DOCD IN RCRD: CPT | Mod: S$GLB,,, | Performed by: NURSE PRACTITIONER

## 2020-11-13 PROCEDURE — 1101F PT FALLS ASSESS-DOCD LE1/YR: CPT | Mod: CPTII,S$GLB,, | Performed by: NURSE PRACTITIONER

## 2020-11-13 PROCEDURE — 99213 PR OFFICE/OUTPT VISIT, EST, LEVL III, 20-29 MIN: ICD-10-PCS | Mod: S$GLB,,, | Performed by: NURSE PRACTITIONER

## 2020-11-13 PROCEDURE — 1159F PR MEDICATION LIST DOCUMENTED IN MEDICAL RECORD: ICD-10-PCS | Mod: S$GLB,,, | Performed by: NURSE PRACTITIONER

## 2020-11-13 PROCEDURE — 3288F FALL RISK ASSESSMENT DOCD: CPT | Mod: CPTII,S$GLB,, | Performed by: NURSE PRACTITIONER

## 2020-11-13 PROCEDURE — 99999 PR PBB SHADOW E&M-EST. PATIENT-LVL V: CPT | Mod: PBBFAC,,, | Performed by: NURSE PRACTITIONER

## 2020-11-13 PROCEDURE — 3074F SYST BP LT 130 MM HG: CPT | Mod: CPTII,S$GLB,, | Performed by: NURSE PRACTITIONER

## 2020-11-13 PROCEDURE — 99999 PR PBB SHADOW E&M-EST. PATIENT-LVL V: ICD-10-PCS | Mod: PBBFAC,,, | Performed by: NURSE PRACTITIONER

## 2020-11-13 PROCEDURE — 3078F PR MOST RECENT DIASTOLIC BLOOD PRESSURE < 80 MM HG: ICD-10-PCS | Mod: CPTII,S$GLB,, | Performed by: NURSE PRACTITIONER

## 2020-11-13 NOTE — PROGRESS NOTES
Chronic patient Established Note (Follow up visit)        SUBJECTIVE:    Interval History 11/13/2020:  The patient returns to clinic today for follow up of back pain. She reports limited relief with previous SYLVIA. She continues to report low back pain that radiates into the lateral aspect of her left leg to her ankle. She denies any right leg pain today. Her pain is worse with standing, walking, and activity. She is frustrated with her continued pain. She did increase Gabapentin to BID. She is unsure of relief at this time. She does take [place] from her PCP. She states that her PCP would like our office to take over this medication. She asks for a refill. She denies any other health changes. Her pain today is 2/10.    Interval History 10/30/2020:  The patient returns to clinic today via audio visit for follow up of back pain. She is s/p bilateral L4/5 TF SYLVIA on 10/14/2020. She reports limited relief of her pain. She reports increased low back pain that radiates down the lateral aspect of her left leg to her ankle. She denies any right leg pain today. Her pain is worse with prolonged standing, walking, and activity. She denies any weakness. She is currently taking Gabapentin once a day. She denies any other health changes.     Interval History 7/24/2020:  The patient returns to clinic today for follow up of low back pain. She reports increased pain over the last two months. She reports low back pain that radiates down the lateral aspect of both legs to her knees, left greater than right. Her pain is worse with standing and walking. She continues to take Gabapentin. She denies any other health changes. Her pain today is 9/10.    Interval History 1/28/2020:  Tamiko Youssef presents to the clinic for a follow-up appointment for lower back pain. She is s/p bilateral L4/5 TF SYLVIA on 1/8/2020. She reports 80% relief of his low back and leg pain. She continues to report low back pain that is aching in nature. She denies any  radiating leg pain today. Her pain is worse with prolonged standing and walking. She continues to take Gabapentin with benefit. She continues to perform a home exercise routine. She denies any other health changes. Her pain today is 8/10.      Pain Disability Index Review:  Last 3 PDI Scores 11/13/2020 7/24/2020 1/28/2020   Pain Disability Index (PDI) 40 48 0       Pain Medications:  Gabapentin 300 mg daily    Opioid Contract: yes     report:  Reviewed and consistent with medication use as prescribed.    Pain Procedures:   3/28/14, 3/13/13 Bilateral L4 TF SYLVIA  8/22/18 Bilateral L4 TF SYLVIA- 75% relief  1/8/2020- Bilateral L4/5 TF SYLVIA - 80% relief   10/14/2020- Bilateral L4/5 TF SYLVIA    Physical Therapy/Home Exercise: no    Imaging:     Narrative     EXAMINATION:  XR LUMBAR SPINE 5 VIEW WITH FLEX AND EXT    CLINICAL HISTORY:  Low back pain, >6wks conservative tx, persistent-progressive sx, surgical candidate;  Other intervertebral disc degeneration, lumbar region    TECHNIQUE:  Five views of the lumbar spine plus flexion extension views were performed.    COMPARISON:  09/09/2015.    FINDINGS:  There is 3 mm anterolisthesis of L3 on L4, L4 on L5, and L5 on S1.  No translational instability is noted on the flexion and extension radiographs.  Vertebral body heights are well maintained.  There is mild disc space narrowing present at the L3-4, L4-5, and L5-S1 levels.  There is facet arthropathy within the mid to lower lumbar spine and lumbosacral junction.  There is no evidence for spondylolysis.  No abnormal paraspinal masses are evident.  Sacroiliac joints appear unremarkable.      Impression       3 mm anterolisthesis of L3 on L4, L4 and L5, and L5 on S1.  This appears to be degenerative in etiology with no evidence for translational instability.    Lumbar spondylosis.-     Narrative     EXAMINATION:  XR HIPS BILATERAL 2 VIEW INCL AP PELVIS    CLINICAL HISTORY:  Other intervertebral disc degeneration, lumbar  region    TECHNIQUE:  AP view of the pelvis and frogleg lateral views of both hips were performed.    COMPARISON:  03/20/2014.    FINDINGS:  The bones are intact.  There is no evidence for acute fracture or bone destruction.  There are mild symmetric degenerative changes of the hips.  Sacroiliac joints appear unremarkable.  There are degenerative changes within the lower lumbar spine.  Soft tissues are unremarkable.      Impression       No evidence for acute fracture, bone destruction, or dislocation.         MRI Thoracic Spine Without Contrast     Narrative     MRI thoracic spine without contrast.    Findings: There is a mild dextroscoliosis of the thoracic spine.  The vertebral body heights are satisfactorily maintained.  There is loss of disk height with degenerative endplate change throughout the thoracic spine.  There are mild disk bulges   identified at T2-3, T3-4, T4-5, T8-9, and a mild disk osteophyte complex at T11-12.  This is mild central canal narrowing at T11-12.  The visualized portion of the intra-abdominal content is significant for a 2.8-cm left-sided renal cyst.  There is no   evidence of an acute marrow replacement process such as tumor or infection.  There is no fracture.  The spinal cord has a normal appearance.  There is no intradural abnormality identified.      Impression      Mild multilevel degenerative change with mild central canal stenosis at T11-12.      Electronically signed by: PATRICIA EDGAR MD  Date: 03/27/14  Time: 14:57      MRI Lumbar Spine Without Contrast    Narrative     Procedure:  Non-contrast MRI of the lumbosacral spine    Technique: sagittal T1, T2 and STIR and axial T1 and T2 images of the lumbosacral spine without contrast.     Comparison: CT abdomen and pelvis on 1/11/3    Findings: Partially included in the  imaging is left renal lesion.  Please see CT report for further details.    There is trace 1-2 mm of anterior listhesis of L4 on L5 and L5 on S1.  . The lumbar  vertebral body heights, contours and bone marrow signal is within normal limits and without evidence for acute fracture or subluxation. There is degenerative disk disease   with disk desiccation all levels there is present degenerative changes are more prominent in the lower thoracic spine partially included in this study specifically at T11/T12 level with moderate to severe height loss.        The distal spinal cord and conus is normal in signal and contour the tip of the conus approximates the inferior [id number].    T11/T12 including the sagittal field only there is a posterior discussed by complex with slight retrolisthesis of T11 on T12 with probable mild center canal stenosis and mild bilateral neural foraminal stenosis.    T12/L1 through L1/L2: No significant disc bulge, central canal or neural foraminal stenosis.     L2/L3: No significant disc bulge, central canal or neural foraminal stenosis. No significant disc bulge, central canal or neural foraminal stenosis.    L3/L4: Small disk bulge with ligamentum flavum hypertrophy without significant central canal stenosis with mild neural foraminal stenosis bilaterally.    L4/L5: Bulging disk with ligamentum flavum hypertrophy and facet joint arthropathy without significant central canal stenosis with mild neural foraminal stenosis.      L5/S1: Bulging disk with facet joint arthropathy without significant central canal stenosis with mild bilateral foraminal stenosis.      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      Impression       Mild spondylo-degenerative change of the lumbosacral spine as detailed above without significant central canal stenosis.      Please note there is degenerative change at the lower thoracic spine specifically at the T11/T12 level with posterior disk osteophyte complex resulting in probable mild central canal and mild bilateral foraminal stenosis.    See above for additional details.      Electronically signed  by: ALEKSANDER POLLACK   Date: 04/09/13  Time: 10:58          Allergies:   Review of patient's allergies indicates:   Allergen Reactions    Naprosyn [naproxen]     Norco [hydrocodone-acetaminophen]     Tramadol        Current Medications:   Current Outpatient Medications   Medication Sig Dispense Refill    albuterol (PROVENTIL) 2.5 mg /3 mL (0.083 %) nebulizer solution Take 2.5 mg by nebulization every 6 (six) hours as needed.      ALPRAZolam (XANAX) 0.25 MG tablet TK 1 T PO QD PRA  3    dicyclomine (BENTYL) 10 MG capsule Take 10 mg by mouth 4 (four) times daily before meals and nightly.      fluticasone propionate (FLONASE) 50 mcg/actuation nasal spray 2 sprays in each nostril every evening. 16 g 1    furosemide (LASIX) 20 MG tablet TK 1 T PO QD PRF FLUID  2    gabapentin (NEURONTIN) 300 MG capsule Take 1 capsule (300 mg total) by mouth 2 (two) times daily. 60 capsule 2    latanoprost 0.005 % ophthalmic solution Place 1 drop into both eyes every evening. 3 Bottle 3    mirtazapine (REMERON) 30 MG tablet Take 30 mg by mouth nightly.  3    nystatin (MYCOSTATIN) 100,000 unit/mL suspension Take 4 mLs (400,000 Units total) by mouth after meals as needed. 160 mL 1    omeprazole (PRILOSEC) 20 MG capsule Take 1 capsule (20 mg total) by mouth 2 (two) times daily. 60 capsule 1    pravastatin (PRAVACHOL) 20 MG tablet   1    SYMBICORT 80-4.5 mcg/actuation HFAA   6    tiZANidine 4 mg Cap Take by mouth 3 (three) times daily as needed.      VENTOLIN HFA 90 mcg/actuation inhaler INL 2 PFS PO Q 4 H UTD PRF SOB  1    walker Misc 1 application by Misc.(Non-Drug; Combo Route) route once daily. 1 each 0     No current facility-administered medications for this visit.        REVIEW OF SYSTEMS:    Constitutional: Positive for unexpected weight change.   HENT: Positive for headache.   Respiratory: Positive for intermittent shortness of breath and wheezing.  (asthma)  Gastrointestinal: Positive for constipation.    Musculoskeletal: Positive for back pain, gait problem and stiffness.     MEDICAL, SURGICAL, FAMILY, SOCIAL HX: reviewed    Past Medical History:  Past Medical History:   Diagnosis Date    Asthma     Cataract     Glaucoma     Hypertension        Past Surgical History:  Past Surgical History:   Procedure Laterality Date    CATARACT EXTRACTION W/  INTRAOCULAR LENS IMPLANT Bilateral     CHOLECYSTECTOMY      EYE SURGERY      HYSTERECTOMY      NECK SURGERY      cyst removed    TRANSFORAMINAL EPIDURAL INJECTION OF STEROID Bilateral 1/8/2020    Procedure: INJECTION, STEROID, EPIDURAL, TRANSFORAMINAL APPROACH;  Surgeon: Obdulio Leon MD;  Location: [facility];  Service: Pain Management;  Laterality: Bilateral;  B/L TFESI L4    TRANSFORAMINAL EPIDURAL INJECTION OF STEROID Bilateral 10/14/2020    Procedure: INJECTION, STEROID, EPIDURAL, TRANSFORAMINAL APPROACH, L4-L5 need consent;  Surgeon: Obdulio Leon MD;  Location: [facility];  Service: Pain Management;  Laterality: Bilateral;       Family History:  Family History   Problem Relation Age of Onset    Cataracts Son     Glaucoma Son     No Known Problems Mother     No Known Problems Father     Macular degeneration Neg Hx        Social History:  Social History     Socioeconomic History    Marital status: Single     Spouse name: Not on file    Number of children: Not on file    Years of education: Not on file    Highest education level: Not on file   Occupational History    Not on file   Social Needs    Financial resource strain: Not on file    Food insecurity     Worry: Not on file     Inability: Not on file    Transportation needs     Medical: Not on file     Non-medical: Not on file   Tobacco Use    Smoking status: Never Smoker    Smokeless tobacco: Never Used   Substance and Sexual Activity    Alcohol use: No    Drug use: Never    Sexual activity: Not on file   Lifestyle    Physical activity     Days per week: Not on file      "Minutes per session: Not on file    Stress: Not on file   Relationships    Social connections     Talks on phone: Not on file     Gets together: Not on file     Attends [employer] service: Not on file     Active member of club or organization: Not on file     Attends meetings of clubs or organizations: Not on file     Relationship status: Not on file   Other Topics Concern    Not on file   Social History Narrative    Not on file       OBJECTIVE:    /64   Pulse 90   Temp 98.9 °F (37.2 °C) (Oral)   Resp 18   Ht 5' 2" (1.575 m)   Wt 76.7 kg (169 lb)   BMI 30.91 kg/m²     PHYSICAL EXAMINATION:    GENERAL: Well appearing, in no acute distress, alert and oriented x3.  PSYCH:  Mood and affect appropriate.  SKIN: Skin color, texture, turgor normal, no rashes or lesions.  HEAD/FACE:  Normocephalic, atraumatic. Cranial nerves grossly intact.  CV: RRR with palpation of the radial artery.  PULM: No evidence of respiratory difficulty, symmetric chest rise.  GI:  Soft and non-tender.  BACK: Straight leg raising in the sitting position is positive for radicular pain on the left.  There is pain to palpation over lumbar paraspinals bilaterally.  There is pain with palpation over lumbar facet joints bilaterally.  Limited ROM with pain on flexion and extension.  Positive facet loading bilaterally.  There is pain with palpation to bilateral SI joints.  REGINA is positive bilaterally.  EXTREMITIES: Peripheral joint ROM is full and pain free without obvious instability or laxity in all four extremities. No deformities, edema, or skin discoloration. Good capillary refill.  MUSCULOSKELETAL:  Bilateral lower extremity strength is normal and symmetric.  No atrophy or tone abnormalities are noted.  NEURO: Bilateral lower extremity coordination and muscle stretch reflexes are physiologic and symmetric.  Plantar response are downgoing. No clonus.  No loss of sensation is noted.  GAIT: Antalgic- ambulates with " walker.      ASSESSMENT: 79 y.o. year old female with lower back pain consistent with the following diagnoses:     1. Lumbar radiculopathy  X-Ray Lumbar Complete With Flex And Ext    MRI Lumbar Spine Without Contrast    X-Ray Hips Bilateral 2 View Incl AP Pelvis   2. Spondylolysis of lumbar region     3. DDD (degenerative disc disease), lumbar     4. Sacroiliac joint pain           PLAN:     - Previous imaging was reviewed and discussed with the patient today.    - She is s/p bilateral L4/5 TF SYLVIA with limited relief.     - Obtain updated xray and MRI of lumbar spine. Obtain xray of hips.     - Consider left L3/4 and L4/5 TF SYLVIA depending on imaging.     - I have stressed the importance of physical activity and a home exercise plan to help with pain and improve health.    - Continue Gabapentin 300mg  BID.     - We discussed that any opioid medications would need to be discussed with the physician.     - Counseled patient regarding the importance of activity modification, constant sleeping habits and physical therapy.    - RTC after imaging with Dr. Leon.     - Dr. Leon was consulted on the patient and agrees with this plan.      The above plan and management options were discussed at length with patient. Patient is in agreement with the above and verbalized understanding.    Marina Gallegos NP  11/13/2020

## 2020-11-18 ENCOUNTER — HOSPITAL ENCOUNTER (OUTPATIENT)
Dept: RADIOLOGY | Facility: OTHER | Age: 80
Discharge: HOME | End: 2020-11-18
Attending: NURSE PRACTITIONER
Payer: MEDICARE

## 2020-11-18 DIAGNOSIS — M54.16 LUMBAR RADICULOPATHY: ICD-10-CM

## 2020-11-18 PROCEDURE — 72148 MRI LUMBAR SPINE W/O DYE: CPT | Mod: 26,,, | Performed by: RADIOLOGY

## 2020-11-18 PROCEDURE — 72110 X-RAY EXAM L-2 SPINE 4/>VWS: CPT | Mod: TC,FY

## 2020-11-18 PROCEDURE — 72148 MRI LUMBAR SPINE WITHOUT CONTRAST: ICD-10-PCS | Mod: 26,,, | Performed by: RADIOLOGY

## 2020-11-18 PROCEDURE — 73521 X-RAY EXAM HIPS BI 2 VIEWS: CPT | Mod: TC,FY

## 2020-11-18 PROCEDURE — 72148 MRI LUMBAR SPINE W/O DYE: CPT | Mod: TC

## 2020-11-18 PROCEDURE — 73521 XR HIPS BILATERAL 2 VIEW INCL AP PELVIS: ICD-10-PCS | Mod: 26,,, | Performed by: RADIOLOGY

## 2020-11-18 PROCEDURE — 72110 X-RAY EXAM L-2 SPINE 4/>VWS: CPT | Mod: 26,,, | Performed by: RADIOLOGY

## 2020-11-18 PROCEDURE — 72110 XR LUMBAR SPINE 5 VIEW WITH FLEX AND EXT: ICD-10-PCS | Mod: 26,,, | Performed by: RADIOLOGY

## 2020-11-18 PROCEDURE — 73521 X-RAY EXAM HIPS BI 2 VIEWS: CPT | Mod: 26,,, | Performed by: RADIOLOGY

## 2020-11-23 NOTE — PROGRESS NOTES
Subjective:       Patient ID: Tamiko Youssef is a 79 y.o. female.    Chief Complaint: Follow-up    Returns for follow-up.  After last visit reduced her omeprazole to once daily and tolerating fine.  Also aware of and continues to work on reflux precautions.  Only occasional breakthrough symptoms and recently saw GI and told all okay.  Tongue doing better as well.  Uses Biotene products, hydration, general tongue brushing, periodic nystatin oral rinse and last used over a week ago.  Continues to rinse and gargle after each use of Symbicort inhaler.  Nose has been open and clear with no mouth breathing on fluticasone nasal spray fairly regularly.  Hearing aid doing fine and has follow-up with outside audiologist soon.  No new complaints.        Review of Systems     Constitutional: Negative for fever.      HENT: Positive for hearing loss.  Negative for ringing in the ears, runny nose, stuffy nose, trouble swallowing and voice change.      Respiratory:  Negative for cough and shortness of breath.      Cardiovascular:  Negative for chest pain.     Gastrointestinal:  Positive for acid reflux.     Neurological: Negative for dizziness and headaches.      Hematologic: Negative for swollen glands.                Objective:        Vitals:    09/28/20 1023   BP: 128/77   Pulse: 96   Temp: 97.9 °F (36.6 °C)     Body mass index is 31.02 kg/m².  Physical Exam  Constitutional:       General: She is not in acute distress.     Appearance: She is well-developed.   HENT:      Head: Normocephalic and atraumatic.      Right Ear: Tympanic membrane, ear canal and external ear normal.      Left Ear: Tympanic membrane, ear canal and external ear normal.      Nose: No nasal deformity, mucosal edema or rhinorrhea.      Mouth/Throat:      Mouth: Mucous membranes are moist.      Pharynx: No oropharyngeal exudate, posterior oropharyngeal erythema or uvula swelling.      Comments: Appearance of tongue improved further today with minimal  discoloration and not thickened at central dorsum.     Neck:      Trachea: Phonation normal.   Pulmonary:      Effort: Pulmonary effort is normal. No respiratory distress.   Lymphadenopathy:      Cervical: No cervical adenopathy.   Skin:     General: Skin is warm and dry.   Neurological:      Mental Status: She is alert and oriented to person, place, and time.   Psychiatric:         Speech: Speech normal.         Behavior: Behavior normal.         Tests / Results:        Assessment:       1. Asymmetrical sensorineural hearing loss    2. Vestibular schwannoma    3. Wears hearing aid in right ear    4. Black hairy tongue    5. History of gastroesophageal reflux (GERD)    6. History of asthma        Plan:       Reviewed all above and recommendations and answered questions.    Continue mouth care as reviewed including keep mouth moist and stay hydrated, gentle tongue brushing, no alcohol based or harsh mouth care products.    Continue Biotene products regularly or as needed.    Continue to rinse/gargle after inhaler use.    Continue fluticasone nasal spray regularly or as needed.    Continue acid reflux precautions.    Continue acid reducer and follow up per GI/PCP.    Hearing protection.    Follow-up if change or problems and otherwise one year with audiogram.

## 2020-12-10 ENCOUNTER — OFFICE VISIT (OUTPATIENT)
Dept: PAIN MEDICINE | Facility: CLINIC | Age: 80
End: 2020-12-10
Attending: ANESTHESIOLOGY
Payer: MEDICARE

## 2020-12-10 VITALS
HEART RATE: 90 BPM | SYSTOLIC BLOOD PRESSURE: 147 MMHG | WEIGHT: 172.81 LBS | HEIGHT: 62 IN | BODY MASS INDEX: 31.8 KG/M2 | DIASTOLIC BLOOD PRESSURE: 76 MMHG | TEMPERATURE: 98 F

## 2020-12-10 DIAGNOSIS — M47.9 OSTEOARTHRITIS OF SPINE, UNSPECIFIED SPINAL OSTEOARTHRITIS COMPLICATION STATUS, UNSPECIFIED SPINAL REGION: ICD-10-CM

## 2020-12-10 DIAGNOSIS — M51.36 DDD (DEGENERATIVE DISC DISEASE), LUMBAR: ICD-10-CM

## 2020-12-10 DIAGNOSIS — M47.819 SPONDYLOSIS WITHOUT MYELOPATHY: ICD-10-CM

## 2020-12-10 DIAGNOSIS — M54.15 RADICULOPATHY OF THORACOLUMBAR REGION: Primary | ICD-10-CM

## 2020-12-10 DIAGNOSIS — G89.4 CHRONIC PAIN SYNDROME: ICD-10-CM

## 2020-12-10 PROCEDURE — 99999 PR PBB SHADOW E&M-EST. PATIENT-LVL V: CPT | Mod: PBBFAC,,, | Performed by: ANESTHESIOLOGY

## 2020-12-10 PROCEDURE — 1101F PR PT FALLS ASSESS DOC 0-1 FALLS W/OUT INJ PAST YR: ICD-10-PCS | Mod: CPTII,S$GLB,, | Performed by: ANESTHESIOLOGY

## 2020-12-10 PROCEDURE — 99999 PR PBB SHADOW E&M-EST. PATIENT-LVL V: ICD-10-PCS | Mod: PBBFAC,,, | Performed by: ANESTHESIOLOGY

## 2020-12-10 PROCEDURE — 3078F DIAST BP <80 MM HG: CPT | Mod: CPTII,S$GLB,, | Performed by: ANESTHESIOLOGY

## 2020-12-10 PROCEDURE — 1101F PT FALLS ASSESS-DOCD LE1/YR: CPT | Mod: CPTII,S$GLB,, | Performed by: ANESTHESIOLOGY

## 2020-12-10 PROCEDURE — 3078F PR MOST RECENT DIASTOLIC BLOOD PRESSURE < 80 MM HG: ICD-10-PCS | Mod: CPTII,S$GLB,, | Performed by: ANESTHESIOLOGY

## 2020-12-10 PROCEDURE — 1125F PR PAIN SEVERITY QUANTIFIED, PAIN PRESENT: ICD-10-PCS | Mod: S$GLB,,, | Performed by: ANESTHESIOLOGY

## 2020-12-10 PROCEDURE — 3077F SYST BP >= 140 MM HG: CPT | Mod: CPTII,S$GLB,, | Performed by: ANESTHESIOLOGY

## 2020-12-10 PROCEDURE — 1125F AMNT PAIN NOTED PAIN PRSNT: CPT | Mod: S$GLB,,, | Performed by: ANESTHESIOLOGY

## 2020-12-10 PROCEDURE — 1159F MED LIST DOCD IN RCRD: CPT | Mod: S$GLB,,, | Performed by: ANESTHESIOLOGY

## 2020-12-10 PROCEDURE — 99214 OFFICE O/P EST MOD 30 MIN: CPT | Mod: GC,S$GLB,, | Performed by: ANESTHESIOLOGY

## 2020-12-10 PROCEDURE — 3288F PR FALLS RISK ASSESSMENT DOCUMENTED: ICD-10-PCS | Mod: CPTII,S$GLB,, | Performed by: ANESTHESIOLOGY

## 2020-12-10 PROCEDURE — 3077F PR MOST RECENT SYSTOLIC BLOOD PRESSURE >= 140 MM HG: ICD-10-PCS | Mod: CPTII,S$GLB,, | Performed by: ANESTHESIOLOGY

## 2020-12-10 PROCEDURE — 1159F PR MEDICATION LIST DOCUMENTED IN MEDICAL RECORD: ICD-10-PCS | Mod: S$GLB,,, | Performed by: ANESTHESIOLOGY

## 2020-12-10 PROCEDURE — 99214 PR OFFICE/OUTPT VISIT, EST, LEVL IV, 30-39 MIN: ICD-10-PCS | Mod: GC,S$GLB,, | Performed by: ANESTHESIOLOGY

## 2020-12-10 PROCEDURE — 3288F FALL RISK ASSESSMENT DOCD: CPT | Mod: CPTII,S$GLB,, | Performed by: ANESTHESIOLOGY

## 2020-12-10 RX ORDER — INFLUENZA A VIRUS A/MICHIGAN/45/2015 X-275 (H1N1) ANTIGEN (FORMALDEHYDE INACTIVATED), INFLUENZA A VIRUS A/SINGAPORE/INFIMH-16-0019/2016 IVR-186 (H3N2) ANTIGEN (FORMALDEHYDE INACTIVATED), INFLUENZA B VIRUS B/PHUKET/3073/2013 ANTIGEN (FORMALDEHYDE INACTIVATED), AND INFLUENZA B VIRUS B/MARYLAND/15/2016 BX-69A ANTIGEN (FORMALDEHYDE INACTIVATED) 60; 60; 60; 60 UG/.7ML; UG/.7ML; UG/.7ML; UG/.7ML
INJECTION, SUSPENSION INTRAMUSCULAR
COMMUNITY
Start: 2020-12-01 | End: 2021-01-14

## 2020-12-10 RX ORDER — AMLODIPINE BESYLATE 10 MG/1
TABLET ORAL
COMMUNITY
Start: 2020-11-03

## 2020-12-10 RX ORDER — DICLOFENAC SODIUM 10 MG/G
2 GEL TOPICAL 4 TIMES DAILY
Qty: 1 TUBE | Refills: 2 | Status: SHIPPED | OUTPATIENT
Start: 2020-12-10 | End: 2022-11-03 | Stop reason: SDUPTHER

## 2020-12-10 RX ORDER — PREGABALIN 75 MG/1
75 CAPSULE ORAL 2 TIMES DAILY
Qty: 60 CAPSULE | Refills: 2 | Status: SHIPPED | OUTPATIENT
Start: 2020-12-10 | End: 2022-04-20

## 2020-12-10 NOTE — PROGRESS NOTES
Chronic patient Established Note (Follow up visit)      SUBJECTIVE:    Interval history 12/10/2020:  Returns for follow up of her low back pain. She continues to report low back pain that radiates into the lateral aspect of her left leg to her ankle. Reports that gabapentin and Zanaflex are not helping with pain. Her pain is worse with standing, walking, and activity. Her pain today is 5/10.    Interval History 11/13/2020:  The patient returns to clinic today for follow up of back pain. She reports limited relief with previous SYLVIA. She continues to report low back pain that radiates into the lateral aspect of her left leg to her ankle. She denies any right leg pain today. Her pain is worse with standing, walking, and activity. She is frustrated with her continued pain. She did increase Gabapentin to BID. She is unsure of relief at this time. She does take [place] from her PCP. She states that her PCP would like our office to take over this medication. She asks for a refill. She denies any other health changes. Her pain today is 2/10.     Interval History 10/30/2020:  The patient returns to clinic today via audio visit for follow up of back pain. She is s/p bilateral L4/5 TF SYLVIA on 10/14/2020. She reports limited relief of her pain. She reports increased low back pain that radiates down the lateral aspect of her left leg to her ankle. She denies any right leg pain today. Her pain is worse with prolonged standing, walking, and activity. She denies any weakness. She is currently taking Gabapentin once a day. She denies any other health changes.      Interval History 7/24/2020:  The patient returns to clinic today for follow up of low back pain. She reports increased pain over the last two months. She reports low back pain that radiates down the lateral aspect of both legs to her knees, left greater than right. Her pain is worse with standing and walking. She continues to take Gabapentin. She denies any other health  changes. Her pain today is 9/10.     Interval History 1/28/2020:  Tamiko Youssef presents to the clinic for a follow-up appointment for lower back pain. She is s/p bilateral L4/5 TF SYLVIA on 1/8/2020. She reports 80% relief of his low back and leg pain. She continues to report low back pain that is aching in nature. She denies any radiating leg pain today. Her pain is worse with prolonged standing and walking. She continues to take Gabapentin with benefit. She continues to perform a home exercise routine. She denies any other health changes. Her pain today is 8/10.       Pain Disability Index Review:  Last 3 PDI Scores 12/10/2020 11/13/2020 7/24/2020   Pain Disability Index (PDI) 36 40 48       Pain Medications:  - Adjuvant Medications: Neurontin (Gabapentin) and Zanaflex ( Tizanidine)    Opioid Contract: no     report:  Reviewed and consistent with medication use as prescribed.    Pain Procedures:   3/28/14, 3/13/13 Bilateral L4 TF SYLVIA - no relief  8/22/18 Bilateral L4 TF SYLVIA- 75% relief  1/8/2020- Bilateral L4/5 TF SYLVIA - 80% relief   10/14/2020- Bilateral L4/5 TF SYLVIA    Physical Therapy/Home Exercise: no    Imaging:  MRI reviewed and showed Multilevel nonstenotic degenerative changes with L3/L4 broad-based disc bulging and superimposed spondylitic spurring which is asymmetric to the left.  Prominent facet arthritis is noted.    Allergies:   Review of patient's allergies indicates:   Allergen Reactions    Mobic [meloxicam] Rash    Naprosyn [naproxen]     Norco [hydrocodone-acetaminophen]     Tramadol        Current Medications:   Current Outpatient Medications   Medication Sig Dispense Refill    albuterol (PROVENTIL) 2.5 mg /3 mL (0.083 %) nebulizer solution Take 2.5 mg by nebulization every 6 (six) hours as needed.      ALPRAZolam (XANAX) 0.25 MG tablet TK 1 T PO QD PRA  3    amLODIPine (NORVASC) 10 MG tablet       dicyclomine (BENTYL) 10 MG capsule Take 10 mg by mouth 4 (four) times daily before meals  and nightly.      fluticasone propionate (FLONASE) 50 mcg/actuation nasal spray 2 sprays in each nostril every evening. 16 g 1    FLUZONE HIGHDOSE QUAD 20-21  mcg/0.7 mL Syrg ADM 0.7ML IM UTD      furosemide (LASIX) 20 MG tablet TK 1 T PO QD PRF FLUID  2    latanoprost 0.005 % ophthalmic solution Place 1 drop into both eyes every evening. 3 Bottle 3    mirtazapine (REMERON) 30 MG tablet Take 30 mg by mouth nightly.  3    nystatin (MYCOSTATIN) 100,000 unit/mL suspension Take 4 mLs (400,000 Units total) by mouth after meals as needed. 160 mL 1    omeprazole (PRILOSEC) 20 MG capsule Take 1 capsule (20 mg total) by mouth 2 (two) times daily. 60 capsule 1    pravastatin (PRAVACHOL) 20 MG tablet   1    SYMBICORT 80-4.5 mcg/actuation HFAA   6    tiZANidine 4 mg Cap Take by mouth 3 (three) times daily as needed.      VENTOLIN HFA 90 mcg/actuation inhaler INL 2 PFS PO Q 4 H UTD PRF SOB  1    walker Misc 1 application by Misc.(Non-Drug; Combo Route) route once daily. 1 each 0    gabapentin (NEURONTIN) 300 MG capsule Take 1 capsule (300 mg total) by mouth 2 (two) times daily. 60 capsule 2     No current facility-administered medications for this visit.        REVIEW OF SYSTEMS:    GENERAL:  No weight loss, malaise or fevers.  HEENT:  Negative for frequent or significant headaches.  NECK:  Negative for lumps, goiter, pain and significant neck swelling.  RESPIRATORY:  Negative for cough, wheezing or shortness of breath.  CARDIOVASCULAR:  Negative for chest pain, leg swelling or palpitations.  GI:  Negative for abdominal discomfort, blood in stools or black stools or change in bowel habits.  MUSCULOSKELETAL:  See HPI.  SKIN:  Negative for lesions, rash, and itching.  PSYCH:  + for sleep disturbance, mood disorder and recent psychosocial stressors.  HEMATOLOGY/LYMPHOLOGY:  Negative for prolonged bleeding, bruising easily or swollen nodes.  NEURO:   No history of headaches, syncope, paralysis, seizures or  tremors.  All other reviewed and negative other than HPI.    Past Medical History:  Past Medical History:   Diagnosis Date    Asthma     Cataract     Glaucoma     Hypertension        Past Surgical History:  Past Surgical History:   Procedure Laterality Date    CATARACT EXTRACTION W/  INTRAOCULAR LENS IMPLANT Bilateral     CHOLECYSTECTOMY      EYE SURGERY      HYSTERECTOMY      NECK SURGERY      cyst removed    TRANSFORAMINAL EPIDURAL INJECTION OF STEROID Bilateral 1/8/2020    Procedure: INJECTION, STEROID, EPIDURAL, TRANSFORAMINAL APPROACH;  Surgeon: Obdulio Leon MD;  Location: [facility];  Service: Pain Management;  Laterality: Bilateral;  B/L TFESI L4    TRANSFORAMINAL EPIDURAL INJECTION OF STEROID Bilateral 10/14/2020    Procedure: INJECTION, STEROID, EPIDURAL, TRANSFORAMINAL APPROACH, L4-L5 need consent;  Surgeon: Obdulio Leon MD;  Location: [facility];  Service: Pain Management;  Laterality: Bilateral;       Family History:  Family History   Problem Relation Age of Onset    Cataracts Son     Glaucoma Son     No Known Problems Mother     No Known Problems Father     Macular degeneration Neg Hx        Social History:  Social History     Socioeconomic History    Marital status: Single     Spouse name: Not on file    Number of children: Not on file    Years of education: Not on file    Highest education level: Not on file   Occupational History    Not on file   Social Needs    Financial resource strain: Not on file    Food insecurity     Worry: Not on file     Inability: Not on file    Transportation needs     Medical: Not on file     Non-medical: Not on file   Tobacco Use    Smoking status: Never Smoker    Smokeless tobacco: Never Used   Substance and Sexual Activity    Alcohol use: No    Drug use: Never    Sexual activity: Not on file   Lifestyle    Physical activity     Days per week: Not on file     Minutes per session: Not on file    Stress: Not on file  "  Relationships    Social connections     Talks on phone: Not on file     Gets together: Not on file     Attends Rastafarian service: Not on file     Active member of club or organization: Not on file     Attends meetings of clubs or organizations: Not on file     Relationship status: Not on file   Other Topics Concern    Not on file   Social History Narrative    Not on file       OBJECTIVE:    BP (!) 147/76   Pulse 90   Temp 98.4 °F (36.9 °C) (Oral)   Ht 5' 2" (1.575 m)   Wt 78.4 kg (172 lb 13.5 oz)   BMI 31.61 kg/m²     PHYSICAL EXAMINATION:  GENERAL: Well appearing, in no acute distress, alert and oriented x3.  PSYCH:  Mood and affect appropriate.  SKIN: Skin color, texture, turgor normal, no rashes or lesions.  HEAD/FACE:  Normocephalic, atraumatic. Cranial nerves grossly intact.  CV: RRR with palpation of the radial artery.  PULM: No evidence of respiratory difficulty, symmetric chest rise.  GI:  Soft and non-tender.  BACK: Straight leg raising in the sitting position is positive for radicular pain on the left.  There is pain to palpation over lumbar paraspinals bilaterally.  There is pain with palpation over lumbar facet joints bilaterally.  Limited ROM with pain on flexion and extension.  Positive facet loading bilaterally.  There is pain with palpation to bilateral SI joints.  EXTREMITIES: Peripheral joint ROM is full and pain free without obvious instability or laxity in all four extremities. No deformities, edema, or skin discoloration. Good capillary refill.  MUSCULOSKELETAL:  Bilateral lower extremity strength is normal and symmetric.  No atrophy or tone abnormalities are noted.  NEURO: Bilateral lower extremity coordination and muscle stretch reflexes are physiologic and symmetric.  Plantar response are downgoing. No clonus.  No loss of sensation is noted.  GAIT: Antalgic- ambulates with walker.    ASSESSMENT: 79 y.o. year old female with low back pain pain, consistent with     1. Radiculopathy of " thoracolumbar region  Ambulatory referral/consult to Physical/Occupational Therapy    diclofenac sodium (VOLTAREN) 1 % Gel    pregabalin (LYRICA) 75 MG capsule   2. DDD (degenerative disc disease), lumbar  Ambulatory referral/consult to Physical/Occupational Therapy    diclofenac sodium (VOLTAREN) 1 % Gel    pregabalin (LYRICA) 75 MG capsule   3. Chronic pain syndrome  Ambulatory referral/consult to Physical/Occupational Therapy    diclofenac sodium (VOLTAREN) 1 % Gel    pregabalin (LYRICA) 75 MG capsule   4. Spondylosis without myelopathy  Ambulatory referral/consult to Physical/Occupational Therapy    diclofenac sodium (VOLTAREN) 1 % Gel    pregabalin (LYRICA) 75 MG capsule   5. Osteoarthritis of spine, unspecified spinal osteoarthritis complication status, unspecified spinal region  Ambulatory referral/consult to Physical/Occupational Therapy    diclofenac sodium (VOLTAREN) 1 % Gel    pregabalin (LYRICA) 75 MG capsule         PLAN:     - I have stressed the importance of physical activity and a home exercise plan to help with pain and improve health.  - Referral to Physical therapy for Lumbar stabilization, core strengthening, and a home exercise program.  - Patient can continue with medications for now since they are providing benefits, using them appropriately, and without side effects.  - Change from gabapentin to pregabalin 75 mg BID.  - Start Voltaren gel topically QID.  - Consider Left TF SYLVIA at L3/4 and L4/5 level if no improvement.  - RTC in 4 weeks with Dr. Leon  - Counseled patient regarding the importance of activity modification, constant sleeping habits and physical therapy.      The above plan and management options were discussed at length with patient. Patient is in agreement with the above and verbalized understanding.      Hany Hassan MD   I have personally reviewed the history and exam of this patient and agree with the resident/fellow/NPs note as stated above.    Obdulio Leon,  MD    12/10/2020

## 2021-01-14 ENCOUNTER — OFFICE VISIT (OUTPATIENT)
Dept: PAIN MEDICINE | Facility: CLINIC | Age: 81
End: 2021-01-14
Attending: ANESTHESIOLOGY
Payer: MEDICARE

## 2021-01-14 VITALS
BODY MASS INDEX: 30.73 KG/M2 | SYSTOLIC BLOOD PRESSURE: 154 MMHG | HEART RATE: 93 BPM | WEIGHT: 167 LBS | HEIGHT: 62 IN | OXYGEN SATURATION: 100 % | TEMPERATURE: 98 F | RESPIRATION RATE: 18 BRPM | DIASTOLIC BLOOD PRESSURE: 83 MMHG

## 2021-01-14 DIAGNOSIS — M53.3 SACROILIAC JOINT PAIN: ICD-10-CM

## 2021-01-14 DIAGNOSIS — G89.4 CHRONIC PAIN SYNDROME: Primary | ICD-10-CM

## 2021-01-14 DIAGNOSIS — M47.816 LUMBAR SPONDYLOSIS: ICD-10-CM

## 2021-01-14 DIAGNOSIS — M51.36 DDD (DEGENERATIVE DISC DISEASE), LUMBAR: ICD-10-CM

## 2021-01-14 DIAGNOSIS — M54.16 LUMBAR RADICULOPATHY: ICD-10-CM

## 2021-01-14 PROCEDURE — 3077F PR MOST RECENT SYSTOLIC BLOOD PRESSURE >= 140 MM HG: ICD-10-PCS | Mod: CPTII,S$GLB,, | Performed by: NURSE PRACTITIONER

## 2021-01-14 PROCEDURE — 99999 PR PBB SHADOW E&M-EST. PATIENT-LVL III: CPT | Mod: PBBFAC,,, | Performed by: NURSE PRACTITIONER

## 2021-01-14 PROCEDURE — 3079F DIAST BP 80-89 MM HG: CPT | Mod: CPTII,S$GLB,, | Performed by: NURSE PRACTITIONER

## 2021-01-14 PROCEDURE — 1125F AMNT PAIN NOTED PAIN PRSNT: CPT | Mod: S$GLB,,, | Performed by: NURSE PRACTITIONER

## 2021-01-14 PROCEDURE — 3288F PR FALLS RISK ASSESSMENT DOCUMENTED: ICD-10-PCS | Mod: CPTII,S$GLB,, | Performed by: NURSE PRACTITIONER

## 2021-01-14 PROCEDURE — 99999 PR PBB SHADOW E&M-EST. PATIENT-LVL III: ICD-10-PCS | Mod: PBBFAC,,, | Performed by: NURSE PRACTITIONER

## 2021-01-14 PROCEDURE — 1101F PT FALLS ASSESS-DOCD LE1/YR: CPT | Mod: CPTII,S$GLB,, | Performed by: NURSE PRACTITIONER

## 2021-01-14 PROCEDURE — 1125F PR PAIN SEVERITY QUANTIFIED, PAIN PRESENT: ICD-10-PCS | Mod: S$GLB,,, | Performed by: NURSE PRACTITIONER

## 2021-01-14 PROCEDURE — 3288F FALL RISK ASSESSMENT DOCD: CPT | Mod: CPTII,S$GLB,, | Performed by: NURSE PRACTITIONER

## 2021-01-14 PROCEDURE — 3077F SYST BP >= 140 MM HG: CPT | Mod: CPTII,S$GLB,, | Performed by: NURSE PRACTITIONER

## 2021-01-14 PROCEDURE — 3079F PR MOST RECENT DIASTOLIC BLOOD PRESSURE 80-89 MM HG: ICD-10-PCS | Mod: CPTII,S$GLB,, | Performed by: NURSE PRACTITIONER

## 2021-01-14 PROCEDURE — 1159F MED LIST DOCD IN RCRD: CPT | Mod: S$GLB,,, | Performed by: NURSE PRACTITIONER

## 2021-01-14 PROCEDURE — 1101F PR PT FALLS ASSESS DOC 0-1 FALLS W/OUT INJ PAST YR: ICD-10-PCS | Mod: CPTII,S$GLB,, | Performed by: NURSE PRACTITIONER

## 2021-01-14 PROCEDURE — 99213 OFFICE O/P EST LOW 20 MIN: CPT | Mod: S$GLB,,, | Performed by: NURSE PRACTITIONER

## 2021-01-14 PROCEDURE — 1159F PR MEDICATION LIST DOCUMENTED IN MEDICAL RECORD: ICD-10-PCS | Mod: S$GLB,,, | Performed by: NURSE PRACTITIONER

## 2021-01-14 PROCEDURE — 99213 PR OFFICE/OUTPT VISIT, EST, LEVL III, 20-29 MIN: ICD-10-PCS | Mod: S$GLB,,, | Performed by: NURSE PRACTITIONER

## 2021-01-28 ENCOUNTER — OFFICE VISIT (OUTPATIENT)
Dept: OTOLARYNGOLOGY | Facility: CLINIC | Age: 81
End: 2021-01-28
Payer: MEDICARE

## 2021-01-28 VITALS
TEMPERATURE: 98 F | HEART RATE: 98 BPM | HEIGHT: 62 IN | DIASTOLIC BLOOD PRESSURE: 85 MMHG | SYSTOLIC BLOOD PRESSURE: 142 MMHG | WEIGHT: 171.88 LBS | BODY MASS INDEX: 31.63 KG/M2

## 2021-01-28 DIAGNOSIS — Z87.19 HISTORY OF GASTROESOPHAGEAL REFLUX (GERD): ICD-10-CM

## 2021-01-28 DIAGNOSIS — Z87.09 HISTORY OF ASTHMA: ICD-10-CM

## 2021-01-28 DIAGNOSIS — K14.3 BLACK HAIRY TONGUE: ICD-10-CM

## 2021-01-28 PROCEDURE — 3079F PR MOST RECENT DIASTOLIC BLOOD PRESSURE 80-89 MM HG: ICD-10-PCS | Mod: CPTII,S$GLB,, | Performed by: OTOLARYNGOLOGY

## 2021-01-28 PROCEDURE — 3077F PR MOST RECENT SYSTOLIC BLOOD PRESSURE >= 140 MM HG: ICD-10-PCS | Mod: CPTII,S$GLB,, | Performed by: OTOLARYNGOLOGY

## 2021-01-28 PROCEDURE — 1159F PR MEDICATION LIST DOCUMENTED IN MEDICAL RECORD: ICD-10-PCS | Mod: S$GLB,,, | Performed by: OTOLARYNGOLOGY

## 2021-01-28 PROCEDURE — 3077F SYST BP >= 140 MM HG: CPT | Mod: CPTII,S$GLB,, | Performed by: OTOLARYNGOLOGY

## 2021-01-28 PROCEDURE — 99214 PR OFFICE/OUTPT VISIT, EST, LEVL IV, 30-39 MIN: ICD-10-PCS | Mod: S$GLB,,, | Performed by: OTOLARYNGOLOGY

## 2021-01-28 PROCEDURE — 3079F DIAST BP 80-89 MM HG: CPT | Mod: CPTII,S$GLB,, | Performed by: OTOLARYNGOLOGY

## 2021-01-28 PROCEDURE — 99214 OFFICE O/P EST MOD 30 MIN: CPT | Mod: S$GLB,,, | Performed by: OTOLARYNGOLOGY

## 2021-01-28 PROCEDURE — 1159F MED LIST DOCD IN RCRD: CPT | Mod: S$GLB,,, | Performed by: OTOLARYNGOLOGY

## 2021-01-28 RX ORDER — CLOTRIMAZOLE 10 MG/1
10 LOZENGE ORAL; TOPICAL
Qty: 50 TROCHE | Refills: 1 | Status: SHIPPED | OUTPATIENT
Start: 2021-01-28 | End: 2022-06-09

## 2021-03-17 ENCOUNTER — CLINICAL SUPPORT (OUTPATIENT)
Dept: REHABILITATION | Facility: OTHER | Age: 81
End: 2021-03-17
Payer: MEDICARE

## 2021-03-17 DIAGNOSIS — R29.3 POSTURE IMBALANCE: ICD-10-CM

## 2021-03-17 DIAGNOSIS — R26.9 GAIT ABNORMALITY: ICD-10-CM

## 2021-03-17 DIAGNOSIS — M54.42 CHRONIC BILATERAL LOW BACK PAIN WITH BILATERAL SCIATICA: ICD-10-CM

## 2021-03-17 DIAGNOSIS — G89.29 CHRONIC BILATERAL LOW BACK PAIN WITH BILATERAL SCIATICA: ICD-10-CM

## 2021-03-17 DIAGNOSIS — M54.41 CHRONIC BILATERAL LOW BACK PAIN WITH BILATERAL SCIATICA: ICD-10-CM

## 2021-03-17 PROCEDURE — 97162 PT EVAL MOD COMPLEX 30 MIN: CPT | Mod: PN | Performed by: PHYSICAL THERAPIST

## 2021-03-17 PROCEDURE — 97110 THERAPEUTIC EXERCISES: CPT | Mod: PN | Performed by: PHYSICAL THERAPIST

## 2021-03-18 DIAGNOSIS — M54.16 LUMBAR RADICULOPATHY: Primary | ICD-10-CM

## 2021-03-26 ENCOUNTER — CLINICAL SUPPORT (OUTPATIENT)
Dept: REHABILITATION | Facility: OTHER | Age: 81
End: 2021-03-26
Payer: MEDICARE

## 2021-03-26 DIAGNOSIS — R26.9 GAIT ABNORMALITY: ICD-10-CM

## 2021-03-26 DIAGNOSIS — M54.41 CHRONIC BILATERAL LOW BACK PAIN WITH BILATERAL SCIATICA: ICD-10-CM

## 2021-03-26 DIAGNOSIS — M54.42 CHRONIC BILATERAL LOW BACK PAIN WITH BILATERAL SCIATICA: ICD-10-CM

## 2021-03-26 DIAGNOSIS — G89.29 CHRONIC BILATERAL LOW BACK PAIN WITH BILATERAL SCIATICA: ICD-10-CM

## 2021-03-26 DIAGNOSIS — R29.3 POSTURE IMBALANCE: ICD-10-CM

## 2021-03-26 PROCEDURE — 97110 THERAPEUTIC EXERCISES: CPT | Mod: PN

## 2021-04-11 ENCOUNTER — HOSPITAL ENCOUNTER (EMERGENCY)
Facility: OTHER | Age: 81
Discharge: HOME | End: 2021-04-12
Attending: EMERGENCY MEDICINE
Payer: MEDICARE

## 2021-04-11 DIAGNOSIS — I63.9 STROKE: ICD-10-CM

## 2021-04-11 DIAGNOSIS — G56.32 ACUTE RADIAL NERVE PALSY OF LEFT UPPER EXTREMITY: Primary | ICD-10-CM

## 2021-04-11 LAB
ALBUMIN SERPL BCP-MCNC: 3.7 G/DL (ref 3.5–5.2)
ALP SERPL-CCNC: 113 U/L (ref 55–135)
ALT SERPL W/O P-5'-P-CCNC: 18 U/L (ref 10–44)
ANION GAP SERPL CALC-SCNC: 11 MMOL/L (ref 8–16)
AST SERPL-CCNC: 22 U/L (ref 10–40)
BASOPHILS # BLD AUTO: 0.02 K/UL (ref 0–0.2)
BASOPHILS NFR BLD: 0.2 % (ref 0–1.9)
BILIRUB SERPL-MCNC: 0.3 MG/DL (ref 0.1–1)
BUN SERPL-MCNC: 7 MG/DL (ref 8–23)
CALCIUM SERPL-MCNC: 9.3 MG/DL (ref 8.7–10.5)
CHLORIDE SERPL-SCNC: 107 MMOL/L (ref 95–110)
CO2 SERPL-SCNC: 22 MMOL/L (ref 23–29)
CREAT SERPL-MCNC: 0.8 MG/DL (ref 0.5–1.4)
DIFFERENTIAL METHOD: ABNORMAL
EOSINOPHIL # BLD AUTO: 0.1 K/UL (ref 0–0.5)
EOSINOPHIL NFR BLD: 1.7 % (ref 0–8)
ERYTHROCYTE [DISTWIDTH] IN BLOOD BY AUTOMATED COUNT: 14.5 % (ref 11.5–14.5)
EST. GFR  (AFRICAN AMERICAN): >60 ML/MIN/1.73 M^2
EST. GFR  (NON AFRICAN AMERICAN): >60 ML/MIN/1.73 M^2
GLUCOSE SERPL-MCNC: 115 MG/DL (ref 70–110)
HCT VFR BLD AUTO: 43.1 % (ref 37–48.5)
HGB BLD-MCNC: 13.4 G/DL (ref 12–16)
IMM GRANULOCYTES # BLD AUTO: 0.02 K/UL (ref 0–0.04)
IMM GRANULOCYTES NFR BLD AUTO: 0.2 % (ref 0–0.5)
LYMPHOCYTES # BLD AUTO: 1.5 K/UL (ref 1–4.8)
LYMPHOCYTES NFR BLD: 18.9 % (ref 18–48)
MCH RBC QN AUTO: 26.5 PG (ref 27–31)
MCHC RBC AUTO-ENTMCNC: 31.1 G/DL (ref 32–36)
MCV RBC AUTO: 85 FL (ref 82–98)
MONOCYTES # BLD AUTO: 0.5 K/UL (ref 0.3–1)
MONOCYTES NFR BLD: 6.2 % (ref 4–15)
NEUTROPHILS # BLD AUTO: 5.8 K/UL (ref 1.8–7.7)
NEUTROPHILS NFR BLD: 72.8 % (ref 38–73)
NRBC BLD-RTO: 0 /100 WBC
PLATELET # BLD AUTO: 264 K/UL (ref 150–450)
PMV BLD AUTO: 9.4 FL (ref 9.2–12.9)
POTASSIUM SERPL-SCNC: 4.1 MMOL/L (ref 3.5–5.1)
PROT SERPL-MCNC: 7.6 G/DL (ref 6–8.4)
RBC # BLD AUTO: 5.06 M/UL (ref 4–5.4)
SODIUM SERPL-SCNC: 140 MMOL/L (ref 136–145)
WBC # BLD AUTO: 8.03 K/UL (ref 3.9–12.7)

## 2021-04-11 PROCEDURE — 93005 ELECTROCARDIOGRAM TRACING: CPT

## 2021-04-11 PROCEDURE — 99291 CRITICAL CARE FIRST HOUR: CPT | Mod: 25

## 2021-04-11 PROCEDURE — 80053 COMPREHEN METABOLIC PANEL: CPT | Performed by: EMERGENCY MEDICINE

## 2021-04-11 PROCEDURE — 85025 COMPLETE CBC W/AUTO DIFF WBC: CPT | Performed by: EMERGENCY MEDICINE

## 2021-04-11 PROCEDURE — G0425 INPT/ED TELECONSULT30: HCPCS | Mod: 95,,, | Performed by: PSYCHIATRY & NEUROLOGY

## 2021-04-11 PROCEDURE — 93010 ELECTROCARDIOGRAM REPORT: CPT | Mod: ,,, | Performed by: INTERNAL MEDICINE

## 2021-04-11 PROCEDURE — 93010 EKG 12-LEAD: ICD-10-PCS | Mod: ,,, | Performed by: INTERNAL MEDICINE

## 2021-04-11 PROCEDURE — G0425 PR INPT TELEHEALTH CONSULT 30M: ICD-10-PCS | Mod: 95,,, | Performed by: PSYCHIATRY & NEUROLOGY

## 2021-04-12 ENCOUNTER — TELEPHONE (OUTPATIENT)
Dept: NEUROLOGY | Facility: CLINIC | Age: 81
End: 2021-04-12

## 2021-04-12 VITALS
HEIGHT: 62 IN | DIASTOLIC BLOOD PRESSURE: 78 MMHG | RESPIRATION RATE: 16 BRPM | BODY MASS INDEX: 31.44 KG/M2 | SYSTOLIC BLOOD PRESSURE: 155 MMHG | OXYGEN SATURATION: 99 % | HEART RATE: 92 BPM | TEMPERATURE: 98 F

## 2021-04-13 ENCOUNTER — PES CALL (OUTPATIENT)
Dept: ADMINISTRATIVE | Facility: CLINIC | Age: 81
End: 2021-04-13

## 2021-04-14 ENCOUNTER — HOSPITAL ENCOUNTER (OUTPATIENT)
Facility: OTHER | Age: 81
Discharge: HOME | End: 2021-04-14
Attending: ANESTHESIOLOGY | Admitting: ANESTHESIOLOGY
Payer: MEDICARE

## 2021-04-14 VITALS
HEART RATE: 69 BPM | OXYGEN SATURATION: 95 % | SYSTOLIC BLOOD PRESSURE: 126 MMHG | DIASTOLIC BLOOD PRESSURE: 63 MMHG | RESPIRATION RATE: 14 BRPM

## 2021-04-14 DIAGNOSIS — M54.16 LUMBAR RADICULOPATHY: Primary | ICD-10-CM

## 2021-04-14 PROCEDURE — 64483 PR EPIDURAL INJ, ANES/STEROID, TRANSFORAMINAL, LUMB/SACR, SNGL LEVL: ICD-10-PCS | Mod: LT,,, | Performed by: ANESTHESIOLOGY

## 2021-04-14 PROCEDURE — 63600175 PHARM REV CODE 636 W HCPCS: Performed by: ANESTHESIOLOGY

## 2021-04-14 PROCEDURE — 64484 NJX AA&/STRD TFRM EPI L/S EA: CPT | Mod: LT | Performed by: ANESTHESIOLOGY

## 2021-04-14 PROCEDURE — 64483 NJX AA&/STRD TFRM EPI L/S 1: CPT | Mod: LT,,, | Performed by: ANESTHESIOLOGY

## 2021-04-14 PROCEDURE — 25000003 PHARM REV CODE 250: Performed by: ANESTHESIOLOGY

## 2021-04-14 PROCEDURE — 25500020 PHARM REV CODE 255: Performed by: ANESTHESIOLOGY

## 2021-04-14 PROCEDURE — 64484 NJX AA&/STRD TFRM EPI L/S EA: CPT | Mod: LT,,, | Performed by: ANESTHESIOLOGY

## 2021-04-14 PROCEDURE — 64483 NJX AA&/STRD TFRM EPI L/S 1: CPT | Mod: LT | Performed by: ANESTHESIOLOGY

## 2021-04-14 PROCEDURE — 64484 PRA INJECT ANES/STEROID FORAMEN LUMBAR/SACRAL W IMG GUIDE ,EA ADD LEVEL: ICD-10-PCS | Mod: LT,,, | Performed by: ANESTHESIOLOGY

## 2021-04-14 RX ORDER — FENTANYL CITRATE 50 UG/ML
INJECTION, SOLUTION INTRAMUSCULAR; INTRAVENOUS
Status: DISCONTINUED | OUTPATIENT
Start: 2021-04-14 | End: 2021-04-14 | Stop reason: HOSPADM

## 2021-04-14 RX ORDER — SODIUM CHLORIDE 9 MG/ML
INJECTION, SOLUTION INTRAVENOUS CONTINUOUS
Status: DISCONTINUED | OUTPATIENT
Start: 2021-04-14 | End: 2021-04-14 | Stop reason: HOSPADM

## 2021-04-14 RX ORDER — LIDOCAINE HYDROCHLORIDE 5 MG/ML
INJECTION, SOLUTION INFILTRATION; INTRAVENOUS
Status: DISCONTINUED | OUTPATIENT
Start: 2021-04-14 | End: 2021-04-14 | Stop reason: HOSPADM

## 2021-04-14 RX ORDER — LIDOCAINE HYDROCHLORIDE 10 MG/ML
INJECTION INFILTRATION; PERINEURAL
Status: DISCONTINUED | OUTPATIENT
Start: 2021-04-14 | End: 2021-04-14 | Stop reason: HOSPADM

## 2021-04-14 RX ORDER — MIDAZOLAM HYDROCHLORIDE 1 MG/ML
INJECTION INTRAMUSCULAR; INTRAVENOUS
Status: DISCONTINUED | OUTPATIENT
Start: 2021-04-14 | End: 2021-04-14 | Stop reason: HOSPADM

## 2021-04-14 RX ORDER — DEXAMETHASONE SODIUM PHOSPHATE 10 MG/ML
INJECTION INTRAMUSCULAR; INTRAVENOUS
Status: DISCONTINUED | OUTPATIENT
Start: 2021-04-14 | End: 2021-04-14 | Stop reason: HOSPADM

## 2021-04-15 ENCOUNTER — TELEPHONE (OUTPATIENT)
Dept: NEUROLOGY | Facility: CLINIC | Age: 81
End: 2021-04-15

## 2021-07-23 ENCOUNTER — OFFICE VISIT (OUTPATIENT)
Dept: NEUROLOGY | Facility: CLINIC | Age: 81
End: 2021-07-23
Payer: MEDICARE

## 2021-07-23 VITALS
DIASTOLIC BLOOD PRESSURE: 65 MMHG | SYSTOLIC BLOOD PRESSURE: 137 MMHG | WEIGHT: 176.38 LBS | HEART RATE: 99 BPM | BODY MASS INDEX: 32.46 KG/M2 | HEIGHT: 62 IN

## 2021-07-23 DIAGNOSIS — M19.049 HAND ARTHRITIS: ICD-10-CM

## 2021-07-23 DIAGNOSIS — R26.9 GAIT ABNORMALITY: ICD-10-CM

## 2021-07-23 DIAGNOSIS — G89.29 OTHER CHRONIC PAIN: ICD-10-CM

## 2021-07-23 DIAGNOSIS — G56.02 CARPAL TUNNEL SYNDROME ON LEFT: Primary | ICD-10-CM

## 2021-07-23 DIAGNOSIS — M47.817 SPONDYLOSIS OF LUMBOSACRAL JOINT WITHOUT MYELOPATHY: ICD-10-CM

## 2021-07-23 DIAGNOSIS — R29.3 POSTURE IMBALANCE: ICD-10-CM

## 2021-07-23 DIAGNOSIS — M17.11 ARTHRITIS OF RIGHT KNEE: ICD-10-CM

## 2021-07-23 DIAGNOSIS — Z74.09 MOBILITY IMPAIRED: ICD-10-CM

## 2021-07-23 DIAGNOSIS — E66.09 CLASS 1 OBESITY DUE TO EXCESS CALORIES WITH SERIOUS COMORBIDITY AND BODY MASS INDEX (BMI) OF 32.0 TO 32.9 IN ADULT: ICD-10-CM

## 2021-07-23 DIAGNOSIS — M46.1 SACROILIITIS: ICD-10-CM

## 2021-07-23 DIAGNOSIS — M47.816 LUMBAR FACET ARTHROPATHY: ICD-10-CM

## 2021-07-23 PROBLEM — E66.811 CLASS 1 OBESITY WITH SERIOUS COMORBIDITY AND BODY MASS INDEX (BMI) OF 32.0 TO 32.9 IN ADULT: Status: ACTIVE | Noted: 2021-07-23

## 2021-07-23 PROBLEM — E66.9 CLASS 1 OBESITY WITH SERIOUS COMORBIDITY AND BODY MASS INDEX (BMI) OF 32.0 TO 32.9 IN ADULT: Status: ACTIVE | Noted: 2021-07-23

## 2021-07-23 PROCEDURE — 1101F PT FALLS ASSESS-DOCD LE1/YR: CPT | Mod: CPTII,S$GLB,, | Performed by: PSYCHIATRY & NEUROLOGY

## 2021-07-23 PROCEDURE — 1159F MED LIST DOCD IN RCRD: CPT | Mod: CPTII,S$GLB,, | Performed by: PSYCHIATRY & NEUROLOGY

## 2021-07-23 PROCEDURE — 99999 PR PBB SHADOW E&M-EST. PATIENT-LVL III: CPT | Mod: PBBFAC,,, | Performed by: PSYCHIATRY & NEUROLOGY

## 2021-07-23 PROCEDURE — 1101F PR PT FALLS ASSESS DOC 0-1 FALLS W/OUT INJ PAST YR: ICD-10-PCS | Mod: CPTII,S$GLB,, | Performed by: PSYCHIATRY & NEUROLOGY

## 2021-07-23 PROCEDURE — 99999 PR PBB SHADOW E&M-EST. PATIENT-LVL III: ICD-10-PCS | Mod: PBBFAC,,, | Performed by: PSYCHIATRY & NEUROLOGY

## 2021-07-23 PROCEDURE — 1159F PR MEDICATION LIST DOCUMENTED IN MEDICAL RECORD: ICD-10-PCS | Mod: CPTII,S$GLB,, | Performed by: PSYCHIATRY & NEUROLOGY

## 2021-07-23 PROCEDURE — 3288F FALL RISK ASSESSMENT DOCD: CPT | Mod: CPTII,S$GLB,, | Performed by: PSYCHIATRY & NEUROLOGY

## 2021-07-23 PROCEDURE — 99203 PR OFFICE/OUTPT VISIT, NEW, LEVL III, 30-44 MIN: ICD-10-PCS | Mod: S$GLB,,, | Performed by: PSYCHIATRY & NEUROLOGY

## 2021-07-23 PROCEDURE — 99203 OFFICE O/P NEW LOW 30 MIN: CPT | Mod: S$GLB,,, | Performed by: PSYCHIATRY & NEUROLOGY

## 2021-07-23 PROCEDURE — 3288F PR FALLS RISK ASSESSMENT DOCUMENTED: ICD-10-PCS | Mod: CPTII,S$GLB,, | Performed by: PSYCHIATRY & NEUROLOGY

## 2021-07-23 RX ORDER — TIZANIDINE 4 MG/1
TABLET ORAL
COMMUNITY
Start: 2021-07-19 | End: 2022-06-09

## 2021-07-23 RX ORDER — FLUTICASONE PROPIONATE AND SALMETEROL 250; 50 UG/1; UG/1
1 POWDER RESPIRATORY (INHALATION)
COMMUNITY
Start: 2021-07-15

## 2021-07-23 RX ORDER — ALBUTEROL SULFATE 90 UG/1
AEROSOL, METERED RESPIRATORY (INHALATION)
COMMUNITY
Start: 2021-03-10

## 2021-07-23 RX ORDER — FLUTICASONE PROPIONATE AND SALMETEROL 250; 50 UG/1; UG/1
POWDER RESPIRATORY (INHALATION)
COMMUNITY
Start: 2021-07-19 | End: 2021-07-23 | Stop reason: SDUPTHER

## 2021-07-23 RX ORDER — FLUCONAZOLE 200 MG/1
TABLET ORAL
COMMUNITY
Start: 2021-07-19 | End: 2022-06-09

## 2021-07-23 RX ORDER — CYCLOBENZAPRINE HCL 5 MG
TABLET ORAL
COMMUNITY
Start: 2021-07-19 | End: 2022-06-09

## 2021-07-23 RX ORDER — TIZANIDINE 2 MG/1
TABLET ORAL
COMMUNITY
Start: 2021-07-19 | End: 2022-06-09

## 2021-09-22 ENCOUNTER — HOSPITAL ENCOUNTER (OUTPATIENT)
Dept: RADIOLOGY | Facility: OTHER | Age: 81
Discharge: HOME | End: 2021-09-22
Attending: NURSE PRACTITIONER
Payer: MEDICARE

## 2021-09-22 DIAGNOSIS — Z78.0 ASYMPTOMATIC MENOPAUSAL STATE: ICD-10-CM

## 2021-09-22 PROCEDURE — 77080 DXA BONE DENSITY AXIAL: CPT | Mod: 26,,, | Performed by: RADIOLOGY

## 2021-09-22 PROCEDURE — 77080 DEXA BONE DENSITY SPINE HIP: ICD-10-PCS | Mod: 26,,, | Performed by: RADIOLOGY

## 2021-09-22 PROCEDURE — 77080 DXA BONE DENSITY AXIAL: CPT | Mod: TC

## 2021-12-09 ENCOUNTER — IMMUNIZATION (OUTPATIENT)
Dept: INTERNAL MEDICINE | Facility: CLINIC | Age: 81
End: 2021-12-09
Payer: MEDICARE

## 2021-12-09 DIAGNOSIS — Z23 NEED FOR VACCINATION: Primary | ICD-10-CM

## 2021-12-09 PROCEDURE — 0004A COVID-19, MRNA, LNP-S, PF, 30 MCG/0.3 ML DOSE VACCINE: CPT | Mod: PBBFAC | Performed by: INTERNAL MEDICINE

## 2021-12-10 ENCOUNTER — IMMUNIZATION (OUTPATIENT)
Dept: PHARMACY | Facility: CLINIC | Age: 81
End: 2021-12-10
Payer: MEDICARE

## 2022-01-11 ENCOUNTER — HOSPITAL ENCOUNTER (OUTPATIENT)
Dept: RADIOLOGY | Facility: HOSPITAL | Age: 82
Discharge: HOME | End: 2022-01-11
Attending: INTERNAL MEDICINE
Payer: MEDICARE

## 2022-01-11 ENCOUNTER — OFFICE VISIT (OUTPATIENT)
Dept: RHEUMATOLOGY | Facility: CLINIC | Age: 82
End: 2022-01-11
Payer: MEDICARE

## 2022-01-11 VITALS
BODY MASS INDEX: 34.61 KG/M2 | HEIGHT: 62 IN | DIASTOLIC BLOOD PRESSURE: 82 MMHG | HEART RATE: 92 BPM | SYSTOLIC BLOOD PRESSURE: 136 MMHG | WEIGHT: 188.06 LBS

## 2022-01-11 DIAGNOSIS — M25.50 POLYARTHRALGIA: ICD-10-CM

## 2022-01-11 PROCEDURE — 3075F SYST BP GE 130 - 139MM HG: CPT | Mod: CPTII,S$GLB,, | Performed by: INTERNAL MEDICINE

## 2022-01-11 PROCEDURE — 1159F PR MEDICATION LIST DOCUMENTED IN MEDICAL RECORD: ICD-10-PCS | Mod: CPTII,S$GLB,, | Performed by: INTERNAL MEDICINE

## 2022-01-11 PROCEDURE — 1125F PR PAIN SEVERITY QUANTIFIED, PAIN PRESENT: ICD-10-PCS | Mod: CPTII,S$GLB,, | Performed by: INTERNAL MEDICINE

## 2022-01-11 PROCEDURE — 99999 PR PBB SHADOW E&M-EST. PATIENT-LVL V: CPT | Mod: PBBFAC,,, | Performed by: INTERNAL MEDICINE

## 2022-01-11 PROCEDURE — 3075F PR MOST RECENT SYSTOLIC BLOOD PRESS GE 130-139MM HG: ICD-10-PCS | Mod: CPTII,S$GLB,, | Performed by: INTERNAL MEDICINE

## 2022-01-11 PROCEDURE — 99204 OFFICE O/P NEW MOD 45 MIN: CPT | Mod: S$GLB,,, | Performed by: INTERNAL MEDICINE

## 2022-01-11 PROCEDURE — 3079F PR MOST RECENT DIASTOLIC BLOOD PRESSURE 80-89 MM HG: ICD-10-PCS | Mod: CPTII,S$GLB,, | Performed by: INTERNAL MEDICINE

## 2022-01-11 PROCEDURE — 99999 PR PBB SHADOW E&M-EST. PATIENT-LVL V: ICD-10-PCS | Mod: PBBFAC,,, | Performed by: INTERNAL MEDICINE

## 2022-01-11 PROCEDURE — 77077 XR ARTHRITIS SURVEY: ICD-10-PCS | Mod: 26,,, | Performed by: RADIOLOGY

## 2022-01-11 PROCEDURE — 1125F AMNT PAIN NOTED PAIN PRSNT: CPT | Mod: CPTII,S$GLB,, | Performed by: INTERNAL MEDICINE

## 2022-01-11 PROCEDURE — 77077 JOINT SURVEY SINGLE VIEW: CPT | Mod: TC

## 2022-01-11 PROCEDURE — 99204 PR OFFICE/OUTPT VISIT, NEW, LEVL IV, 45-59 MIN: ICD-10-PCS | Mod: S$GLB,,, | Performed by: INTERNAL MEDICINE

## 2022-01-11 PROCEDURE — 1159F MED LIST DOCD IN RCRD: CPT | Mod: CPTII,S$GLB,, | Performed by: INTERNAL MEDICINE

## 2022-01-11 PROCEDURE — 77077 JOINT SURVEY SINGLE VIEW: CPT | Mod: 26,,, | Performed by: RADIOLOGY

## 2022-01-11 PROCEDURE — 3079F DIAST BP 80-89 MM HG: CPT | Mod: CPTII,S$GLB,, | Performed by: INTERNAL MEDICINE

## 2022-01-11 ASSESSMENT — ROUTINE ASSESSMENT OF PATIENT INDEX DATA (RAPID3)
FATIGUE SCORE: 8.5
PSYCHOLOGICAL DISTRESS SCORE: 7.7
PATIENT GLOBAL ASSESSMENT SCORE: 8.5
AM STIFFNESS SCORE: 0, NO
MDHAQ FUNCTION SCORE: 0.5
TOTAL RAPID3 SCORE: 6.22
PAIN SCORE: 8.5

## 2022-01-23 ENCOUNTER — TELEPHONE (OUTPATIENT)
Dept: RHEUMATOLOGY | Facility: CLINIC | Age: 82
End: 2022-01-23
Payer: MEDICARE

## 2022-01-23 DIAGNOSIS — M25.472 LEFT ANKLE SWELLING: ICD-10-CM

## 2022-01-23 DIAGNOSIS — M25.572 LEFT LATERAL ANKLE PAIN: Primary | ICD-10-CM

## 2022-01-23 DIAGNOSIS — M25.572 PAIN OF JOINT OF LEFT ANKLE AND FOOT: ICD-10-CM

## 2022-02-02 ENCOUNTER — HOSPITAL ENCOUNTER (OUTPATIENT)
Dept: RADIOLOGY | Facility: OTHER | Age: 82
Discharge: HOME | End: 2022-02-02
Attending: INTERNAL MEDICINE
Payer: MEDICARE

## 2022-02-02 DIAGNOSIS — M25.572 PAIN OF JOINT OF LEFT ANKLE AND FOOT: ICD-10-CM

## 2022-02-02 PROCEDURE — 73723 MRI JOINT LWR EXTR W/O&W/DYE: CPT | Mod: TC,LT

## 2022-02-02 PROCEDURE — 25500020 PHARM REV CODE 255: Performed by: INTERNAL MEDICINE

## 2022-02-02 PROCEDURE — A9585 GADOBUTROL INJECTION: HCPCS | Performed by: INTERNAL MEDICINE

## 2022-02-02 PROCEDURE — 73723 MRI ANKLE W WO CONTRAST LEFT: ICD-10-PCS | Mod: 26,LT,, | Performed by: RADIOLOGY

## 2022-02-02 PROCEDURE — 73723 MRI JOINT LWR EXTR W/O&W/DYE: CPT | Mod: 26,LT,, | Performed by: RADIOLOGY

## 2022-02-02 RX ORDER — GADOBUTROL 604.72 MG/ML
8.5 INJECTION INTRAVENOUS
Status: COMPLETED | OUTPATIENT
Start: 2022-02-02 | End: 2022-02-02

## 2022-02-02 RX ADMIN — GADOBUTROL 8.5 ML: 604.72 INJECTION INTRAVENOUS at 03:02

## 2022-04-11 ENCOUNTER — TELEPHONE (OUTPATIENT)
Dept: PAIN MEDICINE | Facility: CLINIC | Age: 82
End: 2022-04-11
Payer: MEDICARE

## 2022-04-11 NOTE — TELEPHONE ENCOUNTER
Staff returned call to patient in regards to her message.        Staff was able to schedule patient for 4/20/22 at 2:40p with NP      Pt verbalized understanding and has accepted the appointment.

## 2022-04-11 NOTE — TELEPHONE ENCOUNTER
----- Message from Ilir Miller sent at 4/11/2022  2:06 PM CDT -----  Name of Who is Calling: GARY VILLANUEVA         What is the request in detail: The patient is calling to schedule an appointment. Please advise          Can the clinic reply by [facility]: No         [facility] [phone]

## 2022-04-19 ENCOUNTER — TELEPHONE (OUTPATIENT)
Dept: PAIN MEDICINE | Facility: CLINIC | Age: 82
End: 2022-04-19
Payer: MEDICARE

## 2022-04-19 NOTE — TELEPHONE ENCOUNTER
This message is for patient in regards to his/her appointment 04/20/22 at 2:40 p       [facility] Policy: For the safety of all patients and staff members.     Patient Visitor policy: During this visit we're asking all patients to only have one visitor over the age of 18yrs old to accompany to be seen by Marina Gallegos NP. If patient do not required assistance with their visit, we're asking all visitors to remain outside the waiting area.    Upon arriving to your schedule appointment; patients are required to wear a face mask, if patient do not have a face mask one will be provided entering the facility. If you have any questions or concerns please contact [phone]    Staff olbo SG

## 2022-04-20 ENCOUNTER — OFFICE VISIT (OUTPATIENT)
Dept: PAIN MEDICINE | Facility: CLINIC | Age: 82
End: 2022-04-20
Payer: MEDICARE

## 2022-04-20 VITALS
BODY MASS INDEX: 34.41 KG/M2 | TEMPERATURE: 98 F | DIASTOLIC BLOOD PRESSURE: 79 MMHG | WEIGHT: 187 LBS | HEIGHT: 62 IN | SYSTOLIC BLOOD PRESSURE: 132 MMHG | HEART RATE: 97 BPM | RESPIRATION RATE: 18 BRPM

## 2022-04-20 DIAGNOSIS — G89.4 CHRONIC PAIN SYNDROME: ICD-10-CM

## 2022-04-20 DIAGNOSIS — M47.816 LUMBAR SPONDYLOSIS: ICD-10-CM

## 2022-04-20 DIAGNOSIS — M54.16 LUMBAR RADICULOPATHY: ICD-10-CM

## 2022-04-20 DIAGNOSIS — M53.3 SACROILIAC JOINT PAIN: Primary | ICD-10-CM

## 2022-04-20 DIAGNOSIS — M51.36 DDD (DEGENERATIVE DISC DISEASE), LUMBAR: ICD-10-CM

## 2022-04-20 PROCEDURE — 3075F PR MOST RECENT SYSTOLIC BLOOD PRESS GE 130-139MM HG: ICD-10-PCS | Mod: CPTII,S$GLB,, | Performed by: NURSE PRACTITIONER

## 2022-04-20 PROCEDURE — 1159F PR MEDICATION LIST DOCUMENTED IN MEDICAL RECORD: ICD-10-PCS | Mod: CPTII,S$GLB,, | Performed by: NURSE PRACTITIONER

## 2022-04-20 PROCEDURE — 1160F PR REVIEW ALL MEDS BY PRESCRIBER/CLIN PHARMACIST DOCUMENTED: ICD-10-PCS | Mod: CPTII,S$GLB,, | Performed by: NURSE PRACTITIONER

## 2022-04-20 PROCEDURE — 1101F PT FALLS ASSESS-DOCD LE1/YR: CPT | Mod: CPTII,S$GLB,, | Performed by: NURSE PRACTITIONER

## 2022-04-20 PROCEDURE — 1125F PR PAIN SEVERITY QUANTIFIED, PAIN PRESENT: ICD-10-PCS | Mod: CPTII,S$GLB,, | Performed by: NURSE PRACTITIONER

## 2022-04-20 PROCEDURE — 3078F DIAST BP <80 MM HG: CPT | Mod: CPTII,S$GLB,, | Performed by: NURSE PRACTITIONER

## 2022-04-20 PROCEDURE — 99213 OFFICE O/P EST LOW 20 MIN: CPT | Mod: S$GLB,,, | Performed by: NURSE PRACTITIONER

## 2022-04-20 PROCEDURE — 1125F AMNT PAIN NOTED PAIN PRSNT: CPT | Mod: CPTII,S$GLB,, | Performed by: NURSE PRACTITIONER

## 2022-04-20 PROCEDURE — 1159F MED LIST DOCD IN RCRD: CPT | Mod: CPTII,S$GLB,, | Performed by: NURSE PRACTITIONER

## 2022-04-20 PROCEDURE — 99999 PR PBB SHADOW E&M-EST. PATIENT-LVL IV: CPT | Mod: PBBFAC,,, | Performed by: NURSE PRACTITIONER

## 2022-04-20 PROCEDURE — 3288F PR FALLS RISK ASSESSMENT DOCUMENTED: ICD-10-PCS | Mod: CPTII,S$GLB,, | Performed by: NURSE PRACTITIONER

## 2022-04-20 PROCEDURE — 3288F FALL RISK ASSESSMENT DOCD: CPT | Mod: CPTII,S$GLB,, | Performed by: NURSE PRACTITIONER

## 2022-04-20 PROCEDURE — 1101F PR PT FALLS ASSESS DOC 0-1 FALLS W/OUT INJ PAST YR: ICD-10-PCS | Mod: CPTII,S$GLB,, | Performed by: NURSE PRACTITIONER

## 2022-04-20 PROCEDURE — 99999 PR PBB SHADOW E&M-EST. PATIENT-LVL IV: ICD-10-PCS | Mod: PBBFAC,,, | Performed by: NURSE PRACTITIONER

## 2022-04-20 PROCEDURE — 3078F PR MOST RECENT DIASTOLIC BLOOD PRESSURE < 80 MM HG: ICD-10-PCS | Mod: CPTII,S$GLB,, | Performed by: NURSE PRACTITIONER

## 2022-04-20 PROCEDURE — 3075F SYST BP GE 130 - 139MM HG: CPT | Mod: CPTII,S$GLB,, | Performed by: NURSE PRACTITIONER

## 2022-04-20 PROCEDURE — 1160F RVW MEDS BY RX/DR IN RCRD: CPT | Mod: CPTII,S$GLB,, | Performed by: NURSE PRACTITIONER

## 2022-04-20 PROCEDURE — 99213 PR OFFICE/OUTPT VISIT, EST, LEVL III, 20-29 MIN: ICD-10-PCS | Mod: S$GLB,,, | Performed by: NURSE PRACTITIONER

## 2022-04-20 NOTE — PROGRESS NOTES
Chronic patient Established Note (Follow up visit)        SUBJECTIVE:    Interval History 4/20/2022:  The patient returns to clinic today for follow up of low back pain. She has not been seen in over a year. She reports that previous SYLVIA in 4/2021 provided no relief. She continues to report low back pain that radiates into both hips and buttocks. She reports intermittent radiating pain into the posterior aspect of her left leg to under her foot. Her pain is worse with prolonged sitting and standing. She continues to take Gabapentin with limited relief. She denies any other health changes. Her pain today is 8/10.    Interval History 1/14/2021:  The patient returns to clinic today for follow up of low back pain. She continues to report low back pain that radiates into the lateral aspect of left leg to her ankle. She denies any right leg pain. She did not start Lyrica given at last visit due to concern for side effects. She has not started physical therapy yet. She continues to take Gabapentin and Zanaflex. She denies any other health changes. Her pain today is 6/10.    Interval history 12/10/2020:  Returns for follow up of her low back pain. She continues to report low back pain that radiates into the lateral aspect of her left leg to her ankle. Reports that gabapentin and Zanaflex are not helping with pain. Her pain is worse with standing, walking, and activity. Her pain today is 5/10.    Interval History 11/13/2020:  The patient returns to clinic today for follow up of back pain. She reports limited relief with previous SYLVIA. She continues to report low back pain that radiates into the lateral aspect of her left leg to her ankle. She denies any right leg pain today. Her pain is worse with standing, walking, and activity. She is frustrated with her continued pain. She did increase Gabapentin to BID. She is unsure of relief at this time. She does take Gate from her PCP. She states that her PCP would like our office to  take over this medication. She asks for a refill. She denies any other health changes. Her pain today is 2/10.    Interval History 10/30/2020:  The patient returns to clinic today via audio visit for follow up of back pain. She is s/p bilateral L4/5 TF SYLVIA on 10/14/2020. She reports limited relief of her pain. She reports increased low back pain that radiates down the lateral aspect of her left leg to her ankle. She denies any right leg pain today. Her pain is worse with prolonged standing, walking, and activity. She denies any weakness. She is currently taking Gabapentin once a day. She denies any other health changes.     Interval History 7/24/2020:  The patient returns to clinic today for follow up of low back pain. She reports increased pain over the last two months. She reports low back pain that radiates down the lateral aspect of both legs to her knees, left greater than right. Her pain is worse with standing and walking. She continues to take Gabapentin. She denies any other health changes. Her pain today is 9/10.    Interval History 1/28/2020:  Tamiko Youssef presents to the clinic for a follow-up appointment for lower back pain. She is s/p bilateral L4/5 TF SYLVIA on 1/8/2020. She reports 80% relief of his low back and leg pain. She continues to report low back pain that is aching in nature. She denies any radiating leg pain today. Her pain is worse with prolonged standing and walking. She continues to take Gabapentin with benefit. She continues to perform a home exercise routine. She denies any other health changes. Her pain today is 8/10.      Pain Disability Index Review:  Last 3 PDI Scores 4/20/2022 1/14/2021 12/10/2020   Pain Disability Index (PDI) 20 42 36       Pain Medications:  Gabapentin 300 mg daily    Opioid Contract: yes     report:  Reviewed and consistent with medication use as prescribed.    Pain Procedures:   3/28/14, 3/13/13 Bilateral L4 TF SYLVIA  8/22/18 Bilateral L4 TF SYLVIA- 75%  relief  1/8/2020- Bilateral L4/5 TF SYLVIA - 80% relief   10/14/2020- Bilateral L4/5 TF SYLVIA    Physical Therapy/Home Exercise: no    Imaging:   MRI Lumbar Spine 11/18/2020:  COMPARISON:  11/18/2020     FINDINGS:  Lumbar spine alignment is within normal limits. Vertebral body heights preserved.  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     The conus is normal in appearance.  The adjacent soft tissue structures show no significant abnormalities.     L1-L2: No evidence of a disc herniation.No significant central canal or neural foraminal narrowing.     L2-L3: There is no focal disc herniation. No central canal or foraminal stenosis.  Prominent facet arthritis.     L3-L4: There is some broad-based disc bulging and superimposed spondylitic spurring which is asymmetric to the left.  Prominent facet arthritis is noted.  No central canal or foraminal stenosis.     L4-L5: Broad-based disc bulging and facet arthritis present.  No significant central canal or neural foraminal narrowing.     L5-S1: Broad-based disc bulging and facet arthritis are present.  No focal disc herniation.  No significant central canal or neural foraminal narrowing.     Impression:     Multilevel nonstenotic degenerative change mostly affecting the posterior elements.    Xray Lumbar Spine 11/18/2020:  COMPARISON:  Multiple priors, most recent 08/09/2018     FINDINGS:  Levoscoliosis of the lumbar spine.  Grade 1 anterolisthesis of L3 on L4, L4 on L5, and L5 on S1 similar to prior.  No acute, displaced fracture.  Vertebral body heights are maintained.  No evidence of dynamic instability.  Multilevel lumbar facet arthrosis.  No aggressive osseous abnormality.     Impression:     Mild multilevel lumbar spondylosis with unchanged grade 1 anterolisthesis of L3 on L4 through L5 on S1.    Xray Hips 11/18/2020:  COMPARISON:  Multiple priors, most recent 08/09/2018     FINDINGS:  Sacroiliac joints are  symmetric.  Pubic symphysis is not widened.  The femoral heads are well seated within the acetabula.  Degenerative changes of the lower lumbar spine.  Mild joint space narrowing, subchondral sclerosis and minimal osteophytosis of the hip joints.  No acute, displaced fracture.  No aggressive osseous abnormality.     Impression:     No acute osseous abnormality.        Allergies:   Review of patient's allergies indicates:   Allergen Reactions    Naprosyn [naproxen]     Norco [hydrocodone-acetaminophen]     Tramadol        Current Medications:   Current Outpatient Medications   Medication Sig Dispense Refill    albuterol (PROVENTIL) 2.5 mg /3 mL (0.083 %) nebulizer solution Take 2.5 mg by nebulization every 6 (six) hours as needed.      albuterol (PROVENTIL/VENTOLIN HFA) 90 mcg/actuation inhaler Inhale TWO to FOUR puffs by mouth FOUR times daily when needed.      ALPRAZolam (XANAX) 0.25 MG tablet TK 1 T PO QD PRA  3    amLODIPine (NORVASC) 10 MG tablet       clotrimazole (MYCELEX) 10 mg john Take 1 tablet (10 mg total) by mouth 5 (five) times daily. Dissolve one lozenge in the mouth 5 times a day. 50 John 1    cyclobenzaprine (FLEXERIL) 5 MG tablet Take by mouth.      diclofenac sodium (VOLTAREN) 1 % Gel Apply 2 g topically 4 (four) times daily. 1 Tube 2    dicyclomine (BENTYL) 10 MG capsule Take 10 mg by mouth 4 (four) times daily before meals and nightly.      fluconazole (DIFLUCAN) 200 MG Tab Take by mouth.      fluticasone propionate (FLONASE) 50 mcg/actuation nasal spray 2 sprays in each nostril every evening. 16 g 1    fluticasone-salmeterol diskus inhaler 250-50 mcg Inhale 1 puff into the lungs.      furosemide (LASIX) 20 MG tablet TK 1 T PO QD PRF FLUID  2    latanoprost 0.005 % ophthalmic solution Place 1 drop into both eyes every evening. 3 Bottle 3    mirtazapine (REMERON) 30 MG tablet Take 30 mg by mouth nightly.  3    nystatin (MYCOSTATIN) 100,000 unit/mL suspension Take 4 mLs  (400,000 Units total) by mouth after meals as needed. 160 mL 1    omeprazole (PRILOSEC) 20 MG capsule Take 1 capsule (20 mg total) by mouth 2 (two) times daily. 60 capsule 1    pravastatin (PRAVACHOL) 20 MG tablet   1    SYMBICORT 80-4.5 mcg/actuation HFAA   6    tiZANidine (ZANAFLEX) 2 MG tablet Take by mouth.      tiZANidine (ZANAFLEX) 4 MG tablet Take by mouth.      walker Misc 1 application by Misc.(Non-Drug; Combo Route) route once daily. 1 each 0    gabapentin (NEURONTIN) 300 MG capsule Take 1 capsule (300 mg total) by mouth 2 (two) times daily. 60 capsule 2    pregabalin (LYRICA) 75 MG capsule Take 1 capsule (75 mg total) by mouth 2 (two) times daily. 60 capsule 2     No current facility-administered medications for this visit.       REVIEW OF SYSTEMS:    Constitutional: Positive for unexpected weight change.   HENT: Positive for headache.   Respiratory: Positive for intermittent shortness of breath and wheezing.  (asthma)  Gastrointestinal: Positive for constipation.   Musculoskeletal: Positive for back pain, gait problem and stiffness.       Past Medical History:  Past Medical History:   Diagnosis Date    Asthma     Cataract     Glaucoma     Hypertension        Past Surgical History:  Past Surgical History:   Procedure Laterality Date    CATARACT EXTRACTION W/  INTRAOCULAR LENS IMPLANT Bilateral     CHOLECYSTECTOMY      EYE SURGERY      HYSTERECTOMY      NECK SURGERY      cyst removed    TRANSFORAMINAL EPIDURAL INJECTION OF STEROID Bilateral 1/8/2020    Procedure: INJECTION, STEROID, EPIDURAL, TRANSFORAMINAL APPROACH;  Surgeon: Obdulio Leon MD;  Location: [facility];  Service: Pain Management;  Laterality: Bilateral;  B/L TFESI L4    TRANSFORAMINAL EPIDURAL INJECTION OF STEROID Bilateral 10/14/2020    Procedure: INJECTION, STEROID, EPIDURAL, TRANSFORAMINAL APPROACH, L4-L5 need consent;  Surgeon: Obdulio Leon MD;  Location: [facility];  Service: Pain Management;   "Laterality: Bilateral;    TRANSFORAMINAL EPIDURAL INJECTION OF STEROID Left 4/14/2021    Procedure: INJECTION, STEROID, EPIDURAL, TRANSFORAMINAL APPROACH  left L3/4 and L4/5;  Surgeon: Obdulio Leon MD;  Location: [facility];  Service: Pain Management;  Laterality: Left;  consent needed       Family History:  Family History   Problem Relation Age of Onset    Cataracts Son     Glaucoma Son     No Known Problems Mother     No Known Problems Father     Macular degeneration Neg Hx        Social History:  Social History     Socioeconomic History    Marital status: Single   Tobacco Use    Smoking status: Never Smoker    Smokeless tobacco: Never Used   Substance and Sexual Activity    Alcohol use: No    Drug use: Never       OBJECTIVE:    /79 (BP Location: Right arm, Patient Position: Sitting, BP Method: Medium (Automatic))   Pulse 97   Temp 97.6 °F (36.4 °C) (Oral)   Resp 18   Ht 5' 2" (1.575 m)   Wt 84.8 kg (187 lb)   BMI 34.20 kg/m²     PHYSICAL EXAMINATION:    GENERAL: Well appearing, in no acute distress, alert and oriented x3.  PSYCH:  Mood and affect appropriate.  SKIN: Skin color, texture, turgor normal, no rashes or lesions.  HEAD/FACE:  Normocephalic, atraumatic. Cranial nerves grossly intact.  CV: RRR with palpation of the radial artery.  PULM: No evidence of respiratory difficulty, symmetric chest rise.  GI:  Soft and non-tender.  BACK: Straight leg raising in the sitting position is negative for radicular pain bilaterally.  There is pain to palpation over lumbar paraspinals bilaterally.  There is pain with palpation over lumbar facet joints bilaterally.  Limited ROM with pain on flexion and extension.  Positive facet loading bilaterally.    EXTREMITIES: No deformities, edema, or skin discoloration. Good capillary refill.  MUSCULOSKELETAL: There is pain with palpation to bilateral SI joints.  REGINA is positive bilaterally. 5/5 strength in right ankle with plantar and dorsiflexion. " 5/5 strength in left ankle with plantar and dorsiflexion. 4/5 strength with right knee flexion and extension. 4/5 strength with left knee flexion and extension.  No atrophy or tone abnormalities are noted.  NEURO: Bilateral lower extremity coordination and muscle stretch reflexes are physiologic and symmetric.  Plantar response are downgoing. No clonus.  No loss of sensation is noted.  GAIT: Antalgic- ambulates with walker.      ASSESSMENT: 81 y.o. year old female with lower back pain consistent with the following diagnoses:     1. Sacroiliac joint pain  Procedure Order to Pain Management   2. Lumbar spondylosis     3. Lumbar radiculopathy     4. DDD (degenerative disc disease), lumbar     5. Chronic pain syndrome           PLAN:     - Previous imaging was reviewed and discussed with the patient today.    - Schedule for bilateral SI joint injections.     - She may also benefit from lumbar medial branch blocks.     - Consider L5/S1 IL SYLVIA if radicular pain worsens.     - I have stressed the importance of physical activity and a home exercise plan to help with pain and improve health.    - Continue Gabapentin 300mg  BID.     - Counseled patient regarding the importance of activity modification, constant sleeping habits and physical therapy.    - RTC 2 weeks after above procedure.     - Dr. Leon was consulted on the patient and agrees with this plan.    The above plan and management options were discussed at length with patient. Patient is in agreement with the above and verbalized understanding.    Marina Gallegos NP  04/20/2022

## 2022-04-25 ENCOUNTER — TELEPHONE (OUTPATIENT)
Dept: PAIN MEDICINE | Facility: CLINIC | Age: 82
End: 2022-04-25
Payer: MEDICARE

## 2022-04-25 ENCOUNTER — TELEPHONE (OUTPATIENT)
Dept: ADMINISTRATIVE | Facility: OTHER | Age: 82
End: 2022-04-25
Payer: MEDICARE

## 2022-04-25 NOTE — TELEPHONE ENCOUNTER
----- Message from Mariajose Moya sent at 4/25/2022  9:17 AM CDT -----  Regarding: Reschedule procedure  Name of Who is Calling: GARY VILLANUEVA [[id number]]          What is the request in detail: Patient is requesting a call back in reference to procedure that is scheduled for 4/27/2022  patient states she is un able to make appointment           Can the clinic reply by [facility]: no           What Number to Call Back if not in [facility]: [phone]

## 2022-04-26 ENCOUNTER — TELEPHONE (OUTPATIENT)
Dept: PAIN MEDICINE | Facility: CLINIC | Age: 82
End: 2022-04-26
Payer: MEDICARE

## 2022-04-26 NOTE — TELEPHONE ENCOUNTER
----- Message from Olivia Cuba sent at 4/26/2022  4:32 PM CDT -----  Contact: KAY STEFANY [[id number]] [phone]  Type: Appointment Request    Name of Caller: KAY STEFANY [[id number]]  When is the first available appointment? Do not have access  Reason for Visit: Reschedule 4/27/22 injection if appointment is still available  Best Call Back Number: [phone]  Additional Information:  If 4/27/22 is no longer available, patient will need to reschedule for the following Wednesday.

## 2022-04-26 NOTE — TELEPHONE ENCOUNTER
Staff spoke with pt in regards to wanting to be rescheduled for procedure that she cancelled for 4/27/22. Staff informed pt we will submit a message to the schedulers to reach out on tomorrow as they are gone for today.

## 2022-04-27 ENCOUNTER — TELEPHONE (OUTPATIENT)
Dept: ADMINISTRATIVE | Facility: OTHER | Age: 82
End: 2022-04-27
Payer: MEDICARE

## 2022-04-27 ENCOUNTER — TELEPHONE (OUTPATIENT)
Dept: PAIN MEDICINE | Facility: CLINIC | Age: 82
End: 2022-04-27
Payer: MEDICARE

## 2022-04-27 NOTE — TELEPHONE ENCOUNTER
----- Message from Felicia Hanna sent at 4/27/2022 12:44 PM CDT -----  Who Called: GARY VILLANUEVA     What is the request in detail: Would like to speak to staff in regards to needing to reschedule her procedure on 05/04. Please advise.     Can the clinic reply by [facility]?: No    Best Call Back Number: [phone]

## 2022-04-27 NOTE — TELEPHONE ENCOUNTER
----- Message from Kinza Houston sent at 4/27/2022  8:26 AM CDT -----  Regarding: Cancelled Appt  Contact: Wilian  Daughter is requesting a call back regarding injection that was scheduled for 04/27/2022   Daughter would like to know what is the earliest patient can be rescheduled   Please Assist     Patient Alla Cedeno can be reached at [phone]

## 2022-05-05 ENCOUNTER — OFFICE VISIT (OUTPATIENT)
Dept: RHEUMATOLOGY | Facility: CLINIC | Age: 82
End: 2022-05-05
Payer: MEDICARE

## 2022-05-05 VITALS
DIASTOLIC BLOOD PRESSURE: 85 MMHG | WEIGHT: 189.63 LBS | HEIGHT: 62 IN | HEART RATE: 96 BPM | SYSTOLIC BLOOD PRESSURE: 136 MMHG | BODY MASS INDEX: 34.89 KG/M2 | TEMPERATURE: 98 F

## 2022-05-05 DIAGNOSIS — M25.572 PAIN OF JOINT OF LEFT ANKLE AND FOOT: Primary | ICD-10-CM

## 2022-05-05 PROCEDURE — 99214 PR OFFICE/OUTPT VISIT, EST, LEVL IV, 30-39 MIN: ICD-10-PCS | Mod: S$GLB,,, | Performed by: INTERNAL MEDICINE

## 2022-05-05 PROCEDURE — 1125F PR PAIN SEVERITY QUANTIFIED, PAIN PRESENT: ICD-10-PCS | Mod: CPTII,S$GLB,, | Performed by: INTERNAL MEDICINE

## 2022-05-05 PROCEDURE — 1125F AMNT PAIN NOTED PAIN PRSNT: CPT | Mod: CPTII,S$GLB,, | Performed by: INTERNAL MEDICINE

## 2022-05-05 PROCEDURE — 1159F PR MEDICATION LIST DOCUMENTED IN MEDICAL RECORD: ICD-10-PCS | Mod: CPTII,S$GLB,, | Performed by: INTERNAL MEDICINE

## 2022-05-05 PROCEDURE — 99999 PR PBB SHADOW E&M-EST. PATIENT-LVL IV: CPT | Mod: PBBFAC,,, | Performed by: INTERNAL MEDICINE

## 2022-05-05 PROCEDURE — 3079F DIAST BP 80-89 MM HG: CPT | Mod: CPTII,S$GLB,, | Performed by: INTERNAL MEDICINE

## 2022-05-05 PROCEDURE — 3075F PR MOST RECENT SYSTOLIC BLOOD PRESS GE 130-139MM HG: ICD-10-PCS | Mod: CPTII,S$GLB,, | Performed by: INTERNAL MEDICINE

## 2022-05-05 PROCEDURE — 3075F SYST BP GE 130 - 139MM HG: CPT | Mod: CPTII,S$GLB,, | Performed by: INTERNAL MEDICINE

## 2022-05-05 PROCEDURE — 99214 OFFICE O/P EST MOD 30 MIN: CPT | Mod: PBBFAC | Performed by: INTERNAL MEDICINE

## 2022-05-05 PROCEDURE — 99999 PR PBB SHADOW E&M-EST. PATIENT-LVL IV: ICD-10-PCS | Mod: PBBFAC,,, | Performed by: INTERNAL MEDICINE

## 2022-05-05 PROCEDURE — 1159F MED LIST DOCD IN RCRD: CPT | Mod: CPTII,S$GLB,, | Performed by: INTERNAL MEDICINE

## 2022-05-05 PROCEDURE — 99214 OFFICE O/P EST MOD 30 MIN: CPT | Mod: S$GLB,,, | Performed by: INTERNAL MEDICINE

## 2022-05-05 PROCEDURE — 3079F PR MOST RECENT DIASTOLIC BLOOD PRESSURE 80-89 MM HG: ICD-10-PCS | Mod: CPTII,S$GLB,, | Performed by: INTERNAL MEDICINE

## 2022-05-05 NOTE — PROGRESS NOTES
Subjective:       Patient ID: Tamiko Youssef is a 81 y.o. female.    Chief Complaint: No chief complaint on file.    HPI  81 year old  F with PMH of glaucoma, asthma, HTN, asthma here for evaluation. She has pain in hands and wrists.  She also has pain in left buttock that sometimes radiates down her leg. She gets injections in the lower back at [facility] with mild improvement.  Pain level can be as 8/10. Pain is sharp. Denies trigger for her pain. She gets swelling of left ankle and both knees off and on for years. Sometimes she feel like her feet get swollen.  She uses voltaren gel on occasion which improves her pain a little. Denies rashes, oral ulcers, fevers, raynauds, photosensitivity, or pleurisy.    Family hx: negative for RA    Interval history: Patient is here for follow. She continues to have pain in left ankle.  Pain level can be as high as 8/10. She uses voltaren gel on occasion which improves her pain a little.   Past Medical History:   Diagnosis Date    Asthma     Cataract     Glaucoma     Hypertension        Review of Systems   Constitutional: Positive for fatigue. Negative for activity change, appetite change, chills and diaphoresis.   HENT: Negative for congestion, ear discharge, ear pain, facial swelling, mouth sores, sinus pressure, sneezing, sore throat, tinnitus and trouble swallowing.    Eyes: Negative for photophobia, pain, discharge, redness, itching and visual disturbance.   Respiratory: Negative for apnea, chest tightness, shortness of breath, wheezing and stridor.    Cardiovascular: Negative for leg swelling.   Gastrointestinal: Negative for abdominal distention, abdominal pain, anal bleeding, blood in stool, constipation, diarrhea and nausea.   Endocrine: Negative for cold intolerance and heat intolerance.   Genitourinary: Negative for difficulty urinating and dysuria.   Musculoskeletal: Positive for arthralgias, back pain, gait problem and joint swelling. Negative for neck pain and  neck stiffness.   Skin: Negative for color change, pallor, rash and wound.   Neurological: Negative for dizziness, seizures, light-headedness and numbness.   Hematological: Negative for adenopathy. Does not bruise/bleed easily.   Psychiatric/Behavioral: Negative for sleep disturbance. The patient is not nervous/anxious.          Objective:   There were no vitals taken for this visit.     Physical Exam   Constitutional: She is oriented to person, place, and time.   HENT:   Head: Normocephalic and atraumatic.   Right Ear: External ear normal.   Left Ear: External ear normal.   Nose: Nose normal.   Mouth/Throat: Oropharynx is clear and moist. No oropharyngeal exudate.   Eyes: Pupils are equal, round, and reactive to light. Conjunctivae and EOM are normal. Right eye exhibits no discharge. Left eye exhibits no discharge. No scleral icterus.   Neck: No JVD present. No thyromegaly present.   Cardiovascular: Normal rate, regular rhythm, normal heart sounds and intact distal pulses. Exam reveals no gallop and no friction rub.   No murmur heard.  Pulmonary/Chest: Effort normal and breath sounds normal. No respiratory distress. She has no wheezes. She has no rales. She exhibits no tenderness.   Abdominal: Soft. Bowel sounds are normal. She exhibits no distension and no mass. There is no abdominal tenderness. There is no rebound and no guarding.   Musculoskeletal:         General: No swelling, tenderness, deformity, signs of injury or edema.      Cervical back: Neck supple.      Left lower leg: No edema.   Lymphadenopathy:     She has no cervical adenopathy.   Neurological: She is alert and oriented to person, place, and time. No cranial nerve deficit. Gait normal. Coordination normal.   Skin: Skin is dry. No rash noted. No erythema. No pallor.   Psychiatric: Affect and judgment normal.        No data to display     Assessment:     81 year old  F with PMH of glaucoma, asthma, HTN, here for evaluation of pain in left ankle.  MRI  and labs not suggestive of an inflammatory arthritis. Patient would like to see orthopedics for this so will send in referral.  Told her that the MRI showed edema so her pain may be from fluid retention.    1. Hand arthritis            Plan:       Problem List Items Addressed This Visit        Orthopedic    Hand arthritis        Orthopedic consult  Reviewed MRI with patient  Asked patient to discuss with pcp seeing vascular specialist     #obesity: encourage weight loss  **    30 * minutes of total time spent on the encounter, which includes face to face time and non-face to face time preparing to see the patient (eg, review of tests), Obtaining and/or reviewing separately obtained history, Documenting clinical information in the electronic or other health record, Independently interpreting results (not separately reported) and communicating results to the patient/family/caregiver, or Care coordination (not separately reported).

## 2022-05-25 ENCOUNTER — HOSPITAL ENCOUNTER (OUTPATIENT)
Facility: OTHER | Age: 82
Discharge: HOME | End: 2022-05-25
Attending: ANESTHESIOLOGY | Admitting: ANESTHESIOLOGY
Payer: MEDICARE

## 2022-05-25 VITALS
HEIGHT: 62 IN | RESPIRATION RATE: 16 BRPM | HEART RATE: 90 BPM | OXYGEN SATURATION: 97 % | WEIGHT: 189 LBS | SYSTOLIC BLOOD PRESSURE: 143 MMHG | TEMPERATURE: 99 F | DIASTOLIC BLOOD PRESSURE: 72 MMHG | BODY MASS INDEX: 34.78 KG/M2

## 2022-05-25 DIAGNOSIS — M46.1 SACROILIITIS: Primary | ICD-10-CM

## 2022-05-25 PROCEDURE — 25000003 PHARM REV CODE 250: Performed by: ANESTHESIOLOGY

## 2022-05-25 PROCEDURE — 27096 INJECT SACROILIAC JOINT: CPT | Mod: 50 | Performed by: ANESTHESIOLOGY

## 2022-05-25 PROCEDURE — 27096 INJECT SACROILIAC JOINT: CPT | Mod: 50,,, | Performed by: ANESTHESIOLOGY

## 2022-05-25 PROCEDURE — 63600175 PHARM REV CODE 636 W HCPCS: Performed by: ANESTHESIOLOGY

## 2022-05-25 PROCEDURE — 27096 PR INJECTION,SACROILIAC JOINT: ICD-10-PCS | Mod: 50,,, | Performed by: ANESTHESIOLOGY

## 2022-05-25 RX ORDER — SODIUM CHLORIDE 9 MG/ML
500 INJECTION, SOLUTION INTRAVENOUS CONTINUOUS
Status: DISCONTINUED | OUTPATIENT
Start: 2022-05-25 | End: 2022-05-25 | Stop reason: HOSPADM

## 2022-05-25 RX ORDER — TRIAMCINOLONE ACETONIDE 40 MG/ML
INJECTION, SUSPENSION INTRA-ARTICULAR; INTRAMUSCULAR
Status: DISCONTINUED | OUTPATIENT
Start: 2022-05-25 | End: 2022-05-25 | Stop reason: HOSPADM

## 2022-05-25 RX ORDER — BUPIVACAINE HYDROCHLORIDE 2.5 MG/ML
INJECTION, SOLUTION EPIDURAL; INFILTRATION; INTRACAUDAL
Status: DISCONTINUED | OUTPATIENT
Start: 2022-05-25 | End: 2022-05-25 | Stop reason: HOSPADM

## 2022-05-25 RX ORDER — LIDOCAINE HYDROCHLORIDE 20 MG/ML
INJECTION, SOLUTION INFILTRATION; PERINEURAL
Status: DISCONTINUED | OUTPATIENT
Start: 2022-05-25 | End: 2022-05-25 | Stop reason: HOSPADM

## 2022-05-25 NOTE — OP NOTE
Sacroiliac Joint Injection under Fluoroscopic Guidance    The procedure, risks, benefits, and options were discussed with the patient. There are no contraindications to the procedure. The patent expressed understanding and agreed to the procedure. Informed written consent was obtained prior to the start of the procedure and can be found in the patient's chart.    PATIENT NAME: Tamiko Youssef   MRN: [id number]     DATE OF PROCEDURE: 05/25/2022    PROCEDURE: Bilateral Sacroiliac Joint Injection under Fluoroscopic Guidance    PRE-OP DIAGNOSIS: Sacroiliac joint pain [M53.3]  Sacroiliitis    POST-OP DIAGNOSIS: Same    PHYSICIAN: Obdulio Leon MD    ASSISTANTS: Paul Everett, PGY-5 [facility]     MEDICATIONS INJECTED: Preservative-free Kenalog 40mg with 3cc of Bupivacine 0.25%     LOCAL ANESTHETIC INJECTED: Xylocaine 2%     SEDATION: None    ESTIMATED BLOOD LOSS: None    COMPLICATIONS: None    TECHNIQUE: Time-out was performed to identify the patient and procedure to be performed. With the patient laying in a prone position, the surgical area was prepped and draped in the usual sterile fashion using ChloraPrep and a fenestrated drape. The sacroiliac joint was determined under fluoroscopy guidance. Skin anesthesia was achieved by injecting Lidocaine 2% over the injection site. The sacroiliac joint was  then approached with a 22 gauge, 3.5 inch spinal quinke needle that was introduced under fluoroscopic guidance in the AP and Lateral views. Once the needle tip was in the area of the joint, and there was no blood. 4 mL of the medication mixture listed above was injected slowly intraarticular and sera-articular. Displacement of the radio opaque contrast after injection of the medication confirmed that the medication went into the area of the joint. The needles were removed and bleeding was nil. A sterile dressing was applied. No specimens collected. The patient tolerated the procedure well.       The patient was monitored  after the procedure in the recovery area. They were given post-procedure and discharge instructions to follow at home. The patient was discharged in a stable condition.    I reviewed and edited the fellow's note. I conducted my own interview and physical examination. I agree with the findings. I was present and supervising all critical portions of the procedure.    Obdulio Leon MD

## 2022-05-25 NOTE — DISCHARGE INSTRUCTIONS
Thank you for allowing us to care for you today. You may receive a survey about the care we provided. Your feedback is valuable and helps us provide excellent care throughout the community.     Home Care Instructions for Pain Management:    1. DIET:   You may resume your normal diet today.   2. BATHING:   You may shower with luke warm water. No tub baths or anything that will soak injection sites under water for the next 24 hours.  3. DRESSING:   You may remove your bandage today.   4. ACTIVITY LEVEL:   You may resume your normal activities 24 hrs after your procedure. Nothing strenuous today.  5. MEDICATIONS:   You may resume your normal medications today. To restart blood thinners, ask your doctor.  6. DRIVING    If you have received any sedatives by mouth today, you may not drive for 12 hours.    If you have received any sedation through your IV, you may not drive for 24 hrs.   7. SPECIAL INSTRUCTIONS:   No heat to the injection site for 24 hrs including, hot bath or shower, heating pad, moist heat, or hot tubs.    Use ice pack to injection site for any pain or discomfort.  Apply ice packs for 20 minute intervals as needed.    IF you have diabetes, be sure to monitor your blood sugar more closely. IF your injection contained steroids your blood sugar levels may become higher than normal.    If you are still having pain upon discharge:  Your pain may improve over the next 48 hours. The anesthetic (numbing medication) works immediately to 48 hours. IF your injection contained a steroid (anti-inflammatory medication), it takes approximately 3 days to start feeling relief and 7-10 days to see your greatest results from the medication. It is possible you may need subsequent injections. This would be discussed at your follow up appointment with pain management or your referring doctor.    Please call the PAIN MANAGEMENT office at [phone] or ON CALL pager at [phone] if you experienced any:   -Weakness or  loss of sensation  -Fever > 101.5  -Pain uncontrolled with oral medications   -Persistent nausea, vomiting, or diarrhea  -Redness or drainage from the injection sites, or any other worrisome concerns.   If physician on call was not reached or could not communicate with our office for any reason please go to the nearest emergency department. Adult Procedural Sedation Instructions    Recovery After Procedural Sedation (Adult)  You have been given medicine by vein to make you sleep during your surgery. This may have included both a pain medicine and sleeping medicine. Most of the effects have worn off. But you may still have some drowsiness for the next 6 to 8 hours.  Home care  Follow these guidelines when you get home:  For the next 8 hours, you should be watched by a responsible adult. This person should make sure your condition is not getting worse.  Don't drink any alcohol for the next 24 hours.  Don't drive, operate dangerous machinery, or make important business or personal decisions during the next 24 hours.  Note: Your healthcare provider may tell you not to take any medicine by mouth for pain or sleep in the next 4 hours. These medicines may react with the medicines you were given in the hospital. This could cause a much stronger response than usual.  Follow-up care  Follow up with your healthcare provider if you are not alert and back to your usual level of activity within 12 hours.  When to seek medical advice  Call your healthcare provider right away if any of these occur:  Drowsiness gets worse  Weakness or dizziness gets worse  Repeated vomiting  You can't be awakened   Date Last Reviewed: 10/18/2016  © 3000-4736 [email or web address] [address] [place] [address], [place] [address]. All rights reserved. This information is not intended as a substitute for professional medical care. Always follow your healthcare professional's instructions.

## 2022-05-25 NOTE — H&P
HPI  Patient presenting for Procedure(s) (LRB):  INJECTION, JOINT, BILATERAL SACROILIAC (SI) (Bilateral)     Patient on Anti-coagulation No    No health changes since previous encounter    Past Medical History:   Diagnosis Date    Asthma     Cataract     Glaucoma     Hypertension      Past Surgical History:   Procedure Laterality Date    CATARACT EXTRACTION W/  INTRAOCULAR LENS IMPLANT Bilateral     CHOLECYSTECTOMY      EYE SURGERY      HYSTERECTOMY      NECK SURGERY      cyst removed    TRANSFORAMINAL EPIDURAL INJECTION OF STEROID Bilateral 1/8/2020    Procedure: INJECTION, STEROID, EPIDURAL, TRANSFORAMINAL APPROACH;  Surgeon: Obdulio Leon MD;  Location: [facility];  Service: Pain Management;  Laterality: Bilateral;  B/L TFESI L4    TRANSFORAMINAL EPIDURAL INJECTION OF STEROID Bilateral 10/14/2020    Procedure: INJECTION, STEROID, EPIDURAL, TRANSFORAMINAL APPROACH, L4-L5 need consent;  Surgeon: Obdulio Leon MD;  Location: [facility];  Service: Pain Management;  Laterality: Bilateral;    TRANSFORAMINAL EPIDURAL INJECTION OF STEROID Left 4/14/2021    Procedure: INJECTION, STEROID, EPIDURAL, TRANSFORAMINAL APPROACH  left L3/4 and L4/5;  Surgeon: Obdulio Leon MD;  Location: [facility];  Service: Pain Management;  Laterality: Left;  consent needed     Review of patient's allergies indicates:   Allergen Reactions    Mobic [meloxicam] Rash    Norco [hydrocodone-acetaminophen]     Tramadol     Naprosyn [naproxen]      GI upset      No current facility-administered medications for this encounter.       PMHx, PSHx, Allergies, Medications reviewed in epic    ROS negative except pain complaints in HPI    OBJECTIVE:    There were no vitals taken for this visit.    PHYSICAL EXAMINATION:    GENERAL: Well appearing, in no acute distress, alert and oriented x3.  PSYCH:  Mood and affect appropriate.  SKIN: Skin color, texture, turgor normal, no rashes or lesions which will impact the  procedure.  CV: RRR with palpation of the radial artery.  PULM: No evidence of respiratory difficulty, symmetric chest rise. Clear to auscultation.  NEURO: Cranial nerves grossly intact.    Plan:    Proceed with procedure as planned Procedure(s) (LRB):  INJECTION, JOINT, BILATERAL SACROILIAC (SI) (Bilateral)    Joe Everett  05/25/2022

## 2022-05-25 NOTE — DISCHARGE SUMMARY
Discharge Note  Short Stay      SUMMARY     Admit Date: 5/25/2022    Attending Physician: Joe Everett      Discharge Physician: Joe Everett      Discharge Date: 5/25/2022 11:31 AM    Procedure(s) (LRB):  INJECTION, JOINT, BILATERAL SACROILIAC (SI) (Bilateral)    Final Diagnosis: Sacroiliac joint pain [M53.3]    Disposition: Home or self care    Patient Instructions:   Current Discharge Medication List      CONTINUE these medications which have NOT CHANGED    Details   albuterol (PROVENTIL) 2.5 mg /3 mL (0.083 %) nebulizer solution Take 2.5 mg by nebulization every 6 (six) hours as needed.      albuterol (PROVENTIL/VENTOLIN HFA) 90 mcg/actuation inhaler Inhale TWO to FOUR puffs by mouth FOUR times daily when needed.      ALPRAZolam (XANAX) 0.25 MG tablet TK 1 T PO QD PRA  Refills: 3      amLODIPine (NORVASC) 10 MG tablet     Comments: .      clotrimazole (MYCELEX) 10 mg nury Take 1 tablet (10 mg total) by mouth 5 (five) times daily. Dissolve one lozenge in the mouth 5 times a day.  Qty: 50 Nury, Refills: 1      cyclobenzaprine (FLEXERIL) 5 MG tablet Take by mouth.      diclofenac sodium (VOLTAREN) 1 % Gel Apply 2 g topically 4 (four) times daily.  Qty: 1 Tube, Refills: 2    Associated Diagnoses: Radiculopathy of thoracolumbar region; DDD (degenerative disc disease), lumbar; Chronic pain syndrome; Spondylosis without myelopathy; Osteoarthritis of spine, unspecified spinal osteoarthritis complication status, unspecified spinal region      dicyclomine (BENTYL) 10 MG capsule Take 10 mg by mouth 4 (four) times daily before meals and nightly.      fluconazole (DIFLUCAN) 200 MG Tab Take by mouth.      fluticasone propionate (FLONASE) 50 mcg/actuation nasal spray 2 sprays in each nostril every evening.  Qty: 16 g, Refills: 1      fluticasone-salmeterol diskus inhaler 250-50 mcg Inhale 1 puff into the lungs.      furosemide (LASIX) 20 MG tablet TK 1 T PO QD PRF FLUID  Refills: 2      gabapentin (NEURONTIN) 300  MG capsule Take 1 capsule (300 mg total) by mouth 2 (two) times daily.  Qty: 60 capsule, Refills: 2    Comments: **Patient requests 90 days supply**  Associated Diagnoses: Lumbar radiculopathy      latanoprost 0.005 % ophthalmic solution Place 1 drop into both eyes every evening.  Qty: 3 Bottle, Refills: 3    Associated Diagnoses: Primary open angle glaucoma (POAG) of both eyes, mild stage      mirtazapine (REMERON) 30 MG tablet Take 30 mg by mouth nightly.  Refills: 3      nystatin (MYCOSTATIN) 100,000 unit/mL suspension Take 4 mLs (400,000 Units total) by mouth after meals as needed.  Qty: 160 mL, Refills: 1      omeprazole (PRILOSEC) 20 MG capsule Take 1 capsule (20 mg total) by mouth 2 (two) times daily.  Qty: 60 capsule, Refills: 1      pravastatin (PRAVACHOL) 20 MG tablet Refills: 1      SYMBICORT 80-4.5 mcg/actuation HFAA Refills: 6      !! tiZANidine (ZANAFLEX) 2 MG tablet Take by mouth.      !! tiZANidine (ZANAFLEX) 4 MG tablet Take by mouth.      walker Misc 1 application by Misc.(Non-Drug; Combo Route) route once daily.  Qty: 1 each, Refills: 0    Comments: lumbosacral neuritis wheelchair walker with a seat       !! - Potential duplicate medications found. Please discuss with provider.              Discharge Diagnosis: Sacroiliac joint pain [M53.3]  Condition on Discharge: Stable with no complications to procedure   Diet on Discharge: Same as before.  Activity: as per instruction sheet.  Discharge to: Home with a responsible adult.  Follow up: 2-4 weeks       Please call my office or pager at [phone] if experienced any weakness or loss of sensation, fever > 101.5, pain uncontrolled with oral medications, persistent nausea/vomiting/or diarrhea, redness or drainage from the incisions, or any other worrisome concerns. If physician on call was not reached or could not communicate with our office for any reason please go to the nearest emergency department

## 2022-06-07 ENCOUNTER — TELEPHONE (OUTPATIENT)
Dept: PAIN MEDICINE | Facility: CLINIC | Age: 82
End: 2022-06-07
Payer: MEDICARE

## 2022-06-07 NOTE — TELEPHONE ENCOUNTER
This message is for patient in regards to his/her appointment 06/08/22 at 02:20p       [facility] Policy: For the safety of all patients and staff members.     Patient Visitor policy: During this visit we're asking all patients to only have one visitor over the age of 18yrs old to accompany to be seen by NOVA Maldonado. If patient do not required assistance with their visit, we're asking all visitors to remain outside the waiting area.    Upon arriving to your schedule appointment; patients are required to wear a face mask, if patient do not have a face mask one will be provided entering the facility. If you have any questions or concerns please contact [phone]        Staff left a voicemail reminding pt of their appt

## 2022-06-08 ENCOUNTER — OFFICE VISIT (OUTPATIENT)
Dept: PAIN MEDICINE | Facility: CLINIC | Age: 82
End: 2022-06-08
Payer: MEDICARE

## 2022-06-08 VITALS
DIASTOLIC BLOOD PRESSURE: 73 MMHG | WEIGHT: 184.31 LBS | BODY MASS INDEX: 33.92 KG/M2 | HEIGHT: 62 IN | HEART RATE: 89 BPM | SYSTOLIC BLOOD PRESSURE: 144 MMHG

## 2022-06-08 DIAGNOSIS — M47.816 LUMBAR SPONDYLOSIS: ICD-10-CM

## 2022-06-08 DIAGNOSIS — M25.571 CHRONIC PAIN OF BOTH ANKLES: ICD-10-CM

## 2022-06-08 DIAGNOSIS — G89.29 CHRONIC PAIN OF BOTH ANKLES: ICD-10-CM

## 2022-06-08 DIAGNOSIS — M53.3 SACROILIAC JOINT PAIN: ICD-10-CM

## 2022-06-08 DIAGNOSIS — M25.572 CHRONIC PAIN OF BOTH ANKLES: ICD-10-CM

## 2022-06-08 DIAGNOSIS — G89.4 CHRONIC PAIN SYNDROME: ICD-10-CM

## 2022-06-08 DIAGNOSIS — M51.36 DDD (DEGENERATIVE DISC DISEASE), LUMBAR: ICD-10-CM

## 2022-06-08 DIAGNOSIS — M54.16 LUMBAR RADICULOPATHY: Primary | ICD-10-CM

## 2022-06-08 PROCEDURE — 1101F PT FALLS ASSESS-DOCD LE1/YR: CPT | Mod: CPTII,S$GLB,, | Performed by: NURSE PRACTITIONER

## 2022-06-08 PROCEDURE — 3288F PR FALLS RISK ASSESSMENT DOCUMENTED: ICD-10-PCS | Mod: CPTII,S$GLB,, | Performed by: NURSE PRACTITIONER

## 2022-06-08 PROCEDURE — 3078F DIAST BP <80 MM HG: CPT | Mod: CPTII,S$GLB,, | Performed by: NURSE PRACTITIONER

## 2022-06-08 PROCEDURE — 1159F PR MEDICATION LIST DOCUMENTED IN MEDICAL RECORD: ICD-10-PCS | Mod: CPTII,S$GLB,, | Performed by: NURSE PRACTITIONER

## 2022-06-08 PROCEDURE — 99999 PR PBB SHADOW E&M-EST. PATIENT-LVL V: CPT | Mod: PBBFAC,,, | Performed by: NURSE PRACTITIONER

## 2022-06-08 PROCEDURE — 99215 OFFICE O/P EST HI 40 MIN: CPT | Mod: PBBFAC | Performed by: NURSE PRACTITIONER

## 2022-06-08 PROCEDURE — 1125F AMNT PAIN NOTED PAIN PRSNT: CPT | Mod: CPTII,S$GLB,, | Performed by: NURSE PRACTITIONER

## 2022-06-08 PROCEDURE — 1159F MED LIST DOCD IN RCRD: CPT | Mod: CPTII,S$GLB,, | Performed by: NURSE PRACTITIONER

## 2022-06-08 PROCEDURE — 1160F RVW MEDS BY RX/DR IN RCRD: CPT | Mod: CPTII,S$GLB,, | Performed by: NURSE PRACTITIONER

## 2022-06-08 PROCEDURE — 3077F PR MOST RECENT SYSTOLIC BLOOD PRESSURE >= 140 MM HG: ICD-10-PCS | Mod: CPTII,S$GLB,, | Performed by: NURSE PRACTITIONER

## 2022-06-08 PROCEDURE — 3288F FALL RISK ASSESSMENT DOCD: CPT | Mod: CPTII,S$GLB,, | Performed by: NURSE PRACTITIONER

## 2022-06-08 PROCEDURE — 1160F PR REVIEW ALL MEDS BY PRESCRIBER/CLIN PHARMACIST DOCUMENTED: ICD-10-PCS | Mod: CPTII,S$GLB,, | Performed by: NURSE PRACTITIONER

## 2022-06-08 PROCEDURE — 99999 PR PBB SHADOW E&M-EST. PATIENT-LVL V: ICD-10-PCS | Mod: PBBFAC,,, | Performed by: NURSE PRACTITIONER

## 2022-06-08 PROCEDURE — 99213 PR OFFICE/OUTPT VISIT, EST, LEVL III, 20-29 MIN: ICD-10-PCS | Mod: S$GLB,,, | Performed by: NURSE PRACTITIONER

## 2022-06-08 PROCEDURE — 1125F PR PAIN SEVERITY QUANTIFIED, PAIN PRESENT: ICD-10-PCS | Mod: CPTII,S$GLB,, | Performed by: NURSE PRACTITIONER

## 2022-06-08 PROCEDURE — 99213 OFFICE O/P EST LOW 20 MIN: CPT | Mod: S$GLB,,, | Performed by: NURSE PRACTITIONER

## 2022-06-08 PROCEDURE — 1101F PR PT FALLS ASSESS DOC 0-1 FALLS W/OUT INJ PAST YR: ICD-10-PCS | Mod: CPTII,S$GLB,, | Performed by: NURSE PRACTITIONER

## 2022-06-08 PROCEDURE — 3078F PR MOST RECENT DIASTOLIC BLOOD PRESSURE < 80 MM HG: ICD-10-PCS | Mod: CPTII,S$GLB,, | Performed by: NURSE PRACTITIONER

## 2022-06-08 PROCEDURE — 3077F SYST BP >= 140 MM HG: CPT | Mod: CPTII,S$GLB,, | Performed by: NURSE PRACTITIONER

## 2022-06-08 RX ORDER — GABAPENTIN 300 MG/1
300 CAPSULE ORAL 2 TIMES DAILY
Qty: 180 CAPSULE | Refills: 1 | Status: SHIPPED | OUTPATIENT
Start: 2022-06-08 | End: 2022-09-01

## 2022-06-08 NOTE — PROGRESS NOTES
Chronic patient Established Note (Follow up visit)        SUBJECTIVE:    Interval History 6/8/2022:  The patient returns to clinic today for follow up of low back pain. She is s/p bilateral SI joint injections on 5/25/2022. She reports no relief. She continues to report low back pain that radiates into her buttocks into the posterior aspect of both legs to her ankles. She also reports radiates pain into the anterior aspect of her left leg. She is not interested in further procedures at this time. She reports increased bilateral knee and ankle pain. She would like to establish care with an Orthopedic. She also reports that her PCP has left. She would like to establish care here at [facility]. She continues to take Gabapentin. She denies any other health changes. Her pain today is 6/10.    Interval History 4/20/2022:  The patient returns to clinic today for follow up of low back pain. She has not been seen in over a year. She reports that previous SYLVIA in 4/2021 provided no relief. She continues to report low back pain that radiates into both hips and buttocks. She reports intermittent radiating pain into the posterior aspect of her left leg to under her foot. Her pain is worse with prolonged sitting and standing. She continues to take Gabapentin with limited relief. She denies any other health changes. Her pain today is 8/10.    Interval History 1/14/2021:  The patient returns to clinic today for follow up of low back pain. She continues to report low back pain that radiates into the lateral aspect of left leg to her ankle. She denies any right leg pain. She did not start Lyrica given at last visit due to concern for side effects. She has not started physical therapy yet. She continues to take Gabapentin and Zanaflex. She denies any other health changes. Her pain today is 6/10.    Interval history 12/10/2020:  Returns for follow up of her low back pain. She continues to report low back pain that radiates into the lateral  aspect of her left leg to her ankle. Reports that gabapentin and Zanaflex are not helping with pain. Her pain is worse with standing, walking, and activity. Her pain today is 5/10.    Interval History 11/13/2020:  The patient returns to clinic today for follow up of back pain. She reports limited relief with previous SYLVIA. She continues to report low back pain that radiates into the lateral aspect of her left leg to her ankle. She denies any right leg pain today. Her pain is worse with standing, walking, and activity. She is frustrated with her continued pain. She did increase Gabapentin to BID. She is unsure of relief at this time. She does take [place] from her PCP. She states that her PCP would like our office to take over this medication. She asks for a refill. She denies any other health changes. Her pain today is 2/10.    Interval History 10/30/2020:  The patient returns to clinic today via audio visit for follow up of back pain. She is s/p bilateral L4/5 TF SYLVIA on 10/14/2020. She reports limited relief of her pain. She reports increased low back pain that radiates down the lateral aspect of her left leg to her ankle. She denies any right leg pain today. Her pain is worse with prolonged standing, walking, and activity. She denies any weakness. She is currently taking Gabapentin once a day. She denies any other health changes.     Interval History 7/24/2020:  The patient returns to clinic today for follow up of low back pain. She reports increased pain over the last two months. She reports low back pain that radiates down the lateral aspect of both legs to her knees, left greater than right. Her pain is worse with standing and walking. She continues to take Gabapentin. She denies any other health changes. Her pain today is 9/10.    Interval History 1/28/2020:  Tamiko Youssef presents to the clinic for a follow-up appointment for lower back pain. She is s/p bilateral L4/5 TF SYLVIA on 1/8/2020. She reports 80% relief  of his low back and leg pain. She continues to report low back pain that is aching in nature. She denies any radiating leg pain today. Her pain is worse with prolonged standing and walking. She continues to take Gabapentin with benefit. She continues to perform a home exercise routine. She denies any other health changes. Her pain today is 8/10.      Pain Disability Index Review:  Last 3 PDI Scores 6/8/2022 4/20/2022 1/14/2021   Pain Disability Index (PDI) 46 20 42       Pain Medications:  Gabapentin 300 mg daily    Opioid Contract: yes     report:  Reviewed and consistent with medication use as prescribed.    Pain Procedures:   3/28/14, 3/13/13 Bilateral L4 TF SYLVIA  8/22/18 Bilateral L4 TF SYLVIA- 75% relief  1/8/2020- Bilateral L4/5 TF SYLVIA - 80% relief   10/14/2020- Bilateral L4/5 TF SYLVIA    Physical Therapy/Home Exercise: no    Imaging:   MRI Lumbar Spine 11/18/2020:  COMPARISON:  11/18/2020     FINDINGS:  Lumbar spine alignment is within normal limits. Vertebral body heights preserved.  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     The conus is normal in appearance.  The adjacent soft tissue structures show no significant abnormalities.     L1-L2: No evidence of a disc herniation.No significant central canal or neural foraminal narrowing.     L2-L3: There is no focal disc herniation. No central canal or foraminal stenosis.  Prominent facet arthritis.     L3-L4: There is some broad-based disc bulging and superimposed spondylitic spurring which is asymmetric to the left.  Prominent facet arthritis is noted.  No central canal or foraminal stenosis.     L4-L5: Broad-based disc bulging and facet arthritis present.  No significant central canal or neural foraminal narrowing.     L5-S1: Broad-based disc bulging and facet arthritis are present.  No focal disc herniation.  No significant central canal or neural foraminal  narrowing.     Impression:     Multilevel nonstenotic degenerative change mostly affecting the posterior elements.    Xray Lumbar Spine 11/18/2020:  COMPARISON:  Multiple priors, most recent 08/09/2018     FINDINGS:  Levoscoliosis of the lumbar spine.  Grade 1 anterolisthesis of L3 on L4, L4 on L5, and L5 on S1 similar to prior.  No acute, displaced fracture.  Vertebral body heights are maintained.  No evidence of dynamic instability.  Multilevel lumbar facet arthrosis.  No aggressive osseous abnormality.     Impression:     Mild multilevel lumbar spondylosis with unchanged grade 1 anterolisthesis of L3 on L4 through L5 on S1.    Xray Hips 11/18/2020:  COMPARISON:  Multiple priors, most recent 08/09/2018     FINDINGS:  Sacroiliac joints are symmetric.  Pubic symphysis is not widened.  The femoral heads are well seated within the acetabula.  Degenerative changes of the lower lumbar spine.  Mild joint space narrowing, subchondral sclerosis and minimal osteophytosis of the hip joints.  No acute, displaced fracture.  No aggressive osseous abnormality.     Impression:     No acute osseous abnormality.        Allergies:   Review of patient's allergies indicates:   Allergen Reactions    Naprosyn [naproxen]     Norco [hydrocodone-acetaminophen]     Tramadol        Current Medications:   Current Outpatient Medications   Medication Sig Dispense Refill    albuterol (PROVENTIL) 2.5 mg /3 mL (0.083 %) nebulizer solution Take 2.5 mg by nebulization every 6 (six) hours as needed.      albuterol (PROVENTIL/VENTOLIN HFA) 90 mcg/actuation inhaler Inhale TWO to FOUR puffs by mouth FOUR times daily when needed.      ALPRAZolam (XANAX) 0.25 MG tablet TK 1 T PO QD PRA  3    amLODIPine (NORVASC) 10 MG tablet       diclofenac sodium (VOLTAREN) 1 % Gel Apply 2 g topically 4 (four) times daily. 1 Tube 2    fluticasone propionate (FLONASE) 50 mcg/actuation nasal spray 2 sprays in each nostril every evening. 16 g 1     fluticasone-salmeterol diskus inhaler 250-50 mcg Inhale 1 puff into the lungs.      furosemide (LASIX) 20 MG tablet TK 1 T PO QD PRF FLUID  2    gabapentin (NEURONTIN) 300 MG capsule Take 1 capsule (300 mg total) by mouth 2 (two) times daily. 60 capsule 2    latanoprost 0.005 % ophthalmic solution Place 1 drop into both eyes every evening. 3 Bottle 3    mirtazapine (REMERON) 30 MG tablet Take 30 mg by mouth nightly.  3    pravastatin (PRAVACHOL) 20 MG tablet   1    SYMBICORT 80-4.5 mcg/actuation HFAA   6    walker Misc 1 application by Misc.(Non-Drug; Combo Route) route once daily. 1 each 0    clotrimazole (MYCELEX) 10 mg john Take 1 tablet (10 mg total) by mouth 5 (five) times daily. Dissolve one lozenge in the mouth 5 times a day. (Patient not taking: Reported on 6/8/2022) 50 John 1    cyclobenzaprine (FLEXERIL) 5 MG tablet Take by mouth.      dicyclomine (BENTYL) 10 MG capsule Take 10 mg by mouth 4 (four) times daily before meals and nightly.      fluconazole (DIFLUCAN) 200 MG Tab Take by mouth.      nystatin (MYCOSTATIN) 100,000 unit/mL suspension Take 4 mLs (400,000 Units total) by mouth after meals as needed. (Patient not taking: Reported on 6/8/2022) 160 mL 1    omeprazole (PRILOSEC) 20 MG capsule Take 1 capsule (20 mg total) by mouth 2 (two) times daily. (Patient not taking: Reported on 6/8/2022) 60 capsule 1    tiZANidine (ZANAFLEX) 2 MG tablet Take by mouth.      tiZANidine (ZANAFLEX) 4 MG tablet Take by mouth.       No current facility-administered medications for this visit.       REVIEW OF SYSTEMS:    Constitutional: Positive for unexpected weight change.   HENT: Positive for headache.   Respiratory: Positive for intermittent shortness of breath and wheezing.  (asthma)  Gastrointestinal: Positive for constipation.   Musculoskeletal: Positive for back pain, gait problem and stiffness.       Past Medical History:  Past Medical History:   Diagnosis Date    Asthma     Cataract      "Glaucoma     Hypertension        Past Surgical History:  Past Surgical History:   Procedure Laterality Date    CATARACT EXTRACTION W/  INTRAOCULAR LENS IMPLANT Bilateral     CHOLECYSTECTOMY      EYE SURGERY      HYSTERECTOMY      INJECTION OF JOINT Bilateral 5/25/2022    Procedure: INJECTION, JOINT, BILATERAL SACROILIAC (SI);  Surgeon: Obdulio Leon MD;  Location: [facility];  Service: Pain Management;  Laterality: Bilateral;    NECK SURGERY      cyst removed    TRANSFORAMINAL EPIDURAL INJECTION OF STEROID Bilateral 1/8/2020    Procedure: INJECTION, STEROID, EPIDURAL, TRANSFORAMINAL APPROACH;  Surgeon: Obdulio Leon MD;  Location: [facility];  Service: Pain Management;  Laterality: Bilateral;  B/L TFESI L4    TRANSFORAMINAL EPIDURAL INJECTION OF STEROID Bilateral 10/14/2020    Procedure: INJECTION, STEROID, EPIDURAL, TRANSFORAMINAL APPROACH, L4-L5 need consent;  Surgeon: Obdulio Leon MD;  Location: [facility];  Service: Pain Management;  Laterality: Bilateral;    TRANSFORAMINAL EPIDURAL INJECTION OF STEROID Left 4/14/2021    Procedure: INJECTION, STEROID, EPIDURAL, TRANSFORAMINAL APPROACH  left L3/4 and L4/5;  Surgeon: Obdulio Leon MD;  Location: [facility];  Service: Pain Management;  Laterality: Left;  consent needed       Family History:  Family History   Problem Relation Age of Onset    Cataracts Son     Glaucoma Son     No Known Problems Mother     No Known Problems Father     Macular degeneration Neg Hx        Social History:  Social History     Socioeconomic History    Marital status: Single   Tobacco Use    Smoking status: Never Smoker    Smokeless tobacco: Never Used   Substance and Sexual Activity    Alcohol use: No    Drug use: Never       OBJECTIVE:    BP (!) 144/73 (BP Location: Right arm, Patient Position: Sitting, BP Method: Medium (Automatic))   Pulse 89   Ht 5' 2" (1.575 m)   Wt 83.6 kg (184 lb 4.9 oz)   BMI 33.71 kg/m²     PHYSICAL " EXAMINATION:    GENERAL: Well appearing, in no acute distress, alert and oriented x3.  PSYCH:  Mood and affect appropriate.  SKIN: Skin color, texture, turgor normal, no rashes or lesions.  HEAD/FACE:  Normocephalic, atraumatic. Cranial nerves grossly intact.  CV: RRR with palpation of the radial artery.  PULM: No evidence of respiratory difficulty, symmetric chest rise.  GI:  Soft and non-tender.  BACK: Straight leg raising in the sitting position is negative for radicular pain bilaterally.  There is pain to palpation over lumbar paraspinals bilaterally.  There is pain with palpation over lumbar facet joints bilaterally.  Limited ROM with pain on flexion and extension.  Positive facet loading bilaterally.    EXTREMITIES: No deformities, edema, or skin discoloration. Good capillary refill.  MUSCULOSKELETAL: There is pain with palpation to bilateral SI joints.  [place] is positive bilaterally. 5/5 strength in right ankle with plantar and dorsiflexion. 5/5 strength in left ankle with plantar and dorsiflexion. 4/5 strength with right knee flexion and extension. 4/5 strength with left knee flexion and extension.  No atrophy or tone abnormalities are noted.  NEURO: Bilateral lower extremity coordination and muscle stretch reflexes are physiologic and symmetric.  Plantar response are downgoing. No clonus.  No loss of sensation is noted.  GAIT: Antalgic- ambulates with walker.      ASSESSMENT: 81 y.o. year old female with lower back pain consistent with the following diagnoses:     1. Lumbar radiculopathy  gabapentin (NEURONTIN) 300 MG capsule   2. Lumbar spondylosis     3. DDD (degenerative disc disease), lumbar     4. Sacroiliac joint pain     5. Chronic pain syndrome     6. Chronic pain of both ankles  Ambulatory referral/consult to Orthopedics    Ambulatory referral/consult to Internal Medicine         PLAN:     - Previous imaging was reviewed and discussed with the patient today.    - She is s/p bilateral SI joints  without relief.     - We discussed SYLVIA and MBB, she is not interested at this time.     - I have stressed the importance of physical activity and a home exercise plan to help with pain and improve health.    - Continue Gabapentin 300mg  BID.     - Consult Orthopedics per patient request.     - Counseled patient regarding the importance of activity modification, constant sleeping habits and physical therapy.    - RTC in 3 months or sooner if needed.    - Dr. Leon was consulted on the patient and agrees with this plan.    The above plan and management options were discussed at length with patient. Patient is in agreement with the above and verbalized understanding.    Marina Gallegos NP  06/08/2022

## 2022-06-17 ENCOUNTER — HOSPITAL ENCOUNTER (OUTPATIENT)
Dept: RADIOLOGY | Facility: HOSPITAL | Age: 82
Discharge: HOME | End: 2022-06-17
Attending: ORTHOPAEDIC SURGERY
Payer: MEDICARE

## 2022-06-17 ENCOUNTER — OFFICE VISIT (OUTPATIENT)
Dept: ORTHOPEDICS | Facility: CLINIC | Age: 82
End: 2022-06-17
Payer: MEDICARE

## 2022-06-17 VITALS — HEIGHT: 62 IN | BODY MASS INDEX: 33.92 KG/M2 | WEIGHT: 184.31 LBS

## 2022-06-17 DIAGNOSIS — M25.571 CHRONIC PAIN OF BOTH ANKLES: ICD-10-CM

## 2022-06-17 DIAGNOSIS — M25.572 CHRONIC PAIN OF BOTH ANKLES: ICD-10-CM

## 2022-06-17 DIAGNOSIS — G89.29 CHRONIC PAIN OF BOTH ANKLES: ICD-10-CM

## 2022-06-17 PROCEDURE — 1159F MED LIST DOCD IN RCRD: CPT | Mod: CPTII,S$GLB,, | Performed by: ORTHOPAEDIC SURGERY

## 2022-06-17 PROCEDURE — 1125F PR PAIN SEVERITY QUANTIFIED, PAIN PRESENT: ICD-10-PCS | Mod: CPTII,S$GLB,, | Performed by: ORTHOPAEDIC SURGERY

## 2022-06-17 PROCEDURE — 3288F PR FALLS RISK ASSESSMENT DOCUMENTED: ICD-10-PCS | Mod: CPTII,S$GLB,, | Performed by: ORTHOPAEDIC SURGERY

## 2022-06-17 PROCEDURE — 1159F PR MEDICATION LIST DOCUMENTED IN MEDICAL RECORD: ICD-10-PCS | Mod: CPTII,S$GLB,, | Performed by: ORTHOPAEDIC SURGERY

## 2022-06-17 PROCEDURE — 1101F PR PT FALLS ASSESS DOC 0-1 FALLS W/OUT INJ PAST YR: ICD-10-PCS | Mod: CPTII,S$GLB,, | Performed by: ORTHOPAEDIC SURGERY

## 2022-06-17 PROCEDURE — 1125F AMNT PAIN NOTED PAIN PRSNT: CPT | Mod: CPTII,S$GLB,, | Performed by: ORTHOPAEDIC SURGERY

## 2022-06-17 PROCEDURE — 73610 X-RAY EXAM OF ANKLE: CPT | Mod: 26,RT,, | Performed by: RADIOLOGY

## 2022-06-17 PROCEDURE — 99203 PR OFFICE/OUTPT VISIT, NEW, LEVL III, 30-44 MIN: ICD-10-PCS | Mod: S$GLB,,, | Performed by: ORTHOPAEDIC SURGERY

## 2022-06-17 PROCEDURE — 3288F FALL RISK ASSESSMENT DOCD: CPT | Mod: CPTII,S$GLB,, | Performed by: ORTHOPAEDIC SURGERY

## 2022-06-17 PROCEDURE — 73610 XR ANKLE COMPLETE 3 VIEW RIGHT: ICD-10-PCS | Mod: 26,RT,, | Performed by: RADIOLOGY

## 2022-06-17 PROCEDURE — 99999 PR PBB SHADOW E&M-EST. PATIENT-LVL IV: CPT | Mod: PBBFAC,,, | Performed by: ORTHOPAEDIC SURGERY

## 2022-06-17 PROCEDURE — 99203 OFFICE O/P NEW LOW 30 MIN: CPT | Mod: S$GLB,,, | Performed by: ORTHOPAEDIC SURGERY

## 2022-06-17 PROCEDURE — 99999 PR PBB SHADOW E&M-EST. PATIENT-LVL IV: ICD-10-PCS | Mod: PBBFAC,,, | Performed by: ORTHOPAEDIC SURGERY

## 2022-06-17 PROCEDURE — 73610 X-RAY EXAM OF ANKLE: CPT | Mod: TC,RT

## 2022-06-17 PROCEDURE — 1101F PT FALLS ASSESS-DOCD LE1/YR: CPT | Mod: CPTII,S$GLB,, | Performed by: ORTHOPAEDIC SURGERY

## 2022-06-17 NOTE — PROGRESS NOTES
Subjective:      Patient ID: Tamiko Youssef is a 81 y.o. female.    Chief Complaint:  Bilateral ankle pain    HPI:  This is an 81-year-old female who comes in for evaluation of chronic bilateral ankle pain.  She reports pain in both ankles that goes up her legs into her back.  She has a history lumbar radiculopathy with complaints of radiating pain down to her feet.  She is followed in [facility] with previous epidural steroid injections with minimal relief.    Past Medical History:   Diagnosis Date    Asthma     Cataract     Glaucoma     Hypertension        Current Outpatient Medications:     albuterol (PROVENTIL) 2.5 mg /3 mL (0.083 %) nebulizer solution, Take 2.5 mg by nebulization every 6 (six) hours as needed., Disp: , Rfl:     albuterol (PROVENTIL/VENTOLIN HFA) 90 mcg/actuation inhaler, Inhale TWO to FOUR puffs by mouth FOUR times daily when needed., Disp: , Rfl:     ALPRAZolam (XANAX) 0.25 MG tablet, TK 1 T PO QD PRA, Disp: , Rfl: 3    amLODIPine (NORVASC) 10 MG tablet, , Disp: , Rfl:     diclofenac sodium (VOLTAREN) 1 % Gel, Apply 2 g topically 4 (four) times daily., Disp: 1 Tube, Rfl: 2    fluticasone propionate (FLONASE) 50 mcg/actuation nasal spray, 2 sprays in each nostril every evening., Disp: 16 g, Rfl: 1    fluticasone-salmeterol diskus inhaler 250-50 mcg, Inhale 1 puff into the lungs., Disp: , Rfl:     furosemide (LASIX) 20 MG tablet, TK 1 T PO QD PRF FLUID, Disp: , Rfl: 2    gabapentin (NEURONTIN) 300 MG capsule, Take 1 capsule (300 mg total) by mouth 2 (two) times daily., Disp: 180 capsule, Rfl: 1    latanoprost 0.005 % ophthalmic solution, Place 1 drop into both eyes every evening., Disp: 3 Bottle, Rfl: 3    mirtazapine (REMERON) 30 MG tablet, Take 30 mg by mouth nightly., Disp: , Rfl: 3    pravastatin (PRAVACHOL) 20 MG tablet, , Disp: , Rfl: 1    SYMBICORT 80-4.5 mcg/actuation HFAA, , Disp: , Rfl: 6    walker Misc, 1 application by Misc.(Non-Drug; Combo Route) route  "once daily., Disp: 1 each, Rfl: 0  Review of patient's allergies indicates:   Allergen Reactions    Mobic [meloxicam] Rash    Norco [hydrocodone-acetaminophen]     Tramadol     Naprosyn [naproxen]      GI upset       Ht 5' 2" (1.575 m)   Wt 83.6 kg (184 lb 4.9 oz)   BMI 33.71 kg/m²     ROS:  Negative for chest pain, shortness of breath, fevers, or unexplained weight loss      Objective:    Ortho Exam       This is a well-developed well-nourished female who walks in with a slow shuffling gait with a walker.  On standing inspection she has plantigrade alignment of her feet.  There may be some mild swelling of her ankles but it is minimal.  On sitting exam she has active motion of her ankle and subtalar joints without any pain.  She has some mild tenderness about the joint lines bilaterally.  She has good function of the tendons about her ankles bilaterally she is neurovascularly intact.      Assessment:     Imaging:  An MRI of her left ankle was performed on 02/02/2022 in did not reveal any acute or chronic structural abnormalities.  I ordered reviewed an x-ray of her right ankle today.  The right ankle x-ray is normal with no acute or chronic abnormalities.  Her joint spaces are well maintained.        1. Chronic pain of both ankles          Plan:       Orders Placed This Encounter    X-Ray Ankle Complete Bilateral     Recommendation:  I do not appreciate any significant structural abnormalities by exam or imaging studies of her ankles.  Therefore, I really do not have any further recommendations.          "

## 2022-06-24 ENCOUNTER — TELEPHONE (OUTPATIENT)
Dept: PAIN MEDICINE | Facility: CLINIC | Age: 82
End: 2022-06-24
Payer: MEDICARE

## 2022-06-24 NOTE — TELEPHONE ENCOUNTER
Can you please contact the patient and find out more information? The last prescription given was Gabapentin. What kind of reaction did she have?

## 2022-06-24 NOTE — TELEPHONE ENCOUNTER
----- Message from Jaswinder Whitman sent at 6/24/2022 10:23 AM CDT -----  Name of Who is Calling: KAY STEFANY [[id number]]           What is the request in detail: Patient is requesting a call back to discuss medication.  Patient is requesting a new pain medicaiton.  She states that she had an allergic reaction to pregabalin (LYRICA) 75 MG capsule.  Please assist.        Pharmacy Location : [id number] [place] [address] [address]           Can the clinic reply by [facility]: No           What Number to Call Back if not in [facility]: [phone]

## 2022-08-04 ENCOUNTER — OFFICE VISIT (OUTPATIENT)
Dept: INTERNAL MEDICINE | Facility: CLINIC | Age: 82
End: 2022-08-04
Payer: MEDICARE

## 2022-08-04 ENCOUNTER — LAB VISIT (OUTPATIENT)
Dept: LAB | Facility: OTHER | Age: 82
End: 2022-08-04
Attending: STUDENT IN AN ORGANIZED HEALTH CARE EDUCATION/TRAINING PROGRAM
Payer: MEDICARE

## 2022-08-04 VITALS
DIASTOLIC BLOOD PRESSURE: 70 MMHG | OXYGEN SATURATION: 95 % | SYSTOLIC BLOOD PRESSURE: 122 MMHG | HEART RATE: 97 BPM | WEIGHT: 188.38 LBS | BODY MASS INDEX: 34.46 KG/M2

## 2022-08-04 DIAGNOSIS — E53.8 VITAMIN B12 DEFICIENCY: ICD-10-CM

## 2022-08-04 DIAGNOSIS — R73.03 PREDIABETES: ICD-10-CM

## 2022-08-04 DIAGNOSIS — I10 HYPERTENSION, UNSPECIFIED TYPE: ICD-10-CM

## 2022-08-04 DIAGNOSIS — J45.20 MILD INTERMITTENT ASTHMA WITHOUT COMPLICATION: ICD-10-CM

## 2022-08-04 DIAGNOSIS — F41.9 ANXIETY AND DEPRESSION: ICD-10-CM

## 2022-08-04 DIAGNOSIS — E78.5 HYPERLIPIDEMIA, UNSPECIFIED HYPERLIPIDEMIA TYPE: ICD-10-CM

## 2022-08-04 DIAGNOSIS — Z00.00 HEALTH MAINTENANCE EXAMINATION: ICD-10-CM

## 2022-08-04 DIAGNOSIS — F32.A ANXIETY AND DEPRESSION: ICD-10-CM

## 2022-08-04 DIAGNOSIS — R92.8 ABNORMAL FINDING ON BREAST IMAGING: ICD-10-CM

## 2022-08-04 DIAGNOSIS — K59.00 CONSTIPATION, UNSPECIFIED CONSTIPATION TYPE: ICD-10-CM

## 2022-08-04 DIAGNOSIS — M54.41 CHRONIC BILATERAL LOW BACK PAIN WITH BILATERAL SCIATICA: ICD-10-CM

## 2022-08-04 DIAGNOSIS — M54.42 CHRONIC BILATERAL LOW BACK PAIN WITH BILATERAL SCIATICA: ICD-10-CM

## 2022-08-04 DIAGNOSIS — M79.89 SWELLING OF BOTH LOWER EXTREMITIES: ICD-10-CM

## 2022-08-04 DIAGNOSIS — M85.80 OSTEOPENIA, UNSPECIFIED LOCATION: Primary | ICD-10-CM

## 2022-08-04 DIAGNOSIS — E55.9 VITAMIN D DEFICIENCY: ICD-10-CM

## 2022-08-04 DIAGNOSIS — Z23 NEED FOR VACCINATION: ICD-10-CM

## 2022-08-04 DIAGNOSIS — G89.29 CHRONIC BILATERAL LOW BACK PAIN WITH BILATERAL SCIATICA: ICD-10-CM

## 2022-08-04 LAB
25(OH)D3+25(OH)D2 SERPL-MCNC: 39 NG/ML (ref 30–96)
ALBUMIN SERPL BCP-MCNC: 3.7 G/DL (ref 3.5–5.2)
ALP SERPL-CCNC: 103 U/L (ref 55–135)
ALT SERPL W/O P-5'-P-CCNC: 22 U/L (ref 10–44)
ANION GAP SERPL CALC-SCNC: 9 MMOL/L (ref 8–16)
AST SERPL-CCNC: 20 U/L (ref 10–40)
BASOPHILS # BLD AUTO: 0.04 K/UL (ref 0–0.2)
BASOPHILS NFR BLD: 0.5 % (ref 0–1.9)
BILIRUB SERPL-MCNC: 0.3 MG/DL (ref 0.1–1)
BUN SERPL-MCNC: 7 MG/DL (ref 8–23)
CALCIUM SERPL-MCNC: 9.4 MG/DL (ref 8.7–10.5)
CHLORIDE SERPL-SCNC: 107 MMOL/L (ref 95–110)
CHOLEST SERPL-MCNC: 172 MG/DL (ref 120–199)
CHOLEST/HDLC SERPL: 2.7 {RATIO} (ref 2–5)
CO2 SERPL-SCNC: 27 MMOL/L (ref 23–29)
CREAT SERPL-MCNC: 0.7 MG/DL (ref 0.5–1.4)
DIFFERENTIAL METHOD: ABNORMAL
EOSINOPHIL # BLD AUTO: 0.1 K/UL (ref 0–0.5)
EOSINOPHIL NFR BLD: 1.6 % (ref 0–8)
ERYTHROCYTE [DISTWIDTH] IN BLOOD BY AUTOMATED COUNT: 14.4 % (ref 11.5–14.5)
EST. GFR  (NO RACE VARIABLE): >60 ML/MIN/1.73 M^2
ESTIMATED AVG GLUCOSE: 137 MG/DL (ref 68–131)
FOLATE SERPL-MCNC: 7.3 NG/ML (ref 4–24)
GLUCOSE SERPL-MCNC: 102 MG/DL (ref 70–110)
HBA1C MFR BLD: 6.4 % (ref 4–5.6)
HCT VFR BLD AUTO: 41.9 % (ref 37–48.5)
HCYS SERPL-SCNC: 4.4 UMOL/L (ref 4–15.5)
HDLC SERPL-MCNC: 64 MG/DL (ref 40–75)
HDLC SERPL: 37.2 % (ref 20–50)
HGB BLD-MCNC: 13.2 G/DL (ref 12–16)
IMM GRANULOCYTES # BLD AUTO: 0.02 K/UL (ref 0–0.04)
IMM GRANULOCYTES NFR BLD AUTO: 0.3 % (ref 0–0.5)
LDLC SERPL CALC-MCNC: 92.4 MG/DL (ref 63–159)
LYMPHOCYTES # BLD AUTO: 1.5 K/UL (ref 1–4.8)
LYMPHOCYTES NFR BLD: 20.7 % (ref 18–48)
MCH RBC QN AUTO: 27.8 PG (ref 27–31)
MCHC RBC AUTO-ENTMCNC: 31.5 G/DL (ref 32–36)
MCV RBC AUTO: 88 FL (ref 82–98)
MONOCYTES # BLD AUTO: 0.7 K/UL (ref 0.3–1)
MONOCYTES NFR BLD: 8.9 % (ref 4–15)
NEUTROPHILS # BLD AUTO: 5 K/UL (ref 1.8–7.7)
NEUTROPHILS NFR BLD: 68 % (ref 38–73)
NONHDLC SERPL-MCNC: 108 MG/DL
NRBC BLD-RTO: 0 /100 WBC
PLATELET # BLD AUTO: 287 K/UL (ref 150–450)
PMV BLD AUTO: 9.6 FL (ref 9.2–12.9)
POTASSIUM SERPL-SCNC: 4.8 MMOL/L (ref 3.5–5.1)
PROT SERPL-MCNC: 7.3 G/DL (ref 6–8.4)
RBC # BLD AUTO: 4.75 M/UL (ref 4–5.4)
SODIUM SERPL-SCNC: 143 MMOL/L (ref 136–145)
T4 FREE SERPL-MCNC: 0.95 NG/DL (ref 0.71–1.51)
TRIGL SERPL-MCNC: 78 MG/DL (ref 30–150)
TSH SERPL DL<=0.005 MIU/L-ACNC: 0.37 UIU/ML (ref 0.4–4)
VIT B12 SERPL-MCNC: >2000 PG/ML (ref 210–950)
WBC # BLD AUTO: 7.31 K/UL (ref 3.9–12.7)

## 2022-08-04 PROCEDURE — 84443 ASSAY THYROID STIM HORMONE: CPT | Performed by: STUDENT IN AN ORGANIZED HEALTH CARE EDUCATION/TRAINING PROGRAM

## 2022-08-04 PROCEDURE — 3074F SYST BP LT 130 MM HG: CPT | Mod: CPTII,S$GLB,, | Performed by: STUDENT IN AN ORGANIZED HEALTH CARE EDUCATION/TRAINING PROGRAM

## 2022-08-04 PROCEDURE — 82306 VITAMIN D 25 HYDROXY: CPT | Performed by: STUDENT IN AN ORGANIZED HEALTH CARE EDUCATION/TRAINING PROGRAM

## 2022-08-04 PROCEDURE — 80061 LIPID PANEL: CPT | Performed by: STUDENT IN AN ORGANIZED HEALTH CARE EDUCATION/TRAINING PROGRAM

## 2022-08-04 PROCEDURE — 82746 ASSAY OF FOLIC ACID SERUM: CPT | Performed by: STUDENT IN AN ORGANIZED HEALTH CARE EDUCATION/TRAINING PROGRAM

## 2022-08-04 PROCEDURE — 3074F PR MOST RECENT SYSTOLIC BLOOD PRESSURE < 130 MM HG: ICD-10-PCS | Mod: CPTII,S$GLB,, | Performed by: STUDENT IN AN ORGANIZED HEALTH CARE EDUCATION/TRAINING PROGRAM

## 2022-08-04 PROCEDURE — 1126F AMNT PAIN NOTED NONE PRSNT: CPT | Mod: CPTII,S$GLB,, | Performed by: STUDENT IN AN ORGANIZED HEALTH CARE EDUCATION/TRAINING PROGRAM

## 2022-08-04 PROCEDURE — 3288F FALL RISK ASSESSMENT DOCD: CPT | Mod: CPTII,S$GLB,, | Performed by: STUDENT IN AN ORGANIZED HEALTH CARE EDUCATION/TRAINING PROGRAM

## 2022-08-04 PROCEDURE — 83090 ASSAY OF HOMOCYSTEINE: CPT | Performed by: STUDENT IN AN ORGANIZED HEALTH CARE EDUCATION/TRAINING PROGRAM

## 2022-08-04 PROCEDURE — 1160F PR REVIEW ALL MEDS BY PRESCRIBER/CLIN PHARMACIST DOCUMENTED: ICD-10-PCS | Mod: CPTII,S$GLB,, | Performed by: STUDENT IN AN ORGANIZED HEALTH CARE EDUCATION/TRAINING PROGRAM

## 2022-08-04 PROCEDURE — 90677 PCV20 VACCINE IM: CPT | Mod: S$GLB,,, | Performed by: STUDENT IN AN ORGANIZED HEALTH CARE EDUCATION/TRAINING PROGRAM

## 2022-08-04 PROCEDURE — 1159F MED LIST DOCD IN RCRD: CPT | Mod: CPTII,S$GLB,, | Performed by: STUDENT IN AN ORGANIZED HEALTH CARE EDUCATION/TRAINING PROGRAM

## 2022-08-04 PROCEDURE — 82607 VITAMIN B-12: CPT | Performed by: STUDENT IN AN ORGANIZED HEALTH CARE EDUCATION/TRAINING PROGRAM

## 2022-08-04 PROCEDURE — 1160F RVW MEDS BY RX/DR IN RCRD: CPT | Mod: CPTII,S$GLB,, | Performed by: STUDENT IN AN ORGANIZED HEALTH CARE EDUCATION/TRAINING PROGRAM

## 2022-08-04 PROCEDURE — 99204 PR OFFICE/OUTPT VISIT, NEW, LEVL IV, 45-59 MIN: ICD-10-PCS | Mod: S$GLB,,, | Performed by: STUDENT IN AN ORGANIZED HEALTH CARE EDUCATION/TRAINING PROGRAM

## 2022-08-04 PROCEDURE — 99999 PR PBB SHADOW E&M-EST. PATIENT-LVL V: ICD-10-PCS | Mod: PBBFAC,,, | Performed by: STUDENT IN AN ORGANIZED HEALTH CARE EDUCATION/TRAINING PROGRAM

## 2022-08-04 PROCEDURE — 90677 PNEUMOCOCCAL CONJUGATE VACCINE 20-VALENT: ICD-10-PCS | Mod: S$GLB,,, | Performed by: STUDENT IN AN ORGANIZED HEALTH CARE EDUCATION/TRAINING PROGRAM

## 2022-08-04 PROCEDURE — 99204 OFFICE O/P NEW MOD 45 MIN: CPT | Mod: S$GLB,,, | Performed by: STUDENT IN AN ORGANIZED HEALTH CARE EDUCATION/TRAINING PROGRAM

## 2022-08-04 PROCEDURE — G0009 PNEUMOCOCCAL CONJUGATE VACCINE 20-VALENT: ICD-10-PCS | Mod: S$GLB,,, | Performed by: STUDENT IN AN ORGANIZED HEALTH CARE EDUCATION/TRAINING PROGRAM

## 2022-08-04 PROCEDURE — 36415 COLL VENOUS BLD VENIPUNCTURE: CPT | Performed by: STUDENT IN AN ORGANIZED HEALTH CARE EDUCATION/TRAINING PROGRAM

## 2022-08-04 PROCEDURE — 80053 COMPREHEN METABOLIC PANEL: CPT | Performed by: STUDENT IN AN ORGANIZED HEALTH CARE EDUCATION/TRAINING PROGRAM

## 2022-08-04 PROCEDURE — 84439 ASSAY OF FREE THYROXINE: CPT | Performed by: STUDENT IN AN ORGANIZED HEALTH CARE EDUCATION/TRAINING PROGRAM

## 2022-08-04 PROCEDURE — 3078F DIAST BP <80 MM HG: CPT | Mod: CPTII,S$GLB,, | Performed by: STUDENT IN AN ORGANIZED HEALTH CARE EDUCATION/TRAINING PROGRAM

## 2022-08-04 PROCEDURE — 3078F PR MOST RECENT DIASTOLIC BLOOD PRESSURE < 80 MM HG: ICD-10-PCS | Mod: CPTII,S$GLB,, | Performed by: STUDENT IN AN ORGANIZED HEALTH CARE EDUCATION/TRAINING PROGRAM

## 2022-08-04 PROCEDURE — G0009 ADMIN PNEUMOCOCCAL VACCINE: HCPCS | Mod: S$GLB,,, | Performed by: STUDENT IN AN ORGANIZED HEALTH CARE EDUCATION/TRAINING PROGRAM

## 2022-08-04 PROCEDURE — 1126F PR PAIN SEVERITY QUANTIFIED, NO PAIN PRESENT: ICD-10-PCS | Mod: CPTII,S$GLB,, | Performed by: STUDENT IN AN ORGANIZED HEALTH CARE EDUCATION/TRAINING PROGRAM

## 2022-08-04 PROCEDURE — 1159F PR MEDICATION LIST DOCUMENTED IN MEDICAL RECORD: ICD-10-PCS | Mod: CPTII,S$GLB,, | Performed by: STUDENT IN AN ORGANIZED HEALTH CARE EDUCATION/TRAINING PROGRAM

## 2022-08-04 PROCEDURE — 85025 COMPLETE CBC W/AUTO DIFF WBC: CPT | Performed by: STUDENT IN AN ORGANIZED HEALTH CARE EDUCATION/TRAINING PROGRAM

## 2022-08-04 PROCEDURE — 83921 ORGANIC ACID SINGLE QUANT: CPT | Performed by: STUDENT IN AN ORGANIZED HEALTH CARE EDUCATION/TRAINING PROGRAM

## 2022-08-04 PROCEDURE — 83036 HEMOGLOBIN GLYCOSYLATED A1C: CPT | Performed by: STUDENT IN AN ORGANIZED HEALTH CARE EDUCATION/TRAINING PROGRAM

## 2022-08-04 PROCEDURE — 1101F PT FALLS ASSESS-DOCD LE1/YR: CPT | Mod: CPTII,S$GLB,, | Performed by: STUDENT IN AN ORGANIZED HEALTH CARE EDUCATION/TRAINING PROGRAM

## 2022-08-04 PROCEDURE — 1101F PR PT FALLS ASSESS DOC 0-1 FALLS W/OUT INJ PAST YR: ICD-10-PCS | Mod: CPTII,S$GLB,, | Performed by: STUDENT IN AN ORGANIZED HEALTH CARE EDUCATION/TRAINING PROGRAM

## 2022-08-04 PROCEDURE — 3288F PR FALLS RISK ASSESSMENT DOCUMENTED: ICD-10-PCS | Mod: CPTII,S$GLB,, | Performed by: STUDENT IN AN ORGANIZED HEALTH CARE EDUCATION/TRAINING PROGRAM

## 2022-08-04 PROCEDURE — 99999 PR PBB SHADOW E&M-EST. PATIENT-LVL V: CPT | Mod: PBBFAC,,, | Performed by: STUDENT IN AN ORGANIZED HEALTH CARE EDUCATION/TRAINING PROGRAM

## 2022-08-04 RX ORDER — ALPRAZOLAM 0.25 MG/1
TABLET ORAL
Refills: 3 | Status: CANCELLED | OUTPATIENT
Start: 2022-08-04

## 2022-08-04 NOTE — PROGRESS NOTES
Subjective:       Patient ID: Tamiko Youssef is a 81 y.o. female.    Chief Complaint: Osteopenia, unspecified location [M85.80]    Patient is new to me, here to establish care.    Health maintenance -   Colonoscopy performed 2012, unsure screening interval.  Denies family history of colorectal cancer.   Needs mammogram, had abnormal breast u/s MAY2021 at [facility].  Denies family history of breast cancers.  Denies family history of ovarian cancers.  Hysterectomy 2/2 AUB.   Denies history of prior abnormal pap smears.  UTD on COVID19 primary/booster, shingles, and PPSV23 vaccinations.  Due for Tdap, COVID 4th dose, PCV20 vaccinations.  Never smoker.  Denies alcohol or drug use.  Due for lipid screening.  Due for diabetes screening.    Osteopenia -   DEXA in SEP2021 with osteopenia  Taking vitamin D and calcium    Asthma -   Seeing pulmonology routinely at [facility], Dr. Ferrera   Using Advair inhaler daily  Endorses she's not using Symbicort anymore due to tongue issues  Has albuterol PRN     HTN -   Currently prescribed amlodipine.  Patient endorses taking medication as directed.  Denies side effects or concerns while taking medication.  Denies headaches, vision changes, CP, palpitations, or other concerning symptoms.  Due for micro albumin creatinine ratio.   BP Readings from Last 3 Encounters:  08/04/22 : 122/70  06/08/22 : (!) 144/73  05/25/22 : (!) 143/72    HLD -   Endorses taking pravastatin as directed  Denies side effects or concerns while taking medication  Due for lipid recheck.    Seeing pain management for chronic back pain  Taking Gabapentin 300 mg BID  Previously had SI joint injection with incompelte control  Plans to follow with orthopedist     Anxiety -   Taking mirtazapine nightly  Takes Xanax 0.25 mg PRN anxiety, current requiring 2-3 times weekly    Takes lasix 20 mg 3 times weekly for ankle swelling    Constipation -   Takes metamucil daily  Using milk of magnesia twice weekly   Denies rectal  "bleeding  Having BM twice weekly only milk of magnesia   Drinking 4, 16 oz bottles of water daily  Also apple and cranberry juices   Endorses abdominal cramping pain  Denies melena  Had single episode of BRBPR  No improvement with Linzess or Miralax  Previously followed with Dr. Mcleod     Per patient, following with ENT for mass of brain  Seeing Dr. Lieberman  MRI MAR2020 showed:  "Stable 5-6 mm nodular enhancing lesion in the left IAC fundus in keeping with the reported history of vestibular schwannoma.  No apparent interval growth or apparent extension into the labyrinthine structures."      Review of Systems   Constitutional:  Negative for appetite change, chills, fatigue, fever and unexpected weight change.   Respiratory:  Negative for cough and shortness of breath.    Cardiovascular:  Positive for leg swelling. Negative for chest pain and palpitations.   Gastrointestinal:  Positive for anal bleeding and constipation. Negative for blood in stool, diarrhea, nausea and vomiting.   Neurological:  Negative for dizziness, syncope, weakness and headaches.       Current Outpatient Medications   Medication Instructions    albuterol (PROVENTIL) 2.5 mg, Every 6 hours PRN    albuterol (PROVENTIL/VENTOLIN HFA) 90 mcg/actuation inhaler Inhale TWO to FOUR puffs by mouth FOUR times daily when needed.    ALPRAZolam (XANAX) 0.25 MG tablet TK 1 T PO QD PRA    amLODIPine (NORVASC) 10 MG tablet No dose, route, or frequency recorded.    diclofenac sodium (VOLTAREN) 2 g, Topical (Top), 4 times daily    dicyclomine (BENTYL) 10 MG capsule 1 capsule, Oral, As needed (PRN)    fluticasone propionate (FLONASE) 50 mcg/actuation nasal spray 2 sprays in each nostril every evening.    fluticasone-salmeterol diskus inhaler 250-50 mcg 1 puff, Inhalation    furosemide (LASIX) 20 MG tablet TK 1 T PO QD PRF FLUID    gabapentin (NEURONTIN) 300 mg, Oral, 2 times daily    latanoprost 0.005 % ophthalmic solution 1 drop, Both Eyes, Nightly    " mirtazapine (REMERON) 30 mg, Oral, Nightly    polyethylene glycol (GLYCOLAX) 17 g, Oral, Daily PRN    pravastatin (PRAVACHOL) 20 mg, Oral, Nightly    walker Misc 1 application, Misc.(Non-Drug; Combo Route), Daily     Objective:      Vitals:    08/04/22 1000   BP: 122/70   Pulse: 97   SpO2: 95%   Weight: 85.5 kg (188 lb 6.1 oz)   PainSc: 0-No pain     Body mass index is 34.46 kg/m².    Physical Exam  Vitals reviewed.   Constitutional:       General: She is not in acute distress.     Appearance: Normal appearance. She is not ill-appearing or diaphoretic.   HENT:      Head: Normocephalic and atraumatic.      Right Ear: Tympanic membrane, ear canal and external ear normal. There is no impacted cerumen.      Left Ear: Tympanic membrane, ear canal and external ear normal. There is no impacted cerumen.      Nose: Nose normal. No rhinorrhea.      Mouth/Throat:      Mouth: Mucous membranes are moist.      Pharynx: Oropharynx is clear. No oropharyngeal exudate or posterior oropharyngeal erythema.   Eyes:      General: No scleral icterus.        Right eye: No discharge.         Left eye: No discharge.      Conjunctiva/sclera: Conjunctivae normal.   Neck:      Thyroid: No thyromegaly or thyroid tenderness.      Trachea: Trachea normal.   Cardiovascular:      Rate and Rhythm: Normal rate and regular rhythm.      Heart sounds: Normal heart sounds. No murmur heard.    No friction rub. No gallop.   Pulmonary:      Effort: Pulmonary effort is normal. No respiratory distress.      Breath sounds: Normal breath sounds. No stridor. No wheezing, rhonchi or rales.   Abdominal:      General: Bowel sounds are normal. There is no distension.      Palpations: Abdomen is soft.      Tenderness: There is no abdominal tenderness. There is no guarding or rebound.   Musculoskeletal:         General: No swelling.      Cervical back: Neck supple.   Lymphadenopathy:      Head:      Right side of head: No submandibular or posterior auricular  adenopathy.      Left side of head: No submandibular or posterior auricular adenopathy.      Cervical: No cervical adenopathy.      Right cervical: No superficial, deep or posterior cervical adenopathy.     Left cervical: No superficial, deep or posterior cervical adenopathy.      Upper Body:      Right upper body: No supraclavicular adenopathy.      Left upper body: No supraclavicular adenopathy.   Skin:     General: Skin is warm and dry.   Neurological:      Mental Status: She is alert. Mental status is at baseline.   Psychiatric:         Mood and Affect: Mood normal.         Behavior: Behavior normal.       Assessment:       1. Osteopenia, unspecified location    2. Vitamin D deficiency    3. Mild intermittent asthma without complication    4. Hypertension, unspecified type    5. Hyperlipidemia, unspecified hyperlipidemia type    6. Anxiety and depression    7. Swelling of both lower extremities    8. Chronic bilateral low back pain with bilateral sciatica    9. Constipation, unspecified constipation type    10. Vitamin B12 deficiency    11. Health maintenance examination    12. Need for vaccination    13. Abnormal finding on breast imaging    14. Prediabetes        Plan:       Osteopenia, unspecified location  Vitamin D deficiency  Continue supplementation  -     Vitamin D; Future    Mild intermittent asthma without complication  Continue current medications  RTC in 3 months for follow up    Hypertension, unspecified type  Continue current medications  RTC in 3 months for follow up  -     CBC Auto Differential; Future  -     Comprehensive Metabolic Panel; Future  -     TSH; Future  -     Microalbumin/Creatinine Ratio, Urine; Future    Hyperlipidemia, unspecified hyperlipidemia type  Continue current medications  RTC in 3 months for follow up  -     Lipid Panel; Future    Anxiety and depression  Continue current medications  Wean alprazolam   RTC in 3 months for follow up    Swelling of both lower  extremities  Continue current medications  RTC in 3 months for follow up    Chronic bilateral low back pain with bilateral sciatica  Continue current medications  Continue evaluation and management per orthopedist.  RTC in 6 months for follow up    Constipation, unspecified constipation type  -     Ambulatory referral/consult to Gastroenterology; Future    Vitamin B12 deficiency  -     Vitamin B12; Future  -     Folate; Future  -     Methylmalonic Acid, Serum; Future  -     Homocysteine, Serum; Future    Health maintenance examination  Need for vaccination  -     (In Office Administered) Pneumococcal Conjugate Vaccine (20 Valent) (IM)    Abnormal finding on breast imaging  Plans to repeat mammogram    Prediabetes  -     Hemoglobin A1C; Future        Jeanna Ham MD  8/4/2022

## 2022-08-04 NOTE — PROGRESS NOTES
"Patient was given vaccine information sheet for the [id number] (pneumococcal polyvalent) vaccine. The area of injection was palpated using the acromion process as a landmark. This area was cleaned with alcohol. Using a 25g 1" safety needle, 0.5mL of the vaccine was placed into the left deltoid muscle. The injection site was dressed with a bandage. Patient experienced no complications and was discharged in stable condition. Pneumovax 20 (pneumococcal polyvalent) vaccine Lot: [id number] Exp: 07/23    "

## 2022-08-09 LAB — METHYLMALONATE SERPL-SCNC: 0.11 UMOL/L

## 2022-08-10 ENCOUNTER — TELEPHONE (OUTPATIENT)
Dept: INTERNAL MEDICINE | Facility: CLINIC | Age: 82
End: 2022-08-10
Payer: MEDICARE

## 2022-08-10 NOTE — TELEPHONE ENCOUNTER
"Returned pt's call.  Pt rec'd pneumonia vaccine on 8/4. Pt states arm swelling locally to L arm since that day. Today, pt noticed a "rash" to the area.     As described pt states it's a reddened area (not bumps) 2 1/2" wide by 5 " high and is warm.  Discussed being sen today to r/o cellulitis, but pt states she can't come in today as she needs a ride and Is having knee pain.    Will route to MD. Also gave pt ER prompts.  "

## 2022-08-10 NOTE — TELEPHONE ENCOUNTER
----- Message from Kevin Coelho sent at 8/10/2022 10:18 AM CDT -----   Name of Who is Calling:     What is the request in detail:  patient request call back in reference to had injection on 08/04/22  left arm  / arm is now swollen and has a rash  Please contact to further discuss and advise      Can the clinic reply by [facility]:     What Number to Call Back if not in [facility]:  [phone]

## 2022-08-11 NOTE — TELEPHONE ENCOUNTER
Can try applying ice to area or hydrocortisone 2.5% cream, can obtain OTC. If does not improve, recommend coming in for evaluation. If patient has fever, drainage from the area, or is not improving recommend going to ED immediately. Please let patient know, thank you!

## 2022-08-16 ENCOUNTER — TELEPHONE (OUTPATIENT)
Dept: INTERNAL MEDICINE | Facility: CLINIC | Age: 82
End: 2022-08-16
Payer: MEDICARE

## 2022-08-16 DIAGNOSIS — Z12.31 ENCOUNTER FOR SCREENING MAMMOGRAM FOR MALIGNANT NEOPLASM OF BREAST: ICD-10-CM

## 2022-08-16 DIAGNOSIS — R92.8 ABNORMAL MAMMOGRAM: Primary | ICD-10-CM

## 2022-08-16 NOTE — TELEPHONE ENCOUNTER
----- Message from Mi Berman sent at 8/15/2022  2:34 PM CDT -----  Name of Who is Calling: GARY VILLANUEVA [[id number]]            What is the request in detail: Patient is requesting call back to get a referral for mammogram              Can the clinic reply by [facility]: no              What Number to Call Back if not in [facility]: [phone]

## 2022-08-17 DIAGNOSIS — F32.A ANXIETY AND DEPRESSION: Primary | ICD-10-CM

## 2022-08-17 DIAGNOSIS — F41.9 ANXIETY AND DEPRESSION: Primary | ICD-10-CM

## 2022-08-17 RX ORDER — ALPRAZOLAM 0.25 MG/1
TABLET ORAL
Qty: 30 TABLET | Refills: 0 | Status: SHIPPED | OUTPATIENT
Start: 2022-08-17

## 2022-08-17 NOTE — TELEPHONE ENCOUNTER
----- Message from Ilir Miller sent at 8/17/2022  1:59 PM CDT -----  Can the clinic reply in [facility]: No         Please refill the medication(s) listed below. Please call the patient when the prescription(s) is ready for  at this phone number    [phone]         Medication #1 ALPRAZolam (XANAX) 0.25 MG tablet             Preferred Pharmacy: [id number] [place] [address] [address] [facility] AT [address]

## 2022-08-18 NOTE — TELEPHONE ENCOUNTER
LVM for patient to return call to office. Please see message below for regards. Thanks.     Patient informed that her requested medication was approved and sent to pharmacy. Thanks.

## 2022-08-19 ENCOUNTER — OFFICE VISIT (OUTPATIENT)
Dept: URGENT CARE | Facility: CLINIC | Age: 82
End: 2022-08-19
Payer: MEDICARE

## 2022-08-19 VITALS
BODY MASS INDEX: 34.6 KG/M2 | SYSTOLIC BLOOD PRESSURE: 153 MMHG | HEIGHT: 62 IN | RESPIRATION RATE: 18 BRPM | HEART RATE: 100 BPM | DIASTOLIC BLOOD PRESSURE: 70 MMHG | OXYGEN SATURATION: 100 % | WEIGHT: 188 LBS | TEMPERATURE: 97 F

## 2022-08-19 DIAGNOSIS — M94.0 TIETZE SYNDROME: ICD-10-CM

## 2022-08-19 DIAGNOSIS — K59.09 CHRONIC CONSTIPATION: ICD-10-CM

## 2022-08-19 DIAGNOSIS — K62.5 RECTAL BLEEDING: ICD-10-CM

## 2022-08-19 DIAGNOSIS — R10.84 GENERALIZED ABDOMINAL PAIN: Primary | ICD-10-CM

## 2022-08-19 LAB
BILIRUB UR QL STRIP: NEGATIVE
GLUCOSE UR QL STRIP: NEGATIVE
KETONES UR QL STRIP: NEGATIVE
LEUKOCYTE ESTERASE UR QL STRIP: NEGATIVE
PH, POC UA: 9 (ref 5–8)
POC BLOOD, URINE: NEGATIVE
POC NITRATES, URINE: NEGATIVE
PROT UR QL STRIP: NEGATIVE
SP GR UR STRIP: 1 (ref 1–1.03)
UROBILINOGEN UR STRIP-ACNC: NORMAL (ref 0.1–1.1)

## 2022-08-19 PROCEDURE — 3077F SYST BP >= 140 MM HG: CPT | Mod: CPTII,S$GLB,, | Performed by: PHYSICIAN ASSISTANT

## 2022-08-19 PROCEDURE — 81003 URINALYSIS AUTO W/O SCOPE: CPT | Mod: QW,S$GLB,, | Performed by: PHYSICIAN ASSISTANT

## 2022-08-19 PROCEDURE — 74019 XR ABDOMEN FLAT AND ERECT: ICD-10-PCS | Mod: S$GLB,,, | Performed by: RADIOLOGY

## 2022-08-19 PROCEDURE — 99214 OFFICE O/P EST MOD 30 MIN: CPT | Mod: S$GLB,,, | Performed by: PHYSICIAN ASSISTANT

## 2022-08-19 PROCEDURE — 1125F AMNT PAIN NOTED PAIN PRSNT: CPT | Mod: CPTII,S$GLB,, | Performed by: PHYSICIAN ASSISTANT

## 2022-08-19 PROCEDURE — 1159F PR MEDICATION LIST DOCUMENTED IN MEDICAL RECORD: ICD-10-PCS | Mod: CPTII,S$GLB,, | Performed by: PHYSICIAN ASSISTANT

## 2022-08-19 PROCEDURE — 81003 POCT URINALYSIS, DIPSTICK, AUTOMATED, W/O SCOPE: ICD-10-PCS | Mod: QW,S$GLB,, | Performed by: PHYSICIAN ASSISTANT

## 2022-08-19 PROCEDURE — 1159F MED LIST DOCD IN RCRD: CPT | Mod: CPTII,S$GLB,, | Performed by: PHYSICIAN ASSISTANT

## 2022-08-19 PROCEDURE — 99214 PR OFFICE/OUTPT VISIT, EST, LEVL IV, 30-39 MIN: ICD-10-PCS | Mod: S$GLB,,, | Performed by: PHYSICIAN ASSISTANT

## 2022-08-19 PROCEDURE — 3077F PR MOST RECENT SYSTOLIC BLOOD PRESSURE >= 140 MM HG: ICD-10-PCS | Mod: CPTII,S$GLB,, | Performed by: PHYSICIAN ASSISTANT

## 2022-08-19 PROCEDURE — 3078F PR MOST RECENT DIASTOLIC BLOOD PRESSURE < 80 MM HG: ICD-10-PCS | Mod: CPTII,S$GLB,, | Performed by: PHYSICIAN ASSISTANT

## 2022-08-19 PROCEDURE — 74019 RADEX ABDOMEN 2 VIEWS: CPT | Mod: S$GLB,,, | Performed by: RADIOLOGY

## 2022-08-19 PROCEDURE — 3078F DIAST BP <80 MM HG: CPT | Mod: CPTII,S$GLB,, | Performed by: PHYSICIAN ASSISTANT

## 2022-08-19 PROCEDURE — 1125F PR PAIN SEVERITY QUANTIFIED, PAIN PRESENT: ICD-10-PCS | Mod: CPTII,S$GLB,, | Performed by: PHYSICIAN ASSISTANT

## 2022-08-19 NOTE — PROGRESS NOTES
"Subjective:       Patient ID: Tamiko Youssef is a 81 y.o. female.    Vitals:  height is 5' 2.01" (1.575 m) and weight is 85.3 kg (188 lb). Her temporal temperature is 97 °F (36.1 °C). Her blood pressure is 153/70 (abnormal) and her pulse is 100. Her respiration is 18 and oxygen saturation is 100%.     Chief Complaint: Abdominal Pain    81 y.o female with a history of hypertension, hyperlipidemia, asthma, GERD, asthma, knee osteoarthritis, lumbar spondylosis, low back pain with sciatica, irritable bowel syndrome with constipation, and Tietze's syndrome who presents to urgent care clinic and [facility] for evaluation.  Patient complaining of abdominal pain and left lateral rib chronic pain exacerbating no last 2 weeks.  She has chronic constipation that requires daily MiraLax use.  Also has associated increased flatulence, hard stools and straining 3 days ago; subsequently, had mild red blood when she wiped on the tissue paper.  This has now resolved.  Attempted to contact her GI doctor with [facility] gastroenterology group Dr. Cole Mcleod MD yesterday to set up an appointment.  Since she was unable to get in to see her GI doctor she presented to urgent care.  No other associated symptoms.  No urinary symptoms, chest pain, shortness of breath, focal weakness/deficits, injury, fall, dark stool, nausea, vomiting, or reflux symptoms.      presents today c/o L side pain that started 2 weeks ago.   Patient denies injuries and denies hx of trauma to her L side.  Patient also states that noticed blood in her stool a few days ago.    Pain  This is a new problem. The current episode started 1 to 4 weeks ago. Associated symptoms include abdominal pain. Pertinent negatives include no anorexia, arthralgias, change in bowel habit, chest pain, chills, congestion, coughing, diaphoresis, fatigue, fever, headaches, joint swelling, myalgias, nausea, neck pain, numbness, rash, sore throat, swollen glands, urinary " symptoms, vertigo, visual change, vomiting or weakness. Nothing aggravates the symptoms.       Constitution: Negative for activity change, appetite change, chills, sweating, fatigue, fever and generalized weakness.   HENT: Negative for ear pain, hearing loss, facial swelling, congestion, postnasal drip, sinus pain, sinus pressure, sore throat, trouble swallowing and voice change.    Neck: Negative for neck pain, neck stiffness and painful lymph nodes.   Cardiovascular: Negative for chest pain, leg swelling, palpitations, sob on exertion and passing out.   Eyes: Negative for eye discharge, eye pain, photophobia, vision loss, double vision and blurred vision.   Respiratory: Negative for chest tightness, cough, sputum production, bloody sputum, COPD, shortness of breath, stridor, wheezing and asthma.    Gastrointestinal: Positive for abdominal pain, abdominal bloating, constipation and rectal bleeding. Negative for nausea, vomiting, diarrhea, bright red blood in stool, dark colored stools, rectal pain, hemorrhoids, heartburn and bowel incontinence.   Genitourinary: Negative for dysuria, frequency, urgency, urine decreased, flank pain, bladder incontinence and hematuria.   Musculoskeletal: Negative for trauma, joint pain, joint swelling, abnormal ROM of joint, muscle cramps and muscle ache.   Skin: Negative for color change, pale, rash and wound.   Allergic/Immunologic: Negative for seasonal allergies, asthma and immunocompromised state.   Neurological: Negative for dizziness, history of vertigo, light-headedness, passing out, facial drooping, speech difficulty, coordination disturbances, loss of balance, headaches, disorientation, altered mental status, loss of consciousness, numbness, tingling and seizures.   Hematologic/Lymphatic: Negative for swollen lymph nodes, easy bruising/bleeding and trouble clotting. Does not bruise/bleed easily.   Psychiatric/Behavioral: Negative for altered mental status and disorientation.        Past Medical History:   Diagnosis Date    Asthma     Cataract     Glaucoma     Hypertension        Objective:      Physical Exam   Constitutional: She is oriented to person, place, and time. She appears well-developed. She is cooperative.  Non-toxic appearance. She does not appear ill. No distress.      Comments:Well-appearing     HENT:   Head: Normocephalic and atraumatic.   Ears:   Right Ear: Hearing, external ear and ear canal normal. No drainage, swelling or tenderness.   Left Ear: Hearing, external ear and ear canal normal. No drainage, swelling or tenderness.   Nose: Nose normal. No rhinorrhea or purulent discharge. Right sinus exhibits no maxillary sinus tenderness and no frontal sinus tenderness. Left sinus exhibits no maxillary sinus tenderness and no frontal sinus tenderness.   Mouth/Throat: Uvula is midline, oropharynx is clear and moist and mucous membranes are normal. No oral lesions. No trismus in the jaw. No uvula swelling. No oropharyngeal exudate, posterior oropharyngeal edema or posterior oropharyngeal erythema. No tonsillar exudate.   Eyes: Conjunctivae, EOM and lids are normal. Pupils are equal, round, and reactive to light. No visual field deficit is present. Right eye exhibits no discharge. Left eye exhibits no discharge. Right conjunctiva is not injected. Right conjunctiva has no hemorrhage. Left conjunctiva is not injected. Left conjunctiva has no hemorrhage. Extraocular movement intact vision grossly intact gaze aligned appropriately   Neck: Neck supple. No neck rigidity present.   Cardiovascular: Normal rate, regular rhythm, normal heart sounds and normal pulses.   No murmur heard.  Pulmonary/Chest: Effort normal and breath sounds normal. No accessory muscle usage or stridor. No respiratory distress. She has no wheezes. She exhibits no tenderness.   Abdominal: Normal appearance. She exhibits no distension and no mass. Soft. There is no abdominal tenderness. There is no rebound, no  guarding, no left CVA tenderness and no right CVA tenderness.   Musculoskeletal: Normal range of motion.         General: Normal range of motion.      Right lower leg: No edema.      Left lower leg: No edema.      Comments: Moves all extremities with normal tone, strength, and ROM.  Gait normal.   Lymphadenopathy:     She has no cervical adenopathy.   Neurological: no focal deficit. She is alert, oriented to person, place, and time and at baseline. She has normal motor skills and normal sensation. She displays no weakness, facial symmetry, normal reflexes and no dysarthria. No cranial nerve deficit or sensory deficit. She exhibits normal muscle tone. She has a normal Finger-Nose-Finger Test. Coordination: Heel to shin test normal. She shows no pronator drift. She displays no seizure activity. Gait and coordination normal. Coordination normal. GCS eye subscore is 4. GCS verbal subscore is 5. GCS motor subscore is 6.   Skin: Skin is warm, dry, not diaphoretic and no rash. Capillary refill takes less than 2 seconds.   Psychiatric: Her speech is normal and behavior is normal. Mood and thought content normal.   Nursing note and vitals reviewed.          Results for orders placed or performed in visit on 08/19/22   POCT Urinalysis, Dipstick, Automated, W/O Scope   Result Value Ref Range    POC Blood, Urine Negative Negative    POC Bilirubin, Urine Negative Negative    POC Urobilinogen, Urine Normal 0.1 - 1.1    POC Ketones, Urine Negative Negative    POC Protein, Urine Negative Negative    POC Nitrates, Urine Negative Negative    POC Glucose, Urine Negative Negative    pH, UA 9.0 (A) 5 - 8    POC Specific Gravity, Urine 1.005 1.003 - 1.029    POC Leukocytes, Urine Negative Negative     X-Ray Abdomen Flat And Erect    Result Date: 8/19/2022  EXAMINATION: XR ABDOMEN FLAT AND ERECT CLINICAL HISTORY: Left upper quadrant pain TECHNIQUE: Flat and erect AP views of the abdomen were performed. COMPARISON: 11/12/2019 FINDINGS: No  free air in the abdomen.  No significant bowel dilatation identified.  Mild-to-moderate constipation.     See above Electronically signed by: Nish Ovalles MD Date:    08/19/2022 Time:    14:52    Assessment:       1. Generalized abdominal pain    2. Chronic constipation    3. Tietze syndrome    4. Rectal bleeding        Patient presents with clinical exam findings and history consistent with  chronic constipation and tietze syndrome.  Rectal bleeding now resolved but was associated with previous constipation/hard stool/draining.  On exam, patient is nontoxic appearing and vitals are stable.  Patient is essentially neurovascularly intact on exam.  No concern for sepsis and no acute abdomen on exam.    urinalysis within normal limits.  Abdominal x-ray showed no significant air-fluid levels, No large volume free air or pneumatosis, no focal dilated small-bowel loops, and nonobstructive bowel gas pattern.  No stones present.  There is significant amount of stool and gas present.  Diagnostic testing results were independently reviewed and interpreted, which were discussed in depth with patient.    Patient was recommended OTC treatments for their symptoms.  Increase fluid intake and fiber.  Made temporally increase MiraLax to twice a day.   If symptoms do not improve/worsens, patient was referred back to PCP for continued outpatient workup and management.  Recommend follow-up with GI physician for continued management of her chronic constipation symptoms.    Patient was instructed to return for re-evaluation for any worsening or change in current symptoms. Strict ED versus clinic precautions given in depth. Discharge and follow-up instructions given verbally/printed with the patient who expressed understanding and willingness to comply with my recommendations.  Patient verbalized understanding and agreed with the entirety of plan of care.    Note dictated with voice recognition software, please excuse any grammatical  errors.    Plan:         Generalized abdominal pain  -     X-Ray Abdomen Flat And Erect; Future; Expected date: 08/19/2022  -     POCT Urinalysis, Dipstick, Automated, W/O Scope    Chronic constipation    Tietze syndrome    Rectal bleeding              Additional MDM:     Heart Failure Score:   COPD = No      Patient Instructions   PLEASE READ YOUR DISCHARGE INSTRUCTIONS ENTIRELY AS IT CONTAINS IMPORTANT INFORMATION.  - please take daily prebiotic supplement (Benefiber/Metamucil), increase fluid hydration (at least your 1/2 body weight in ounces), and eat yogurt.  May supplement with MiraLax once to twice daily as needed for stool softener.  -no straining during bowel movement.  - if no improvement or worsening symptoms, recommend follow-up with GI for further evaluation.      -You must understand that you've received an Urgent Care treatment only and that you may be released before all your medical problems are known or treated. You, the patient, will arrange for follow up care as instructed. Please arrange follow up with your primary medical clinic within 2-5 days if your signs and symptoms have not resolved or worsen.   - Follow up with your PCP or specialty clinic as directed.  You can call [phone] or [phone] to schedule an appointment with the appropriate provider.    - If your condition worsens or fails to improve we recommend that you receive another evaluation at the emergency room immediately or contact your primary medical clinic to discuss your concerns.      RED FLAGS/WARNING SYMPTOMS DISCUSSED WITH PATIENT THAT WOULD WARRANT EMERGENT MEDICAL ATTENTION. Patient aware and verbalized understanding.

## 2022-08-19 NOTE — PATIENT INSTRUCTIONS
PLEASE READ YOUR DISCHARGE INSTRUCTIONS ENTIRELY AS IT CONTAINS IMPORTANT INFORMATION.  - please take daily prebiotic supplement (Benefiber/Metamucil), increase fluid hydration (at least your 1/2 body weight in ounces), and eat yogurt.  May supplement with MiraLax once to twice daily as needed for stool softener.  -no straining during bowel movement.  - if no improvement or worsening symptoms, recommend follow-up with GI for further evaluation.      -You must understand that you've received an Urgent Care treatment only and that you may be released before all your medical problems are known or treated. You, the patient, will arrange for follow up care as instructed. Please arrange follow up with your primary medical clinic within 2-5 days if your signs and symptoms have not resolved or worsen.   - Follow up with your PCP or specialty clinic as directed.  You can call [phone] or [phone] to schedule an appointment with the appropriate provider.    - If your condition worsens or fails to improve we recommend that you receive another evaluation at the emergency room immediately or contact your primary medical clinic to discuss your concerns.      RED FLAGS/WARNING SYMPTOMS DISCUSSED WITH PATIENT THAT WOULD WARRANT EMERGENT MEDICAL ATTENTION. Patient aware and verbalized understanding.

## 2022-08-22 ENCOUNTER — TELEPHONE (OUTPATIENT)
Dept: INTERNAL MEDICINE | Facility: CLINIC | Age: 82
End: 2022-08-22
Payer: MEDICARE

## 2022-08-22 NOTE — TELEPHONE ENCOUNTER
----- Message from Jaswinder Whitman sent at 8/22/2022  3:32 PM CDT -----  Name of Who is Calling: GARY VILLANUEVA [[id number]]           What is the request in detail: Patient is requesting orders for a mammogram be faxed to [facility] at [phone]           Can the clinic reply by [facility]: No           What Number to Call Back if not in [facility]: [phone]

## 2022-08-31 ENCOUNTER — TELEPHONE (OUTPATIENT)
Dept: PAIN MEDICINE | Facility: CLINIC | Age: 82
End: 2022-08-31
Payer: MEDICARE

## 2022-08-31 NOTE — TELEPHONE ENCOUNTER
"This message is for patient in regards to his/her appointment 09/01/22 at 2:20p      [facility] Policy: For the safety of all patients and staff members.     Patient Visitor policy: Due to social distancing and limited seating staff are requesting patient to arrive to their schedule appointments on time.     Upon arriving to facility; patients are required to wear a face mask, if patient do not have a face mask one will be provided. Upon arriving patient we ask patients to contact clinic at this number [phone] to notify staff that they have arrived or they may do so by utilizing the Mobile checked in [facility](if patient have patient portal; clinical staff will send a message through there letting them know it's okay to proceed to their visit). Staff will give the patient the "okay" to come up or wait inside their vehicle until clinic is ready for patient to be seen by Marina Gallegos NP. If you have any questions or concerns please contact [phone]      Patient verbalized and confirmed appt  "

## 2022-09-01 ENCOUNTER — OFFICE VISIT (OUTPATIENT)
Dept: PAIN MEDICINE | Facility: CLINIC | Age: 82
End: 2022-09-01
Payer: MEDICARE

## 2022-09-01 VITALS
DIASTOLIC BLOOD PRESSURE: 84 MMHG | RESPIRATION RATE: 18 BRPM | WEIGHT: 180 LBS | HEART RATE: 103 BPM | BODY MASS INDEX: 33.13 KG/M2 | OXYGEN SATURATION: 100 % | SYSTOLIC BLOOD PRESSURE: 141 MMHG | HEIGHT: 62 IN

## 2022-09-01 DIAGNOSIS — M51.36 DDD (DEGENERATIVE DISC DISEASE), LUMBAR: ICD-10-CM

## 2022-09-01 DIAGNOSIS — G89.4 CHRONIC PAIN SYNDROME: Primary | ICD-10-CM

## 2022-09-01 DIAGNOSIS — M53.3 SACROILIAC JOINT PAIN: ICD-10-CM

## 2022-09-01 DIAGNOSIS — M54.16 LUMBAR RADICULOPATHY: ICD-10-CM

## 2022-09-01 DIAGNOSIS — M47.816 LUMBAR SPONDYLOSIS: ICD-10-CM

## 2022-09-01 PROCEDURE — 3288F FALL RISK ASSESSMENT DOCD: CPT | Mod: CPTII,S$GLB,, | Performed by: NURSE PRACTITIONER

## 2022-09-01 PROCEDURE — 1125F PR PAIN SEVERITY QUANTIFIED, PAIN PRESENT: ICD-10-PCS | Mod: CPTII,S$GLB,, | Performed by: NURSE PRACTITIONER

## 2022-09-01 PROCEDURE — 1160F RVW MEDS BY RX/DR IN RCRD: CPT | Mod: CPTII,S$GLB,, | Performed by: NURSE PRACTITIONER

## 2022-09-01 PROCEDURE — 1101F PT FALLS ASSESS-DOCD LE1/YR: CPT | Mod: CPTII,S$GLB,, | Performed by: NURSE PRACTITIONER

## 2022-09-01 PROCEDURE — 99213 OFFICE O/P EST LOW 20 MIN: CPT | Mod: S$GLB,,, | Performed by: NURSE PRACTITIONER

## 2022-09-01 PROCEDURE — 3079F PR MOST RECENT DIASTOLIC BLOOD PRESSURE 80-89 MM HG: ICD-10-PCS | Mod: CPTII,S$GLB,, | Performed by: NURSE PRACTITIONER

## 2022-09-01 PROCEDURE — 1125F AMNT PAIN NOTED PAIN PRSNT: CPT | Mod: CPTII,S$GLB,, | Performed by: NURSE PRACTITIONER

## 2022-09-01 PROCEDURE — 99999 PR PBB SHADOW E&M-EST. PATIENT-LVL IV: ICD-10-PCS | Mod: PBBFAC,,, | Performed by: NURSE PRACTITIONER

## 2022-09-01 PROCEDURE — 1159F PR MEDICATION LIST DOCUMENTED IN MEDICAL RECORD: ICD-10-PCS | Mod: CPTII,S$GLB,, | Performed by: NURSE PRACTITIONER

## 2022-09-01 PROCEDURE — 3077F PR MOST RECENT SYSTOLIC BLOOD PRESSURE >= 140 MM HG: ICD-10-PCS | Mod: CPTII,S$GLB,, | Performed by: NURSE PRACTITIONER

## 2022-09-01 PROCEDURE — 99999 PR PBB SHADOW E&M-EST. PATIENT-LVL IV: CPT | Mod: PBBFAC,,, | Performed by: NURSE PRACTITIONER

## 2022-09-01 PROCEDURE — 1101F PR PT FALLS ASSESS DOC 0-1 FALLS W/OUT INJ PAST YR: ICD-10-PCS | Mod: CPTII,S$GLB,, | Performed by: NURSE PRACTITIONER

## 2022-09-01 PROCEDURE — 1159F MED LIST DOCD IN RCRD: CPT | Mod: CPTII,S$GLB,, | Performed by: NURSE PRACTITIONER

## 2022-09-01 PROCEDURE — 3077F SYST BP >= 140 MM HG: CPT | Mod: CPTII,S$GLB,, | Performed by: NURSE PRACTITIONER

## 2022-09-01 PROCEDURE — 1160F PR REVIEW ALL MEDS BY PRESCRIBER/CLIN PHARMACIST DOCUMENTED: ICD-10-PCS | Mod: CPTII,S$GLB,, | Performed by: NURSE PRACTITIONER

## 2022-09-01 PROCEDURE — 3288F PR FALLS RISK ASSESSMENT DOCUMENTED: ICD-10-PCS | Mod: CPTII,S$GLB,, | Performed by: NURSE PRACTITIONER

## 2022-09-01 PROCEDURE — 99213 PR OFFICE/OUTPT VISIT, EST, LEVL III, 20-29 MIN: ICD-10-PCS | Mod: S$GLB,,, | Performed by: NURSE PRACTITIONER

## 2022-09-01 PROCEDURE — 3079F DIAST BP 80-89 MM HG: CPT | Mod: CPTII,S$GLB,, | Performed by: NURSE PRACTITIONER

## 2022-09-01 RX ORDER — PREGABALIN 75 MG/1
75 CAPSULE ORAL DAILY
Qty: 30 CAPSULE | Refills: 1 | Status: SHIPPED | OUTPATIENT
Start: 2022-09-01 | End: 2022-10-03

## 2022-09-01 NOTE — PROGRESS NOTES
Chronic patient Established Note (Follow up visit)        SUBJECTIVE:    Interval History 9/1/2022:  The patient returns to clinic today for follow up of low back pain. She continues to report low back pain that radiates into the posterior aspect of both legs to her ankles. Her pain is worse with standing and walking. She also reports increased right knee pain. She is currently taking Gabapentin with limited relief. She also reports that this makes her sleepy. She is not interested in further procedures at this time. She denies any other health changes. Her pain today is 8/10.    Interval History 6/8/2022:  The patient returns to clinic today for follow up of low back pain. She is s/p bilateral SI joint injections on 5/25/2022. She reports no relief. She continues to report low back pain that radiates into her buttocks into the posterior aspect of both legs to her ankles. She also reports radiates pain into the anterior aspect of her left leg. She is not interested in further procedures at this time. She reports increased bilateral knee and ankle pain. She would like to establish care with an Orthopedic. She also reports that her PCP has left. She would like to establish care here at [facility]. She continues to take Gabapentin. She denies any other health changes. Her pain today is 6/10.    Interval History 4/20/2022:  The patient returns to clinic today for follow up of low back pain. She has not been seen in over a year. She reports that previous SYLVIA in 4/2021 provided no relief. She continues to report low back pain that radiates into both hips and buttocks. She reports intermittent radiating pain into the posterior aspect of her left leg to under her foot. Her pain is worse with prolonged sitting and standing. She continues to take Gabapentin with limited relief. She denies any other health changes. Her pain today is 8/10.    Interval History 1/14/2021:  The patient returns to clinic today for follow up of low  back pain. She continues to report low back pain that radiates into the lateral aspect of left leg to her ankle. She denies any right leg pain. She did not start Lyrica given at last visit due to concern for side effects. She has not started physical therapy yet. She continues to take Gabapentin and Zanaflex. She denies any other health changes. Her pain today is 6/10.    Interval history 12/10/2020:  Returns for follow up of her low back pain. She continues to report low back pain that radiates into the lateral aspect of her left leg to her ankle. Reports that gabapentin and Zanaflex are not helping with pain. Her pain is worse with standing, walking, and activity. Her pain today is 5/10.    Interval History 11/13/2020:  The patient returns to clinic today for follow up of back pain. She reports limited relief with previous SYLVIA. She continues to report low back pain that radiates into the lateral aspect of her left leg to her ankle. She denies any right leg pain today. Her pain is worse with standing, walking, and activity. She is frustrated with her continued pain. She did increase Gabapentin to BID. She is unsure of relief at this time. She does take [place] from her PCP. She states that her PCP would like our office to take over this medication. She asks for a refill. She denies any other health changes. Her pain today is 2/10.    Interval History 10/30/2020:  The patient returns to clinic today via audio visit for follow up of back pain. She is s/p bilateral L4/5 TF SYLVIA on 10/14/2020. She reports limited relief of her pain. She reports increased low back pain that radiates down the lateral aspect of her left leg to her ankle. She denies any right leg pain today. Her pain is worse with prolonged standing, walking, and activity. She denies any weakness. She is currently taking Gabapentin once a day. She denies any other health changes.     Interval History 7/24/2020:  The patient returns to clinic today for follow  up of low back pain. She reports increased pain over the last two months. She reports low back pain that radiates down the lateral aspect of both legs to her knees, left greater than right. Her pain is worse with standing and walking. She continues to take Gabapentin. She denies any other health changes. Her pain today is 9/10.    Interval History 1/28/2020:  Tamiko Youssef presents to the clinic for a follow-up appointment for lower back pain. She is s/p bilateral L4/5 TF SYLVIA on 1/8/2020. She reports 80% relief of his low back and leg pain. She continues to report low back pain that is aching in nature. She denies any radiating leg pain today. Her pain is worse with prolonged standing and walking. She continues to take Gabapentin with benefit. She continues to perform a home exercise routine. She denies any other health changes. Her pain today is 8/10.      Pain Disability Index Review:  Last 3 PDI Scores 9/1/2022 6/8/2022 4/20/2022   Pain Disability Index (PDI) 29 [phone] 20       Pain Medications:  Gabapentin 300 mg daily    Opioid Contract: yes     report:  Reviewed and consistent with medication use as prescribed.    Pain Procedures:   3/28/14, 3/13/13 Bilateral L4 TF SYLVIA  8/22/18 Bilateral L4 TF SYLVIA- 75% relief  1/8/2020- Bilateral L4/5 TF SYLVIA - 80% relief   10/14/2020- Bilateral L4/5 TF SYLVIA    Physical Therapy/Home Exercise: no    Imaging:   MRI Lumbar Spine 11/18/2020:  COMPARISON:  11/18/2020     FINDINGS:  Lumbar spine alignment is within normal limits. Vertebral body heights preserved.  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     The conus is normal in appearance.  The adjacent soft tissue structures show no significant abnormalities.     L1-L2: No evidence of a disc herniation.No significant central canal or neural foraminal narrowing.     L2-L3: There is no focal disc herniation. No central canal or foraminal stenosis.  Prominent facet  arthritis.     L3-L4: There is some broad-based disc bulging and superimposed spondylitic spurring which is asymmetric to the left.  Prominent facet arthritis is noted.  No central canal or foraminal stenosis.     L4-L5: Broad-based disc bulging and facet arthritis present.  No significant central canal or neural foraminal narrowing.     L5-S1: Broad-based disc bulging and facet arthritis are present.  No focal disc herniation.  No significant central canal or neural foraminal narrowing.     Impression:     Multilevel nonstenotic degenerative change mostly affecting the posterior elements.    Xray Lumbar Spine 11/18/2020:  COMPARISON:  Multiple priors, most recent 08/09/2018     FINDINGS:  Levoscoliosis of the lumbar spine.  Grade 1 anterolisthesis of L3 on L4, L4 on L5, and L5 on S1 similar to prior.  No acute, displaced fracture.  Vertebral body heights are maintained.  No evidence of dynamic instability.  Multilevel lumbar facet arthrosis.  No aggressive osseous abnormality.     Impression:     Mild multilevel lumbar spondylosis with unchanged grade 1 anterolisthesis of L3 on L4 through L5 on S1.    Xray Hips 11/18/2020:  COMPARISON:  Multiple priors, most recent 08/09/2018     FINDINGS:  Sacroiliac joints are symmetric.  Pubic symphysis is not widened.  The femoral heads are well seated within the acetabula.  Degenerative changes of the lower lumbar spine.  Mild joint space narrowing, subchondral sclerosis and minimal osteophytosis of the hip joints.  No acute, displaced fracture.  No aggressive osseous abnormality.     Impression:     No acute osseous abnormality.        Allergies:   Review of patient's allergies indicates:   Allergen Reactions    Naprosyn [naproxen]     Norco [hydrocodone-acetaminophen]     Tramadol        Current Medications:   Current Outpatient Medications   Medication Sig Dispense Refill    albuterol (PROVENTIL) 2.5 mg /3 mL (0.083 %) nebulizer solution Take 2.5 mg by nebulization every 6  (six) hours as needed.      albuterol (PROVENTIL/VENTOLIN HFA) 90 mcg/actuation inhaler Inhale TWO to FOUR puffs by mouth FOUR times daily when needed.      ALPRAZolam (XANAX) 0.25 MG tablet TK 1 T PO QD PRA 30 tablet 0    amLODIPine (NORVASC) 10 MG tablet       diclofenac sodium (VOLTAREN) 1 % Gel Apply 2 g topically 4 (four) times daily. 1 Tube 2    fluticasone propionate (FLONASE) 50 mcg/actuation nasal spray 2 sprays in each nostril every evening. 16 g 1    fluticasone-salmeterol diskus inhaler 250-50 mcg Inhale 1 puff into the lungs.      furosemide (LASIX) 20 MG tablet TK 1 T PO QD PRF FLUID  2    latanoprost 0.005 % ophthalmic solution Place 1 drop into both eyes every evening. 3 Bottle 3    mirtazapine (REMERON) 30 MG tablet Take 30 mg by mouth nightly.  3    pravastatin (PRAVACHOL) 20 MG tablet   1    walker Misc 1 application by Misc.(Non-Drug; Combo Route) route once daily. 1 each 0    gabapentin (NEURONTIN) 300 MG capsule Take 1 capsule (300 mg total) by mouth 2 (two) times daily. 180 capsule 1    SYMBICORT 80-4.5 mcg/actuation HFAA   6     No current facility-administered medications for this visit.       REVIEW OF SYSTEMS:    Constitutional: Positive for unexpected weight change.   HENT: Positive for headache.   Respiratory: Positive for intermittent shortness of breath and wheezing.  (asthma)  Gastrointestinal: Positive for constipation.   Musculoskeletal: Positive for back pain, gait problem and stiffness.       Past Medical History:  Past Medical History:   Diagnosis Date    Asthma     Cataract     Glaucoma     Hypertension        Past Surgical History:  Past Surgical History:   Procedure Laterality Date    CATARACT EXTRACTION W/  INTRAOCULAR LENS IMPLANT Bilateral     CHOLECYSTECTOMY      CYST REMOVAL      on neck    EYE SURGERY      HYSTERECTOMY      2/2 AUB, partial    INJECTION OF JOINT Bilateral 05/25/2022    Procedure: INJECTION, JOINT, BILATERAL SACROILIAC (SI);  Surgeon: Obdulio Leon,  "MD;  Location: [facility];  Service: Pain Management;  Laterality: Bilateral;    TRANSFORAMINAL EPIDURAL INJECTION OF STEROID Bilateral 01/08/2020    Procedure: INJECTION, STEROID, EPIDURAL, TRANSFORAMINAL APPROACH;  Surgeon: Obdulio Leon MD;  Location: [facility];  Service: Pain Management;  Laterality: Bilateral;  B/L TFESI L4    TRANSFORAMINAL EPIDURAL INJECTION OF STEROID Bilateral 10/14/2020    Procedure: INJECTION, STEROID, EPIDURAL, TRANSFORAMINAL APPROACH, L4-L5 need consent;  Surgeon: Obdulio Leon MD;  Location: [facility];  Service: Pain Management;  Laterality: Bilateral;    TRANSFORAMINAL EPIDURAL INJECTION OF STEROID Left 04/14/2021    Procedure: INJECTION, STEROID, EPIDURAL, TRANSFORAMINAL APPROACH  left L3/4 and L4/5;  Surgeon: Obdulio Leon MD;  Location: [facility];  Service: Pain Management;  Laterality: Left;  consent needed       Family History:  Family History   Problem Relation Age of Onset    Cataracts Son     Glaucoma Son     No Known Problems Mother     No Known Problems Father     Macular degeneration Neg Hx        Social History:  Social History     Socioeconomic History    Marital status: Single   Tobacco Use    Smoking status: Never    Smokeless tobacco: Never   Substance and Sexual Activity    Alcohol use: No    Drug use: Never       OBJECTIVE:    BP (!) 141/84   Pulse 103   Resp 18   Ht 5' 2.01" (1.575 m)   Wt 81.6 kg (180 lb)   SpO2 100%   BMI 32.91 kg/m²     PHYSICAL EXAMINATION:    GENERAL: Well appearing, in no acute distress, alert and oriented x3.  PSYCH:  Mood and affect appropriate.  SKIN: Skin color, texture, turgor normal, no rashes or lesions.  HEAD/FACE:  Normocephalic, atraumatic. Cranial nerves grossly intact.  CV: RRR with palpation of the radial artery.  PULM: No evidence of respiratory difficulty, symmetric chest rise.  GI:  Soft and non-tender.  BACK: Straight leg raising in the sitting position is negative for radicular pain " bilaterally.  There is mild pain to palpation over lumbar paraspinals bilaterally.  There is pain with palpation over lumbar facet joints bilaterally.  Limited ROM with pain on flexion and extension.  Positive facet loading bilaterally.    EXTREMITIES: No deformities, edema, or skin discoloration. Good capillary refill.  MUSCULOSKELETAL: There is pain with palpation to bilateral SI joints.  [place] is positive bilaterally. 5/5 strength in right ankle with plantar and dorsiflexion. 5/5 strength in left ankle with plantar and dorsiflexion. 4/5 strength with right knee flexion and extension. 4/5 strength with left knee flexion and extension.  No atrophy or tone abnormalities are noted.  NEURO: Bilateral lower extremity coordination and muscle stretch reflexes are physiologic and symmetric.  Plantar response are downgoing. No clonus.  No loss of sensation is noted.  GAIT: Antalgic- ambulates with walker.      ASSESSMENT: 81 y.o. year old female with lower back pain consistent with the following diagnoses:     1. Chronic pain syndrome        2. Lumbar radiculopathy        3. Lumbar spondylosis        4. DDD (degenerative disc disease), lumbar        5. Sacroiliac joint pain                PLAN:     - Previous imaging was reviewed and discussed with the patient today.    - We discussed SYLVIA and MBB, she is not interested at this time.     - I have stressed the importance of physical activity and a home exercise plan to help with pain and improve health.    - Discontinue Gabapentin. Trial Lyrica 75 mg at bedtime. We may escalate this further in the future.     - Counseled patient regarding the importance of activity modification, constant sleeping habits and physical therapy.    - RTC in 1 month.     - Dr. Leon was consulted on the patient and agrees with this plan.    The above plan and management options were discussed at length with patient. Patient is in agreement with the above and verbalized understanding.    Marina JIMENEZ  WHITNEY Gallegos  09/01/2022

## 2022-09-08 PROBLEM — E05.90 SUBCLINICAL HYPERTHYROIDISM: Status: ACTIVE | Noted: 2022-09-08

## 2022-09-09 ENCOUNTER — TELEPHONE (OUTPATIENT)
Dept: PRIMARY CARE CLINIC | Facility: CLINIC | Age: 82
End: 2022-09-09
Payer: MEDICARE

## 2022-09-09 NOTE — TELEPHONE ENCOUNTER
----- Message from Jeanna Ham MD sent at 9/8/2022  7:58 PM CDT -----  Please ensure patient has follow up appointment scheduled to discuss labs, appears follow up appt was cancelled. Thank you!

## 2022-09-22 ENCOUNTER — LAB VISIT (OUTPATIENT)
Dept: LAB | Facility: OTHER | Age: 82
End: 2022-09-22
Attending: INTERNAL MEDICINE
Payer: MEDICARE

## 2022-09-22 ENCOUNTER — OFFICE VISIT (OUTPATIENT)
Dept: GASTROENTEROLOGY | Facility: CLINIC | Age: 82
End: 2022-09-22
Payer: MEDICARE

## 2022-09-22 ENCOUNTER — PATIENT OUTREACH (OUTPATIENT)
Dept: ADMINISTRATIVE | Facility: HOSPITAL | Age: 82
End: 2022-09-22
Payer: MEDICARE

## 2022-09-22 VITALS
HEIGHT: 62 IN | WEIGHT: 182.75 LBS | BODY MASS INDEX: 33.63 KG/M2 | DIASTOLIC BLOOD PRESSURE: 82 MMHG | SYSTOLIC BLOOD PRESSURE: 138 MMHG | HEART RATE: 99 BPM

## 2022-09-22 DIAGNOSIS — K59.00 CONSTIPATION, UNSPECIFIED CONSTIPATION TYPE: ICD-10-CM

## 2022-09-22 DIAGNOSIS — R10.32 LEFT LOWER QUADRANT PAIN: ICD-10-CM

## 2022-09-22 DIAGNOSIS — R10.32 CHRONIC LLQ PAIN: ICD-10-CM

## 2022-09-22 DIAGNOSIS — R13.10 DYSPHAGIA, UNSPECIFIED TYPE: ICD-10-CM

## 2022-09-22 DIAGNOSIS — K21.9 GASTROESOPHAGEAL REFLUX DISEASE, UNSPECIFIED WHETHER ESOPHAGITIS PRESENT: ICD-10-CM

## 2022-09-22 DIAGNOSIS — G89.29 CHRONIC LLQ PAIN: ICD-10-CM

## 2022-09-22 DIAGNOSIS — R10.32 CHRONIC LLQ PAIN: Primary | ICD-10-CM

## 2022-09-22 DIAGNOSIS — R19.4 CHANGE IN BOWEL HABITS: ICD-10-CM

## 2022-09-22 DIAGNOSIS — G89.29 CHRONIC LLQ PAIN: Primary | ICD-10-CM

## 2022-09-22 LAB
ALBUMIN SERPL BCP-MCNC: 3.6 G/DL (ref 3.5–5.2)
ALP SERPL-CCNC: 90 U/L (ref 55–135)
ALT SERPL W/O P-5'-P-CCNC: 20 U/L (ref 10–44)
ANION GAP SERPL CALC-SCNC: 11 MMOL/L (ref 8–16)
AST SERPL-CCNC: 18 U/L (ref 10–40)
BASOPHILS # BLD AUTO: 0.04 K/UL (ref 0–0.2)
BASOPHILS NFR BLD: 0.6 % (ref 0–1.9)
BILIRUB SERPL-MCNC: 0.2 MG/DL (ref 0.1–1)
BUN SERPL-MCNC: 7 MG/DL (ref 8–23)
CALCIUM SERPL-MCNC: 9.3 MG/DL (ref 8.7–10.5)
CHLORIDE SERPL-SCNC: 104 MMOL/L (ref 95–110)
CO2 SERPL-SCNC: 26 MMOL/L (ref 23–29)
CREAT SERPL-MCNC: 0.8 MG/DL (ref 0.5–1.4)
CREAT SERPL-MCNC: 0.8 MG/DL (ref 0.5–1.4)
DIFFERENTIAL METHOD: ABNORMAL
EOSINOPHIL # BLD AUTO: 0.1 K/UL (ref 0–0.5)
EOSINOPHIL NFR BLD: 1.6 % (ref 0–8)
ERYTHROCYTE [DISTWIDTH] IN BLOOD BY AUTOMATED COUNT: 14.8 % (ref 11.5–14.5)
EST. GFR  (NO RACE VARIABLE): >60 ML/MIN/1.73 M^2
EST. GFR  (NO RACE VARIABLE): >60 ML/MIN/1.73 M^2
GLUCOSE SERPL-MCNC: 122 MG/DL (ref 70–110)
HCT VFR BLD AUTO: 41.3 % (ref 37–48.5)
HGB BLD-MCNC: 12.9 G/DL (ref 12–16)
IGA SERPL-MCNC: 440 MG/DL (ref 40–350)
IMM GRANULOCYTES # BLD AUTO: 0.01 K/UL (ref 0–0.04)
IMM GRANULOCYTES NFR BLD AUTO: 0.1 % (ref 0–0.5)
LIPASE SERPL-CCNC: 9 U/L (ref 4–60)
LYMPHOCYTES # BLD AUTO: 2.2 K/UL (ref 1–4.8)
LYMPHOCYTES NFR BLD: 31.6 % (ref 18–48)
MCH RBC QN AUTO: 27.4 PG (ref 27–31)
MCHC RBC AUTO-ENTMCNC: 31.2 G/DL (ref 32–36)
MCV RBC AUTO: 88 FL (ref 82–98)
MONOCYTES # BLD AUTO: 0.5 K/UL (ref 0.3–1)
MONOCYTES NFR BLD: 7 % (ref 4–15)
NEUTROPHILS # BLD AUTO: 4 K/UL (ref 1.8–7.7)
NEUTROPHILS NFR BLD: 59.1 % (ref 38–73)
NRBC BLD-RTO: 0 /100 WBC
PLATELET # BLD AUTO: 336 K/UL (ref 150–450)
PMV BLD AUTO: 9.8 FL (ref 9.2–12.9)
POTASSIUM SERPL-SCNC: 3.8 MMOL/L (ref 3.5–5.1)
PROT SERPL-MCNC: 7.7 G/DL (ref 6–8.4)
RBC # BLD AUTO: 4.71 M/UL (ref 4–5.4)
SODIUM SERPL-SCNC: 141 MMOL/L (ref 136–145)
WBC # BLD AUTO: 6.84 K/UL (ref 3.9–12.7)

## 2022-09-22 PROCEDURE — 3075F PR MOST RECENT SYSTOLIC BLOOD PRESS GE 130-139MM HG: ICD-10-PCS | Mod: CPTII,S$GLB,, | Performed by: INTERNAL MEDICINE

## 2022-09-22 PROCEDURE — 3075F SYST BP GE 130 - 139MM HG: CPT | Mod: CPTII,S$GLB,, | Performed by: INTERNAL MEDICINE

## 2022-09-22 PROCEDURE — 80053 COMPREHEN METABOLIC PANEL: CPT | Performed by: INTERNAL MEDICINE

## 2022-09-22 PROCEDURE — 99204 OFFICE O/P NEW MOD 45 MIN: CPT | Mod: S$GLB,,, | Performed by: INTERNAL MEDICINE

## 2022-09-22 PROCEDURE — 3288F FALL RISK ASSESSMENT DOCD: CPT | Mod: CPTII,S$GLB,, | Performed by: INTERNAL MEDICINE

## 2022-09-22 PROCEDURE — 85025 COMPLETE CBC W/AUTO DIFF WBC: CPT | Performed by: INTERNAL MEDICINE

## 2022-09-22 PROCEDURE — 3079F DIAST BP 80-89 MM HG: CPT | Mod: CPTII,S$GLB,, | Performed by: INTERNAL MEDICINE

## 2022-09-22 PROCEDURE — 1125F PR PAIN SEVERITY QUANTIFIED, PAIN PRESENT: ICD-10-PCS | Mod: CPTII,S$GLB,, | Performed by: INTERNAL MEDICINE

## 2022-09-22 PROCEDURE — 3079F PR MOST RECENT DIASTOLIC BLOOD PRESSURE 80-89 MM HG: ICD-10-PCS | Mod: CPTII,S$GLB,, | Performed by: INTERNAL MEDICINE

## 2022-09-22 PROCEDURE — 1100F PTFALLS ASSESS-DOCD GE2>/YR: CPT | Mod: CPTII,S$GLB,, | Performed by: INTERNAL MEDICINE

## 2022-09-22 PROCEDURE — 1100F PR PT FALLS ASSESS DOC 2+ FALLS/FALL W/INJURY/YR: ICD-10-PCS | Mod: CPTII,S$GLB,, | Performed by: INTERNAL MEDICINE

## 2022-09-22 PROCEDURE — 36415 COLL VENOUS BLD VENIPUNCTURE: CPT | Performed by: INTERNAL MEDICINE

## 2022-09-22 PROCEDURE — 1159F PR MEDICATION LIST DOCUMENTED IN MEDICAL RECORD: ICD-10-PCS | Mod: CPTII,S$GLB,, | Performed by: INTERNAL MEDICINE

## 2022-09-22 PROCEDURE — 82784 ASSAY IGA/IGD/IGG/IGM EACH: CPT | Performed by: INTERNAL MEDICINE

## 2022-09-22 PROCEDURE — 99999 PR PBB SHADOW E&M-EST. PATIENT-LVL IV: CPT | Mod: PBBFAC,,, | Performed by: INTERNAL MEDICINE

## 2022-09-22 PROCEDURE — 3288F PR FALLS RISK ASSESSMENT DOCUMENTED: ICD-10-PCS | Mod: CPTII,S$GLB,, | Performed by: INTERNAL MEDICINE

## 2022-09-22 PROCEDURE — 99999 PR PBB SHADOW E&M-EST. PATIENT-LVL IV: ICD-10-PCS | Mod: PBBFAC,,, | Performed by: INTERNAL MEDICINE

## 2022-09-22 PROCEDURE — 1159F MED LIST DOCD IN RCRD: CPT | Mod: CPTII,S$GLB,, | Performed by: INTERNAL MEDICINE

## 2022-09-22 PROCEDURE — 99204 PR OFFICE/OUTPT VISIT, NEW, LEVL IV, 45-59 MIN: ICD-10-PCS | Mod: S$GLB,,, | Performed by: INTERNAL MEDICINE

## 2022-09-22 PROCEDURE — 83516 IMMUNOASSAY NONANTIBODY: CPT | Performed by: INTERNAL MEDICINE

## 2022-09-22 PROCEDURE — 1125F AMNT PAIN NOTED PAIN PRSNT: CPT | Mod: CPTII,S$GLB,, | Performed by: INTERNAL MEDICINE

## 2022-09-22 PROCEDURE — 83690 ASSAY OF LIPASE: CPT | Performed by: INTERNAL MEDICINE

## 2022-09-22 RX ORDER — POLYETHYLENE GLYCOL 3350 17 G/17G
17 POWDER, FOR SOLUTION ORAL DAILY PRN
Qty: 1700 G | Refills: 1 | Status: SHIPPED | OUTPATIENT
Start: 2022-09-22

## 2022-09-22 NOTE — Clinical Note
Lab work today CT scan abdomen pelvis Orders placed for referral to endoscopy schedulers for EGD colonoscopy Waldemar patient is worried about missing her RT a ride home today coming about 12 15 down stairs so she will need to get labs quickly and get a ride home is her way to give her a fast past for lab work

## 2022-09-22 NOTE — PROGRESS NOTES
[facility] Consultation Note    Reason for Consult:  The primary encounter diagnosis was Chronic LLQ pain. Diagnoses of Constipation, unspecified constipation type, Gastroesophageal reflux disease, unspecified whether esophagitis present, Dysphagia, unspecified type, Left lower quadrant pain, and Change in bowel habits were also pertinent to this visit.    PCP:   Akosua Lucas   [address] / [facility] [id number]    Referring MD:  Jeanna Castro Md  [address]  [address]  [place] [address]    Initial History of Present Illness (HPI):  This is a 81 y.o. female here for evaluation of new onset left lower quadrant pain and change in bowel habits constipation patient says her last colonoscopy was 8-10 years ago at another facility does not think she ever had colon polyps no family history of GI malignancies nobody with colon cancer nobody advanced colon adenomas polyps no FAP no attenuated FAP no MA P no Thomson syndrome patient has had her gallbladder out in hysterectomy no colon or small bowel surgery no fever no chills no shortness of breath no weight loss no blood in her stool her pain last several days comes and goes nothing really makes it better or worse she takes MiraLax and fiber every day gives her a bowel movement basically every other day no chest pain no shortness of breath she does think she has some heartburn symptoms kids she has intermittent solid food dysphagia occasionally no food impactions no aspirations are pneumonias no chest pain no wheezing no palpitations no exertional symptoms no diaphoresis.    Abdominal pain - as above  Reflux - as above  Dysphagia - as above  Bowel habits - as above  GI bleeding - none  NSAID usage - none    Interval HPI 09/22/2022:  The patient's last visit with me was on Visit date not found.      ROS:  Constitutional: No fevers, chills, No weight loss  ENT:  As above heartburn as above dysphagia no odynophagia no  hoarseness  CV: No chest pain, no palpitation  Pulm: No cough, No shortness of breath, no wheezing  Ophtho: No vision changes  GI: see HPI  Derm: No rash, no itching  Heme: No lymphadenopathy, No easy bruising  MSK:  Chronic arthritis  : No dysuria, No hematuria  Endo: No hot or cold intolerance  Neuro: No syncope, No seizure, no strokes  Psych: No uncontrolled anxiety, No uncontrolled depression    Medical History:  has a past medical history of Asthma, Cataract, Glaucoma, and Hypertension.    Surgical History:  has a past surgical history that includes Eye surgery; Cholecystectomy; Hysterectomy; Cataract extraction w/  intraocular lens implant (Bilateral); Transforaminal epidural injection of steroid (Bilateral, 01/08/2020); Transforaminal epidural injection of steroid (Bilateral, 10/14/2020); Transforaminal epidural injection of steroid (Left, 04/14/2021); Injection of joint (Bilateral, 05/25/2022); and Cyst Removal.    Family History: family history includes Cataracts in her son; Glaucoma in her son; No Known Problems in her father and mother..     Social History:  reports that she has never smoked. She has never used smokeless tobacco. She reports that she does not drink alcohol and does not use drugs.    Review of patient's allergies indicates:   Allergen Reactions    Mobic [meloxicam] Rash    Norco [hydrocodone-acetaminophen]     Tramadol     Naprosyn [naproxen]      GI upset       Medication List with Changes/Refills   Current Medications    ALBUTEROL (PROVENTIL) 2.5 MG /3 ML (0.083 %) NEBULIZER SOLUTION    Take 2.5 mg by nebulization every 6 (six) hours as needed.    ALBUTEROL (PROVENTIL/VENTOLIN HFA) 90 MCG/ACTUATION INHALER    Inhale TWO to FOUR puffs by mouth FOUR times daily when needed.    ALPRAZOLAM (XANAX) 0.25 MG TABLET    TK 1 T PO QD PRA    AMLODIPINE (NORVASC) 10 MG TABLET        DICLOFENAC SODIUM (VOLTAREN) 1 % GEL    Apply 2 g topically 4 (four) times daily.    FLUTICASONE PROPIONATE  "(FLONASE) 50 MCG/ACTUATION NASAL SPRAY    2 sprays in each nostril every evening.    FLUTICASONE-SALMETEROL DISKUS INHALER 250-50 MCG    Inhale 1 puff into the lungs.    FUROSEMIDE (LASIX) 20 MG TABLET    TK 1 T PO QD PRF FLUID    LATANOPROST 0.005 % OPHTHALMIC SOLUTION    Place 1 drop into both eyes every evening.    MIRTAZAPINE (REMERON) 30 MG TABLET    Take 30 mg by mouth nightly.    PRAVASTATIN (PRAVACHOL) 20 MG TABLET        PREGABALIN (LYRICA) 75 MG CAPSULE    Take 1 capsule (75 mg total) by mouth once daily.    SYMBICORT 80-4.5 MCG/ACTUATION HFAA        WALKER MISC    1 application by Misc.(Non-Drug; Combo Route) route once daily.         Objective Findings:    Vital Signs:  /82   Pulse 99   Ht 5' 2" (1.575 m)   Wt 82.9 kg (182 lb 12.2 oz)   BMI 33.43 kg/m²   Body mass index is 33.43 kg/m².    Physical Exam:  General Appearance: Well appearing in no acute distress  Eyes:    No scleral icterus  ENT:  No lesions or masses   Lungs: CTA bilaterally, no wheezes, no rhonchi, no rales  Heart:  S1, S2 normal, no murmurs heard  Abdomen:  Non distended, soft, no guarding, no rebound, no tenderness, no appreciated ascites, no bruits, no hepatosplenomegaly,  No CVA tenderness, no appreciated hernias, no David sign, no McBurney point tenderness  Musculoskeletal:  No major joint deformities  Skin: No petechiae or rash on exposed skin areas  Neurologic:  Alert and oriented x4  Psychiatric:  Normal speech mentation and affect    Labs:  Lab Results   Component Value Date    WBC 7.31 08/04/2022    HGB 13.2 08/04/2022    HCT 41.9 08/04/2022     08/04/2022    CHOL 172 08/04/2022    TRIG 78 08/04/2022    HDL 64 08/04/2022    ALT 22 08/04/2022    AST 20 08/04/2022     08/04/2022    K 4.8 08/04/2022     08/04/2022    CREATININE 0.7 08/04/2022    BUN 7 (L) 08/04/2022    CO2 27 08/04/2022    TSH 0.370 (L) 08/04/2022    INR 1.0 12/24/2009    HGBA1C 6.4 (H) 08/04/2022           Medical Decision " Making:  Lab work reviewed  Lab work talk given  CT scan talk given  EGD colonoscopy constipation bowel prep talk given      Assessment:  1. Chronic LLQ pain    2. Constipation, unspecified constipation type    3. Gastroesophageal reflux disease, unspecified whether esophagitis present    4. Dysphagia, unspecified type    5. Left lower quadrant pain    6. Change in bowel habits         Recommendations:  1. Lab work today  2. CT scan abdomen pelvis rule out diverticulitis or other etiology  3. EGD for dysphagia GERD and colonoscopy constipation bowel prep protocol for change in bowel habits constipation left lower quadrant pain  4. Return to GI clinic 8 weeks for follow-up sooner if symptoms worsen    No follow-ups on file.      Order summary:  Orders Placed This Encounter    CT Abdomen Pelvis W Wo Contrast    Comprehensive Metabolic Panel    TISSUE TRANSGLUTAMINASE (TTG), IGA    IgA    CBC Auto Differential    Lipase    Creatinine, serum    Ambulatory referral/consult to Endo Procedure          Thank you so much for allowing me to participate in the care of Tamiko Youssef    Daniel Mckeon MD

## 2022-09-27 LAB — TTG IGA SER-ACNC: 11 UNITS

## 2022-09-30 ENCOUNTER — TELEPHONE (OUTPATIENT)
Dept: PAIN MEDICINE | Facility: CLINIC | Age: 82
End: 2022-09-30
Payer: MEDICARE

## 2022-09-30 NOTE — TELEPHONE ENCOUNTER
This message is for patient in regards to his/her appointment 10/03/22 at 2:20p   With FERNANDO Maldonado.      For the safety of all patients and staff members. We are requesting during this visit that all patients and visitors to wear required face mask at all times here at [facility].     If you have any questions or concerns please contact [phone]       Staff left pt a voicemail reminding of their appt

## 2022-10-03 ENCOUNTER — OFFICE VISIT (OUTPATIENT)
Dept: PAIN MEDICINE | Facility: CLINIC | Age: 82
End: 2022-10-03
Payer: MEDICARE

## 2022-10-03 VITALS
TEMPERATURE: 98 F | HEART RATE: 94 BPM | DIASTOLIC BLOOD PRESSURE: 78 MMHG | RESPIRATION RATE: 18 BRPM | BODY MASS INDEX: 34.08 KG/M2 | WEIGHT: 185.19 LBS | SYSTOLIC BLOOD PRESSURE: 141 MMHG | HEIGHT: 62 IN

## 2022-10-03 DIAGNOSIS — G89.4 CHRONIC PAIN SYNDROME: Primary | ICD-10-CM

## 2022-10-03 DIAGNOSIS — M47.816 LUMBAR SPONDYLOSIS: ICD-10-CM

## 2022-10-03 DIAGNOSIS — M53.3 SACROILIAC JOINT PAIN: ICD-10-CM

## 2022-10-03 DIAGNOSIS — M51.36 DDD (DEGENERATIVE DISC DISEASE), LUMBAR: ICD-10-CM

## 2022-10-03 DIAGNOSIS — M54.16 LUMBAR RADICULOPATHY: ICD-10-CM

## 2022-10-03 PROCEDURE — 1125F AMNT PAIN NOTED PAIN PRSNT: CPT | Mod: CPTII,S$GLB,, | Performed by: NURSE PRACTITIONER

## 2022-10-03 PROCEDURE — 1160F PR REVIEW ALL MEDS BY PRESCRIBER/CLIN PHARMACIST DOCUMENTED: ICD-10-PCS | Mod: CPTII,S$GLB,, | Performed by: NURSE PRACTITIONER

## 2022-10-03 PROCEDURE — 1159F PR MEDICATION LIST DOCUMENTED IN MEDICAL RECORD: ICD-10-PCS | Mod: CPTII,S$GLB,, | Performed by: NURSE PRACTITIONER

## 2022-10-03 PROCEDURE — 1160F RVW MEDS BY RX/DR IN RCRD: CPT | Mod: CPTII,S$GLB,, | Performed by: NURSE PRACTITIONER

## 2022-10-03 PROCEDURE — 1159F MED LIST DOCD IN RCRD: CPT | Mod: CPTII,S$GLB,, | Performed by: NURSE PRACTITIONER

## 2022-10-03 PROCEDURE — 3077F PR MOST RECENT SYSTOLIC BLOOD PRESSURE >= 140 MM HG: ICD-10-PCS | Mod: CPTII,S$GLB,, | Performed by: NURSE PRACTITIONER

## 2022-10-03 PROCEDURE — 1101F PR PT FALLS ASSESS DOC 0-1 FALLS W/OUT INJ PAST YR: ICD-10-PCS | Mod: CPTII,S$GLB,, | Performed by: NURSE PRACTITIONER

## 2022-10-03 PROCEDURE — 99999 PR PBB SHADOW E&M-EST. PATIENT-LVL IV: ICD-10-PCS | Mod: PBBFAC,,, | Performed by: NURSE PRACTITIONER

## 2022-10-03 PROCEDURE — 3288F FALL RISK ASSESSMENT DOCD: CPT | Mod: CPTII,S$GLB,, | Performed by: NURSE PRACTITIONER

## 2022-10-03 PROCEDURE — 3077F SYST BP >= 140 MM HG: CPT | Mod: CPTII,S$GLB,, | Performed by: NURSE PRACTITIONER

## 2022-10-03 PROCEDURE — 3078F DIAST BP <80 MM HG: CPT | Mod: CPTII,S$GLB,, | Performed by: NURSE PRACTITIONER

## 2022-10-03 PROCEDURE — 99213 PR OFFICE/OUTPT VISIT, EST, LEVL III, 20-29 MIN: ICD-10-PCS | Mod: S$GLB,,, | Performed by: NURSE PRACTITIONER

## 2022-10-03 PROCEDURE — 99999 PR PBB SHADOW E&M-EST. PATIENT-LVL IV: CPT | Mod: PBBFAC,,, | Performed by: NURSE PRACTITIONER

## 2022-10-03 PROCEDURE — 1125F PR PAIN SEVERITY QUANTIFIED, PAIN PRESENT: ICD-10-PCS | Mod: CPTII,S$GLB,, | Performed by: NURSE PRACTITIONER

## 2022-10-03 PROCEDURE — 1101F PT FALLS ASSESS-DOCD LE1/YR: CPT | Mod: CPTII,S$GLB,, | Performed by: NURSE PRACTITIONER

## 2022-10-03 PROCEDURE — 3078F PR MOST RECENT DIASTOLIC BLOOD PRESSURE < 80 MM HG: ICD-10-PCS | Mod: CPTII,S$GLB,, | Performed by: NURSE PRACTITIONER

## 2022-10-03 PROCEDURE — 99213 OFFICE O/P EST LOW 20 MIN: CPT | Mod: S$GLB,,, | Performed by: NURSE PRACTITIONER

## 2022-10-03 PROCEDURE — 3288F PR FALLS RISK ASSESSMENT DOCUMENTED: ICD-10-PCS | Mod: CPTII,S$GLB,, | Performed by: NURSE PRACTITIONER

## 2022-10-03 RX ORDER — GABAPENTIN 300 MG/1
300 CAPSULE ORAL 2 TIMES DAILY
COMMUNITY
End: 2022-11-14

## 2022-10-03 RX ORDER — DICYCLOMINE HYDROCHLORIDE 10 MG/1
1 CAPSULE ORAL
COMMUNITY
End: 2022-11-03 | Stop reason: SDUPTHER

## 2022-10-03 NOTE — PROGRESS NOTES
Chronic patient Established Note (Follow up visit)        SUBJECTIVE:    Interval History 10/3/2022:  The patient returns to clinic today for follow up of low back pain. She continues to report low back pain that radiates into the posterior aspect both legs to her ankles. Her pain is worse with activity. She continues to take Gabapentin with some benefit. She did try Lyrica but this caused a rash. She is not interested in further procedures at this time. She asks about Norco today, as this has helped her pain in the past. She denies any other health changes. Her pain today is 8/10.    Interval History 9/1/2022:  The patient returns to clinic today for follow up of low back pain. She continues to report low back pain that radiates into the posterior aspect of both legs to her ankles. Her pain is worse with standing and walking. She also reports increased right knee pain. She is currently taking Gabapentin with limited relief. She also reports that this makes her sleepy. She is not interested in further procedures at this time. She denies any other health changes. Her pain today is 8/10.    Interval History 6/8/2022:  The patient returns to clinic today for follow up of low back pain. She is s/p bilateral SI joint injections on 5/25/2022. She reports no relief. She continues to report low back pain that radiates into her buttocks into the posterior aspect of both legs to her ankles. She also reports radiates pain into the anterior aspect of her left leg. She is not interested in further procedures at this time. She reports increased bilateral knee and ankle pain. She would like to establish care with an Orthopedic. She also reports that her PCP has left. She would like to establish care here at [facility]. She continues to take Gabapentin. She denies any other health changes. Her pain today is 6/10.    Interval History 4/20/2022:  The patient returns to clinic today for follow up of low back pain. She has not been seen  in over a year. She reports that previous SYLVIA in 4/2021 provided no relief. She continues to report low back pain that radiates into both hips and buttocks. She reports intermittent radiating pain into the posterior aspect of her left leg to under her foot. Her pain is worse with prolonged sitting and standing. She continues to take Gabapentin with limited relief. She denies any other health changes. Her pain today is 8/10.    Interval History 1/14/2021:  The patient returns to clinic today for follow up of low back pain. She continues to report low back pain that radiates into the lateral aspect of left leg to her ankle. She denies any right leg pain. She did not start Lyrica given at last visit due to concern for side effects. She has not started physical therapy yet. She continues to take Gabapentin and Zanaflex. She denies any other health changes. Her pain today is 6/10.    Interval history 12/10/2020:  Returns for follow up of her low back pain. She continues to report low back pain that radiates into the lateral aspect of her left leg to her ankle. Reports that gabapentin and Zanaflex are not helping with pain. Her pain is worse with standing, walking, and activity. Her pain today is 5/10.    Interval History 11/13/2020:  The patient returns to clinic today for follow up of back pain. She reports limited relief with previous SYLVIA. She continues to report low back pain that radiates into the lateral aspect of her left leg to her ankle. She denies any right leg pain today. Her pain is worse with standing, walking, and activity. She is frustrated with her continued pain. She did increase Gabapentin to BID. She is unsure of relief at this time. She does take [place] from her PCP. She states that her PCP would like our office to take over this medication. She asks for a refill. She denies any other health changes. Her pain today is 2/10.    Interval History 10/30/2020:  The patient returns to clinic today via audio  visit for follow up of back pain. She is s/p bilateral L4/5 TF SYLVIA on 10/14/2020. She reports limited relief of her pain. She reports increased low back pain that radiates down the lateral aspect of her left leg to her ankle. She denies any right leg pain today. Her pain is worse with prolonged standing, walking, and activity. She denies any weakness. She is currently taking Gabapentin once a day. She denies any other health changes.     Interval History 7/24/2020:  The patient returns to clinic today for follow up of low back pain. She reports increased pain over the last two months. She reports low back pain that radiates down the lateral aspect of both legs to her knees, left greater than right. Her pain is worse with standing and walking. She continues to take Gabapentin. She denies any other health changes. Her pain today is 9/10.    Interval History 1/28/2020:  Tamiko Youssef presents to the clinic for a follow-up appointment for lower back pain. She is s/p bilateral L4/5 TF SYLVIA on 1/8/2020. She reports 80% relief of his low back and leg pain. She continues to report low back pain that is aching in nature. She denies any radiating leg pain today. Her pain is worse with prolonged standing and walking. She continues to take Gabapentin with benefit. She continues to perform a home exercise routine. She denies any other health changes. Her pain today is 8/10.      Pain Disability Index Review:  Last 3 PDI Scores 10/3/2022 9/1/2022 6/8/2022   Pain Disability Index (PDI) 50 29 46       Pain Medications:  Gabapentin     Opioid Contract: yes     report:  Reviewed and consistent with medication use as prescribed.    Pain Procedures:   3/28/14, 3/13/13 Bilateral L4 TF SYLVIA  8/22/18 Bilateral L4 TF SYLVIA- 75% relief  1/8/2020- Bilateral L4/5 TF SYLVIA - 80% relief   10/14/2020- Bilateral L4/5 TF SYLVIA    Physical Therapy/Home Exercise: no    Imaging:   MRI Lumbar Spine 11/18/2020:  COMPARISON:  11/18/2020      FINDINGS:  Lumbar spine alignment is within normal limits. Vertebral body heights preserved.  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     The conus is normal in appearance.  The adjacent soft tissue structures show no significant abnormalities.     L1-L2: No evidence of a disc herniation.No significant central canal or neural foraminal narrowing.     L2-L3: There is no focal disc herniation. No central canal or foraminal stenosis.  Prominent facet arthritis.     L3-L4: There is some broad-based disc bulging and superimposed spondylitic spurring which is asymmetric to the left.  Prominent facet arthritis is noted.  No central canal or foraminal stenosis.     L4-L5: Broad-based disc bulging and facet arthritis present.  No significant central canal or neural foraminal narrowing.     L5-S1: Broad-based disc bulging and facet arthritis are present.  No focal disc herniation.  No significant central canal or neural foraminal narrowing.     Impression:     Multilevel nonstenotic degenerative change mostly affecting the posterior elements.    Xray Lumbar Spine 11/18/2020:  COMPARISON:  Multiple priors, most recent 08/09/2018     FINDINGS:  Levoscoliosis of the lumbar spine.  Grade 1 anterolisthesis of L3 on L4, L4 on L5, and L5 on S1 similar to prior.  No acute, displaced fracture.  Vertebral body heights are maintained.  No evidence of dynamic instability.  Multilevel lumbar facet arthrosis.  No aggressive osseous abnormality.     Impression:     Mild multilevel lumbar spondylosis with unchanged grade 1 anterolisthesis of L3 on L4 through L5 on S1.    Xray Hips 11/18/2020:  COMPARISON:  Multiple priors, most recent 08/09/2018     FINDINGS:  Sacroiliac joints are symmetric.  Pubic symphysis is not widened.  The femoral heads are well seated within the acetabula.  Degenerative changes of the lower lumbar spine.  Mild joint space narrowing, subchondral  sclerosis and minimal osteophytosis of the hip joints.  No acute, displaced fracture.  No aggressive osseous abnormality.     Impression:     No acute osseous abnormality.        Allergies:   Review of patient's allergies indicates:   Allergen Reactions    Naprosyn [naproxen]     Norco [hydrocodone-acetaminophen]     Tramadol        Current Medications:   Current Outpatient Medications   Medication Sig Dispense Refill    albuterol (PROVENTIL) 2.5 mg /3 mL (0.083 %) nebulizer solution Take 2.5 mg by nebulization every 6 (six) hours as needed.      albuterol (PROVENTIL/VENTOLIN HFA) 90 mcg/actuation inhaler Inhale TWO to FOUR puffs by mouth FOUR times daily when needed.      ALPRAZolam (XANAX) 0.25 MG tablet TK 1 T PO QD PRA 30 tablet 0    amLODIPine (NORVASC) 10 MG tablet       diclofenac sodium (VOLTAREN) 1 % Gel Apply 2 g topically 4 (four) times daily. 1 Tube 2    fluticasone propionate (FLONASE) 50 mcg/actuation nasal spray 2 sprays in each nostril every evening. 16 g 1    fluticasone-salmeterol diskus inhaler 250-50 mcg Inhale 1 puff into the lungs.      furosemide (LASIX) 20 MG tablet TK 1 T PO QD PRF FLUID  2    latanoprost 0.005 % ophthalmic solution Place 1 drop into both eyes every evening. 3 Bottle 3    mirtazapine (REMERON) 30 MG tablet Take 30 mg by mouth nightly.  3    polyethylene glycol (GLYCOLAX) 17 gram/dose powder Take 17 g by mouth daily as needed (Constipation). 1700 g 1    pravastatin (PRAVACHOL) 20 MG tablet   1    pregabalin (LYRICA) 75 MG capsule Take 1 capsule (75 mg total) by mouth once daily. 30 capsule 1    SYMBICORT 80-4.5 mcg/actuation HFAA   6    walker Misc 1 application by Misc.(Non-Drug; Combo Route) route once daily. 1 each 0     No current facility-administered medications for this visit.       REVIEW OF SYSTEMS:    Constitutional: Positive for unexpected weight change.   HENT: Positive for headache.   Respiratory: Positive for intermittent shortness of breath and wheezing.   (asthma)  Gastrointestinal: Positive for constipation.   Musculoskeletal: Positive for back pain, gait problem and stiffness.       Past Medical History:  Past Medical History:   Diagnosis Date    Asthma     Cataract     Glaucoma     Hypertension        Past Surgical History:  Past Surgical History:   Procedure Laterality Date    CATARACT EXTRACTION W/  INTRAOCULAR LENS IMPLANT Bilateral     CHOLECYSTECTOMY      CYST REMOVAL      on neck    EYE SURGERY      HYSTERECTOMY      2/2 AUB, partial    INJECTION OF JOINT Bilateral 05/25/2022    Procedure: INJECTION, JOINT, BILATERAL SACROILIAC (SI);  Surgeon: Obdulio Leon MD;  Location: [facility];  Service: Pain Management;  Laterality: Bilateral;    TRANSFORAMINAL EPIDURAL INJECTION OF STEROID Bilateral 01/08/2020    Procedure: INJECTION, STEROID, EPIDURAL, TRANSFORAMINAL APPROACH;  Surgeon: Obdulio Leon MD;  Location: [facility] PAIN [facility]GT;  Service: Pain Management;  Laterality: Bilateral;  B/L TFESI L4    TRANSFORAMINAL EPIDURAL INJECTION OF STEROID Bilateral 10/14/2020    Procedure: INJECTION, STEROID, EPIDURAL, TRANSFORAMINAL APPROACH, L4-L5 need consent;  Surgeon: Obdulio Leon MD;  Location: [facility]IN [facility]GT;  Service: Pain Management;  Laterality: Bilateral;    TRANSFORAMINAL EPIDURAL INJECTION OF STEROID Left 04/14/2021    Procedure: INJECTION, STEROID, EPIDURAL, TRANSFORAMINAL APPROACH  left L3/4 and L4/5;  Surgeon: Obdulio Leon MD;  Location: [facility] PAIN [facility]GT;  Service: Pain Management;  Laterality: Left;  consent needed       Family History:  Family History   Problem Relation Age of Onset    Cataracts Son     Glaucoma Son     No Known Problems Mother     No Known Problems Father     Macular degeneration Neg Hx        Social History:  Social History     Socioeconomic History    Marital status: Single   Tobacco Use    Smoking status: Never    Smokeless tobacco: Never   Substance and Sexual Activity    Alcohol use: No    Drug use: Never  "      OBJECTIVE:    BP (!) 141/78   Pulse 94   Temp 97.5 °F (36.4 °C)   Resp 18   Ht 5' 2" (1.575 m)   Wt 84 kg (185 lb 3 oz)   BMI 33.87 kg/m²     PHYSICAL EXAMINATION:    GENERAL: Well appearing, in no acute distress, alert and oriented x3.  PSYCH:  Mood and affect appropriate.  SKIN: Skin color, texture, turgor normal, no rashes or lesions.  HEAD/FACE:  Normocephalic, atraumatic. Cranial nerves grossly intact.  CV: RRR with palpation of the radial artery.  PULM: No evidence of respiratory difficulty, symmetric chest rise.  GI:  Soft and non-tender.  BACK: Straight leg raising in the sitting position is negative for radicular pain bilaterally.  There is mild pain to palpation over lumbar paraspinals bilaterally.  There is pain with palpation over lumbar facet joints bilaterally.  Limited ROM with pain on flexion and extension.  Positive facet loading bilaterally.    EXTREMITIES: No deformities, edema, or skin discoloration. Good capillary refill.  MUSCULOSKELETAL: There is pain with palpation to bilateral SI joints.  REG[place] is positive bilaterally. 5/5 strength in right ankle with plantar and dorsiflexion. 5/5 strength in left ankle with plantar and dorsiflexion. 4/5 strength with right knee flexion and extension. 4/5 strength with left knee flexion and extension.  No atrophy or tone abnormalities are noted.  NEURO: Bilateral lower extremity coordination and muscle stretch reflexes are physiologic and symmetric.  Plantar response are downgoing. No clonus.  No loss of sensation is noted.  GAIT: Antalgic- ambulates with walker.      ASSESSMENT: 81 y.o. year old female with lower back pain consistent with the following diagnoses:     1. Chronic pain syndrome        2. Lumbar radiculopathy        3. Lumbar spondylosis        4. DDD (degenerative disc disease), lumbar        5. Sacroiliac joint pain                  PLAN:     - Previous imaging was reviewed and discussed with the patient today.    - We discussed " SYLVIA and JEFF, she is not interested at this time.     - I have stressed the importance of physical activity and a home exercise plan to help with pain and improve health.    - Continue Gabapentin.     - We discussed that any opioid medication will have to be discussed with physician. We do not recommend long term opioid use.     - Counseled patient regarding the importance of activity modification, constant sleeping habits and physical therapy.    - RTC in 1 month with Dr. Leon to discuss medication.     The above plan and management options were discussed at length with patient. Patient is in agreement with the above and verbalized understanding.    Marina Gallegos NP  10/03/2022

## 2022-10-05 ENCOUNTER — HOSPITAL ENCOUNTER (OUTPATIENT)
Dept: RADIOLOGY | Facility: HOSPITAL | Age: 82
Discharge: HOME | End: 2022-10-05
Attending: INTERNAL MEDICINE
Payer: MEDICARE

## 2022-10-05 DIAGNOSIS — K21.9 GASTROESOPHAGEAL REFLUX DISEASE, UNSPECIFIED WHETHER ESOPHAGITIS PRESENT: ICD-10-CM

## 2022-10-05 DIAGNOSIS — R13.10 DYSPHAGIA, UNSPECIFIED TYPE: ICD-10-CM

## 2022-10-05 DIAGNOSIS — G89.29 CHRONIC LLQ PAIN: ICD-10-CM

## 2022-10-05 DIAGNOSIS — R10.32 CHRONIC LLQ PAIN: ICD-10-CM

## 2022-10-05 DIAGNOSIS — K59.00 CONSTIPATION, UNSPECIFIED CONSTIPATION TYPE: ICD-10-CM

## 2022-10-05 DIAGNOSIS — R10.32 LEFT LOWER QUADRANT PAIN: ICD-10-CM

## 2022-10-05 PROCEDURE — 25500020 PHARM REV CODE 255: Performed by: INTERNAL MEDICINE

## 2022-10-05 PROCEDURE — A9698 NON-RAD CONTRAST MATERIALNOC: HCPCS | Performed by: INTERNAL MEDICINE

## 2022-10-05 PROCEDURE — 74178 CT ABD&PLV WO CNTR FLWD CNTR: CPT | Mod: 26,,, | Performed by: RADIOLOGY

## 2022-10-05 PROCEDURE — 74178 CT ABDOMEN PELVIS W WO CONTRAST: ICD-10-PCS | Mod: 26,,, | Performed by: RADIOLOGY

## 2022-10-05 PROCEDURE — 74178 CT ABD&PLV WO CNTR FLWD CNTR: CPT | Mod: TC

## 2022-10-05 RX ADMIN — Medication 450 ML: at 11:10

## 2022-10-05 RX ADMIN — Medication 450 ML: at 12:10

## 2022-10-05 RX ADMIN — IOHEXOL 100 ML: 350 INJECTION, SOLUTION INTRAVENOUS at 01:10

## 2022-10-06 DIAGNOSIS — R93.3 ABNORMAL CT SCAN, GASTROINTESTINAL TRACT: ICD-10-CM

## 2022-10-06 DIAGNOSIS — R93.41 ABNORMAL CT SCAN, BLADDER: ICD-10-CM

## 2022-10-06 DIAGNOSIS — N64.59 ABNORMAL BREAST FINDING: Primary | ICD-10-CM

## 2022-10-06 DIAGNOSIS — K83.8 COMMON BILE DUCT DILATION: ICD-10-CM

## 2022-10-06 NOTE — PROGRESS NOTES
IMPORTANT:    [facility] please refer patient to breast surgery clinic for further evaluation of 1 cm nodule density in the right breast which may represent a lymph node but further evaluation recommended by Radiology referral placed.    [facility] please refer patient to Urology for evaluation of thickened bladder referral placed    Please schedule patient for MRI MRCP for further evaluation of dilated common bile duct and as nonspecific finding where radiology cannot rule out a retained common bile duct stone although felt less likely.  Referral placed.    Impression:     Mild bladder wall thickening, possibly greater than expected for degree of under-distension.  Correlation with urinalysis advised.     Sigmoid diverticulosis without evidence of diverticulitis.     Expected extrahepatic biliary ductal dilatation status post cholecystectomy.  Punctate hyperdensity, possibly reflecting a subcentimeter retained gallstone within the extrahepatic CBD, although felt unlikely as the degree of biliary dilatation is stable dating back to 2013.     Left lower lobe tree-in-bud nodules, nonspecific, possibly infectious/inflammatory.     1 cm right breast nodular density, probably representing an intramammary lymph node, however correlation with mammography if not already performed is advised.     Additional findings in the body of the report.     Electronically signed by resident: Adrien Beasley  Date:                                            10/05/2022  Time:                                           14:05     Electronically signed by: Juan Fowler Jr  Date:                                            10/05/2022

## 2022-10-06 NOTE — PROGRESS NOTES
I spoke with patient on the phone today about her CT scan abnormal findings of recommended referral to breast surgeons, Urology and MRI MRCP for further evaluations.  Referrals been placed

## 2022-10-07 ENCOUNTER — TELEPHONE (OUTPATIENT)
Dept: GASTROENTEROLOGY | Facility: CLINIC | Age: 82
End: 2022-10-07
Payer: MEDICARE

## 2022-10-07 NOTE — TELEPHONE ENCOUNTER
Contact and spoke with patient per Waldemar Fan please refer patient to breast surgery clinic for further evaluation of 1 cm nodule density in the right breast which may represent a lymph node but further evaluation recommended by Radiology referral placed.     Waldemar please refer patient to Urology for evaluation of thickened bladder referral placed     Please schedule patient for MRI MRCP for further evaluation of dilated common bile duct and as nonspecific finding where radiology cannot rule out a retained common bile duct stone although felt less likely.  Referral placed.     Impression:       Mild bladder wall thickening, possibly greater than expected for degree of under-distension.  Correlation with urinalysis advised.       Sigmoid diverticulosis without evidence of diverticulitis.       Expected extrahepatic biliary ductal dilatation status post cholecystectomy.  Punctate hyperdensity, possibly reflecting a subcentimeter retained gallstone within the extrahepatic CBD, although felt unlikely as the degree of biliary dilatation is stable dating back to 2013.       Left lower lobe tree-in-bud nodules, nonspecific, possibly infectious/inflammatory.       1 cm right breast nodular density, probably representing an intramammary lymph node, however correlation with mammography if not already performed is advised    Patient verbalized understanding.

## 2022-10-07 NOTE — TELEPHONE ENCOUNTER
----- Message from Daniel Mckeon MD sent at 10/6/2022  3:55 PM CDT -----  IMPORTANT:    Waldemar please refer patient to breast surgery clinic for further evaluation of 1 cm nodule density in the right breast which may represent a lymph node but further evaluation recommended by Radiology referral placed.    Waldemar please refer patient to Urology for evaluation of thickened bladder referral placed    Please schedule patient for MRI MRCP for further evaluation of dilated common bile duct and as nonspecific finding where radiology cannot rule out a retained common bile duct stone although felt less likely.  Referral placed.    Impression:     Mild bladder wall thickening, possibly greater than expected for degree of under-distension.  Correlation with urinalysis advised.     Sigmoid diverticulosis without evidence of diverticulitis.     Expected extrahepatic biliary ductal dilatation status post cholecystectomy.  Punctate hyperdensity, possibly reflecting a subcentimeter retained gallstone within the extrahepatic CBD, although felt unlikely as the degree of biliary dilatation is stable dating back to 2013.     Left lower lobe tree-in-bud nodules, nonspecific, possibly infectious/inflammatory.     1 cm right breast nodular density, probably representing an intramammary lymph node, however correlation with mammography if not already performed is advised.     Additional findings in the body of the report.     Electronically signed by resident: Adrien Beasley  Date:                                            10/05/2022  Time:                                           14:05     Electronically signed by: Juan Fowler Jr  Date:                                            10/05/2022

## 2022-10-13 ENCOUNTER — HOSPITAL ENCOUNTER (OUTPATIENT)
Dept: RADIOLOGY | Facility: HOSPITAL | Age: 82
Discharge: HOME | End: 2022-10-13
Attending: PHYSICIAN ASSISTANT
Payer: MEDICARE

## 2022-10-13 PROCEDURE — 77067 SCR MAMMO BI INCL CAD: CPT | Mod: 26,,, | Performed by: RADIOLOGY

## 2022-10-13 PROCEDURE — 77067 PR MAMMO, CAD, SCREENING, BILAT: ICD-10-PCS | Mod: 26,,, | Performed by: RADIOLOGY

## 2022-10-14 ENCOUNTER — OFFICE VISIT (OUTPATIENT)
Dept: UROLOGY | Facility: CLINIC | Age: 82
End: 2022-10-14
Payer: MEDICARE

## 2022-10-14 VITALS
HEART RATE: 100 BPM | WEIGHT: 185.19 LBS | SYSTOLIC BLOOD PRESSURE: 140 MMHG | HEIGHT: 62 IN | BODY MASS INDEX: 34.08 KG/M2 | DIASTOLIC BLOOD PRESSURE: 80 MMHG

## 2022-10-14 DIAGNOSIS — R93.41 ABNORMAL CT SCAN, BLADDER: ICD-10-CM

## 2022-10-14 PROCEDURE — 3079F PR MOST RECENT DIASTOLIC BLOOD PRESSURE 80-89 MM HG: ICD-10-PCS | Mod: CPTII,S$GLB,,

## 2022-10-14 PROCEDURE — 1126F PR PAIN SEVERITY QUANTIFIED, NO PAIN PRESENT: ICD-10-PCS | Mod: CPTII,S$GLB,,

## 2022-10-14 PROCEDURE — 99999 PR PBB SHADOW E&M-EST. PATIENT-LVL IV: ICD-10-PCS | Mod: PBBFAC,,,

## 2022-10-14 PROCEDURE — 1159F MED LIST DOCD IN RCRD: CPT | Mod: CPTII,S$GLB,,

## 2022-10-14 PROCEDURE — 3077F SYST BP >= 140 MM HG: CPT | Mod: CPTII,S$GLB,,

## 2022-10-14 PROCEDURE — 1126F AMNT PAIN NOTED NONE PRSNT: CPT | Mod: CPTII,S$GLB,,

## 2022-10-14 PROCEDURE — 99999 PR PBB SHADOW E&M-EST. PATIENT-LVL IV: CPT | Mod: PBBFAC,,,

## 2022-10-14 PROCEDURE — 99204 OFFICE O/P NEW MOD 45 MIN: CPT | Mod: S$GLB,,,

## 2022-10-14 PROCEDURE — 1160F PR REVIEW ALL MEDS BY PRESCRIBER/CLIN PHARMACIST DOCUMENTED: ICD-10-PCS | Mod: CPTII,S$GLB,,

## 2022-10-14 PROCEDURE — 99204 PR OFFICE/OUTPT VISIT, NEW, LEVL IV, 45-59 MIN: ICD-10-PCS | Mod: S$GLB,,,

## 2022-10-14 PROCEDURE — 3079F DIAST BP 80-89 MM HG: CPT | Mod: CPTII,S$GLB,,

## 2022-10-14 PROCEDURE — 3077F PR MOST RECENT SYSTOLIC BLOOD PRESSURE >= 140 MM HG: ICD-10-PCS | Mod: CPTII,S$GLB,,

## 2022-10-14 PROCEDURE — 1160F RVW MEDS BY RX/DR IN RCRD: CPT | Mod: CPTII,S$GLB,,

## 2022-10-14 PROCEDURE — 1159F PR MEDICATION LIST DOCUMENTED IN MEDICAL RECORD: ICD-10-PCS | Mod: CPTII,S$GLB,,

## 2022-10-18 DIAGNOSIS — E78.5 HYPERLIPIDEMIA, UNSPECIFIED HYPERLIPIDEMIA TYPE: Primary | ICD-10-CM

## 2022-10-18 PROBLEM — F32.A ANXIETY AND DEPRESSION: Status: ACTIVE | Noted: 2022-10-18

## 2022-10-18 PROBLEM — E55.9 VITAMIN D DEFICIENCY: Status: ACTIVE | Noted: 2022-10-18

## 2022-10-18 PROBLEM — M79.89 SWELLING OF BOTH LOWER EXTREMITIES: Status: ACTIVE | Noted: 2022-10-18

## 2022-10-18 PROBLEM — M85.80 OSTEOPENIA: Status: ACTIVE | Noted: 2022-10-18

## 2022-10-18 PROBLEM — F41.9 ANXIETY AND DEPRESSION: Status: ACTIVE | Noted: 2022-10-18

## 2022-10-18 PROBLEM — K59.00 CONSTIPATION: Status: ACTIVE | Noted: 2022-10-18

## 2022-10-18 PROBLEM — I10 HYPERTENSION: Status: ACTIVE | Noted: 2022-10-18

## 2022-10-18 PROBLEM — R73.03 PREDIABETES: Status: ACTIVE | Noted: 2022-10-18

## 2022-10-18 PROBLEM — E53.8 VITAMIN B12 DEFICIENCY: Status: ACTIVE | Noted: 2022-10-18

## 2022-10-18 RX ORDER — PRAVASTATIN SODIUM 20 MG/1
20 TABLET ORAL NIGHTLY
Qty: 90 TABLET | Refills: 3 | Status: SHIPPED | OUTPATIENT
Start: 2022-10-18 | End: 2023-08-07

## 2022-10-20 ENCOUNTER — CLINICAL SUPPORT (OUTPATIENT)
Dept: ENDOSCOPY | Facility: HOSPITAL | Age: 82
End: 2022-10-20
Attending: INTERNAL MEDICINE
Payer: MEDICARE

## 2022-10-20 VITALS — WEIGHT: 188 LBS | BODY MASS INDEX: 34.6 KG/M2 | HEIGHT: 62 IN

## 2022-10-20 DIAGNOSIS — R19.4 CHANGE IN BOWEL HABITS: ICD-10-CM

## 2022-10-20 DIAGNOSIS — K59.00 CONSTIPATION, UNSPECIFIED CONSTIPATION TYPE: ICD-10-CM

## 2022-10-20 DIAGNOSIS — R13.10 DYSPHAGIA, UNSPECIFIED TYPE: ICD-10-CM

## 2022-10-20 NOTE — PLAN OF CARE
Endoscopy procedure scheduled, prep instructions reviewed, Pt verbalized understanding. Patient needs instructions mailed to her

## 2022-10-26 ENCOUNTER — TELEPHONE (OUTPATIENT)
Dept: INTERNAL MEDICINE | Facility: CLINIC | Age: 82
End: 2022-10-26
Payer: MEDICARE

## 2022-10-26 NOTE — TELEPHONE ENCOUNTER
----- Message from Jeanna Ham MD sent at 10/25/2022  5:55 PM CDT -----  Patient cancelled follow up appt to discuss labs, please assist with rescheduling, thank you!

## 2022-10-27 ENCOUNTER — HOSPITAL ENCOUNTER (OUTPATIENT)
Dept: RADIOLOGY | Facility: HOSPITAL | Age: 82
Discharge: HOME | End: 2022-10-27
Attending: INTERNAL MEDICINE
Payer: MEDICARE

## 2022-10-27 DIAGNOSIS — R93.3 ABNORMAL CT SCAN, GASTROINTESTINAL TRACT: ICD-10-CM

## 2022-10-27 DIAGNOSIS — K83.8 COMMON BILE DUCT DILATION: ICD-10-CM

## 2022-10-27 PROCEDURE — 76376 3D RENDER W/INTRP POSTPROCES: CPT | Mod: 26,,, | Performed by: RADIOLOGY

## 2022-10-27 PROCEDURE — 74183 MRI ABDOMEN WITH AND WO_INC MRCP (XPD): ICD-10-PCS | Mod: 26,,, | Performed by: RADIOLOGY

## 2022-10-27 PROCEDURE — 74183 MRI ABD W/O CNTR FLWD CNTR: CPT | Mod: 26,,, | Performed by: RADIOLOGY

## 2022-10-27 PROCEDURE — 25500020 PHARM REV CODE 255: Performed by: INTERNAL MEDICINE

## 2022-10-27 PROCEDURE — A9585 GADOBUTROL INJECTION: HCPCS | Performed by: INTERNAL MEDICINE

## 2022-10-27 PROCEDURE — 74183 MRI ABD W/O CNTR FLWD CNTR: CPT | Mod: TC

## 2022-10-27 PROCEDURE — 76376 MRI ABDOMEN WITH AND WO_INC MRCP (XPD): ICD-10-PCS | Mod: 26,,, | Performed by: RADIOLOGY

## 2022-10-27 RX ORDER — GADOBUTROL 604.72 MG/ML
10 INJECTION INTRAVENOUS
Status: COMPLETED | OUTPATIENT
Start: 2022-10-27 | End: 2022-10-27

## 2022-10-27 RX ADMIN — GADOBUTROL 10 ML: 604.72 INJECTION INTRAVENOUS at 12:10

## 2022-10-30 DIAGNOSIS — K86.89 PANCREATIC DUCT DILATED: ICD-10-CM

## 2022-10-30 DIAGNOSIS — K83.8 COMMON BILE DUCT DILATATION: Primary | ICD-10-CM

## 2022-10-30 NOTE — PROGRESS NOTES
Waldemar please schedule patient for EUS for further evaluation of her dilated common bile duct and diffuse mild ductal prominence of her pancreatic duct in light of her new abdominal complaints and change in bowel habits referral placed thank you.    Pancreas: Mild diffuse ductal prominence.  No lesions identified.  Common bile duct dilatation (15 mm).  No filling defects or stricture identified

## 2022-11-03 ENCOUNTER — OFFICE VISIT (OUTPATIENT)
Dept: INTERNAL MEDICINE | Facility: CLINIC | Age: 82
End: 2022-11-03
Payer: MEDICARE

## 2022-11-03 ENCOUNTER — IMMUNIZATION (OUTPATIENT)
Dept: INTERNAL MEDICINE | Facility: CLINIC | Age: 82
End: 2022-11-03
Payer: MEDICARE

## 2022-11-03 VITALS
BODY MASS INDEX: 33.73 KG/M2 | DIASTOLIC BLOOD PRESSURE: 67 MMHG | OXYGEN SATURATION: 99 % | SYSTOLIC BLOOD PRESSURE: 139 MMHG | WEIGHT: 184.44 LBS | HEART RATE: 78 BPM

## 2022-11-03 DIAGNOSIS — F32.A ANXIETY AND DEPRESSION: ICD-10-CM

## 2022-11-03 DIAGNOSIS — M51.36 DDD (DEGENERATIVE DISC DISEASE), LUMBAR: ICD-10-CM

## 2022-11-03 DIAGNOSIS — R10.9 CRAMP, ABDOMINAL: ICD-10-CM

## 2022-11-03 DIAGNOSIS — R06.02 SHORTNESS OF BREATH ON EXERTION: ICD-10-CM

## 2022-11-03 DIAGNOSIS — K59.09 CHRONIC CONSTIPATION: Primary | ICD-10-CM

## 2022-11-03 DIAGNOSIS — M85.80 OSTEOPENIA, UNSPECIFIED LOCATION: ICD-10-CM

## 2022-11-03 DIAGNOSIS — F41.9 ANXIETY AND DEPRESSION: ICD-10-CM

## 2022-11-03 DIAGNOSIS — E55.9 VITAMIN D DEFICIENCY: ICD-10-CM

## 2022-11-03 DIAGNOSIS — Z23 NEED FOR VACCINATION: Primary | ICD-10-CM

## 2022-11-03 DIAGNOSIS — E53.8 VITAMIN B12 DEFICIENCY: ICD-10-CM

## 2022-11-03 DIAGNOSIS — E78.2 MIXED HYPERLIPIDEMIA: ICD-10-CM

## 2022-11-03 DIAGNOSIS — G89.4 CHRONIC PAIN SYNDROME: ICD-10-CM

## 2022-11-03 DIAGNOSIS — E05.90 SUBCLINICAL HYPERTHYROIDISM: ICD-10-CM

## 2022-11-03 DIAGNOSIS — J45.20 MILD INTERMITTENT ASTHMA WITHOUT COMPLICATION: ICD-10-CM

## 2022-11-03 DIAGNOSIS — R73.03 PREDIABETES: ICD-10-CM

## 2022-11-03 DIAGNOSIS — I10 HYPERTENSION, UNSPECIFIED TYPE: ICD-10-CM

## 2022-11-03 PROCEDURE — 3075F PR MOST RECENT SYSTOLIC BLOOD PRESS GE 130-139MM HG: ICD-10-PCS | Mod: CPTII,S$GLB,, | Performed by: STUDENT IN AN ORGANIZED HEALTH CARE EDUCATION/TRAINING PROGRAM

## 2022-11-03 PROCEDURE — 99999 PR PBB SHADOW E&M-EST. PATIENT-LVL III: CPT | Mod: PBBFAC,,, | Performed by: STUDENT IN AN ORGANIZED HEALTH CARE EDUCATION/TRAINING PROGRAM

## 2022-11-03 PROCEDURE — 1101F PT FALLS ASSESS-DOCD LE1/YR: CPT | Mod: CPTII,S$GLB,, | Performed by: STUDENT IN AN ORGANIZED HEALTH CARE EDUCATION/TRAINING PROGRAM

## 2022-11-03 PROCEDURE — 1101F PR PT FALLS ASSESS DOC 0-1 FALLS W/OUT INJ PAST YR: ICD-10-PCS | Mod: CPTII,S$GLB,, | Performed by: STUDENT IN AN ORGANIZED HEALTH CARE EDUCATION/TRAINING PROGRAM

## 2022-11-03 PROCEDURE — 0124A COVID-19, MRNA, LNP-S, BIVALENT BOOSTER, PF, 30 MCG/0.3 ML DOSE: CPT | Mod: PBBFAC | Performed by: INTERNAL MEDICINE

## 2022-11-03 PROCEDURE — 99214 PR OFFICE/OUTPT VISIT, EST, LEVL IV, 30-39 MIN: ICD-10-PCS | Mod: S$GLB,,, | Performed by: STUDENT IN AN ORGANIZED HEALTH CARE EDUCATION/TRAINING PROGRAM

## 2022-11-03 PROCEDURE — 3078F PR MOST RECENT DIASTOLIC BLOOD PRESSURE < 80 MM HG: ICD-10-PCS | Mod: CPTII,S$GLB,, | Performed by: STUDENT IN AN ORGANIZED HEALTH CARE EDUCATION/TRAINING PROGRAM

## 2022-11-03 PROCEDURE — 91312 COVID-19, MRNA, LNP-S, BIVALENT BOOSTER, PF, 30 MCG/0.3 ML DOSE: CPT | Mod: S$GLB,,, | Performed by: INTERNAL MEDICINE

## 2022-11-03 PROCEDURE — 99999 PR PBB SHADOW E&M-EST. PATIENT-LVL III: ICD-10-PCS | Mod: PBBFAC,,, | Performed by: STUDENT IN AN ORGANIZED HEALTH CARE EDUCATION/TRAINING PROGRAM

## 2022-11-03 PROCEDURE — 1126F PR PAIN SEVERITY QUANTIFIED, NO PAIN PRESENT: ICD-10-PCS | Mod: CPTII,S$GLB,, | Performed by: STUDENT IN AN ORGANIZED HEALTH CARE EDUCATION/TRAINING PROGRAM

## 2022-11-03 PROCEDURE — 3078F DIAST BP <80 MM HG: CPT | Mod: CPTII,S$GLB,, | Performed by: STUDENT IN AN ORGANIZED HEALTH CARE EDUCATION/TRAINING PROGRAM

## 2022-11-03 PROCEDURE — 99214 OFFICE O/P EST MOD 30 MIN: CPT | Mod: S$GLB,,, | Performed by: STUDENT IN AN ORGANIZED HEALTH CARE EDUCATION/TRAINING PROGRAM

## 2022-11-03 PROCEDURE — 3288F PR FALLS RISK ASSESSMENT DOCUMENTED: ICD-10-PCS | Mod: CPTII,S$GLB,, | Performed by: STUDENT IN AN ORGANIZED HEALTH CARE EDUCATION/TRAINING PROGRAM

## 2022-11-03 PROCEDURE — 91312 COVID-19, MRNA, LNP-S, BIVALENT BOOSTER, PF, 30 MCG/0.3 ML DOSE: ICD-10-PCS | Mod: S$GLB,,, | Performed by: INTERNAL MEDICINE

## 2022-11-03 PROCEDURE — 3288F FALL RISK ASSESSMENT DOCD: CPT | Mod: CPTII,S$GLB,, | Performed by: STUDENT IN AN ORGANIZED HEALTH CARE EDUCATION/TRAINING PROGRAM

## 2022-11-03 PROCEDURE — 3075F SYST BP GE 130 - 139MM HG: CPT | Mod: CPTII,S$GLB,, | Performed by: STUDENT IN AN ORGANIZED HEALTH CARE EDUCATION/TRAINING PROGRAM

## 2022-11-03 PROCEDURE — 1126F AMNT PAIN NOTED NONE PRSNT: CPT | Mod: CPTII,S$GLB,, | Performed by: STUDENT IN AN ORGANIZED HEALTH CARE EDUCATION/TRAINING PROGRAM

## 2022-11-03 RX ORDER — ALPRAZOLAM 0.25 MG/1
TABLET ORAL
Qty: 30 TABLET | Refills: 0 | Status: CANCELLED | OUTPATIENT
Start: 2022-11-03

## 2022-11-03 RX ORDER — DICLOFENAC SODIUM 10 MG/G
2 GEL TOPICAL 4 TIMES DAILY
Qty: 1 EACH | Refills: 2 | Status: SHIPPED | OUTPATIENT
Start: 2022-11-03

## 2022-11-03 RX ORDER — DICYCLOMINE HYDROCHLORIDE 10 MG/1
10 CAPSULE ORAL
Qty: 30 CAPSULE | Refills: 0 | Status: SHIPPED | OUTPATIENT
Start: 2022-11-03

## 2022-11-03 NOTE — PROGRESS NOTES
"Subjective:       Patient ID: Tamiko Youssef is a 81 y.o. female.    Chief Complaint: Chronic constipation [K59.09]    Patient is established with me, here today for the following:    HTN, prediabetes, HLD, asthma, vitamin B12 deficiency, vitamin D deficiency, osteopenia, anxiety, chronic pain, subclinical hyperthyroidism, history of vestibular schwannoma, chronic constipation    Seeing Dr. Mckeon routinely for chronic constipation and LLQ pain  Underwent CT abd/pelv with abnormal findings  Had MRI abdomen with "Common bile duct dilatation (15 mm).  No filling defects or stricture identified.  No pancreatic lesions. Bilateral renal cysts."  Has colonoscopy and EGD scheduled 05DEC2022  Takes metamucil and fiber daily  Using milk of magnesia twice weekly   Having BM twice weekly only milk of magnesia   Drinking 4, 16 oz bottles of water daily     Followed with urology for bladder thickening on CT scan  Determined benign etiology    Prediabetes -   Lab Results       Component                Value               Date                       HGBA1C                   6.4 (H)             08/04/2022              Osteopenia -   Vitamin D deficiency -  DEXA in SEP2021 with osteopenia  "There is a 5.6% risk of a major osteoporotic fracture and a 1.3% risk of hip fracture in the next 10 years (FRAX)."  Caltrate 600 mg +D3 twice daily    Vitamin B12 deficiency -  Currently taking vitamin B12 2,500 mcg supplementation daily    Subclinical hyperthyroidism -   Lab Results       Component                Value               Date                       TSH                      0.370 (L)           08/04/2022              Asthma -   Following with pulmonology routinely at [facility], Dr. Ferrera   Using Advair inhaler daily  Using albuterol PRN   Notes SOB with exertion, resolves with rest    HTN -   Currently prescribed amlodipine.  Patient endorses taking medication as directed.  Denies side effects or concerns while taking " medication.  Denies headaches, vision changes, CP, palpitations, or other concerning symptoms.  Lab Results       Component                Value               Date                       MICALBCREAT                                  08/04/2022             Unable to calculate  BP Readings from Last Encounters:  11/03/22 : 139/67  10/14/22 : (!) 140/80    HLD -   Endorses taking pravastatin as directed  Denies side effects or concerns while taking medication  : 08/04/2022  Lab Results       Component                Value               Date                       LDLCALC                  92.4                08/04/2022            Following routinely with pain management for chronic pain  Increased Gabapentin to 600 mg TID   Continuing Norco 5 mg BID PRN   Recently added tizanidine PRN     Taking Remeron for anxiety/insomnia with good effect  Currently using Xanax 0.25 mg 1-2 times monthly PRN anxiety    Taking lasix 20 mg 3 times weekly for ankle swelling    Health maintenance -   Colonoscopy performed 2012.  Denies family history of colorectal cancer.   Mammogram BI-RADS 1 in SEP2022.  Denies family history of breast cancers.  Denies family history of ovarian cancers.  Hysterectomy 2/2 AUB.   Denies history of prior abnormal pap smears.  UTD on COVID19 primary/booster, shingles, PCV20, and PPSV23 vaccinations.  Due for Tdap, COVID bivalent vaccinations.  Never smoker.  Denies alcohol or drug use.      Review of Systems   Constitutional:  Negative for chills, fever and unexpected weight change.   Respiratory:  Positive for shortness of breath. Negative for cough.    Cardiovascular:  Positive for leg swelling. Negative for chest pain and palpitations.   Gastrointestinal:  Positive for abdominal pain and constipation. Negative for diarrhea, nausea and vomiting.       Current Outpatient Medications   Medication Instructions    albuterol (PROVENTIL) 2.5 mg, Every 6 hours PRN    albuterol (PROVENTIL/VENTOLIN HFA) 90  mcg/actuation inhaler Inhale TWO to FOUR puffs by mouth FOUR times daily when needed.    ALPRAZolam (XANAX) 0.25 MG tablet TK 1 T PO QD PRA    amLODIPine (NORVASC) 10 MG tablet No dose, route, or frequency recorded.    diclofenac sodium (VOLTAREN) 2 g, Topical (Top), 4 times daily    dicyclomine (BENTYL) 10 mg, Oral, As needed (PRN)    fluticasone propionate (FLONASE) 50 mcg/actuation nasal spray 2 sprays in each nostril every evening.    fluticasone-salmeterol diskus inhaler 250-50 mcg 1 puff, Inhalation    furosemide (LASIX) 20 MG tablet TK 1 T PO QD PRF FLUID    gabapentin (NEURONTIN) 600 mg, Oral, 3 times daily    HYDROcodone-acetaminophen (NORCO) 5-325 mg per tablet 1 tablet, Oral, Every 12 hours PRN    latanoprost 0.005 % ophthalmic solution 1 drop, Both Eyes, Nightly    mirtazapine (REMERON) 30 mg, Oral, Nightly    polyethylene glycol (GLYCOLAX) 17 g, Oral, Daily PRN    pravastatin (PRAVACHOL) 20 mg, Oral, Nightly    tiZANidine (ZANAFLEX) 4 mg, Oral, Nightly PRN    walker Misc 1 application, Misc.(Non-Drug; Combo Route), Daily     Objective:      Vitals:    11/03/22 0951   BP: 139/67   Pulse: 78   SpO2: 99%   Weight: 83.7 kg (184 lb 6.6 oz)   PainSc: 0-No pain     Body mass index is 33.73 kg/m².    Physical Exam  Vitals reviewed.   Constitutional:       General: She is not in acute distress.     Appearance: She is not ill-appearing or diaphoretic.      Comments: Ambulates with walker   Cardiovascular:      Rate and Rhythm: Normal rate and regular rhythm.      Heart sounds: Normal heart sounds. No murmur heard.    No friction rub. No gallop.   Pulmonary:      Effort: Pulmonary effort is normal. No respiratory distress.      Breath sounds: Normal breath sounds. No stridor. No wheezing, rhonchi or rales.   Skin:     General: Skin is warm and dry.      Comments: Color WNL   Neurological:      Mental Status: She is alert. Mental status is at baseline.   Psychiatric:         Mood and Affect: Mood normal.          Behavior: Behavior normal.       Assessment:       1. Chronic constipation    2. Cramp, abdominal    3. Prediabetes    4. Osteopenia, unspecified location    5. Vitamin D deficiency    6. Vitamin B12 deficiency    7. Subclinical hyperthyroidism    8. Mild intermittent asthma without complication    9. Shortness of breath on exertion    10. Hypertension, unspecified type    11. Mixed hyperlipidemia    12. DDD (degenerative disc disease), lumbar    13. Chronic pain syndrome    14. Anxiety and depression          Plan:       Chronic constipation  Cramp, abdominal  Continue medication, evaluation, and management per GI.  Discussed risk for worsening constipation with Bentyl, limit use  -     dicyclomine (BENTYL) 10 MG capsule; Take 1 capsule (10 mg total) by mouth as needed (abdominal cramping).    Prediabetes  Continue monitoring    Osteopenia, unspecified location  Vitamin D deficiency  Continue supplementation     Vitamin B12 deficiency  Continue supplementation     Subclinical hyperthyroidism  Continue monitoring  Repeat TSH [id number]    Mild intermittent asthma without complication  Shortness of breath on exertion  Continue medication, evaluation, and management per pulmonologist.  -     SCHEDULED EKG 12-LEAD (to [place]); Future    Hypertension, unspecified type  Continue current medications  RTC in 3 months for follow up    Mixed hyperlipidemia  Continue current medications  RTC in 3 months for follow up    DDD (degenerative disc disease), lumbar  Chronic pain syndrome  Continue medication, evaluation, and management per pain management.  -     diclofenac sodium (VOLTAREN) 1 % Gel; Apply 2 g topically 4 (four) times daily.    Anxiety and depression  Continue mirtazapine   Wean Xanax as tolerated, recommended cessation per pain management       Jeanna Ham MD  11/3/2022

## 2022-11-04 ENCOUNTER — HOSPITAL ENCOUNTER (OUTPATIENT)
Dept: CARDIOLOGY | Facility: OTHER | Age: 82
Discharge: HOME | End: 2022-11-04
Attending: STUDENT IN AN ORGANIZED HEALTH CARE EDUCATION/TRAINING PROGRAM
Payer: MEDICARE

## 2022-11-04 DIAGNOSIS — R06.02 SHORTNESS OF BREATH ON EXERTION: ICD-10-CM

## 2022-11-04 PROCEDURE — 93010 ELECTROCARDIOGRAM REPORT: CPT | Mod: ,,, | Performed by: INTERNAL MEDICINE

## 2022-11-04 PROCEDURE — 93010 EKG 12-LEAD: ICD-10-PCS | Mod: ,,, | Performed by: INTERNAL MEDICINE

## 2022-11-04 PROCEDURE — 93005 ELECTROCARDIOGRAM TRACING: CPT

## 2022-11-09 ENCOUNTER — PATIENT OUTREACH (OUTPATIENT)
Dept: INTERNAL MEDICINE | Facility: CLINIC | Age: 82
End: 2022-11-09
Payer: MEDICARE

## 2022-11-09 DIAGNOSIS — R93.89 ABNORMAL FINDING ON IMAGING: Primary | ICD-10-CM

## 2022-11-10 ENCOUNTER — TELEPHONE (OUTPATIENT)
Dept: ENDOSCOPY | Facility: HOSPITAL | Age: 82
End: 2022-11-10
Payer: MEDICARE

## 2022-11-14 ENCOUNTER — OFFICE VISIT (OUTPATIENT)
Dept: PAIN MEDICINE | Facility: CLINIC | Age: 82
End: 2022-11-14
Attending: ANESTHESIOLOGY
Payer: MEDICARE

## 2022-11-14 VITALS
HEIGHT: 62 IN | SYSTOLIC BLOOD PRESSURE: 123 MMHG | TEMPERATURE: 98 F | DIASTOLIC BLOOD PRESSURE: 74 MMHG | WEIGHT: 184.06 LBS | BODY MASS INDEX: 33.87 KG/M2 | RESPIRATION RATE: 19 BRPM | HEART RATE: 85 BPM

## 2022-11-14 DIAGNOSIS — M47.897 OTHER SPONDYLOSIS, LUMBOSACRAL REGION: ICD-10-CM

## 2022-11-14 DIAGNOSIS — M54.16 LUMBAR RADICULOPATHY: ICD-10-CM

## 2022-11-14 DIAGNOSIS — M46.1 SACROILIITIS: ICD-10-CM

## 2022-11-14 DIAGNOSIS — M54.15 RADICULOPATHY OF THORACOLUMBAR REGION: ICD-10-CM

## 2022-11-14 DIAGNOSIS — G89.4 CHRONIC PAIN SYNDROME: ICD-10-CM

## 2022-11-14 DIAGNOSIS — M47.816 LUMBAR SPONDYLOSIS: ICD-10-CM

## 2022-11-14 DIAGNOSIS — M17.11 OSTEOARTHRITIS OF RIGHT KNEE, UNSPECIFIED OSTEOARTHRITIS TYPE: Primary | ICD-10-CM

## 2022-11-14 PROCEDURE — 3074F SYST BP LT 130 MM HG: CPT | Mod: CPTII,S$GLB,, | Performed by: ANESTHESIOLOGY

## 2022-11-14 PROCEDURE — 99999 PR PBB SHADOW E&M-EST. PATIENT-LVL V: ICD-10-PCS | Mod: PBBFAC,,, | Performed by: ANESTHESIOLOGY

## 2022-11-14 PROCEDURE — 3078F DIAST BP <80 MM HG: CPT | Mod: CPTII,S$GLB,, | Performed by: ANESTHESIOLOGY

## 2022-11-14 PROCEDURE — 1125F PR PAIN SEVERITY QUANTIFIED, PAIN PRESENT: ICD-10-PCS | Mod: CPTII,S$GLB,, | Performed by: ANESTHESIOLOGY

## 2022-11-14 PROCEDURE — 99999 PR PBB SHADOW E&M-EST. PATIENT-LVL V: CPT | Mod: PBBFAC,,, | Performed by: ANESTHESIOLOGY

## 2022-11-14 PROCEDURE — 99214 PR OFFICE/OUTPT VISIT, EST, LEVL IV, 30-39 MIN: ICD-10-PCS | Mod: GC,S$GLB,, | Performed by: ANESTHESIOLOGY

## 2022-11-14 PROCEDURE — 3078F PR MOST RECENT DIASTOLIC BLOOD PRESSURE < 80 MM HG: ICD-10-PCS | Mod: CPTII,S$GLB,, | Performed by: ANESTHESIOLOGY

## 2022-11-14 PROCEDURE — 1125F AMNT PAIN NOTED PAIN PRSNT: CPT | Mod: CPTII,S$GLB,, | Performed by: ANESTHESIOLOGY

## 2022-11-14 PROCEDURE — 1160F PR REVIEW ALL MEDS BY PRESCRIBER/CLIN PHARMACIST DOCUMENTED: ICD-10-PCS | Mod: CPTII,S$GLB,, | Performed by: ANESTHESIOLOGY

## 2022-11-14 PROCEDURE — 99214 OFFICE O/P EST MOD 30 MIN: CPT | Mod: GC,S$GLB,, | Performed by: ANESTHESIOLOGY

## 2022-11-14 PROCEDURE — 3074F PR MOST RECENT SYSTOLIC BLOOD PRESSURE < 130 MM HG: ICD-10-PCS | Mod: CPTII,S$GLB,, | Performed by: ANESTHESIOLOGY

## 2022-11-14 PROCEDURE — 1160F RVW MEDS BY RX/DR IN RCRD: CPT | Mod: CPTII,S$GLB,, | Performed by: ANESTHESIOLOGY

## 2022-11-14 PROCEDURE — 1159F PR MEDICATION LIST DOCUMENTED IN MEDICAL RECORD: ICD-10-PCS | Mod: CPTII,S$GLB,, | Performed by: ANESTHESIOLOGY

## 2022-11-14 PROCEDURE — 1159F MED LIST DOCD IN RCRD: CPT | Mod: CPTII,S$GLB,, | Performed by: ANESTHESIOLOGY

## 2022-11-14 RX ORDER — GABAPENTIN 600 MG/1
600 TABLET ORAL 3 TIMES DAILY
Qty: 90 TABLET | Refills: 2 | Status: SHIPPED | OUTPATIENT
Start: 2022-11-14 | End: 2023-03-24 | Stop reason: SDUPTHER

## 2022-11-14 RX ORDER — HYDROCODONE BITARTRATE AND ACETAMINOPHEN 5; 325 MG/1; MG/1
1 TABLET ORAL EVERY 12 HOURS PRN
Qty: 60 TABLET | Refills: 0 | Status: SHIPPED | OUTPATIENT
Start: 2022-11-14 | End: 2022-12-14

## 2022-11-14 RX ORDER — TIZANIDINE 4 MG/1
4 TABLET ORAL NIGHTLY PRN
Qty: 90 TABLET | Refills: 0 | Status: SHIPPED | OUTPATIENT
Start: 2022-11-14 | End: 2023-04-04 | Stop reason: SDUPTHER

## 2022-11-14 NOTE — PATIENT INSTRUCTIONS
Gabapentin: Please take 2 tablets with dinner and 1 tablet at bedtime.  Tizanidine: Only take 1 tablet per day  Hydrocodone: Please use one tablet and increase to two tablet per day if needed. If only need one tablet than please reach out to clinic and let us know you only require one tablet per day

## 2022-11-15 ENCOUNTER — HOSPITAL ENCOUNTER (OUTPATIENT)
Dept: RADIOLOGY | Facility: OTHER | Age: 82
Discharge: HOME | End: 2022-11-15
Attending: STUDENT IN AN ORGANIZED HEALTH CARE EDUCATION/TRAINING PROGRAM
Payer: MEDICARE

## 2022-11-15 DIAGNOSIS — M17.11 OSTEOARTHRITIS OF RIGHT KNEE, UNSPECIFIED OSTEOARTHRITIS TYPE: ICD-10-CM

## 2022-11-15 PROBLEM — K59.09 CHRONIC CONSTIPATION: Status: ACTIVE | Noted: 2022-10-18

## 2022-11-15 PROCEDURE — 73565 XR KNEE AP STANDING BILATERAL: ICD-10-PCS | Mod: 26,,, | Performed by: RADIOLOGY

## 2022-11-15 PROCEDURE — 73565 X-RAY EXAM OF KNEES: CPT | Mod: TC,FY

## 2022-11-15 PROCEDURE — 73565 X-RAY EXAM OF KNEES: CPT | Mod: 26,,, | Performed by: RADIOLOGY

## 2022-11-17 ENCOUNTER — OFFICE VISIT (OUTPATIENT)
Dept: URGENT CARE | Facility: CLINIC | Age: 82
End: 2022-11-17
Payer: MEDICARE

## 2022-11-17 VITALS
SYSTOLIC BLOOD PRESSURE: 155 MMHG | BODY MASS INDEX: 33.87 KG/M2 | RESPIRATION RATE: 18 BRPM | HEIGHT: 62 IN | DIASTOLIC BLOOD PRESSURE: 67 MMHG | WEIGHT: 184.06 LBS | OXYGEN SATURATION: 100 % | TEMPERATURE: 98 F | HEART RATE: 88 BPM

## 2022-11-17 DIAGNOSIS — M79.604 RIGHT LEG PAIN: Primary | ICD-10-CM

## 2022-11-17 PROCEDURE — 99213 OFFICE O/P EST LOW 20 MIN: CPT | Mod: S$GLB,,, | Performed by: PHYSICIAN ASSISTANT

## 2022-11-17 PROCEDURE — 3077F SYST BP >= 140 MM HG: CPT | Mod: CPTII,S$GLB,, | Performed by: PHYSICIAN ASSISTANT

## 2022-11-17 PROCEDURE — 1125F AMNT PAIN NOTED PAIN PRSNT: CPT | Mod: CPTII,S$GLB,, | Performed by: PHYSICIAN ASSISTANT

## 2022-11-17 PROCEDURE — 99213 PR OFFICE/OUTPT VISIT, EST, LEVL III, 20-29 MIN: ICD-10-PCS | Mod: S$GLB,,, | Performed by: PHYSICIAN ASSISTANT

## 2022-11-17 PROCEDURE — 1160F PR REVIEW ALL MEDS BY PRESCRIBER/CLIN PHARMACIST DOCUMENTED: ICD-10-PCS | Mod: CPTII,S$GLB,, | Performed by: PHYSICIAN ASSISTANT

## 2022-11-17 PROCEDURE — 3077F PR MOST RECENT SYSTOLIC BLOOD PRESSURE >= 140 MM HG: ICD-10-PCS | Mod: CPTII,S$GLB,, | Performed by: PHYSICIAN ASSISTANT

## 2022-11-17 PROCEDURE — 1159F MED LIST DOCD IN RCRD: CPT | Mod: CPTII,S$GLB,, | Performed by: PHYSICIAN ASSISTANT

## 2022-11-17 PROCEDURE — 1160F RVW MEDS BY RX/DR IN RCRD: CPT | Mod: CPTII,S$GLB,, | Performed by: PHYSICIAN ASSISTANT

## 2022-11-17 PROCEDURE — 3078F DIAST BP <80 MM HG: CPT | Mod: CPTII,S$GLB,, | Performed by: PHYSICIAN ASSISTANT

## 2022-11-17 PROCEDURE — 1125F PR PAIN SEVERITY QUANTIFIED, PAIN PRESENT: ICD-10-PCS | Mod: CPTII,S$GLB,, | Performed by: PHYSICIAN ASSISTANT

## 2022-11-17 PROCEDURE — 1159F PR MEDICATION LIST DOCUMENTED IN MEDICAL RECORD: ICD-10-PCS | Mod: CPTII,S$GLB,, | Performed by: PHYSICIAN ASSISTANT

## 2022-11-17 PROCEDURE — 3078F PR MOST RECENT DIASTOLIC BLOOD PRESSURE < 80 MM HG: ICD-10-PCS | Mod: CPTII,S$GLB,, | Performed by: PHYSICIAN ASSISTANT

## 2022-11-17 NOTE — PROGRESS NOTES
"Subjective:       Patient ID: Tamiko Youssef is a 81 y.o. female.    Vitals:  height is 5' 2" (1.575 m) and weight is 83.5 kg (184 lb 1.4 oz). Her temporal temperature is 97.6 °F (36.4 °C). Her blood pressure is 155/67 (abnormal) and her pulse is 88. Her respiration is 18 and oxygen saturation is 100%.     Chief Complaint: Pain (Right Leg)    Patient reports to clinic with the compliant of leg pain located in the right anterior lower leg/shin. Patient reports that pain is intermittent and chronic, but really started hurting badly early this morning. No recent injury or trauma. No swelling or rash. No posterior leg pain/calf pain. No paresthesia.  Patient reports with not taking anything for pain due to a scheduled colonoscopy, that has instructed her not top take any pain medications two weeks prior to procedure. She was sent medication for pain by her pain specialist from visit 2 days ago, but she has not picked up the medication yet, waiting on prior auth. Sees pain for chronic pain, chronic back and joint/knee pain.  Patient reports pain 4/10. Pt denies hx dvt.  She does note that over the past few days she has been walking much more than usual, which she thinks may be contributing.     Pain  This is a new problem. The current episode started in the past 7 days. The problem occurs constantly. The problem has been unchanged. Pertinent negatives include no abdominal pain, chest pain, chills, coughing, diaphoresis, fatigue, fever, headaches, nausea, neck pain, numbness, rash, sore throat or vomiting. The symptoms are aggravated by bending, twisting and walking. The treatment provided no relief.     Constitution: Negative for chills, sweating, fatigue and fever.   HENT:  Negative for postnasal drip and sore throat.    Neck: Negative for neck pain and neck stiffness.   Cardiovascular:  Negative for chest pain, leg swelling, palpitations and sob on exertion.   Eyes:  Negative for eye itching, eye pain and eye redness. "   Respiratory:  Negative for cough and sputum production.    Gastrointestinal:  Negative for abdominal pain, nausea, vomiting and diarrhea.   Genitourinary:  Negative for dysuria, frequency, urgency and flank pain.   Musculoskeletal:  Positive for pain. Negative for trauma and abnormal ROM of joint.   Skin:  Negative for color change, rash, wound, abrasion and erythema.   Neurological:  Negative for headaches, numbness and tingling.     Objective:      Physical Exam   Constitutional: She is oriented to person, place, and time. She appears well-developed.  Non-toxic appearance. She does not appear ill. No distress.      Comments:Uses rollator to assist with ambulation     HENT:   Head: Normocephalic and atraumatic. Head is without abrasion, without contusion and without laceration.   Ears:   Right Ear: External ear normal.   Left Ear: External ear normal.   Nose: Nose normal.   Mouth/Throat: Oropharynx is clear and moist and mucous membranes are normal.   Eyes: Conjunctivae, EOM and lids are normal. Pupils are equal, round, and reactive to light.   Neck: Trachea normal and phonation normal. Neck supple.   Cardiovascular: Normal rate, regular rhythm and normal heart sounds.   Pulmonary/Chest: Effort normal and breath sounds normal. No accessory muscle usage or stridor. No tachypnea and no bradypnea. No respiratory distress.   Musculoskeletal: Normal range of motion.         General: Normal range of motion.        Legs:    Neurological: She is alert and oriented to person, place, and time.   Skin: Skin is warm, dry, intact, not diaphoretic and no rash. Capillary refill takes less than 2 seconds. No abrasion, No burn, No bruising, No erythema and No ecchymosis   Psychiatric: Her speech is normal and behavior is normal. Judgment and thought content normal.   Nursing note and vitals reviewed.      Assessment:       1. Right leg pain          Plan:       Per wells criteria, low risk for dvt. This does not appear clinically  consistent with DVT. No evidence of infection. No injury or trauma, andx ray not indicated at this time. Consider muscular pain from overuse, lateral tibial stress syndrome, radicular pain from lumbar radiculopathy among others in ddx. She was instructed to not take medication for pain within 2 weeks of egd, instructed patient to call doctor for clarification, all pain meds vs nsaids. If OK by her Dr who instructed this, can continue pain control per pain management's plan.   - Discussed ddx, home care, tx options, and given follow up precautions.     Right leg pain    Patient Instructions   - Rest.    - Drink plenty of fluids.      - Follow up with your PCP or specialty clinic as directed in the next 1-2 weeks if not improved or as needed.  You can call [phone] to schedule an appointment with the appropriate provider.    - Go to the ER or seek medical attention immediately if you develop new or worsening symptoms.     - You must understand that you have received an Urgent Care treatment only and that you may be released before all of your medical problems are known or treated.   - You, the patient, will arrange for follow up care as instructed.   - If your condition worsens or fails to improve we recommend that you receive another evaluation at the ER immediately or contact your PCP to discuss your concerns or return here.    Elevated Blood Pressure  Your blood pressure was elevated during your visit to the urgent care.  It was not so high that immediate care was needed but it is recommended that you monitor your blood pressure over the next week or two to make sure that it is not staying elevated.  Please have your blood pressure taken 2-3 times daily at different times of the day.  Write all of those blood pressures down and record the time that they were taken.  Keep all that information and take it with you to see your Primary Care Physician.  If your blood pressure is consistently above 140/90 you will  need to follow up with your PCP more quickly

## 2022-11-17 NOTE — PATIENT INSTRUCTIONS
- Rest.    - Drink plenty of fluids.      - Follow up with your PCP or specialty clinic as directed in the next 1-2 weeks if not improved or as needed.  You can call [phone] to schedule an appointment with the appropriate provider.    - Go to the ER or seek medical attention immediately if you develop new or worsening symptoms.     - You must understand that you have received an Urgent Care treatment only and that you may be released before all of your medical problems are known or treated.   - You, the patient, will arrange for follow up care as instructed.   - If your condition worsens or fails to improve we recommend that you receive another evaluation at the ER immediately or contact your PCP to discuss your concerns or return here.    Elevated Blood Pressure  Your blood pressure was elevated during your visit to the urgent care.  It was not so high that immediate care was needed but it is recommended that you monitor your blood pressure over the next week or two to make sure that it is not staying elevated.  Please have your blood pressure taken 2-3 times daily at different times of the day.  Write all of those blood pressures down and record the time that they were taken.  Keep all that information and take it with you to see your Primary Care Physician.  If your blood pressure is consistently above 140/90 you will need to follow up with your PCP more quickly

## 2022-11-21 ENCOUNTER — TELEPHONE (OUTPATIENT)
Dept: ENDOSCOPY | Facility: HOSPITAL | Age: 82
End: 2022-11-21
Payer: MEDICARE

## 2022-11-23 ENCOUNTER — TELEPHONE (OUTPATIENT)
Dept: ENDOSCOPY | Facility: HOSPITAL | Age: 82
End: 2022-11-23
Payer: MEDICARE

## 2022-11-23 NOTE — TELEPHONE ENCOUNTER
Telephoned patient and pt's daughter, Mariam, to schedule EUS both with no answer. Left voicemail messages with my direct contact number for patient to return phone call.

## 2022-12-05 ENCOUNTER — HOSPITAL ENCOUNTER (OUTPATIENT)
Facility: HOSPITAL | Age: 82
Discharge: HOME | End: 2022-12-05
Attending: INTERNAL MEDICINE | Admitting: INTERNAL MEDICINE
Payer: MEDICARE

## 2022-12-05 ENCOUNTER — ANESTHESIA EVENT (OUTPATIENT)
Dept: ENDOSCOPY | Facility: HOSPITAL | Age: 82
End: 2022-12-05
Payer: MEDICARE

## 2022-12-05 ENCOUNTER — ANESTHESIA (OUTPATIENT)
Dept: ENDOSCOPY | Facility: HOSPITAL | Age: 82
End: 2022-12-05
Payer: MEDICARE

## 2022-12-05 VITALS
TEMPERATURE: 98 F | BODY MASS INDEX: 34.6 KG/M2 | HEIGHT: 62 IN | DIASTOLIC BLOOD PRESSURE: 68 MMHG | OXYGEN SATURATION: 97 % | WEIGHT: 188 LBS | SYSTOLIC BLOOD PRESSURE: 117 MMHG | RESPIRATION RATE: 17 BRPM | HEART RATE: 78 BPM

## 2022-12-05 DIAGNOSIS — K59.00 CONSTIPATION: ICD-10-CM

## 2022-12-05 PROCEDURE — E9220 PRA ENDO ANESTHESIA: HCPCS | Mod: ,,, | Performed by: NURSE ANESTHETIST, CERTIFIED REGISTERED

## 2022-12-05 PROCEDURE — 43239 EGD BIOPSY SINGLE/MULTIPLE: CPT | Performed by: INTERNAL MEDICINE

## 2022-12-05 PROCEDURE — 63600175 PHARM REV CODE 636 W HCPCS: Performed by: ANESTHESIOLOGY

## 2022-12-05 PROCEDURE — 37000008 HC ANESTHESIA 1ST 15 MINUTES: Performed by: INTERNAL MEDICINE

## 2022-12-05 PROCEDURE — 88342 IMHCHEM/IMCYTCHM 1ST ANTB: CPT | Mod: 26,,, | Performed by: PATHOLOGY

## 2022-12-05 PROCEDURE — 37000009 HC ANESTHESIA EA ADD 15 MINS: Performed by: INTERNAL MEDICINE

## 2022-12-05 PROCEDURE — 43239 PR EGD, FLEX, W/BIOPSY, SGL/MULTI: ICD-10-PCS | Mod: 51,,, | Performed by: INTERNAL MEDICINE

## 2022-12-05 PROCEDURE — 88342 IMHCHEM/IMCYTCHM 1ST ANTB: CPT | Performed by: PATHOLOGY

## 2022-12-05 PROCEDURE — 25000003 PHARM REV CODE 250: Performed by: INTERNAL MEDICINE

## 2022-12-05 PROCEDURE — 45385 COLONOSCOPY W/LESION REMOVAL: CPT | Mod: PT | Performed by: INTERNAL MEDICINE

## 2022-12-05 PROCEDURE — 45385 COLONOSCOPY W/LESION REMOVAL: CPT | Mod: PT,,, | Performed by: INTERNAL MEDICINE

## 2022-12-05 PROCEDURE — 43239 EGD BIOPSY SINGLE/MULTIPLE: CPT | Mod: 51,,, | Performed by: INTERNAL MEDICINE

## 2022-12-05 PROCEDURE — 88305 TISSUE EXAM BY PATHOLOGIST: ICD-10-PCS | Mod: 26,,, | Performed by: PATHOLOGY

## 2022-12-05 PROCEDURE — 88342 CHG IMMUNOCYTOCHEMISTRY: ICD-10-PCS | Mod: 26,,, | Performed by: PATHOLOGY

## 2022-12-05 PROCEDURE — 88305 TISSUE EXAM BY PATHOLOGIST: CPT | Mod: 26,,, | Performed by: PATHOLOGY

## 2022-12-05 PROCEDURE — 27200997: Performed by: INTERNAL MEDICINE

## 2022-12-05 PROCEDURE — 45385 PR COLONOSCOPY,REMV LESN,SNARE: ICD-10-PCS | Mod: PT,,, | Performed by: INTERNAL MEDICINE

## 2022-12-05 PROCEDURE — 27201089 HC SNARE, DISP (ANY): Performed by: INTERNAL MEDICINE

## 2022-12-05 PROCEDURE — 88305 TISSUE EXAM BY PATHOLOGIST: CPT | Mod: 59 | Performed by: PATHOLOGY

## 2022-12-05 PROCEDURE — 27201012 HC FORCEPS, HOT/COLD, DISP: Performed by: INTERNAL MEDICINE

## 2022-12-05 PROCEDURE — E9220 PRA ENDO ANESTHESIA: ICD-10-PCS | Mod: ,,, | Performed by: NURSE ANESTHETIST, CERTIFIED REGISTERED

## 2022-12-05 PROCEDURE — 63600175 PHARM REV CODE 636 W HCPCS: Performed by: NURSE ANESTHETIST, CERTIFIED REGISTERED

## 2022-12-05 RX ORDER — PROPOFOL 10 MG/ML
VIAL (ML) INTRAVENOUS
Status: DISCONTINUED | OUTPATIENT
Start: 2022-12-05 | End: 2022-12-05

## 2022-12-05 RX ORDER — SODIUM CHLORIDE 9 MG/ML
INJECTION, SOLUTION INTRAVENOUS CONTINUOUS
Status: DISCONTINUED | OUTPATIENT
Start: 2022-12-05 | End: 2022-12-05 | Stop reason: HOSPADM

## 2022-12-05 RX ORDER — FENTANYL CITRATE 50 UG/ML
INJECTION, SOLUTION INTRAMUSCULAR; INTRAVENOUS
Status: DISCONTINUED | OUTPATIENT
Start: 2022-12-05 | End: 2022-12-05

## 2022-12-05 RX ORDER — PROPOFOL 10 MG/ML
VIAL (ML) INTRAVENOUS CONTINUOUS PRN
Status: DISCONTINUED | OUTPATIENT
Start: 2022-12-05 | End: 2022-12-05

## 2022-12-05 RX ADMIN — FENTANYL CITRATE 25 MCG: 50 INJECTION, SOLUTION INTRAMUSCULAR; INTRAVENOUS at 01:12

## 2022-12-05 RX ADMIN — Medication 100 MCG/KG/MIN: at 12:12

## 2022-12-05 RX ADMIN — SODIUM CHLORIDE: 0.9 INJECTION, SOLUTION INTRAVENOUS at 12:12

## 2022-12-05 RX ADMIN — FENTANYL CITRATE 25 MCG: 50 INJECTION, SOLUTION INTRAMUSCULAR; INTRAVENOUS at 12:12

## 2022-12-05 RX ADMIN — PROPOFOL 50 MG: 10 INJECTION, EMULSION INTRAVENOUS at 12:12

## 2022-12-05 RX ADMIN — FENTANYL CITRATE 50 MCG: 50 INJECTION, SOLUTION INTRAMUSCULAR; INTRAVENOUS at 12:12

## 2022-12-05 NOTE — TRANSFER OF CARE
"Anesthesia Transfer of Care Note    Patient: Tamiko Youssef    Procedure(s) Performed: Procedure(s) (LRB):  EGD (ESOPHAGOGASTRODUODENOSCOPY) (N/A)  COLONOSCOPY (N/A)    Patient location: GI    Anesthesia Type: general    Transport from OR: Transported from OR on room air with adequate spontaneous ventilation    Post pain: adequate analgesia    Post assessment: no apparent anesthetic complications and tolerated procedure well    Post vital signs: stable    Level of consciousness: awake and alert    Nausea/Vomiting: no nausea/vomiting    Complications: none    Transfer of care protocol was followed      Last vitals:   Visit Vitals  /80 (BP Location: Left arm, Patient Position: Lying)   Pulse 98   Temp 37.5 °C (99.5 °F)   Resp (!) 22   Ht 5' 2" (1.575 m)   Wt 85.3 kg (188 lb)   SpO2 99%   Breastfeeding No   BMI 34.39 kg/m²     "

## 2022-12-05 NOTE — H&P
[facility] 4[facility]  History & Physical    Subjective:      Chief Complaint/Reason for Admission:     EGD and colonoscopy for GERD and dysphagia and change in bowel habits and constipation.    Tamiko Youssef is a 81 y.o. female.    Past Medical History:   Diagnosis Date    Asthma     Cataract     Glaucoma     Hypertension      Past Surgical History:   Procedure Laterality Date    CATARACT EXTRACTION W/  INTRAOCULAR LENS IMPLANT Bilateral     CHOLECYSTECTOMY      CYST REMOVAL      on neck    EYE SURGERY      HYSTERECTOMY      2/2 AUB, partial    INJECTION OF JOINT Bilateral 05/25/2022    Procedure: INJECTION, JOINT, BILATERAL SACROILIAC (SI);  Surgeon: Obdulio Leon MD;  Location: [facility] PAIN [facility]GT;  Service: Pain Management;  Laterality: Bilateral;    TRANSFORAMINAL EPIDURAL INJECTION OF STEROID Bilateral 01/08/2020    Procedure: INJECTION, STEROID, EPIDURAL, TRANSFORAMINAL APPROACH;  Surgeon: Obdulio Leon MD;  Location: [facility];  Service: Pain Management;  Laterality: Bilateral;  B/L TFESI L4    TRANSFORAMINAL EPIDURAL INJECTION OF STEROID Bilateral 10/14/2020    Procedure: INJECTION, STEROID, EPIDURAL, TRANSFORAMINAL APPROACH, L4-L5 need consent;  Surgeon: Obdulio Leon MD;  Location: [facility];  Service: Pain Management;  Laterality: Bilateral;    TRANSFORAMINAL EPIDURAL INJECTION OF STEROID Left 04/14/2021    Procedure: INJECTION, STEROID, EPIDURAL, TRANSFORAMINAL APPROACH  left L3/4 and L4/5;  Surgeon: Obdulio Leon MD;  Location: [facility];  Service: Pain Management;  Laterality: Left;  consent needed     Family History   Problem Relation Age of Onset    Cataracts Son     Glaucoma Son     No Known Problems Mother     No Known Problems Father     Macular degeneration Neg Hx      Social History     Tobacco Use    Smoking status: Never    Smokeless tobacco: Never   Substance Use Topics    Alcohol use: No    Drug use: Never       PTA Medications   Medication Sig     albuterol (PROVENTIL) 2.5 mg /3 mL (0.083 %) nebulizer solution Take 2.5 mg by nebulization every 6 (six) hours as needed.    amLODIPine (NORVASC) 10 MG tablet     furosemide (LASIX) 20 MG tablet TK 1 T PO QD PRF FLUID    albuterol (PROVENTIL/VENTOLIN HFA) 90 mcg/actuation inhaler Inhale TWO to FOUR puffs by mouth FOUR times daily when needed.    ALPRAZolam (XANAX) 0.25 MG tablet TK 1 T PO QD PRA    diclofenac sodium (VOLTAREN) 1 % Gel Apply 2 g topically 4 (four) times daily.    dicyclomine (BENTYL) 10 MG capsule Take 1 capsule (10 mg total) by mouth as needed (abdominal cramping).    fluticasone propionate (FLONASE) 50 mcg/actuation nasal spray 2 sprays in each nostril every evening.    fluticasone-salmeterol diskus inhaler 250-50 mcg Inhale 1 puff into the lungs.    gabapentin (NEURONTIN) 600 MG tablet Take 1 tablet (600 mg total) by mouth 3 (three) times daily.    HYDROcodone-acetaminophen (NORCO) 5-325 mg per tablet Take 1 tablet by mouth every 12 (twelve) hours as needed for Pain.    latanoprost 0.005 % ophthalmic solution Place 1 drop into both eyes every evening.    mirtazapine (REMERON) 30 MG tablet Take 30 mg by mouth nightly.    polyethylene glycol (GLYCOLAX) 17 gram/dose powder Take 17 g by mouth daily as needed (Constipation).    pravastatin (PRAVACHOL) 20 MG tablet Take 1 tablet (20 mg total) by mouth every evening.    tiZANidine (ZANAFLEX) 4 MG tablet Take 1 tablet (4 mg total) by mouth nightly as needed.    walker Misc 1 application by Misc.(Non-Drug; Combo Route) route once daily.     Review of patient's allergies indicates:   Allergen Reactions    Mobic [meloxicam] Rash    Norco [hydrocodone-acetaminophen]     Tramadol     Naprosyn [naproxen]      GI upset        Review of Systems   Constitutional:  Negative for fever.   Respiratory:  Negative for shortness of breath.    Cardiovascular:  Negative for chest pain.   Gastrointestinal:  Positive for constipation and heartburn.     Objective:      Vital  Signs (Most Recent)  Temp: 99.5 °F (37.5 °C) (12/05/22 1047)  Pulse: (!) 116 (12/05/22 1047)  Resp: (!) 22 (12/05/22 1047)  BP: 138/80 (12/05/22 1047)  SpO2: 99 % (12/05/22 1047)    Vital Signs Range (Last 24H):  Temp:  [99.5 °F (37.5 °C)]   Pulse:  [116]   Resp:  [22]   BP: (138)/(80)   SpO2:  [99 %]     Physical Exam  Constitutional:       Appearance: Normal appearance.   Cardiovascular:      Rate and Rhythm: Tachycardia present.   Pulmonary:      Effort: Pulmonary effort is normal.   Neurological:      Mental Status: She is alert and oriented to person, place, and time.           Assessment:      EGD and colonoscopy for GERD and dysphagia and change in bowel habits and constipation.      Plan:      EGD and colonoscopy for GERD and dysphagia and change in bowel habits and constipation.

## 2022-12-05 NOTE — PROVATION PATIENT INSTRUCTIONS
Discharge Summary/Instructions after an Endoscopic Procedure  Patient Name: Tamiko Youssef  Patient MRN: [id number]  Patient YOB: 1940 Monday, December 5, 2022  Daniel Mckeon MD  Dear patient,  As a result of recent federal legislation (The Federal Cures Act), you may   receive lab or pathology results from your procedure in your [facility]   account before your physician is able to contact you. Your physician or   their representative will relay the results to you with their   recommendations at their soonest availability.  Thank you,  RESTRICTIONS:  During your procedure today, you received medications for sedation.  These   medications may affect your judgment, balance and coordination.  Therefore,   for 24 hours, you have the following restrictions:   - DO NOT drive a car, operate machinery, make legal/financial decisions,   sign important papers or drink alcohol.    ACTIVITY:  Today: no heavy lifting, straining or running due to procedural   sedation/anesthesia.  The following day: return to full activity including work.  DIET:  Eat and drink normally unless instructed otherwise.     TREATMENT FOR COMMON SIDE EFFECTS:  - Mild abdominal pain, nausea, belching, bloating or excessive gas:  rest,   eat lightly and use a heating pad.  - Sore Throat: treat with throat lozenges and/or gargle with warm salt   water.  - Because air was used during the procedure, expelling large amounts of air   from your rectum or belching is normal.  - If a bowel prep was taken, you may not have a bowel movement for 1-3 days.    This is normal.  SYMPTOMS TO WATCH FOR AND REPORT TO YOUR PHYSICIAN:  1. Abdominal pain or bloating, other than gas cramps.  2. Chest pain.  3. Back pain.  4. Signs of infection such as: chills or fever occurring within 24 hours   after the procedure.  5. Rectal bleeding, which would show as bright red, maroon, or black stools.   (A tablespoon of blood from the rectum is not serious, especially if    hemorrhoids are present.)  6. Vomiting.  7. Weakness or dizziness.  GO DIRECTLY TO THE NEAREST EMERGENCY ROOM IF YOU HAVE ANY OF THE FOLLOWING:      Difficulty breathing              Chills and/or fever over 101 F   Persistent vomiting and/or vomiting blood   Severe abdominal pain   Severe chest pain   Black, tarry stools   Bleeding- more than one tablespoon   Any other symptom or condition that you feel may need urgent attention  Your doctor recommends these additional instructions:  If any biopsies were taken, your doctors clinic will contact you in 1 to 2   weeks with any results.  - Discharge patient to home.   - Await pathology results.   - Telephone endoscopist for pathology results in 3 weeks.   - Repeat colonoscopy in 3 years for surveillance of multiple polyps.   - Return to GI clinic.   - The findings and recommendations were discussed with the patient.  For questions, problems or results please call your physician - Daniel Mckeon MD at Work:  [phone].  [facility], EMERGENCY ROOM PHONE NUMBER: [phone]  IF A COMPLICATION OR EMERGENCY SITUATION ARISES AND YOU ARE UNABLE TO REACH   YOUR PHYSICIAN - GO DIRECTLY TO THE EMERGENCY ROOM.  Daniel Mckeon MD  12/5/2022 1:21:34 PM  This report has been verified and signed electronically.  Dear patient,  As a result of recent federal legislation (The Federal Cures Act), you may   receive lab or pathology results from your procedure in your [facility]   account before your physician is able to contact you. Your physician or   their representative will relay the results to you with their   recommendations at their soonest availability.  Thank you,  PROVATION

## 2022-12-05 NOTE — ANESTHESIA PREPROCEDURE EVALUATION
12/05/2022  Tamiko Youssef is a 81 y.o., female.      Pre-op Assessment    I have reviewed the Patient Summary Reports.     I have reviewed the Nursing Notes. I have reviewed the NPO Status.   I have reviewed the Medications.     Review of Systems  Anesthesia Hx:  No problems with previous Anesthesia  Denies Family Hx of Anesthesia complications.   Denies Personal Hx of Anesthesia complications.   EENT/Dental:EENT/Dental Normal   Cardiovascular:   Hypertension    Pulmonary:   Asthma mild    Hepatic/GI:  Hepatic/GI Normal Denies PUD.  Denies Hepatitis.    Musculoskeletal:   Arthritis     Neurological:   Neuromuscular Disease,    Endocrine:   Hyperthyroidism    Psych:   Psychiatric History anxiety depression          Physical Exam  General: Well nourished    Airway:  Mallampati: II   Mouth Opening: Normal  Neck ROM: Normal ROM    Dental:  Dentures        Anesthesia Plan  Type of Anesthesia, risks & benefits discussed:    Anesthesia Type: Gen Natural Airway, MAC  Intra-op Monitoring Plan: Standard ASA Monitors  Post Op Pain Control Plan: multimodal analgesia and IV/PO Opioids PRN  Induction:  IV  Informed Consent: Informed consent signed with the Patient and all parties understand the risks and agree with anesthesia plan.  All questions answered.   ASA Score: 3    Ready For Surgery From Anesthesia Perspective.     .

## 2022-12-05 NOTE — ANESTHESIA POSTPROCEDURE EVALUATION
Anesthesia Post Evaluation    Patient: Tamiko Youssef    Procedure(s) Performed: Procedure(s) (LRB):  EGD (ESOPHAGOGASTRODUODENOSCOPY) (N/A)  COLONOSCOPY (N/A)    Final Anesthesia Type: general      Patient location during evaluation: PACU  Patient participation: Yes- Able to Participate  Level of consciousness: awake and alert  Post-procedure vital signs: reviewed and stable  Pain management: adequate  Airway patency: patent    PONV status at discharge: No PONV  Anesthetic complications: no      Cardiovascular status: blood pressure returned to baseline  Respiratory status: unassisted  Hydration status: euvolemic  Follow-up not needed.          Vitals Value Taken Time   /68 12/05/22 1350   Temp 36.9 °C (98.4 °F) 12/05/22 1320   Pulse 78 12/05/22 1350   Resp 17 12/05/22 1350   SpO2 97 % 12/05/22 1350         Event Time   Out of Recovery 14:08:17         Pain/Sagar Score: Sagar Score: 10 (12/5/2022  1:50 PM)

## 2022-12-05 NOTE — PROVATION PATIENT INSTRUCTIONS
Discharge Summary/Instructions after an Endoscopic Procedure  Patient Name: Tamiko Youssef  Patient MRN: [id number]  Patient YOB: 1940 Monday, December 5, 2022  Daniel Mckeon MD  Dear patient,  As a result of recent federal legislation (The Federal Cures Act), you may   receive lab or pathology results from your procedure in your [facility]   account before your physician is able to contact you. Your physician or   their representative will relay the results to you with their   recommendations at their soonest availability.  Thank you,  RESTRICTIONS:  During your procedure today, you received medications for sedation.  These   medications may affect your judgment, balance and coordination.  Therefore,   for 24 hours, you have the following restrictions:   - DO NOT drive a car, operate machinery, make legal/financial decisions,   sign important papers or drink alcohol.    ACTIVITY:  Today: no heavy lifting, straining or running due to procedural   sedation/anesthesia.  The following day: return to full activity including work.  DIET:  Eat and drink normally unless instructed otherwise.     TREATMENT FOR COMMON SIDE EFFECTS:  - Mild abdominal pain, nausea, belching, bloating or excessive gas:  rest,   eat lightly and use a heating pad.  - Sore Throat: treat with throat lozenges and/or gargle with warm salt   water.  - Because air was used during the procedure, expelling large amounts of air   from your rectum or belching is normal.  - If a bowel prep was taken, you may not have a bowel movement for 1-3 days.    This is normal.  SYMPTOMS TO WATCH FOR AND REPORT TO YOUR PHYSICIAN:  1. Abdominal pain or bloating, other than gas cramps.  2. Chest pain.  3. Back pain.  4. Signs of infection such as: chills or fever occurring within 24 hours   after the procedure.  5. Rectal bleeding, which would show as bright red, maroon, or black stools.   (A tablespoon of blood from the rectum is not serious, especially if    hemorrhoids are present.)  6. Vomiting.  7. Weakness or dizziness.  GO DIRECTLY TO THE NEAREST EMERGENCY ROOM IF YOU HAVE ANY OF THE FOLLOWING:      Difficulty breathing              Chills and/or fever over 101 F   Persistent vomiting and/or vomiting blood   Severe abdominal pain   Severe chest pain   Black, tarry stools   Bleeding- more than one tablespoon   Any other symptom or condition that you feel may need urgent attention  Your doctor recommends these additional instructions:  If any biopsies were taken, your doctors clinic will contact you in 1 to 2   weeks with any results.  - Discharge patient to home.   - Follow an antireflux regimen indefinitely.   - Await pathology results.   - Telephone endoscopist for pathology results in 3 weeks.   - Return to GI clinic.   - The findings and recommendations were discussed with the patient.  For questions, problems or results please call your physician - Daniel Mckeon MD at Work:  [phone].  [facility], EMERGENCY ROOM PHONE NUMBER: [phone]  IF A COMPLICATION OR EMERGENCY SITUATION ARISES AND YOU ARE UNABLE TO REACH   YOUR PHYSICIAN - GO DIRECTLY TO THE EMERGENCY ROOM.  Daniel Mckeon MD  12/5/2022 1:22:13 PM  This report has been verified and signed electronically.  Dear patient,  As a result of recent federal legislation (The Federal Cures Act), you may   receive lab or pathology results from your procedure in your [facility]   account before your physician is able to contact you. Your physician or   their representative will relay the results to you with their   recommendations at their soonest availability.  Thank you,  PROVATION

## 2022-12-07 ENCOUNTER — OFFICE VISIT (OUTPATIENT)
Dept: OTOLARYNGOLOGY | Facility: CLINIC | Age: 82
End: 2022-12-07
Payer: MEDICARE

## 2022-12-07 VITALS
HEART RATE: 97 BPM | SYSTOLIC BLOOD PRESSURE: 119 MMHG | TEMPERATURE: 98 F | DIASTOLIC BLOOD PRESSURE: 69 MMHG | WEIGHT: 184 LBS | HEIGHT: 62 IN | BODY MASS INDEX: 33.86 KG/M2

## 2022-12-07 DIAGNOSIS — B37.0 ORAL THRUSH: ICD-10-CM

## 2022-12-07 DIAGNOSIS — D33.3 VESTIBULAR SCHWANNOMA: ICD-10-CM

## 2022-12-07 DIAGNOSIS — Z87.09 HISTORY OF ASTHMA: ICD-10-CM

## 2022-12-07 DIAGNOSIS — H90.3 ASYMMETRICAL SENSORINEURAL HEARING LOSS: ICD-10-CM

## 2022-12-07 DIAGNOSIS — Z97.4 WEARS HEARING AID: ICD-10-CM

## 2022-12-07 PROCEDURE — 1160F PR REVIEW ALL MEDS BY PRESCRIBER/CLIN PHARMACIST DOCUMENTED: ICD-10-PCS | Mod: CPTII,S$GLB,, | Performed by: OTOLARYNGOLOGY

## 2022-12-07 PROCEDURE — 3074F SYST BP LT 130 MM HG: CPT | Mod: CPTII,S$GLB,, | Performed by: OTOLARYNGOLOGY

## 2022-12-07 PROCEDURE — 3078F DIAST BP <80 MM HG: CPT | Mod: CPTII,S$GLB,, | Performed by: OTOLARYNGOLOGY

## 2022-12-07 PROCEDURE — 3074F PR MOST RECENT SYSTOLIC BLOOD PRESSURE < 130 MM HG: ICD-10-PCS | Mod: CPTII,S$GLB,, | Performed by: OTOLARYNGOLOGY

## 2022-12-07 PROCEDURE — 1160F RVW MEDS BY RX/DR IN RCRD: CPT | Mod: CPTII,S$GLB,, | Performed by: OTOLARYNGOLOGY

## 2022-12-07 PROCEDURE — 99214 PR OFFICE/OUTPT VISIT, EST, LEVL IV, 30-39 MIN: ICD-10-PCS | Mod: S$GLB,,, | Performed by: OTOLARYNGOLOGY

## 2022-12-07 PROCEDURE — 1159F PR MEDICATION LIST DOCUMENTED IN MEDICAL RECORD: ICD-10-PCS | Mod: CPTII,S$GLB,, | Performed by: OTOLARYNGOLOGY

## 2022-12-07 PROCEDURE — 3078F PR MOST RECENT DIASTOLIC BLOOD PRESSURE < 80 MM HG: ICD-10-PCS | Mod: CPTII,S$GLB,, | Performed by: OTOLARYNGOLOGY

## 2022-12-07 PROCEDURE — 99214 OFFICE O/P EST MOD 30 MIN: CPT | Mod: S$GLB,,, | Performed by: OTOLARYNGOLOGY

## 2022-12-07 PROCEDURE — 1159F MED LIST DOCD IN RCRD: CPT | Mod: CPTII,S$GLB,, | Performed by: OTOLARYNGOLOGY

## 2022-12-07 RX ORDER — CLOTRIMAZOLE 10 MG/1
10 LOZENGE ORAL; TOPICAL
Qty: 50 TROCHE | Refills: 0 | Status: SHIPPED | OUTPATIENT
Start: 2022-12-07 | End: 2024-03-07

## 2022-12-07 NOTE — PATIENT INSTRUCTIONS
Take course of generic Mycelex lozenges as prescribed.     Resume rinse mouth and gargle after using inhaler.    Need cc of recent audio from Joanne.

## 2022-12-13 ENCOUNTER — TELEPHONE (OUTPATIENT)
Dept: ENDOSCOPY | Facility: HOSPITAL | Age: 82
End: 2022-12-13
Payer: MEDICARE

## 2022-12-13 NOTE — TELEPHONE ENCOUNTER
Telephoned patient to schedule EUS with no answer. Left voicemail message with my direct contact number for patient to return phone call.

## 2022-12-15 NOTE — PROGRESS NOTES
Subjective:       Patient ID: Tamiko Youssef is a 81 y.o. female.    Chief Complaint: Follow-up (States nothing going on at this time but thought she should check in.  Missed follow up after last visit.  )    States here for follow-up, last seen 01/28/2021.  Missed follow-up after this visit but states has been doing well.  No mouth or throat complaints.  Had EGD 2 days ago for GERD and occasional solid dysphagia.  Back on daily inhaler per pulmonology, currently Wixela.  Has not been rinsing, gargling after use.  Has hearing aids through outside audiologist, last checked 3 or 4 months ago with audiogram.  Reports hearing aids work well for her without complaints, and not aware of any change in hearing.  No other otolaryngologic complaints.        Review of Systems     Constitutional: Negative for fever.      HENT: Positive for hearing loss.  Negative for ear discharge, ear pain, ringing in the ears, sore throat, stuffy nose and voice change.      Respiratory:  Negative for cough and shortness of breath.      Cardiovascular:  Negative for chest pain.     Gastrointestinal:  Negative for abdominal pain.     Neurological: Negative for dizziness and headaches.      Hematologic: Negative for swollen glands.              Objective:        Vitals:    12/07/22 1305   BP: 119/69   Pulse: 97   Temp: 97.7 °F (36.5 °C)     Body mass index is 33.65 kg/m².  Physical Exam  Constitutional:       General: She is not in acute distress.     Appearance: She is well-developed.   HENT:      Head: Normocephalic and atraumatic.      Right Ear: Tympanic membrane, ear canal and external ear normal.      Left Ear: Tympanic membrane, ear canal and external ear normal.      Nose: No nasal deformity, mucosal edema or rhinorrhea.      Mouth/Throat:      Mouth: No oral lesions.      Pharynx: No pharyngeal swelling, oropharyngeal exudate or posterior oropharyngeal erythema.      Comments:   Mild thrush at uvula and palate versus exudate secondary  to recent EGD.  Tongue is better with mild central discoloration.         Neck:      Trachea: Phonation normal.   Pulmonary:      Effort: Pulmonary effort is normal. No respiratory distress.   Lymphadenopathy:      Cervical: No cervical adenopathy.   Skin:     General: Skin is warm and dry.   Neurological:      Mental Status: She is alert and oriented to person, place, and time.   Psychiatric:         Speech: Speech normal.         Behavior: Behavior normal.       Tests / Results:    Last audiogram in [facility] records done 08/10/2020 reviewed as well as last MRI of IAC's/temporal bones done 03/16/2020.        Assessment:       1. Vestibular schwannoma    2. Asymmetrical sensorineural hearing loss    3. Wears hearing aid    4. Oral thrush    5. History of asthma        Plan:       Reviewed all above and considerations and recommendations and answered questions.    Take course of generic Mycelex lozenges as prescribed and reviewed.    Resume oral rinse and gargle after inhaler use.    Need cc of recent audiogram from [facility].  Follow up after above.

## 2022-12-21 LAB
FINAL PATHOLOGIC DIAGNOSIS: NORMAL
GROSS: NORMAL
Lab: NORMAL
SUPPLEMENTAL DIAGNOSIS: NORMAL

## 2022-12-25 PROBLEM — M54.41 CHRONIC BILATERAL LOW BACK PAIN WITH BILATERAL SCIATICA: Status: RESOLVED | Noted: 2021-03-17 | Resolved: 2022-12-25

## 2022-12-25 PROBLEM — M53.86 DECREASED ROM OF LUMBAR SPINE: Status: RESOLVED | Noted: 2018-10-09 | Resolved: 2022-12-25

## 2022-12-25 PROBLEM — M54.42 CHRONIC BILATERAL LOW BACK PAIN WITH BILATERAL SCIATICA: Status: RESOLVED | Noted: 2021-03-17 | Resolved: 2022-12-25

## 2022-12-25 PROBLEM — M54.15 RADICULOPATHY OF THORACOLUMBAR REGION: Status: ACTIVE | Noted: 2022-12-25

## 2022-12-25 PROBLEM — G89.29 CHRONIC BILATERAL LOW BACK PAIN WITH BILATERAL SCIATICA: Status: RESOLVED | Noted: 2021-03-17 | Resolved: 2022-12-25

## 2023-01-03 ENCOUNTER — TELEPHONE (OUTPATIENT)
Dept: GASTROENTEROLOGY | Facility: CLINIC | Age: 83
End: 2023-01-03
Payer: MEDICARE

## 2023-01-03 NOTE — TELEPHONE ENCOUNTER
----- Message from Berkley Lerma MA sent at 1/3/2023  4:17 PM CST -----  Regarding: FW: Appt  Contact: Pt @ [phone]    ----- Message -----  From: Brittney Rivera  Sent: 1/3/2023   3:50 PM CST  To: [facility]AL[facility] Staff  Subject: Appt                                             Pt feel the need to be seen, having severe pain is side and nausea. Asking for urgent call back

## 2023-01-03 NOTE — PROGRESS NOTES
Waldemar agree please tell patient she needs to go to ER for evaluation if she has an 8/10 abdominal pain.    Please also let her know that her EGD colonoscopy pathology was benign but 1 of her colon polyps the large 1 was a precancerous but benign colon polyp she needs ever next surveillance colonoscopy in 3 years.    Also all her first-degree relatives her siblings and children should have the 1st screening colonoscopy at age 40 based on her advanced colon adenomas polyp.    Please schedule her telemedicine video visit for follow-up with me next week       1. STOMACH, BIOPSY:   Gastric fundic and antral mucosa with inactive chronic gastritis.   An immunostain for H pylori is pending.   Negative for intestinal metaplasia, dysplasia or malignancy.   2. DISTAL/PROXIMAL ESOPHAGUS, BIOPSY:   Esophageal squamous mucosa without significant pathologic changes.   Negative for eosinophils, intestinal metaplasia, dysplasia or malignancy.   3. CECAL POLYP, BIOPSY:   Colonic mucosa with lymphoid aggregates and stromal edema.   Negative for dysplasia or malignancy.   4. SIGMOID COLON POLYP, BIOPSY:   Tubular adenoma, completely removed.   Negative for high-grade dysplasia or malignancy.   5. RECTAL POLYP, BIOPSY:   Hyperplastic polyp.   Negative for dysplasia or malignancy.  VC    Comment: Interp By JANET Kaufman M.D., Signed on 12/21/2022

## 2023-01-08 ENCOUNTER — OFFICE VISIT (OUTPATIENT)
Dept: URGENT CARE | Facility: CLINIC | Age: 83
End: 2023-01-08
Payer: MEDICARE

## 2023-01-08 VITALS
DIASTOLIC BLOOD PRESSURE: 67 MMHG | WEIGHT: 184 LBS | BODY MASS INDEX: 33.86 KG/M2 | RESPIRATION RATE: 18 BRPM | OXYGEN SATURATION: 99 % | SYSTOLIC BLOOD PRESSURE: 137 MMHG | TEMPERATURE: 99 F | HEART RATE: 92 BPM | HEIGHT: 62 IN

## 2023-01-08 DIAGNOSIS — M54.50 ACUTE LEFT-SIDED LOW BACK PAIN WITHOUT SCIATICA: Primary | ICD-10-CM

## 2023-01-08 DIAGNOSIS — R31.29 MICROSCOPIC HEMATURIA: ICD-10-CM

## 2023-01-08 DIAGNOSIS — S39.012A STRAIN OF LUMBAR REGION, INITIAL ENCOUNTER: ICD-10-CM

## 2023-01-08 LAB
BILIRUB UR QL STRIP: NEGATIVE
GLUCOSE UR QL STRIP: NEGATIVE
KETONES UR QL STRIP: NEGATIVE
LEUKOCYTE ESTERASE UR QL STRIP: NEGATIVE
PH, POC UA: 6.5 (ref 5–8)
POC BLOOD, URINE: POSITIVE
POC NITRATES, URINE: NEGATIVE
PROT UR QL STRIP: NEGATIVE
SP GR UR STRIP: 1.01 (ref 1–1.03)
UROBILINOGEN UR STRIP-ACNC: ABNORMAL (ref 0.1–1.1)

## 2023-01-08 PROCEDURE — 3075F PR MOST RECENT SYSTOLIC BLOOD PRESS GE 130-139MM HG: ICD-10-PCS | Mod: CPTII,S$GLB,, | Performed by: PHYSICIAN ASSISTANT

## 2023-01-08 PROCEDURE — 3075F SYST BP GE 130 - 139MM HG: CPT | Mod: CPTII,S$GLB,, | Performed by: PHYSICIAN ASSISTANT

## 2023-01-08 PROCEDURE — 81003 URINALYSIS AUTO W/O SCOPE: CPT | Mod: QW,S$GLB,, | Performed by: PHYSICIAN ASSISTANT

## 2023-01-08 PROCEDURE — 1125F PR PAIN SEVERITY QUANTIFIED, PAIN PRESENT: ICD-10-PCS | Mod: CPTII,S$GLB,, | Performed by: PHYSICIAN ASSISTANT

## 2023-01-08 PROCEDURE — 3078F PR MOST RECENT DIASTOLIC BLOOD PRESSURE < 80 MM HG: ICD-10-PCS | Mod: CPTII,S$GLB,, | Performed by: PHYSICIAN ASSISTANT

## 2023-01-08 PROCEDURE — 99214 PR OFFICE/OUTPT VISIT, EST, LEVL IV, 30-39 MIN: ICD-10-PCS | Mod: S$GLB,,, | Performed by: PHYSICIAN ASSISTANT

## 2023-01-08 PROCEDURE — 99214 OFFICE O/P EST MOD 30 MIN: CPT | Mod: S$GLB,,, | Performed by: PHYSICIAN ASSISTANT

## 2023-01-08 PROCEDURE — 1160F RVW MEDS BY RX/DR IN RCRD: CPT | Mod: CPTII,S$GLB,, | Performed by: PHYSICIAN ASSISTANT

## 2023-01-08 PROCEDURE — 3078F DIAST BP <80 MM HG: CPT | Mod: CPTII,S$GLB,, | Performed by: PHYSICIAN ASSISTANT

## 2023-01-08 PROCEDURE — 81003 POCT URINALYSIS, DIPSTICK, AUTOMATED, W/O SCOPE: ICD-10-PCS | Mod: QW,S$GLB,, | Performed by: PHYSICIAN ASSISTANT

## 2023-01-08 PROCEDURE — 1125F AMNT PAIN NOTED PAIN PRSNT: CPT | Mod: CPTII,S$GLB,, | Performed by: PHYSICIAN ASSISTANT

## 2023-01-08 PROCEDURE — 1159F PR MEDICATION LIST DOCUMENTED IN MEDICAL RECORD: ICD-10-PCS | Mod: CPTII,S$GLB,, | Performed by: PHYSICIAN ASSISTANT

## 2023-01-08 PROCEDURE — 1160F PR REVIEW ALL MEDS BY PRESCRIBER/CLIN PHARMACIST DOCUMENTED: ICD-10-PCS | Mod: CPTII,S$GLB,, | Performed by: PHYSICIAN ASSISTANT

## 2023-01-08 PROCEDURE — 1159F MED LIST DOCD IN RCRD: CPT | Mod: CPTII,S$GLB,, | Performed by: PHYSICIAN ASSISTANT

## 2023-01-08 RX ORDER — PREDNISONE 20 MG/1
TABLET ORAL
Qty: 5 TABLET | Refills: 0 | Status: SHIPPED | OUTPATIENT
Start: 2023-01-08 | End: 2023-01-12

## 2023-01-08 NOTE — PROGRESS NOTES
"Subjective:       Patient ID: Tamiko Youssef is a 82 y.o. female.    Vitals:  height is 5' 2" (1.575 m) and weight is 83.5 kg (184 lb). Her temperature is 98.5 °F (36.9 °C). Her blood pressure is 137/67 and her pulse is 92. Her respiration is 18 and oxygen saturation is 99%.     Chief Complaint: Flank Pain    Pt is an 83 yo female who presents for left sided low back pain. Pt states that pain began 1 week ago and has been persistent. Denies injury or trauma.  Pt states that pain is constant but is exacerbated by standing for long periods, change in position, bending, movements. No abdominal pain or chest pain. No rash. Pt has tried her pain medication, has also used lidocaine patches, with temporary relief. No radiation of pain to LE. Denies any urinary symptoms- no hematuria, dysuria, frequency, urgency, pelvic pain, bladder pressure. She denies colicky type flank pain.     Pt also mentions that today her BP was elevated which concerned her, systolic 170s. She checks BP every day multiple times and keeps a log- which she has today. It is typically well controlled. Her elevated BP this morning made her concerned. She ate porkchop and canned pork and beans last night for dinner. No cp, dyspnea, LE edema, or persistent headache.      Flank Pain  This is a new problem. The current episode started 1 to 4 weeks ago. The problem occurs constantly. The problem has been gradually worsening since onset. The pain does not radiate. The pain is at a severity of 8/10. The pain is moderate. The pain is Worse during the night. The symptoms are aggravated by sitting. Stiffness is present All day. Pertinent negatives include no abdominal pain, bladder incontinence, bowel incontinence, chest pain, dysuria, fever, headaches, leg pain, numbness, paresis, paresthesias, pelvic pain, perianal numbness, tingling, weakness or weight loss. Treatments tried: pain pills 8pm.     Constitution: Negative for chills, sweating, fatigue, fever and " unexpected weight change.   HENT:  Negative for ear pain, congestion, sinus pain and sore throat.    Neck: Negative for neck pain and neck stiffness.   Cardiovascular:  Negative for chest pain, leg swelling and palpitations.   Eyes:  Negative for eye pain, eye redness, photophobia, vision loss, double vision and blurred vision.   Respiratory:  Negative for cough, sputum production and shortness of breath.    Gastrointestinal:  Negative for abdominal pain, nausea, vomiting, diarrhea and bowel incontinence.   Genitourinary:  Positive for flank pain. Negative for dysuria, frequency, urgency, bladder incontinence and pelvic pain.   Musculoskeletal:  Positive for pain and back pain. Negative for trauma.   Neurological:  Negative for headaches, disorientation and numbness.   Psychiatric/Behavioral:  Negative for disorientation.      Objective:      Physical Exam   Constitutional: She is oriented to person, place, and time. She appears well-developed. She is cooperative.  Non-toxic appearance. She does not appear ill. No distress.   HENT:   Head: Normocephalic and atraumatic.   Ears:   Right Ear: External ear normal.   Left Ear: External ear normal.   Nose: Nose normal.   Mouth/Throat: Oropharynx is clear and moist and mucous membranes are normal.   Eyes: Conjunctivae and lids are normal.   Neck: Trachea normal and phonation normal. Neck supple.   Cardiovascular: Normal rate, regular rhythm, normal heart sounds and normal pulses.   Pulmonary/Chest: Effort normal and breath sounds normal. No accessory muscle usage or stridor. No apnea, no tachypnea and no bradypnea. No respiratory distress. She has no decreased breath sounds. She has no wheezes. She has no rhonchi. She has no rales.   Abdominal: Normal appearance and bowel sounds are normal. She exhibits no distension and no mass. Soft. There is no abdominal tenderness. There is no guarding, no tenderness at McBurney's point, negative David's sign, no left CVA tenderness  and no right CVA tenderness.      Comments: Abdomen soft, no ttp. No ttp to percussion of cva. No pulsatile mass. No AA bruit.    Musculoskeletal: Normal range of motion.         General: No deformity. Normal range of motion.      Thoracic back: Normal.      Lumbar back: She exhibits tenderness and spasm. She exhibits no bony tenderness and no laceration.        Back:       Right lower leg: No edema.      Left lower leg: No edema.      Comments: Bilat knees full ROM 5/5 str. With extension of left knee she does experience exacerbation of the left sided low back pain. No radiation to LE. Bilat sh, elbows full ROM 5/5 str.  str 5/5 bilat.     Neurological: no focal deficit. She is alert and oriented to person, place, and time. She has normal motor skills, normal sensation, normal strength and normal reflexes. She displays no weakness, no atrophy, no tremor, facial symmetry and no dysarthria. No cranial nerve deficit or sensory deficit. She has a normal Finger-Nose-Finger Test. Coordination: Romberg sign negative and Rapid alternating movements normal. She displays no seizure activity. Gait and coordination normal. Gait normal. GCS eye subscore is 4. GCS verbal subscore is 5. GCS motor subscore is 6.   Skin: Skin is warm, dry, intact, not diaphoretic and not pale.   Psychiatric: Her speech is normal and behavior is normal. Judgment and thought content normal.   Nursing note and vitals reviewed.        Results for orders placed or performed in visit on 01/08/23   POCT Urinalysis, Dipstick, Automated, W/O Scope   Result Value Ref Range    POC Blood, Urine Positive (A) Negative    POC Bilirubin, Urine Negative Negative    POC Urobilinogen, Urine NORM 0.1 - 1.1    POC Ketones, Urine Negative Negative    POC Protein, Urine Negative Negative    POC Nitrates, Urine Negative Negative    POC Glucose, Urine Negative Negative    pH, UA 6.5 5 - 8    POC Specific Gravity, Urine 1.015 1.003 - 1.029    POC Leukocytes, Urine  Negative Negative       Assessment:       1. Acute left-sided low back pain without sciatica    2. Strain of lumbar region, initial encounter    3. Microscopic hematuria          Plan:       Pt has allergy to nsaid, will refrain. She has pain medication and zanaflex. Offered prednisone for antiinflammatory for suspected MSK etiology, discussed risks and benefits, pt would like to try this, has tolerated steroids well in the past. Pt without red flags, vitals good and BP within normal range. UA with scant microscopic blood  5 rbc/ul. No nitrate or leuk.  Pt has no urinary symptoms and unlikely to be pyelonephritis or nephrolithiasis. She does have recent CT that showed left renal cyst, which I have considered as potentially contributing to pain of left low back, though more likely MSK etiology. - Discussed ddx, home care, tx options, and given follow up precautions.     Acute left-sided low back pain without sciatica  -     POCT Urinalysis, Dipstick, Automated, W/O Scope  -     predniSONE (DELTASONE) 20 MG tablet; Take 2 tablets (40 mg total) by mouth once daily for 1 day, THEN 1 tablet (20 mg total) once daily for 3 days.  Dispense: 5 tablet; Refill: 0    Strain of lumbar region, initial encounter    Microscopic hematuria      Patient Instructions   - Rest.    - Drink plenty of fluids.    - Acetaminophen (tylenol) as directed as needed for fever/pain. Avoid tylenol if you have a history of liver disease. Do not take ibuprofen if you have a history of GI bleeding, kidney disease, or if you take blood thinners.     - You received a steroid prednisone today.  This can elevate your blood pressure, elevate your blood sugar, water weight gain, nervous energy, redness to the face and dimpling of the skin where the shot goes in.   - Do not use steroids more than 3 times per year.   - If you have diabetes, please check you blood sugar frequently.  - If you have high blood pressure, please check your blood pressure  frequently.     - continue tizanidine as needed for muscle spasm and pain    - ice or low heat to area of pain.    - there was small amount of blood in urine- seen microscopically- no other issues with the urine. Follow up with your pcp regarding this for further evaluation, repeat urinalysis.    - Follow up with your PCP or specialty clinic as directed in the next 1-2 weeks if not improved or as needed.  You can call [phone] to schedule an appointment with the appropriate provider.    - Go to the ER or seek medical attention immediately if you develop new or worsening symptoms.     - You must understand that you have received an Urgent Care treatment only and that you may be released before all of your medical problems are known or treated.   - You, the patient, will arrange for follow up care as instructed.   - If your condition worsens or fails to improve we recommend that you receive another evaluation at the ER immediately or contact your PCP to discuss your concerns or return here.

## 2023-01-08 NOTE — PATIENT INSTRUCTIONS
- Rest.    - Drink plenty of fluids.    - Acetaminophen (tylenol) as directed as needed for fever/pain. Avoid tylenol if you have a history of liver disease. Do not take ibuprofen if you have a history of GI bleeding, kidney disease, or if you take blood thinners.     - You received a steroid prednisone today.  This can elevate your blood pressure, elevate your blood sugar, water weight gain, nervous energy, redness to the face and dimpling of the skin where the shot goes in.   - Do not use steroids more than 3 times per year.   - If you have diabetes, please check you blood sugar frequently.  - If you have high blood pressure, please check your blood pressure frequently.     - continue tizanidine as needed for muscle spasm and pain    - ice or low heat to area of pain.    - there was small amount of blood in urine- seen microscopically- no other issues with the urine. Follow up with your pcp regarding this for further evaluation, repeat urinalysis.    - Follow up with your PCP or specialty clinic as directed in the next 1-2 weeks if not improved or as needed.  You can call [phone] to schedule an appointment with the appropriate provider.    - Go to the ER or seek medical attention immediately if you develop new or worsening symptoms.     - You must understand that you have received an Urgent Care treatment only and that you may be released before all of your medical problems are known or treated.   - You, the patient, will arrange for follow up care as instructed.   - If your condition worsens or fails to improve we recommend that you receive another evaluation at the ER immediately or contact your PCP to discuss your concerns or return here.

## 2023-01-11 ENCOUNTER — TELEPHONE (OUTPATIENT)
Dept: GASTROENTEROLOGY | Facility: CLINIC | Age: 83
End: 2023-01-11
Payer: MEDICARE

## 2023-01-11 NOTE — TELEPHONE ENCOUNTER
Ma called Patient no answer NIDIA GIBBS/Dr.Connolly Medeiros agree please tell patient she needs to go to ER for evaluation if she has an 8/10 abdominal pain.     Please also let her know that her EGD colonoscopy pathology was benign but 1 of her colon polyps the large 1 was a precancerous but benign colon polyp she needs ever next surveillance colonoscopy in 3 years.     Also all her first-degree relatives her siblings and children should have the 1st screening colonoscopy at age 40 based on her advanced colon adenomas polyp.     Please schedule her telemedicine video visit for follow-up with me next week

## 2023-02-12 ENCOUNTER — TELEPHONE (OUTPATIENT)
Dept: ENDOSCOPY | Facility: HOSPITAL | Age: 83
End: 2023-02-12
Payer: MEDICARE

## 2023-02-16 ENCOUNTER — TELEPHONE (OUTPATIENT)
Dept: ENDOSCOPY | Facility: HOSPITAL | Age: 83
End: 2023-02-16
Payer: MEDICARE

## 2023-02-17 NOTE — TELEPHONE ENCOUNTER
Yolanda Nazario placed an order for an EUS. Attempted were made to call her. Should I just schedule her for an [facility] visit?  [place]

## 2023-03-24 ENCOUNTER — TELEPHONE (OUTPATIENT)
Dept: GASTROENTEROLOGY | Facility: CLINIC | Age: 83
End: 2023-03-24
Payer: MEDICARE

## 2023-03-24 ENCOUNTER — TELEPHONE (OUTPATIENT)
Dept: PAIN MEDICINE | Facility: CLINIC | Age: 83
End: 2023-03-24
Payer: MEDICARE

## 2023-03-24 DIAGNOSIS — M54.15 RADICULOPATHY OF THORACOLUMBAR REGION: ICD-10-CM

## 2023-03-24 DIAGNOSIS — G89.4 CHRONIC PAIN SYNDROME: ICD-10-CM

## 2023-03-24 DIAGNOSIS — M47.816 LUMBAR SPONDYLOSIS: Primary | ICD-10-CM

## 2023-03-24 RX ORDER — GABAPENTIN 600 MG/1
600 TABLET ORAL 3 TIMES DAILY
Qty: 90 TABLET | Refills: 2 | Status: SHIPPED | OUTPATIENT
Start: 2023-03-24 | End: 2023-04-04 | Stop reason: SDUPTHER

## 2023-03-24 NOTE — TELEPHONE ENCOUNTER
Return call and spoke with patient. Inform patient of Dr. Mckeon schedule. Patient declined to schedule with another GI specalist. Patient decline to go to Er for sever pain. Patient stated that she has contacted her PCP and is unable to get a appointment. Patient stated that she will contact her pain management doctor because she is in pain and all she has been taking was tylenol and it's not helping.

## 2023-03-24 NOTE — TELEPHONE ENCOUNTER
----- Message from Marya Munson sent at 3/24/2023 10:03 AM CDT -----  Regarding: Medication  Refill  Request                  Reply in [facility]: NO      Please refill the medication(s) listed below. Please call the patient: [phone] (M)       Medication    HYDROcodone-acetaminophen (NORCO) 5-325 mg per tablet                        Sig - Route: Take 1 tablet by mouth every 12 (twelve) hours as needed for Pain. - Oral                        Dispense:  60 Tablets    Medication     gabapentin (NEURONTIN) 300 MG capsule                         Sig - Route: Take 1 capsule (300 mg total) by mouth 3 (three) times daily. - Oral                         Dispense:  90 Capsules      Preferred Pharmacy:  [facility] ([facility] - [place] [address]     Phone: [phone]  Fax:  [phone]

## 2023-03-24 NOTE — TELEPHONE ENCOUNTER
Staff spoke with pt that we will submit refill to the provider but for the med refill we would need to see pt in the office due to we haven't saw pt since august. Pt has been set up for Monday at 1:40 pm with Bhupendra monae Np. Pt had been informed visits are required every 2 months for pain med refill.

## 2023-03-27 ENCOUNTER — OFFICE VISIT (OUTPATIENT)
Dept: PAIN MEDICINE | Facility: CLINIC | Age: 83
End: 2023-03-27
Payer: MEDICARE

## 2023-03-27 VITALS
HEIGHT: 62 IN | SYSTOLIC BLOOD PRESSURE: 165 MMHG | HEART RATE: 100 BPM | WEIGHT: 178.38 LBS | BODY MASS INDEX: 32.82 KG/M2 | DIASTOLIC BLOOD PRESSURE: 93 MMHG

## 2023-03-27 DIAGNOSIS — Z51.81 ENCOUNTER FOR THERAPEUTIC DRUG MONITORING: Primary | ICD-10-CM

## 2023-03-27 PROCEDURE — 3077F PR MOST RECENT SYSTOLIC BLOOD PRESSURE >= 140 MM HG: ICD-10-PCS | Mod: CPTII,S$GLB,, | Performed by: NURSE PRACTITIONER

## 2023-03-27 PROCEDURE — 3080F DIAST BP >= 90 MM HG: CPT | Mod: CPTII,S$GLB,, | Performed by: NURSE PRACTITIONER

## 2023-03-27 PROCEDURE — 99999 PR PBB SHADOW E&M-EST. PATIENT-LVL IV: ICD-10-PCS | Mod: PBBFAC,,, | Performed by: NURSE PRACTITIONER

## 2023-03-27 PROCEDURE — 1160F PR REVIEW ALL MEDS BY PRESCRIBER/CLIN PHARMACIST DOCUMENTED: ICD-10-PCS | Mod: CPTII,S$GLB,, | Performed by: NURSE PRACTITIONER

## 2023-03-27 PROCEDURE — 99214 OFFICE O/P EST MOD 30 MIN: CPT | Mod: S$GLB,,, | Performed by: NURSE PRACTITIONER

## 2023-03-27 PROCEDURE — 3080F PR MOST RECENT DIASTOLIC BLOOD PRESSURE >= 90 MM HG: ICD-10-PCS | Mod: CPTII,S$GLB,, | Performed by: NURSE PRACTITIONER

## 2023-03-27 PROCEDURE — 99214 PR OFFICE/OUTPT VISIT, EST, LEVL IV, 30-39 MIN: ICD-10-PCS | Mod: S$GLB,,, | Performed by: NURSE PRACTITIONER

## 2023-03-27 PROCEDURE — 80326 AMPHETAMINES 5 OR MORE: CPT | Performed by: NURSE PRACTITIONER

## 2023-03-27 PROCEDURE — 1159F PR MEDICATION LIST DOCUMENTED IN MEDICAL RECORD: ICD-10-PCS | Mod: CPTII,S$GLB,, | Performed by: NURSE PRACTITIONER

## 2023-03-27 PROCEDURE — 1125F AMNT PAIN NOTED PAIN PRSNT: CPT | Mod: CPTII,S$GLB,, | Performed by: NURSE PRACTITIONER

## 2023-03-27 PROCEDURE — 1160F RVW MEDS BY RX/DR IN RCRD: CPT | Mod: CPTII,S$GLB,, | Performed by: NURSE PRACTITIONER

## 2023-03-27 PROCEDURE — 1101F PR PT FALLS ASSESS DOC 0-1 FALLS W/OUT INJ PAST YR: ICD-10-PCS | Mod: CPTII,S$GLB,, | Performed by: NURSE PRACTITIONER

## 2023-03-27 PROCEDURE — 1101F PT FALLS ASSESS-DOCD LE1/YR: CPT | Mod: CPTII,S$GLB,, | Performed by: NURSE PRACTITIONER

## 2023-03-27 PROCEDURE — 3288F PR FALLS RISK ASSESSMENT DOCUMENTED: ICD-10-PCS | Mod: CPTII,S$GLB,, | Performed by: NURSE PRACTITIONER

## 2023-03-27 PROCEDURE — 3077F SYST BP >= 140 MM HG: CPT | Mod: CPTII,S$GLB,, | Performed by: NURSE PRACTITIONER

## 2023-03-27 PROCEDURE — 99999 PR PBB SHADOW E&M-EST. PATIENT-LVL IV: CPT | Mod: PBBFAC,,, | Performed by: NURSE PRACTITIONER

## 2023-03-27 PROCEDURE — 1159F MED LIST DOCD IN RCRD: CPT | Mod: CPTII,S$GLB,, | Performed by: NURSE PRACTITIONER

## 2023-03-27 PROCEDURE — 1125F PR PAIN SEVERITY QUANTIFIED, PAIN PRESENT: ICD-10-PCS | Mod: CPTII,S$GLB,, | Performed by: NURSE PRACTITIONER

## 2023-03-27 PROCEDURE — 3288F FALL RISK ASSESSMENT DOCD: CPT | Mod: CPTII,S$GLB,, | Performed by: NURSE PRACTITIONER

## 2023-03-27 RX ORDER — HYDROCODONE BITARTRATE AND ACETAMINOPHEN 5; 325 MG/1; MG/1
1 TABLET ORAL
Qty: 60 TABLET | Refills: 0 | Status: SHIPPED | OUTPATIENT
Start: 2023-03-27 | End: 2023-05-04 | Stop reason: SDUPTHER

## 2023-03-27 NOTE — PROGRESS NOTES
Chronic patient Established Note (Follow up visit)      SUBJECTIVE:    Interval History 3/27/2023  Mrs Youssef presents for follow up of chronic pain complaints. She was started on Norco 5/325mg prior visit. She states this was beneficial but did not return or call to request refills over interval. She is  requesting refill as this did provide relief and improved functioning.    Interval History 11/14/2022:  Tamiko Youssef presents to the clinic for a follow-up appointment for chronic low back pain. Since the last visit, Tamiko Youssef states the pain has been worsening. Current pain intensity is 6/10. Pain is localized to the low back with radiating into the right leg. Radiating pain is predominately in the bilateral calves. Also with localized pain to right knee. Reports she has had steroid injections in the right knee in the past with good benefit. Most recently 3 years ago. Patient ambulates with a Rolator and reports most recent fall about 6 months ago when she fell on her right knee. Did not seek MD care at that time.   No new onset weakness, new onset sensation changes, saddle anesthesia, or bowel/bladder changes.    Interval History 10/3/2022:  The patient returns to clinic today for follow up of low back pain. She continues to report low back pain that radiates into the posterior aspect both legs to her ankles. Her pain is worse with activity. She continues to take Gabapentin with some benefit. She did try Lyrica but this caused a rash. She is not interested in further procedures at this time. She asks about Norco today, as this has helped her pain in the past. She denies any other health changes. Her pain today is 8/10.     Interval History 9/1/2022:  The patient returns to clinic today for follow up of low back pain. She continues to report low back pain that radiates into the posterior aspect of both legs to her ankles. Her pain is worse with standing and walking. She also reports increased right knee  pain. She is currently taking Gabapentin with limited relief. She also reports that this makes her sleepy. She is not interested in further procedures at this time. She denies any other health changes. Her pain today is 8/10.     Interval History 6/8/2022:  The patient returns to clinic today for follow up of low back pain. She is s/p bilateral SI joint injections on 5/25/2022. She reports no relief. She continues to report low back pain that radiates into her buttocks into the posterior aspect of both legs to her ankles. She also reports radiates pain into the anterior aspect of her left leg. She is not interested in further procedures at this time. She reports increased bilateral knee and ankle pain. She would like to establish care with an Orthopedic. She also reports that her PCP has left. She would like to establish care here at [facility]. She continues to take Gabapentin. She denies any other health changes. Her pain today is 6/10.     Interval History 4/20/2022:  The patient returns to clinic today for follow up of low back pain. She has not been seen in over a year. She reports that previous SYLVIA in 4/2021 provided no relief. She continues to report low back pain that radiates into both hips and buttocks. She reports intermittent radiating pain into the posterior aspect of her left leg to under her foot. Her pain is worse with prolonged sitting and standing. She continues to take Gabapentin with limited relief. She denies any other health changes. Her pain today is 8/10.     Interval History 1/14/2021:  The patient returns to clinic today for follow up of low back pain. She continues to report low back pain that radiates into the lateral aspect of left leg to her ankle. She denies any right leg pain. She did not start Lyrica given at last visit due to concern for side effects. She has not started physical therapy yet. She continues to take Gabapentin and Zanaflex. She denies any other health changes. Her pain  today is 6/10.     Interval history 12/10/2020:  Returns for follow up of her low back pain. She continues to report low back pain that radiates into the lateral aspect of her left leg to her ankle. Reports that gabapentin and Zanaflex are not helping with pain. Her pain is worse with standing, walking, and activity. Her pain today is 5/10.     Interval History 11/13/2020:  The patient returns to clinic today for follow up of back pain. She reports limited relief with previous SYLVIA. She continues to report low back pain that radiates into the lateral aspect of her left leg to her ankle. She denies any right leg pain today. Her pain is worse with standing, walking, and activity. She is frustrated with her continued pain. She did increase Gabapentin to BID. She is unsure of relief at this time. She does take [place] from her PCP. She states that her PCP would like our office to take over this medication. She asks for a refill. She denies any other health changes. Her pain today is 2/10.     Interval History 10/30/2020:  The patient returns to clinic today via audio visit for follow up of back pain. She is s/p bilateral L4/5 TF SYLVIA on 10/14/2020. She reports limited relief of her pain. She reports increased low back pain that radiates down the lateral aspect of her left leg to her ankle. She denies any right leg pain today. Her pain is worse with prolonged standing, walking, and activity. She denies any weakness. She is currently taking Gabapentin once a day. She denies any other health changes.      Interval History 7/24/2020:  The patient returns to clinic today for follow up of low back pain. She reports increased pain over the last two months. She reports low back pain that radiates down the lateral aspect of both legs to her knees, left greater than right. Her pain is worse with standing and walking. She continues to take Gabapentin. She denies any other health changes. Her pain today is 9/10.     Interval History  2020:  Tamiko Youssef presents to the clinic for a follow-up appointment for lower back pain. She is s/p bilateral L4/5 TF SYLVIA on 2020. She reports 80% relief of his low back and leg pain. She continues to report low back pain that is aching in nature. She denies any radiating leg pain today. Her pain is worse with prolonged standing and walking. She continues to take Gabapentin with benefit. She continues to perform a home exercise routine. She denies any other health changes. Her pain today is 8/10.    Pain Disability Index Review:  Last 3 PDI Scores 3/27/2023 2022 10/3/2022   Pain Disability Index (PDI) 40 28 50       Pain Medications:  Gabapentin 300mg TID  Lyrica 75mg daily  Tylenol 1000mg daily  Voltaren gel for right knee  Salonpas on low back  Alprazolam (Xanax) 0.25mg qday prn sleep     Opioid Contract: yes      report:  Reviewed and consistent with medication use as prescribed.     Pain Procedures:   3/28/14, 3/13/13 Bilateral L4 TF SYLVIA  18 Bilateral L4 TF SYLVIA- 75% relief  2020- Bilateral L4/5 TF SYLVIA - 80% relief   10/14/2020- Bilateral L4/5 TF SYLVIA  2021 Left L3/4 and L4/5 TF SYLVIA  2022 Bilateral SIJ     Physical Therapy/Home Exercise: no    Imagin2022 MRI Lumbar Spine  FINDINGS:  Lumbar spine alignment is within normal limits. Vertebral body heights preserved.  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     The conus is normal in appearance.  The adjacent soft tissue structures show no significant abnormalities.     L1-L2: No evidence of a disc herniation.No significant central canal or neural foraminal narrowing.     L2-L3: There is no focal disc herniation. No central canal or foraminal stenosis.  Prominent facet arthritis.     L3-L4: There is some broad-based disc bulging and superimposed spondylitic spurring which is asymmetric to the left.  Prominent facet arthritis is noted.  No central canal  or foraminal stenosis.     L4-L5: Broad-based disc bulging and facet arthritis present.  No significant central canal or neural foraminal narrowing.     L5-S1: Broad-based disc bulging and facet arthritis are present.  No focal disc herniation.  No significant central canal or neural foraminal narrowing.     Impression:     Multilevel nonstenotic degenerative change mostly affecting the posterior elements.      Allergies:   Review of patient's allergies indicates:   Allergen Reactions    Mobic [meloxicam] Rash    Tramadol     Naprosyn [naproxen]      GI upset       Current Medications:   Current Outpatient Medications   Medication Sig Dispense Refill    albuterol (PROVENTIL) 2.5 mg /3 mL (0.083 %) nebulizer solution Take 2.5 mg by nebulization every 6 (six) hours as needed.      albuterol (PROVENTIL/VENTOLIN HFA) 90 mcg/actuation inhaler Inhale TWO to FOUR puffs by mouth FOUR times daily when needed.      ALPRAZolam (XANAX) 0.25 MG tablet TK 1 T PO QD PRA 30 tablet 0    amLODIPine (NORVASC) 10 MG tablet       clotrimazole (MYCELEX) 10 mg john Take 1 tablet (10 mg total) by mouth 5 (five) times daily. Dissolve one lozenge in the mouth 5 times a day. 50 John 0    diclofenac sodium (VOLTAREN) 1 % Gel Apply 2 g topically 4 (four) times daily. 1 each 2    dicyclomine (BENTYL) 10 MG capsule Take 1 capsule (10 mg total) by mouth as needed (abdominal cramping). 30 capsule 0    fluticasone propionate (FLONASE) 50 mcg/actuation nasal spray 2 sprays in each nostril every evening. 16 g 1    fluticasone-salmeterol diskus inhaler 250-50 mcg Inhale 1 puff into the lungs.      furosemide (LASIX) 20 MG tablet TK 1 T PO QD PRF FLUID  2    gabapentin (NEURONTIN) 600 MG tablet Take 1 tablet (600 mg total) by mouth 3 (three) times daily. 90 tablet 2    latanoprost 0.005 % ophthalmic solution Place 1 drop into both eyes every evening. 3 Bottle 3    mirtazapine (REMERON) 30 MG tablet Take 30 mg by mouth nightly.  3    pravastatin  (PRAVACHOL) 20 MG tablet Take 1 tablet (20 mg total) by mouth every evening. 90 tablet 3    polyethylene glycol (GLYCOLAX) 17 gram/dose powder Take 17 g by mouth daily as needed (Constipation). (Patient not taking: Reported on 12/7/2022) 1700 g 1    walker Misc 1 application by Misc.(Non-Drug; Combo Route) route once daily. (Patient not taking: Reported on 3/27/2023) 1 each 0     No current facility-administered medications for this visit.       REVIEW OF SYSTEMS:    GENERAL:  No weight loss, malaise or fevers.  HEENT:  Negative for frequent or significant headaches.  NECK:  Negative for lumps, goiter, pain and significant neck swelling.  RESPIRATORY:  Negative for cough, wheezing or shortness of breath.  CARDIOVASCULAR:  Negative for chest pain, leg swelling or palpitations.  GI:  Negative for abdominal discomfort, blood in stools or black stools or change in bowel habits.  MUSCULOSKELETAL:  See HPI.  SKIN:  Negative for lesions, rash, and itching.  PSYCH:  Negative for sleep disturbance, mood disorder and recent psychosocial stressors.  HEMATOLOGY/LYMPHOLOGY:  Negative for prolonged bleeding, bruising easily or swollen nodes.  NEURO:   No history of headaches, syncope, paralysis, seizures or tremors.  All other reviewed and negative other than HPI.    Past Medical History:  Past Medical History:   Diagnosis Date    Asthma     Cataract     Glaucoma     Hypertension        Past Surgical History:  Past Surgical History:   Procedure Laterality Date    CATARACT EXTRACTION W/  INTRAOCULAR LENS IMPLANT Bilateral     CHOLECYSTECTOMY      COLONOSCOPY N/A 12/5/2022    Procedure: COLONOSCOPY;  Surgeon: Daniel Mckeon MD;  Location: [address]);  Service: Endoscopy;  Laterality: N/A;  fully vaccinated - sm  extended constipation mirilax prep   instructions mailed - sm    CYST REMOVAL      on neck    ESOPHAGOGASTRODUODENOSCOPY N/A 12/5/2022    Procedure: EGD (ESOPHAGOGASTRODUODENOSCOPY);  Surgeon: Daniel Mckeon  MD;  Location: [facility] ENDO (4TH FLR);  Service: Endoscopy;  Laterality: N/A;    EYE SURGERY      HYSTERECTOMY      2/2 AUB, partial    INJECTION OF JOINT Bilateral 05/25/2022    Procedure: INJECTION, JOINT, BILATERAL SACROILIAC (SI);  Surgeon: Obdulio Leon MD;  Location: [facility] PAIN MGT;  Service: Pain Management;  Laterality: Bilateral;    TRANSFORAMINAL EPIDURAL INJECTION OF STEROID Bilateral 01/08/2020    Procedure: INJECTION, STEROID, EPIDURAL, TRANSFORAMINAL APPROACH;  Surgeon: Obdulio Leon MD;  Location: [facility] PAIN MGT;  Service: Pain Management;  Laterality: Bilateral;  B/L TFESI L4    TRANSFORAMINAL EPIDURAL INJECTION OF STEROID Bilateral 10/14/2020    Procedure: INJECTION, STEROID, EPIDURAL, TRANSFORAMINAL APPROACH, L4-L5 need consent;  Surgeon: Obdulio Leon MD;  Location: [facility] PAIN MGT;  Service: Pain Management;  Laterality: Bilateral;    TRANSFORAMINAL EPIDURAL INJECTION OF STEROID Left 04/14/2021    Procedure: INJECTION, STEROID, EPIDURAL, TRANSFORAMINAL APPROACH  left L3/4 and L4/5;  Surgeon: Obdulio Leon MD;  Location: [facility] PAIN MGT;  Service: Pain Management;  Laterality: Left;  consent needed       Family History:  Family History   Problem Relation Age of Onset    Cataracts Son     Glaucoma Son     No Known Problems Mother     No Known Problems Father     Macular degeneration Neg Hx        Social History:  Social History     Socioeconomic History    Marital status: Single   Tobacco Use    Smoking status: Never    Smokeless tobacco: Never   Substance and Sexual Activity    Alcohol use: No    Drug use: Never       OBJECTIVE:    Wt 80.9 kg (178 lb 5.6 oz)   BMI 32.62 kg/m²     No focal change from prior PHYSICAL EXAMINATION:    General appearance: Well appearing, in no acute distress, alert and oriented x3.  Psych:  Mood and affect appropriate.  Skin: Skin color, texture, turgor normal, no rashes or lesions, in both upper and lower body.  Head/face:  Atraumatic, normocephalic. No  palpable lymph nodes  Cor: RRR  Pulm: CTA  GI: Abdomen soft and non-tender.  Back: Straight leg raising in the sitting positions is positive to radicular pain on the right. Diffuse pain to palpation over the spine and lumbar paraspinals.  Limited range of motion with pain reproduction. Leg pains worsened with extension.Positive axial loading test bilateral. Positive tenderness over both SIJ with positive thigh and sacral thrust test, Positive FABERE,Ganselin and Yeoman's test on the both side.negative FADIR   Extremities: Peripheral joint ROM is full and pain free without obvious instability or laxity in all four extremities. No deformities, edema, or skin discoloration. Good capillary refill.  Musculoskeletal: Shoulder and hip provocative maneuvers are negative. Right knee with tenderness along medial and lateral joint lines. Pain with end range extension. +crepitus. Bilateral upper and lower extremity strength is normal and symmetric.  No atrophy or tone abnormalities are noted.  Neuro: Bilateral upper and lower extremity coordination and muscle stretch reflexes are physiologic and symmetric.  Plantar response are downgoing. No loss of sensation is noted.  Gait: Normal.    ASSESSMENT: 82 y.o. year old female with low back pan and radiating right leg pain as well as right knee pain, consistent with      No diagnosis found.        PLAN:     - I have stressed the importance of physical activity and a home exercise plan to help with pain and improve health.  - Patient can continue with medications for now since they are providing benefits, using them appropriately, and without side effects.  - ANTONIOMP reviewed without issues   - Refill Norco 5-325 mg every 12 h prn today. She has not refilled benzo for several months  - UDS and PMA done today 3/27/2023 (likely negative for medication as refill was over 2 months prior)  - Continue gabapentin to 600mg TID.  - Counseled patient regarding the importance of activity  modification, constant sleeping habits, and physical therapy.   - RTC with Dr Leon to continue medication mgt as he is prescriber and agrees with above plan of care.   The above plan and management options were discussed at length with patient. Patient is in agreement with the above and verbalized understanding.      Bhupendra Fung NP    03/27/2023    I spent a total of 30 minutes on the day of the visit.  This includes face to face time and non-face to face time preparing to see the patient by reviewing previous labs/imaging, obtaining and/or reviewing separately obtained history, documenting clinical information in the electronic or other health record, independently interpreting results and communicating results to the patient/family/caregiver.

## 2023-03-31 LAB
6MAM UR QL: NOT DETECTED
7AMINOCLONAZEPAM UR QL: NOT DETECTED
A-OH ALPRAZ UR QL: NOT DETECTED
ALPHA-OH-MIDAZOLAM: NOT DETECTED
ALPRAZ UR QL: NOT DETECTED
AMPHET UR QL SCN: NOT DETECTED
ANNOTATION COMMENT IMP: NORMAL
ANNOTATION COMMENT IMP: NORMAL
BARBITURATES UR QL: NOT DETECTED
BUPRENORPHINE UR QL: NOT DETECTED
BZE UR QL: NOT DETECTED
CARBOXYTHC UR QL: NOT DETECTED
CARISOPRODOL UR QL: NOT DETECTED
CLONAZEPAM UR QL: NOT DETECTED
CODEINE UR QL: PRESENT
CREAT UR-MCNC: 52.7 MG/DL (ref 20–400)
DIAZEPAM UR QL: NOT DETECTED
ETHYL GLUCURONIDE UR QL: NOT DETECTED
FENTANYL UR QL: NOT DETECTED
GABAPENTIN: NOT DETECTED
HYDROCODONE UR QL: NOT DETECTED
HYDROMORPHONE UR QL: NOT DETECTED
LORAZEPAM UR QL: NOT DETECTED
MDA UR QL: NOT DETECTED
MDEA UR QL: NOT DETECTED
MDMA UR QL: NOT DETECTED
ME-PHENIDATE UR QL: NOT DETECTED
METHADONE UR QL: NOT DETECTED
METHAMPHET UR QL: NOT DETECTED
MIDAZOLAM UR QL SCN: NOT DETECTED
MORPHINE UR QL: PRESENT
NALOXONE: NOT DETECTED
NORBUPRENORPHINE UR QL CFM: NOT DETECTED
NORDIAZEPAM UR QL: NOT DETECTED
NORFENTANYL UR QL: NOT DETECTED
NORHYDROCODONE UR QL CFM: NOT DETECTED
NORMEPERIDINE UR QL CFM: NOT DETECTED
NOROXYCODONE UR QL CFM: NOT DETECTED
NOROXYMORPHONE UR QL SCN: NOT DETECTED
OXAZEPAM UR QL: NOT DETECTED
OXYCODONE UR QL: NOT DETECTED
OXYMORPHONE UR QL: NOT DETECTED
PATHOLOGY STUDY: NORMAL
PCP UR QL: NOT DETECTED
PHENTERMINE UR QL: NOT DETECTED
PREGABALIN: NOT DETECTED
SERVICE CMNT-IMP: NORMAL
TAPENTADOL UR QL SCN: NOT DETECTED
TAPENTADOL UR QL SCN: NOT DETECTED
TEMAZEPAM UR QL: NOT DETECTED
TRAMADOL UR QL: NOT DETECTED
ZOLPIDEM METABOLITE: NOT DETECTED
ZOLPIDEM UR QL: NOT DETECTED

## 2023-04-04 DIAGNOSIS — M47.816 LUMBAR SPONDYLOSIS: ICD-10-CM

## 2023-04-04 DIAGNOSIS — M54.15 RADICULOPATHY OF THORACOLUMBAR REGION: ICD-10-CM

## 2023-04-04 DIAGNOSIS — G89.4 CHRONIC PAIN SYNDROME: Primary | ICD-10-CM

## 2023-04-04 RX ORDER — GABAPENTIN 600 MG/1
600 TABLET ORAL 3 TIMES DAILY
Qty: 90 TABLET | Refills: 2 | Status: SHIPPED | OUTPATIENT
Start: 2023-04-04 | End: 2023-05-04

## 2023-04-04 RX ORDER — TIZANIDINE 4 MG/1
4 TABLET ORAL NIGHTLY PRN
Qty: 90 TABLET | Refills: 0 | Status: SHIPPED | OUTPATIENT
Start: 2023-04-04 | End: 2023-07-03

## 2023-04-04 NOTE — TELEPHONE ENCOUNTER
----- Message from Marya Munson sent at 4/4/2023 11:02 AM CDT -----  Regarding: Medication  Refill  Request                Reply in [facility]: NO      Please refill the medication(s) listed below. Please call the patient :   [phone] ([phone]        Medication   gabapentin (NEURONTIN) 600 MG tablet                       Sig - Route: Take 1 tablet (600 mg total) by mouth 3 (three) times daily. - Oral                       Dispense  90 Tablets    Medication   tiZANidine (ZANAFLEX) 4 MG tablet                       Sig - Route: Take 1 tablet (4 mg total) by mouth nightly as needed. - Oral                       Dispense  90 Tablets                           Preferred Pharmacy:   [facility] - [place] [address]   Phone: [phone]  Fax:  [phone]

## 2023-04-24 ENCOUNTER — OFFICE VISIT (OUTPATIENT)
Dept: URGENT CARE | Facility: CLINIC | Age: 83
End: 2023-04-24
Payer: MEDICARE

## 2023-04-24 VITALS
RESPIRATION RATE: 16 BRPM | DIASTOLIC BLOOD PRESSURE: 64 MMHG | BODY MASS INDEX: 35.34 KG/M2 | OXYGEN SATURATION: 98 % | HEIGHT: 60 IN | SYSTOLIC BLOOD PRESSURE: 142 MMHG | TEMPERATURE: 97 F | WEIGHT: 180 LBS | HEART RATE: 64 BPM

## 2023-04-24 DIAGNOSIS — M54.50 ACUTE LOW BACK PAIN WITHOUT SCIATICA, UNSPECIFIED BACK PAIN LATERALITY: Primary | ICD-10-CM

## 2023-04-24 PROCEDURE — 99212 OFFICE O/P EST SF 10 MIN: CPT | Mod: S$GLB,,, | Performed by: NURSE PRACTITIONER

## 2023-04-24 PROCEDURE — 99212 PR OFFICE/OUTPT VISIT, EST, LEVL II, 10-19 MIN: ICD-10-PCS | Mod: S$GLB,,, | Performed by: NURSE PRACTITIONER

## 2023-04-24 RX ORDER — METHYLPREDNISOLONE 4 MG/1
TABLET ORAL
Qty: 21 EACH | Refills: 0 | Status: SHIPPED | OUTPATIENT
Start: 2023-04-24 | End: 2023-05-15

## 2023-04-24 RX ORDER — METHYLPREDNISOLONE 4 MG/1
TABLET ORAL
Qty: 21 EACH | Refills: 0 | Status: SHIPPED | OUTPATIENT
Start: 2023-04-24 | End: 2023-04-24

## 2023-04-24 NOTE — PROGRESS NOTES
Subjective:      Patient ID: Tamiko Youssef is a 82 y.o. female.    Vitals:  height is 5' (1.524 m) and weight is 81.6 kg (180 lb). Her oral temperature is 97 °F (36.1 °C). Her blood pressure is 142/64 (abnormal) and her pulse is 64. Her respiration is 16 and oxygen saturation is 98%.     Chief Complaint: Flank Pain    Patient presents with left-sided back pain.  She reports history of chronic back pain and is followed by pain management.  She states she is scheduled to see her pain management provider on 05/05/2023 for injections.  Patient denies CVA tenderness, dysuria, hematuria, fever and chills.  Patient denies incontinence of bladder and bowel.  She states she has been taking Tylenol with no relief.  Pain is described as aching and rated 8/10.        Flank Pain  This is a new problem. The current episode started 1 to 4 weeks ago. The problem occurs constantly. The problem has been gradually worsening since onset. The quality of the pain is described as aching. The pain does not radiate. The pain is at a severity of 6/10. The pain is moderate. The pain is The same all the time. The symptoms are aggravated by sitting. Pertinent negatives include no abdominal pain, bladder incontinence, bowel incontinence, chest pain, dysuria, fever, leg pain, numbness, paresis, pelvic pain, perianal numbness, tingling, weakness or weight loss. Risk factors include lack of exercise. Treatments tried: tylenol. The treatment provided no relief.     Constitution: Negative for fever.   Cardiovascular:  Negative for chest pain.   Gastrointestinal:  Negative for abdominal pain and bowel incontinence.   Genitourinary:  Negative for dysuria, bladder incontinence and pelvic pain.   Musculoskeletal:  Positive for back pain.   Neurological:  Negative for numbness.    Objective:     Physical Exam   Constitutional: She is oriented to person, place, and time. She appears well-developed. She is cooperative. No distress.   HENT:   Head:  Normocephalic and atraumatic.   Nose: Nose normal.   Mouth/Throat: Oropharynx is clear and moist and mucous membranes are normal.   Eyes: Conjunctivae and lids are normal.   Neck: Trachea normal and phonation normal. Neck supple.   Cardiovascular: Normal rate and normal pulses.   Pulmonary/Chest: Effort normal and breath sounds normal.   Abdominal: Normal appearance and bowel sounds are normal. She exhibits no mass. Soft. There is no left CVA tenderness and no right CVA tenderness.   Musculoskeletal:         General: No deformity.      Lumbar back: She exhibits tenderness and spasm.   Neurological: She is alert and oriented to person, place, and time. She has normal strength and normal reflexes. No sensory deficit.   Skin: Skin is warm, dry, intact and not diaphoretic.   Psychiatric: Her speech is normal and behavior is normal. Judgment and thought content normal.   Nursing note and vitals reviewed.    Assessment:     No diagnosis found.    Plan:       There are no diagnoses linked to this encounter.

## 2023-04-24 NOTE — PATIENT INSTRUCTIONS
Please follow-up with pain management/PCP as scheduled        - You must understand that you have received an Urgent Care treatment only and that you may be released before all of your medical problems are known or treated.   - You, the patient, will arrange for follow up care as instructed.   - If your condition worsens or fails to improve we recommend that you receive another evaluation at the ER immediately or contact your PCP to discuss your concerns or return here.   - Follow up with your PCP or specialty clinic as directed in the next 1-2 weeks if not improved or as needed.  You can call [phone] to schedule an appointment with the appropriate provider.

## 2023-05-03 ENCOUNTER — TELEPHONE (OUTPATIENT)
Dept: PAIN MEDICINE | Facility: CLINIC | Age: 83
End: 2023-05-03
Payer: MEDICARE

## 2023-05-04 ENCOUNTER — OFFICE VISIT (OUTPATIENT)
Dept: PAIN MEDICINE | Facility: CLINIC | Age: 83
End: 2023-05-04
Attending: ANESTHESIOLOGY
Payer: MEDICARE

## 2023-05-04 VITALS
WEIGHT: 178.56 LBS | RESPIRATION RATE: 18 BRPM | HEIGHT: 60 IN | TEMPERATURE: 98 F | BODY MASS INDEX: 35.05 KG/M2 | HEART RATE: 106 BPM | DIASTOLIC BLOOD PRESSURE: 80 MMHG | SYSTOLIC BLOOD PRESSURE: 145 MMHG

## 2023-05-04 DIAGNOSIS — M47.816 LUMBAR SPONDYLOSIS: ICD-10-CM

## 2023-05-04 DIAGNOSIS — M54.15 RADICULOPATHY OF THORACOLUMBAR REGION: ICD-10-CM

## 2023-05-04 DIAGNOSIS — G89.4 CHRONIC PAIN SYNDROME: ICD-10-CM

## 2023-05-04 DIAGNOSIS — M17.0 BILATERAL PRIMARY OSTEOARTHRITIS OF KNEE: ICD-10-CM

## 2023-05-04 DIAGNOSIS — M46.1 SACROILIITIS: Primary | ICD-10-CM

## 2023-05-04 PROCEDURE — 3077F SYST BP >= 140 MM HG: CPT | Mod: CPTII,S$GLB,, | Performed by: ANESTHESIOLOGY

## 2023-05-04 PROCEDURE — 99214 OFFICE O/P EST MOD 30 MIN: CPT | Mod: GC,S$GLB,, | Performed by: ANESTHESIOLOGY

## 2023-05-04 PROCEDURE — 3079F DIAST BP 80-89 MM HG: CPT | Mod: CPTII,S$GLB,, | Performed by: ANESTHESIOLOGY

## 2023-05-04 PROCEDURE — 99499 UNLISTED E&M SERVICE: CPT | Mod: S$GLB,,, | Performed by: ANESTHESIOLOGY

## 2023-05-04 PROCEDURE — 99999 PR PBB SHADOW E&M-EST. PATIENT-LVL IV: ICD-10-PCS | Mod: PBBFAC,,, | Performed by: ANESTHESIOLOGY

## 2023-05-04 PROCEDURE — 99999 PR PBB SHADOW E&M-EST. PATIENT-LVL IV: CPT | Mod: PBBFAC,,, | Performed by: ANESTHESIOLOGY

## 2023-05-04 PROCEDURE — 1160F PR REVIEW ALL MEDS BY PRESCRIBER/CLIN PHARMACIST DOCUMENTED: ICD-10-PCS | Mod: CPTII,S$GLB,, | Performed by: ANESTHESIOLOGY

## 2023-05-04 PROCEDURE — 1125F PR PAIN SEVERITY QUANTIFIED, PAIN PRESENT: ICD-10-PCS | Mod: CPTII,S$GLB,, | Performed by: ANESTHESIOLOGY

## 2023-05-04 PROCEDURE — 1159F MED LIST DOCD IN RCRD: CPT | Mod: CPTII,S$GLB,, | Performed by: ANESTHESIOLOGY

## 2023-05-04 PROCEDURE — 3079F PR MOST RECENT DIASTOLIC BLOOD PRESSURE 80-89 MM HG: ICD-10-PCS | Mod: CPTII,S$GLB,, | Performed by: ANESTHESIOLOGY

## 2023-05-04 PROCEDURE — 3077F PR MOST RECENT SYSTOLIC BLOOD PRESSURE >= 140 MM HG: ICD-10-PCS | Mod: CPTII,S$GLB,, | Performed by: ANESTHESIOLOGY

## 2023-05-04 PROCEDURE — 99499 RISK ADDL DX/OHS AUDIT: ICD-10-PCS | Mod: S$GLB,,, | Performed by: ANESTHESIOLOGY

## 2023-05-04 PROCEDURE — 1125F AMNT PAIN NOTED PAIN PRSNT: CPT | Mod: CPTII,S$GLB,, | Performed by: ANESTHESIOLOGY

## 2023-05-04 PROCEDURE — 1160F RVW MEDS BY RX/DR IN RCRD: CPT | Mod: CPTII,S$GLB,, | Performed by: ANESTHESIOLOGY

## 2023-05-04 PROCEDURE — 1159F PR MEDICATION LIST DOCUMENTED IN MEDICAL RECORD: ICD-10-PCS | Mod: CPTII,S$GLB,, | Performed by: ANESTHESIOLOGY

## 2023-05-04 PROCEDURE — 99214 PR OFFICE/OUTPT VISIT, EST, LEVL IV, 30-39 MIN: ICD-10-PCS | Mod: GC,S$GLB,, | Performed by: ANESTHESIOLOGY

## 2023-05-04 RX ORDER — GABAPENTIN 300 MG/1
300 CAPSULE ORAL 3 TIMES DAILY
Qty: 90 CAPSULE | Refills: 2 | Status: SHIPPED | OUTPATIENT
Start: 2023-05-04 | End: 2023-07-21

## 2023-05-04 RX ORDER — HYDROCODONE BITARTRATE AND ACETAMINOPHEN 5; 325 MG/1; MG/1
1 TABLET ORAL EVERY 12 HOURS PRN
Qty: 60 TABLET | Refills: 0 | Status: SHIPPED | OUTPATIENT
Start: 2023-05-04 | End: 2023-05-24 | Stop reason: SDUPTHER

## 2023-05-04 NOTE — PROGRESS NOTES
Chronic patient Established Note (Follow up visit)    Interval History 5/4/23:  Patient returns to clinic today for follow-up of chronic low back and bilateral knee pain.  She was taking Norco 5-325 mg twice daily which provided good relief for her.  Her prescription was refilled following her visit on 3/27/2023; however, the prescription was sent to the wrong pharmacy, and she was not able to have filled.  She feels that the gabapentin 600 mg 3 times daily is over sedating for her, and she wishes to return to 300 mg 3 times daily.  Today, she reports pain in her low back that radiates into her buttocks. She had bilateral SI joint injections in May 2022 which she feels gave her good relief.  She would like to repeat these if possible.  She is also having bilateral knee pain.  She had corticosteroid injections 4-5 years ago in her knees from an outside doctor that gave her good relief. She is interested in repeating these as well.     Interval History 3/27/2023  Mrs Youssef presents for follow up of chronic pain complaints. She was started on Norco 5/325mg prior visit. She states this was beneficial but did not return or call to request refills over interval. She is  requesting refill as this did provide relief and improved functioning.     Interval History 11/14/2022:  Tamiko Youssef presents to the clinic for a follow-up appointment for chronic low back pain. Since the last visit, Tamiko Youssef states the pain has been worsening. Current pain intensity is 6/10. Pain is localized to the low back with radiating into the right leg. Radiating pain is predominately in the bilateral calves. Also with localized pain to right knee. Reports she has had steroid injections in the right knee in the past with good benefit. Most recently 3 years ago. Patient ambulates with a Rolator and reports most recent fall about 6 months ago when she fell on her right knee. Did not seek MD care at that time.   No new onset weakness, new  onset sensation changes, saddle anesthesia, or bowel/bladder changes.     Interval History 10/3/2022:  The patient returns to clinic today for follow up of low back pain. She continues to report low back pain that radiates into the posterior aspect both legs to her ankles. Her pain is worse with activity. She continues to take Gabapentin with some benefit. She did try Lyrica but this caused a rash. She is not interested in further procedures at this time. She asks about Norco today, as this has helped her pain in the past. She denies any other health changes. Her pain today is 8/10.     Interval History 9/1/2022:  The patient returns to clinic today for follow up of low back pain. She continues to report low back pain that radiates into the posterior aspect of both legs to her ankles. Her pain is worse with standing and walking. She also reports increased right knee pain. She is currently taking Gabapentin with limited relief. She also reports that this makes her sleepy. She is not interested in further procedures at this time. She denies any other health changes. Her pain today is 8/10.     Interval History 6/8/2022:  The patient returns to clinic today for follow up of low back pain. She is s/p bilateral SI joint injections on 5/25/2022. She reports no relief. She continues to report low back pain that radiates into her buttocks into the posterior aspect of both legs to her ankles. She also reports radiates pain into the anterior aspect of her left leg. She is not interested in further procedures at this time. She reports increased bilateral knee and ankle pain. She would like to establish care with an Orthopedic. She also reports that her PCP has left. She would like to establish care here at [facility]. She continues to take Gabapentin. She denies any other health changes. Her pain today is 6/10.     Interval History 4/20/2022:  The patient returns to clinic today for follow up of low back pain. She has not been  seen in over a year. She reports that previous SYLVIA in 4/2021 provided no relief. She continues to report low back pain that radiates into both hips and buttocks. She reports intermittent radiating pain into the posterior aspect of her left leg to under her foot. Her pain is worse with prolonged sitting and standing. She continues to take Gabapentin with limited relief. She denies any other health changes. Her pain today is 8/10.     Interval History 1/14/2021:  The patient returns to clinic today for follow up of low back pain. She continues to report low back pain that radiates into the lateral aspect of left leg to her ankle. She denies any right leg pain. She did not start Lyrica given at last visit due to concern for side effects. She has not started physical therapy yet. She continues to take Gabapentin and Zanaflex. She denies any other health changes. Her pain today is 6/10.     Interval history 12/10/2020:  Returns for follow up of her low back pain. She continues to report low back pain that radiates into the lateral aspect of her left leg to her ankle. Reports that gabapentin and Zanaflex are not helping with pain. Her pain is worse with standing, walking, and activity. Her pain today is 5/10.     Interval History 11/13/2020:  The patient returns to clinic today for follow up of back pain. She reports limited relief with previous SYLVIA. She continues to report low back pain that radiates into the lateral aspect of her left leg to her ankle. She denies any right leg pain today. Her pain is worse with standing, walking, and activity. She is frustrated with her continued pain. She did increase Gabapentin to BID. She is unsure of relief at this time. She does take [place] from her PCP. She states that her PCP would like our office to take over this medication. She asks for a refill. She denies any other health changes. Her pain today is 2/10.     Interval History 10/30/2020:  The patient returns to clinic today  via audio visit for follow up of back pain. She is s/p bilateral L4/5 TF SYLVIA on 10/14/2020. She reports limited relief of her pain. She reports increased low back pain that radiates down the lateral aspect of her left leg to her ankle. She denies any right leg pain today. Her pain is worse with prolonged standing, walking, and activity. She denies any weakness. She is currently taking Gabapentin once a day. She denies any other health changes.      Interval History 7/24/2020:  The patient returns to clinic today for follow up of low back pain. She reports increased pain over the last two months. She reports low back pain that radiates down the lateral aspect of both legs to her knees, left greater than right. Her pain is worse with standing and walking. She continues to take Gabapentin. She denies any other health changes. Her pain today is 9/10.     Interval History 1/28/2020:  Tamiko Youssef presents to the clinic for a follow-up appointment for lower back pain. She is s/p bilateral L4/5 TF SYLVIA on 1/8/2020. She reports 80% relief of his low back and leg pain. She continues to report low back pain that is aching in nature. She denies any radiating leg pain today. Her pain is worse with prolonged standing and walking. She continues to take Gabapentin with benefit. She continues to perform a home exercise routine. She denies any other health changes. Her pain today is 8/10.    Pain Disability Index Review:  Last 3 PDI Scores 5/4/2023 3/27/2023 11/14/2022   Pain Disability Index (PDI) 40 40 28        Pain Medications:  Norco 5-325mg q12hrs prn - was not filled  Gabapentin 600mg TID  Tylenol 1000mg daily  Voltaren gel for right knee  Salonpas on low back     Opioid Contract: yes      report:  Reviewed and consistent with medication use as prescribed.     Pain Procedures:   3/28/14, 3/13/13 Bilateral L4 TF SYLVIA  8/22/18 Bilateral L4 TF SYLVIA- 75% relief  1/8/2020- Bilateral L4/5 TF YSLVIA - 80% relief   10/14/2020-  Bilateral L4/5 TF SYLVIA  2021 Left L3/4 and L4/5 TF SYLVIA  2022 Bilateral SIJ     Physical Therapy/Home Exercise: no     Imagin2022 MRI Lumbar Spine  FINDINGS:  Lumbar spine alignment is within normal limits. Vertebral body heights preserved.  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     The conus is normal in appearance.  The adjacent soft tissue structures show no significant abnormalities.     L1-L2: No evidence of a disc herniation.No significant central canal or neural foraminal narrowing.     L2-L3: There is no focal disc herniation. No central canal or foraminal stenosis.  Prominent facet arthritis.     L3-L4: There is some broad-based disc bulging and superimposed spondylitic spurring which is asymmetric to the left.  Prominent facet arthritis is noted.  No central canal or foraminal stenosis.     L4-L5: Broad-based disc bulging and facet arthritis present.  No significant central canal or neural foraminal narrowing.     L5-S1: Broad-based disc bulging and facet arthritis are present.  No focal disc herniation.  No significant central canal or neural foraminal narrowing.     Impression:     Multilevel nonstenotic degenerative change mostly affecting the posterior elements.        Allergies:   Review of patient's allergies indicates:   Allergen Reactions    Mobic [meloxicam] Rash    Tramadol     Naprosyn [naproxen]      GI upset       Current Medications:   Current Outpatient Medications   Medication Sig Dispense Refill    albuterol (PROVENTIL) 2.5 mg /3 mL (0.083 %) nebulizer solution Take 2.5 mg by nebulization every 6 (six) hours as needed.      albuterol (PROVENTIL/VENTOLIN HFA) 90 mcg/actuation inhaler Inhale TWO to FOUR puffs by mouth FOUR times daily when needed.      ALPRAZolam (XANAX) 0.25 MG tablet TK 1 T PO QD PRA 30 tablet 0    amLODIPine (NORVASC) 10 MG tablet       clotrimazole (MYCELEX) 10 mg nury Take 1 tablet  (10 mg total) by mouth 5 (five) times daily. Dissolve one lozenge in the mouth 5 times a day. 50 John 0    diclofenac sodium (VOLTAREN) 1 % Gel Apply 2 g topically 4 (four) times daily. 1 each 2    dicyclomine (BENTYL) 10 MG capsule Take 1 capsule (10 mg total) by mouth as needed (abdominal cramping). 30 capsule 0    fluticasone propionate (FLONASE) 50 mcg/actuation nasal spray 2 sprays in each nostril every evening. 16 g 1    fluticasone-salmeterol diskus inhaler 250-50 mcg Inhale 1 puff into the lungs.      furosemide (LASIX) 20 MG tablet TK 1 T PO QD PRF FLUID  2    gabapentin (NEURONTIN) 600 MG tablet Take 1 tablet (600 mg total) by mouth 3 (three) times daily. 90 tablet 2    latanoprost 0.005 % ophthalmic solution Place 1 drop into both eyes every evening. 3 Bottle 3    mirtazapine (REMERON) 30 MG tablet Take 30 mg by mouth nightly.  3    polyethylene glycol (GLYCOLAX) 17 gram/dose powder Take 17 g by mouth daily as needed (Constipation). 1700 g 1    pravastatin (PRAVACHOL) 20 MG tablet Take 1 tablet (20 mg total) by mouth every evening. 90 tablet 3    tiZANidine (ZANAFLEX) 4 MG tablet Take 1 tablet (4 mg total) by mouth nightly as needed. 90 tablet 0    walker Misc 1 application by Misc.(Non-Drug; Combo Route) route once daily. 1 each 0    methylPREDNISolone (MEDROL DOSEPACK) 4 mg tablet use as directed (Patient not taking: Reported on 5/4/2023) 21 each 0     No current facility-administered medications for this visit.       REVIEW OF SYSTEMS:    GENERAL:  No weight loss, malaise or fevers.  HEENT:  Negative for frequent or significant headaches.  NECK:  Negative for lumps, goiter, pain and significant neck swelling.  RESPIRATORY:  Negative for cough, wheezing or shortness of breath.  CARDIOVASCULAR:  Negative for chest pain, leg swelling or palpitations.  GI:  Negative for abdominal discomfort, blood in stools or black stools or change in bowel habits.  MUSCULOSKELETAL:  See HPI.  SKIN:  Negative for  lesions, rash, and itching.  PSYCH:  +ve for sleep disturbance, mood disorder and recent psychosocial stressors.  HEMATOLOGY/LYMPHOLOGY:  Negative for prolonged bleeding, bruising easily or swollen nodes.  NEURO:   No history of headaches, syncope, paralysis, seizures or tremors.  All other reviewed and negative other than HPI.    Past Medical History:  Past Medical History:   Diagnosis Date    Asthma     Cataract     Glaucoma     Hypertension        Past Surgical History:  Past Surgical History:   Procedure Laterality Date    CATARACT EXTRACTION W/  INTRAOCULAR LENS IMPLANT Bilateral     CHOLECYSTECTOMY      COLONOSCOPY N/A 12/5/2022    Procedure: COLONOSCOPY;  Surgeon: Daniel Mckeon MD;  Location: [facility] ENDO (4TH FLR);  Service: Endoscopy;  Laterality: N/A;  fully vaccinated - sm  extended constipation mirilax prep   instructions mailed - sm    CYST REMOVAL      on neck    ESOPHAGOGASTRODUODENOSCOPY N/A 12/5/2022    Procedure: EGD (ESOPHAGOGASTRODUODENOSCOPY);  Surgeon: Daniel Mckeon MD;  [facility]  Service: Endoscopy;  Laterality: N/A;    EYE SURGERY      HYSTERECTOMY      2/2 AUB, partial    INJECTION OF JOINT Bilateral 05/25/2022    Procedure: INJECTION, JOINT, BILATERAL SACROILIAC (SI);  Surgeon: Obdulio Leon MD;  Location: [facility] PAIN MGT;  Service: Pain Management;  Laterality: Bilateral;    TRANSFORAMINAL EPIDURAL INJECTION OF STEROID Bilateral 01/08/2020    Procedure: INJECTION, STEROID, EPIDURAL, TRANSFORAMINAL APPROACH;  Surgeon: Obdulio Leon MD;  Location: [facility] PAIN MGT;  Service: Pain Management;  Laterality: Bilateral;  B/L TFESI L4    TRANSFORAMINAL EPIDURAL INJECTION OF STEROID Bilateral 10/14/2020    Procedure: INJECTION, STEROID, EPIDURAL, TRANSFORAMINAL APPROACH, L4-L5 need consent;  Surgeon: Obdulio Leon MD;  Location: [facility] PAIN MGT;  Service: Pain Management;  Laterality: Bilateral;    TRANSFORAMINAL EPIDURAL INJECTION OF STEROID Left 04/14/2021     Procedure: INJECTION, STEROID, EPIDURAL, TRANSFORAMINAL APPROACH  left L3/4 and L4/5;  Surgeon: Obdulio Leon MD;  Location: [facility];  Service: Pain Management;  Laterality: Left;  consent needed       Family History:  Family History   Problem Relation Age of Onset    Cataracts Son     Glaucoma Son     No Known Problems Mother     No Known Problems Father     Macular degeneration Neg Hx        Social History:  Social History     Socioeconomic History    Marital status: Single   Tobacco Use    Smoking status: Never    Smokeless tobacco: Never   Substance and Sexual Activity    Alcohol use: No    Drug use: Never       OBJECTIVE:    BP (!) 145/80   Pulse 106   Temp 98 °F (36.7 °C)   Resp 18   Ht 5' (1.524 m)   Wt 81 kg (178 lb 9.2 oz)   BMI 34.88 kg/m²     PHYSICAL EXAMINATION:    General appearance: Well appearing, in no acute distress, alert and oriented x3.  Psych:  Mood and affect appropriate.  Skin: Skin color, texture, turgor normal, no rashes or lesions, in both upper and lower body.  Head/face:  Atraumatic, normocephalic. No palpable lymph nodes  Cor: RRR  Pulm:  Nonlabored.  Back: Straight leg raising in the sitting positions is negative to radicular pain.  There is tenderness to palpation over the lower lumbar spine as well as the bilateral lumbar paraspinal muscles.  Range of motion is limited in all planes.  Extremities: Peripheral joint ROM is full and pain free without obvious instability or laxity in all four extremities.  There is tenderness to the medial and lateral joint lines as well as the patellar tendon of bilateral knees.  Crepitus noted to both knees.  No deformities, edema, or skin discoloration. Good capillary refill.  Musculoskeletal: There is TTP over bilateral SI joints. REGINA elicits pain over SI joints bilaterally. Sacral compression/distraction tests positive. Bilateral upper and lower extremity strength is normal and symmetric.  No atrophy or tone abnormalities are  noted.  Neuro: Bilateral upper and lower extremity coordination and muscle stretch reflexes are physiologic and symmetric.  Plantar response are downgoing. No loss of sensation is noted.  Gait:  Antalgic.  Uses Rollator for ambulation.    ASSESSMENT: 82 y.o. year old female with low back and bilateral knee pain, consistent with:     1. Sacroiliitis        2. Bilateral primary osteoarthritis of knee        3. Lumbar spondylosis        4. Radiculopathy of thoracolumbar region        5. Chronic pain syndrome          PLAN:   - I have stressed the importance of physical activity and a home exercise plan to help with pain and improve health.  - Patient can continue with medications for now since they are providing benefits, using them appropriately, and without side effects.  - Referral to [facility] as this was very beneficial to her in the past.  - Decreased Neurontin back to 300mg TID due to oversedation with higher dose.  - Refilled Norco 5-325mg q12 hrs prn. Counseled her to contact our office if her new Pharmacy is unable to fill Rx, and we will send to [facility]  - Schedule for repeat bilateral SI joint injections  - Discussed knee injections with her today and will consider CSI vs HA injections in the future   - RTC 4 weeks following SI joint injections  - Counseled patient regarding the importance of activity modification, constant sleeping habits, and physical therapy.    The above plan and management options were discussed at length with patient. Patient is in agreement with the above and verbalized understanding.    Kenton Reina MD  [facility], PGY-4     I have personally reviewed the history and exam of this patient and agree with the resident/fellow/NPs note as stated above.    Obdulio Leon MD  5/4/23

## 2023-05-05 ENCOUNTER — TELEPHONE (OUTPATIENT)
Dept: ADMINISTRATIVE | Facility: OTHER | Age: 83
End: 2023-05-05
Payer: MEDICARE

## 2023-05-23 ENCOUNTER — TELEPHONE (OUTPATIENT)
Dept: PAIN MEDICINE | Facility: CLINIC | Age: 83
End: 2023-05-23
Payer: MEDICARE

## 2023-05-23 NOTE — TELEPHONE ENCOUNTER
----- Message from Lexy Dickinson sent at 5/23/2023  9:37 AM CDT -----  Regarding: Refill  Contact: TAMIKO YOUSSEF [[id number]]  Type:  RX Refill Request    Who Called: Tamiko Youssef    Refill or New Rx:Refill   RX Name and Strength:HYDROcodone-acetaminophen (NORCO) 5-325 mg per tablet   How is the patient currently taking it? (ex. 1XDay):every 12hrs   Is this a 30 day or 90 day RX:60 tab   Preferred Pharmacy with phone number:[facility] ([facility] ) - [place], [place] - [address]   Local or Mail Order:Local   Ordering Provider:Dr Leon   Would the patient rather a call back or a response via [facility]? Call back   Best Call Back Number: Home Phone      [phone]  Work Phone      Not on file.  Mobile          [phone]        Additional Information:

## 2023-05-23 NOTE — TELEPHONE ENCOUNTER
Staff spoke with the pt and we will need to speak with optum to see why pt was only given 14 tablets and we prescribed 60 and give the pt a updated once a response is received.

## 2023-05-24 DIAGNOSIS — M46.1 SACROILIITIS: Primary | ICD-10-CM

## 2023-05-24 DIAGNOSIS — M47.816 LUMBAR SPONDYLOSIS: ICD-10-CM

## 2023-05-24 DIAGNOSIS — M17.0 BILATERAL PRIMARY OSTEOARTHRITIS OF KNEE: ICD-10-CM

## 2023-05-24 RX ORDER — HYDROCODONE BITARTRATE AND ACETAMINOPHEN 5; 325 MG/1; MG/1
1 TABLET ORAL EVERY 12 HOURS PRN
Qty: 60 TABLET | Refills: 0 | Status: SHIPPED | OUTPATIENT
Start: 2023-05-24 | End: 2023-06-23

## 2023-06-06 ENCOUNTER — TELEPHONE (OUTPATIENT)
Dept: PAIN MEDICINE | Facility: CLINIC | Age: 83
End: 2023-06-06
Payer: MEDICARE

## 2023-06-06 NOTE — TELEPHONE ENCOUNTER
----- Message from Wale Peter sent at 6/6/2023 10:51 AM CDT -----  Regarding: Time  Name of Who is Calling:  Patient          What is the request in detail:  patient would like to know the time of her procedure            Can the clinic reply by [facility]: No            What Number to Call Back if not in [facility]:[phone]

## 2023-06-07 ENCOUNTER — HOSPITAL ENCOUNTER (OUTPATIENT)
Facility: OTHER | Age: 83
Discharge: HOME | End: 2023-06-07
Attending: ANESTHESIOLOGY | Admitting: ANESTHESIOLOGY
Payer: MEDICARE

## 2023-06-07 VITALS
RESPIRATION RATE: 16 BRPM | TEMPERATURE: 99 F | HEART RATE: 87 BPM | DIASTOLIC BLOOD PRESSURE: 67 MMHG | HEIGHT: 62 IN | WEIGHT: 180 LBS | SYSTOLIC BLOOD PRESSURE: 146 MMHG | BODY MASS INDEX: 33.13 KG/M2 | OXYGEN SATURATION: 96 %

## 2023-06-07 DIAGNOSIS — G89.29 CHRONIC PAIN: ICD-10-CM

## 2023-06-07 DIAGNOSIS — M46.1 SACROILIITIS: Primary | ICD-10-CM

## 2023-06-07 PROCEDURE — 25500020 PHARM REV CODE 255: Performed by: ANESTHESIOLOGY

## 2023-06-07 PROCEDURE — 63600175 PHARM REV CODE 636 W HCPCS: Performed by: ANESTHESIOLOGY

## 2023-06-07 PROCEDURE — 27096 INJECT SACROILIAC JOINT: CPT | Mod: RT | Performed by: ANESTHESIOLOGY

## 2023-06-07 PROCEDURE — 27096 PR INJECTION,SACROILIAC JOINT: ICD-10-PCS | Mod: 50,,, | Performed by: ANESTHESIOLOGY

## 2023-06-07 PROCEDURE — 27096 INJECT SACROILIAC JOINT: CPT | Mod: 50,,, | Performed by: ANESTHESIOLOGY

## 2023-06-07 PROCEDURE — 25000003 PHARM REV CODE 250: Performed by: ANESTHESIOLOGY

## 2023-06-07 RX ORDER — LIDOCAINE HYDROCHLORIDE 20 MG/ML
INJECTION, SOLUTION INFILTRATION; PERINEURAL
Status: DISCONTINUED | OUTPATIENT
Start: 2023-06-07 | End: 2023-06-07 | Stop reason: HOSPADM

## 2023-06-07 RX ORDER — SODIUM CHLORIDE 9 MG/ML
INJECTION, SOLUTION INTRAVENOUS CONTINUOUS
Status: DISCONTINUED | OUTPATIENT
Start: 2023-06-07 | End: 2023-06-07 | Stop reason: HOSPADM

## 2023-06-07 RX ORDER — TRIAMCINOLONE ACETONIDE 40 MG/ML
INJECTION, SUSPENSION INTRA-ARTICULAR; INTRAMUSCULAR
Status: DISCONTINUED | OUTPATIENT
Start: 2023-06-07 | End: 2023-06-07 | Stop reason: HOSPADM

## 2023-06-07 RX ORDER — BUPIVACAINE HYDROCHLORIDE 2.5 MG/ML
INJECTION, SOLUTION EPIDURAL; INFILTRATION; INTRACAUDAL
Status: DISCONTINUED | OUTPATIENT
Start: 2023-06-07 | End: 2023-06-07 | Stop reason: HOSPADM

## 2023-06-07 NOTE — OP NOTE
Sacroiliac Joint Injection under Fluoroscopic Guidance    The procedure, risks, benefits, and options were discussed with the patient. There are no contraindications to the procedure. The patent expressed understanding and agreed to the procedure. Informed written consent was obtained prior to the start of the procedure and can be found in the patient's chart.    PATIENT NAME: Tamiko Youssef   MRN: [id number]     DATE OF PROCEDURE: 06/07/2023    PROCEDURE: Bilateral Sacroiliac Joint Injection under Fluoroscopic Guidance    PRE-OP DIAGNOSIS: Sacroiliitis [M46.1]    POST-OP DIAGNOSIS: Same    PHYSICIAN: Obdulio Leon MD    ASSISTANTS: Kenton Reina MD Resident    MEDICATIONS INJECTED: Preservative-free Kenalog 40mg with 3cc of Bupivacine 0.25%     LOCAL ANESTHETIC INJECTED: Xylocaine 2%     SEDATION:  None    ESTIMATED BLOOD LOSS: None    COMPLICATIONS: None    TECHNIQUE: Time-out was performed to identify the patient and procedure to be performed. With the patient laying in a prone position, the surgical area was prepped and draped in the usual sterile fashion using ChloraPrep and a fenestrated drape. The sacroiliac joint was determined under fluoroscopy guidance. Skin anesthesia was achieved by injecting Lidocaine 2% over the injection site. The sacroiliac joint was  then approached with a 22 gauge, 3.5 inch spinal quinke needle that was introduced under fluoroscopic guidance in the AP and Lateral views. Once the needle tip was in the area of the joint, and there was no blood, contrast dye Omnipaque (300mg/mL) was injected to confirm placement and there was no vascular runoff. Fluoroscopic imaging in the AP and lateral views revealed a clear outline of the joint space. 4 mL of the medication mixture listed above was injected slowly intraarticular and sera-articular. Displacement of the radio opaque contrast after injection of the medication confirmed that the medication went into the area of the joint. The needles  were removed and bleeding was nil. The same procedure was repeated on the contralateral side. A sterile dressing was applied. No specimens collected. The patient tolerated the procedure well.       The patient was monitored after the procedure in the recovery area. They were given post-procedure and discharge instructions to follow at home. The patient was discharged in a stable condition.    Kenton Reina MD    I reviewed and edited the fellow's note. I conducted my own interview and physical examination. I agree with the findings. I was present and supervising all critical portions of the procedure.    Obdulio Leon MD

## 2023-06-07 NOTE — DISCHARGE SUMMARY
Discharge Note  Short Stay      SUMMARY     Admit Date: 6/7/2023    Attending Physician: Obdulio Leon    Discharge Physician: Kenton Reina      Discharge Date: 6/7/2023 3:36 PM    Procedure(s) (LRB):  INJECTION,SACROILIAC JOINT, BILATERAL (Bilateral)    Final Diagnosis: Sacroiliitis [M46.1]    Disposition: Home or self care    Patient Instructions:   Current Discharge Medication List        CONTINUE these medications which have NOT CHANGED    Details   albuterol (PROVENTIL) 2.5 mg /3 mL (0.083 %) nebulizer solution Take 2.5 mg by nebulization every 6 (six) hours as needed.      albuterol (PROVENTIL/VENTOLIN HFA) 90 mcg/actuation inhaler Inhale TWO to FOUR puffs by mouth FOUR times daily when needed.      ALPRAZolam (XANAX) 0.25 MG tablet TK 1 T PO QD PRA  Qty: 30 tablet, Refills: 0    Associated Diagnoses: Anxiety and depression      amLODIPine (NORVASC) 10 MG tablet     Comments: .      clotrimazole (MYCELEX) 10 mg nury Take 1 tablet (10 mg total) by mouth 5 (five) times daily. Dissolve one lozenge in the mouth 5 times a day.  Qty: 50 Nury, Refills: 0      diclofenac sodium (VOLTAREN) 1 % Gel Apply 2 g topically 4 (four) times daily.  Qty: 1 each, Refills: 2    Associated Diagnoses: DDD (degenerative disc disease), lumbar; Chronic pain syndrome      dicyclomine (BENTYL) 10 MG capsule Take 1 capsule (10 mg total) by mouth as needed (abdominal cramping).  Qty: 30 capsule, Refills: 0    Associated Diagnoses: Cramp, abdominal      fluticasone propionate (FLONASE) 50 mcg/actuation nasal spray 2 sprays in each nostril every evening.  Qty: 16 g, Refills: 1      fluticasone-salmeterol diskus inhaler 250-50 mcg Inhale 1 puff into the lungs.      furosemide (LASIX) 20 MG tablet TK 1 T PO QD PRF FLUID  Refills: 2      gabapentin (NEURONTIN) 300 MG capsule Take 1 capsule (300 mg total) by mouth 3 (three) times daily. Take 1 capsule at night for 1 week, then take 1 capsule twice a day for a week, then take 1 capsule  3x/day, thereafter.  Qty: 90 capsule, Refills: 2      HYDROcodone-acetaminophen (NORCO) 5-325 mg per tablet Take 1 tablet by mouth every 12 (twelve) hours as needed for Pain.  Qty: 60 tablet, Refills: 0    Comments: >7 day supply medically necessary  Associated Diagnoses: Sacroiliitis; Lumbar spondylosis; Bilateral primary osteoarthritis of knee      latanoprost 0.005 % ophthalmic solution Place 1 drop into both eyes every evening.  Qty: 3 Bottle, Refills: 3    Associated Diagnoses: Primary open angle glaucoma (POAG) of both eyes, mild stage      mirtazapine (REMERON) 30 MG tablet Take 30 mg by mouth nightly.  Refills: 3      polyethylene glycol (GLYCOLAX) 17 gram/dose powder Take 17 g by mouth daily as needed (Constipation).  Qty: 1700 g, Refills: 1    Associated Diagnoses: Constipation, unspecified constipation type      pravastatin (PRAVACHOL) 20 MG tablet Take 1 tablet (20 mg total) by mouth every evening.  Qty: 90 tablet, Refills: 3    Associated Diagnoses: Hyperlipidemia, unspecified hyperlipidemia type      tiZANidine (ZANAFLEX) 4 MG tablet Take 1 tablet (4 mg total) by mouth nightly as needed.  Qty: 90 tablet, Refills: 0    Associated Diagnoses: Chronic pain syndrome      walker Misc 1 application by Misc.(Non-Drug; Combo Route) route once daily.  Qty: 1 each, Refills: 0    Comments: lumbosacral neuritis wheelchair walker with a seat                 Discharge Diagnosis: Sacroiliitis [M46.1]  Condition on Discharge: Stable with no complications to procedure   Diet on Discharge: Same as before.  Activity: as per instruction sheet.  Discharge to: Home with a responsible adult.  Follow up: 2-4 weeks       Please call my office or pager at [phone] if experienced any weakness or loss of sensation, fever > 101.5, pain uncontrolled with oral medications, persistent nausea/vomiting/or diarrhea, redness or drainage from the incisions, or any other worrisome concerns. If physician on call was not reached or could  not communicate with our office for any reason please go to the nearest emergency department        Obdulio Leon

## 2023-06-07 NOTE — DISCHARGE INSTRUCTIONS
Adult Procedural Sedation Instructions    Recovery After Procedural Sedation (Adult)  You have been given medicine by vein to make you sleep during your surgery. This may have included both a pain medicine and sleeping medicine. Most of the effects have worn off. But you may still have some drowsiness for the next 6 to 8 hours.  Home care  Follow these guidelines when you get home:  For the next 8 hours, you should be watched by a responsible adult. This person should make sure your condition is not getting worse.  Don't drink any alcohol for the next 24 hours.  Don't drive, operate dangerous machinery, or make important business or personal decisions during the next 24 hours.  Note: Your healthcare provider may tell you not to take any medicine by mouth for pain or sleep in the next 4 hours. These medicines may react with the medicines you were given in the hospital. This could cause a much stronger response than usual.  Follow-up care  Follow up with your healthcare provider if you are not alert and back to your usual level of activity within 12 hours.  When to seek medical advice  Call your healthcare provider right away if any of these occur:  Drowsiness gets worse  Weakness or dizziness gets worse  Repeated vomiting  You can't be awakened   Date Last Reviewed: 10/18/2016  © 9681-2151 [employer]. [address] [place] [address], [place] [address]. All rights reserved. This information is not intended as a substitute for professional medical care. Always follow your healthcare professional's instructions.          Thank you for allowing us to care for you today. You may receive a survey about the care we provided. Your feedback is valuable and helps us provide excellent care throughout the community.     Home Care Instructions for Pain Management:    1. DIET:   You may resume your normal diet today.   2. BATHING:   You may shower with luke warm water. No tub baths or anything that will soak injection sites  under water for the next 24 hours.  3. DRESSING:   You may remove your bandage today.   4. ACTIVITY LEVEL:   You may resume your normal activities 24 hrs after your procedure. Nothing strenuous today.  5. MEDICATIONS:   You may resume your normal medications today. To restart blood thinners, ask your doctor.  6. DRIVING    If you have received any sedatives by mouth today, you may not drive for 12 hours.    If you have received any sedation through your IV, you may not drive for 24 hrs.   7. SPECIAL INSTRUCTIONS:   No heat to the injection site for 24 hrs including, hot bath or shower, heating pad, moist heat, or hot tubs.    Use ice pack to injection site for any pain or discomfort.  Apply ice packs for 20 minute intervals as needed.    IF you have diabetes, be sure to monitor your blood sugar more closely. IF your injection contained steroids your blood sugar levels may become higher than normal.    If you are still having pain upon discharge:  Your pain may improve over the next 48 hours. The anesthetic (numbing medication) works immediately to 48 hours. IF your injection contained a steroid (anti-inflammatory medication), it takes approximately 3 days to start feeling relief and 7-10 days to see your greatest results from the medication. It is possible you may need subsequent injections. This would be discussed at your follow up appointment with pain management or your referring doctor.    Please call the PAIN MANAGEMENT office at [phone] or ON CALL pager at [phone] if you experienced any:   -Weakness or loss of sensation  -Fever > 101.5  -Pain uncontrolled with oral medications   -Persistent nausea, vomiting, or diarrhea  -Redness or drainage from the injection sites, or any other worrisome concerns.   If physician on call was not reached or could not communicate with our office for any reason please go to the nearest emergency department.

## 2023-06-14 ENCOUNTER — CLINICAL SUPPORT (OUTPATIENT)
Dept: REHABILITATION | Facility: OTHER | Age: 83
End: 2023-06-14
Attending: ANESTHESIOLOGY
Payer: MEDICARE

## 2023-06-14 DIAGNOSIS — M47.816 LUMBAR SPONDYLOSIS: ICD-10-CM

## 2023-06-14 DIAGNOSIS — R29.898 DECREASED STRENGTH OF TRUNK AND BACK: ICD-10-CM

## 2023-06-14 DIAGNOSIS — R29.3 POOR POSTURE: ICD-10-CM

## 2023-06-14 DIAGNOSIS — M25.69 DECREASED RANGE OF MOTION OF TRUNK AND BACK: ICD-10-CM

## 2023-06-14 DIAGNOSIS — M54.15 RADICULOPATHY OF THORACOLUMBAR REGION: ICD-10-CM

## 2023-06-14 DIAGNOSIS — M46.1 SACROILIITIS: ICD-10-CM

## 2023-06-14 PROCEDURE — 97161 PT EVAL LOW COMPLEX 20 MIN: CPT

## 2023-06-14 PROCEDURE — 97530 THERAPEUTIC ACTIVITIES: CPT

## 2023-06-14 NOTE — PLAN OF CARE
[facility]  Physical Therapy Lumbar Evaluation      Name: Tamiko Youssef  Clinic Number: [id number]    Therapy Diagnosis:   Encounter Diagnoses   Name Primary?    Lumbar spondylosis     Radiculopathy of thoracolumbar region     Sacroiliitis     Poor posture     Decreased range of motion of trunk and back     Decreased strength of trunk and back      Physician: Obdulio Leon MD    Physician Orders: PT Eval and Treat  Medical Diagnosis from Referral:   M47.816 (ICD-10-CM) - Lumbar spondylosis   M54.15 (ICD-10-CM) - Radiculopathy of thoracolumbar region   M46.1 (ICD-10-CM) - Sacroiliitis   Evaluation Date: 6/14/2023  Authorization Period Expiration: 12/31/23  Plan of Care Expiration: 8/23/2023  Reassessment Due: 7/14/2023  Visit # / Visits authorized: 1/20    Time In: 1500  Time Out: 1545  Total Billable Time: 45 minutes  INSURANCE and OUTCOMES: Value Based Insurance with FOTO Outcomes 1/3    Precautions: standard, HTN, fall, vision impaired, and asthma    Subjective     Date of onset: chronic, past 5 years  History of current condition: Tamiko Youssef reports that she has been having back pain for the past 5 years. She was walking out of an airplane and tripped on the jetway from the plane 5 years ago. She also fell in her house about 6 months ago. She reports that her back pain increased after both of those episodes.      Medical History:   Past Medical History:   Diagnosis Date    Asthma     Cataract     Glaucoma     Hypertension        Surgical History:   Tamiko Youssef  has a past surgical history that includes Eye surgery; Cholecystectomy; Hysterectomy; Cataract extraction w/  intraocular lens implant (Bilateral); Transforaminal epidural injection of steroid (Bilateral, 01/08/2020); Transforaminal epidural injection of steroid (Bilateral, 10/14/2020); Transforaminal epidural injection of steroid (Left, 04/14/2021); Injection of joint (Bilateral, 05/25/2022); Cyst  "Removal; Esophagogastroduodenoscopy (N/A, 12/5/2022); Colonoscopy (N/A, 12/5/2022); and injection, sacroiliac joint (Bilateral, 6/7/2023).    Medications:   Tamiko has a current medication list which includes the following prescription(s): albuterol, albuterol, alprazolam, amlodipine, clotrimazole, diclofenac sodium, dicyclomine, fluticasone propionate, fluticasone-salmeterol 250-50 mcg/dose, furosemide, gabapentin, hydrocodone-acetaminophen, latanoprost, mirtazapine, polyethylene glycol, pravastatin, tizanidine, and walker.    Allergies:   Review of patient's allergies indicates:   Allergen Reactions    Mobic [meloxicam] Rash    Tramadol     Naprosyn [naproxen]      GI upset        Imaging: No relevant imaging on file     Prior Therapy: HB in past, other PT  Prior Treatment: past steroid injections; most recently 6/5/23; limited effectiveness   Social History: lives alone; no RADHA; lives in a senior citizen apartment   Occupation: retired   Leisure: enjoys sewing, watch TV, crocheting       Prior Level of Function: independent   Current Level of Function: ambulates with rollator, has difficulty with cleaning, other household chores   DME owned/used: rollator;   Gym Membership: no    Pain:  Current 6/10, worst 8/10, best 4/10   Location: low back, hips, buttocks   Description: achy, tight  Aggravating Factors: prolonged sitting, walking, standing   Easing Factors: medication, ice pack   Disturbed Sleep: yes     Pattern of pain questions:  1.  Where is your pain the worst? back  2.  Is your pain constant or intermittent? Constant   3.  Does bending forward make your typical pain worse? Yes   4.  Since the start of your back pain, has there been a change in your bowel or bladder? Yes, chronic issue  5.  What can't you do now that you use to be able to do? Sit to sew or knit    Pts goals: "To help get rid of pain"    Red Flag Screening:   Cough/Sneeze Strain: (--)  Bladder/Bowel: (--)  Falls: (--) last fall was 6 months " ago  Night pain: (--)  Unexplained weight loss: (--)  General health: fair     Objective      Postural examination/scapula alignment: Rounded shoulder, Head forward, Slouched posture, Increased kyphosis, and Decreased lordosis  Joint integrity: Firm end feeling  Skin integrity:WNL   Edema: None  Correction of posture: better with lumbar roll      MOVEMENT LOSS - Lumbar   Norms ROM Loss Initial   Flexion Fingers touch toes, sacral angle >/= 70 deg, uniform spinal curvature, posterior weight shift  moderate loss   Extension ASIS surpasses toes, spine of scapulae surpasses heels, uniform spinal curve moderate loss   Side glide Right  moderate loss   Side glide Left  moderate loss   Rotation Right PT observes contralateral shoulder moderate loss   Rotation Left PT observes contralateral shoulder moderate loss     Lower Extremity Strength  Right LE  Left LE    Hip flexion: 4-/5 Hip flexion: 4-/5   Hip extension:  4-/5 Hip extension: 4-/5   Hip abduction: 4-/5 Hip abduction: 4-/5   Hip adduction:  4-/5 Hip adduction:  4-/5   Knee Flexion 4/5 Knee Flexion 4/5   Knee Extension 4/5 Knee Extension 4/5   Ankle dorsiflexion: 4/5 Ankle dorsiflexion: 4/5   Ankle plantarflexion: 4/5 Ankle plantarflexion: 4/5     GAIT:  Assistive Device used: rollator  Level of Assistance: independent  Patient displays the following gait deviations: unsteady gait and decreased step length.     Special Tests:   Test Name  Test Result   Prone Instability Test (+)   SI Joint Provocation Test (--)   Straight Leg Raise (--)   Neural Tension Test (--)   Crossed Straight Leg Raise (--)   Walking on toes Not able   Walking on heels  Not able     NEUROLOGICAL SCREENING:     Sensory deficits: WFL    Reflexes:    Left Right   Patella Tendon 1+ 1+   Achilles Tendon 1+ 1+   Clonus (--) (--)     Timed Up & Go Test (TUG)    Instructions to the patient:   From sitting in a chair, stand up, walk 3 meters, turn around, walk back, and sit down    Scoring:   Assistive  Device and/or Bracing Used: 4 wheeled walker  TUG Time: 19.85 seconds     Community Dwelling Older Adult = > 13.5 sec. are associated with high fall risk     5 times sit-stand 21.19 seconds    >12 sec= fall risk for general elderly  >16 sec= fall risk for Parkinson's disease  >10 sec= balance/vestibular dysfunction (<61 y/o)  >14.2 sec= balance/vestibular dysfunction (>61 y/o)  >12 sec= fall risk for CVA       Limitation/Restriction for FOTO Lumbar Survey    Therapist reviewed FOTO scores for Tamiko Youssef on 6/14/2023.   FOTO documents entered into [facility] - see Media section.    Limitation Score: 60%  Visit 5/6 Score:   Visit 10 Score:   Discharge Score:   Goal Score: 54%       Treatment     Total Treatment time separate from Evaluation: 10 minutes    Written Home Exercises Provided: Patient instructed to cont prior HEP.    HEP AS FOLLOWS:  DKTC  LTR  SOC  PPT    Exercises were reviewed and Tamiko Youssef was able to demonstrate them prior to the end of the session.  Tamiko Youssef demonstrated good understanding of the education provided.     See EMR under Patient Instructions for exercises provided 6/14/2023.    Therapeutic Education/Activity provided for 10 minutes:   - Discussed the following:  - what to expect in therapy  - an overview of the program, including health coaching and wellness  - importance of spinal hygiene, proper posture, lifting mechanics, sleep quality, and nutrition/hydration   - James roll trialed, recommended, and purchase information was provided.  - Patient received a handout regarding anticipated muscular soreness following the isometric test and strategies for management were reviewed with patient including stretching, using ice and scheduled rest.     Assessment     Tamiko is a 82 y.o. female referred to [facility] with a medical diagnosis of LBP. Pt presents with deficits in strength, ROM, functional movements, and balance. Patient is not appropriate for HB program at this  time due to increased fall risk. Will transfer to ortho PT to help maximize outcomes and work further on balance training.    Pt prognosis is Fair.     Pt will benefit from skilled outpatient Physical Therapy to address the deficits stated above and in the chart below, to provide pt/family education, and to maximize pt's level of independence. Based on the above history and physical examination an active physical therapy program is recommended.      Plan of care discussed with patient: Yes  Pt's spiritual, cultural and educational needs considered and patient is agreeable to the plan of care and goals as stated below:     Anticipated Barriers for therapy: chronicity of condition, compliance     PT Evaluation Completed? Yes    Medical necessity is demonstrated by the following problem list:    History  Co-morbidities and personal factors that may impact the plan of care [] LOW: no personal factors / co-morbidities  [x] MODERATE: 1-2 personal factors / co-morbidities  [] HIGH: 3+ personal factors / co-morbidities    Moderate / High Support Documentation:   Co-morbidities affecting plan of care: -    Personal Factors:   age     Examination  Body Structures and Functions, activity limitations and participation restrictions that may impact the plan of care [x] LOW: addressing 1-2 elements  [] MODERATE: 3+ elements  [] HIGH: 4+ elements (please support below)    Moderate / High Support Documentation:     Clinical Presentation [x] LOW: stable  [] MODERATE: Evolving  [] HIGH: Unstable     Decision Making/ Complexity Score: low       Goals:  Short Term Goals: 4 weeks   - The patient with report compliance with HEP to maximize functional outcomes. Appropriate and ongoing  - The patient will demonstrate increase in BLE MMT by 1/2 grade to improve pt's functional mobility. Appropriate and ongoing  - The patient will decrease pain level by 50% in order to improve QOL. Appropriate and ongoing  - The patient will demonstrate 25%  improvement in lumbar ROM to improve ability to perform ADLs. Appropriate and ongoing    Long Term Goals: 8 weeks   - The patient will demonstrate understanding and performance of advanced HEP to allow for independence once discharged from therapy. Appropriate and ongoing  - The patient will demonstrate 50% improvement in lumbar ROM to improve ability to perform ADLs. Appropriate and ongoing  - The patient will demonstrate increase in BLE MMT by 1 grade to improve pt's functional mobility. Appropriate and ongoing  - The patient will report 54% functional limitation on FOTO to indicate an improvement in overall function. Appropriate and ongoing    Plan     Outpatient physical therapy 2x week for 10 weeks or 20 visits to include the following:   - Patient education  - Therapeutic exercise  - Manual therapy  - Performance testing   - Neuromuscular Re-education  - Therapeutic activity   - Modalities    Pt may be seen by PTA as part of the rehabilitation team.     Therapist: Rashawn Decker, PT  6/14/2023

## 2023-06-30 ENCOUNTER — HOSPITAL ENCOUNTER (EMERGENCY)
Facility: OTHER | Age: 83
Discharge: HOME | End: 2023-06-30
Attending: EMERGENCY MEDICINE
Payer: MEDICARE

## 2023-06-30 ENCOUNTER — OFFICE VISIT (OUTPATIENT)
Dept: PAIN MEDICINE | Facility: CLINIC | Age: 83
End: 2023-06-30
Payer: MEDICARE

## 2023-06-30 VITALS
DIASTOLIC BLOOD PRESSURE: 62 MMHG | TEMPERATURE: 98 F | RESPIRATION RATE: 15 BRPM | SYSTOLIC BLOOD PRESSURE: 138 MMHG | WEIGHT: 172 LBS | HEIGHT: 62 IN | BODY MASS INDEX: 31.65 KG/M2 | OXYGEN SATURATION: 96 % | HEART RATE: 97 BPM

## 2023-06-30 VITALS
BODY MASS INDEX: 31.93 KG/M2 | SYSTOLIC BLOOD PRESSURE: 142 MMHG | HEIGHT: 62 IN | WEIGHT: 173.5 LBS | RESPIRATION RATE: 18 BRPM | DIASTOLIC BLOOD PRESSURE: 77 MMHG | HEART RATE: 96 BPM | OXYGEN SATURATION: 99 %

## 2023-06-30 DIAGNOSIS — Z51.81 ENCOUNTER FOR THERAPEUTIC DRUG MONITORING: ICD-10-CM

## 2023-06-30 DIAGNOSIS — M54.16 LUMBAR RADICULOPATHY: ICD-10-CM

## 2023-06-30 DIAGNOSIS — M51.36 DDD (DEGENERATIVE DISC DISEASE), LUMBAR: ICD-10-CM

## 2023-06-30 DIAGNOSIS — G89.4 CHRONIC PAIN SYNDROME: Primary | ICD-10-CM

## 2023-06-30 DIAGNOSIS — M47.816 LUMBAR SPONDYLOSIS: ICD-10-CM

## 2023-06-30 DIAGNOSIS — R42 LIGHTHEADEDNESS: ICD-10-CM

## 2023-06-30 DIAGNOSIS — R42 VERTIGO: Primary | ICD-10-CM

## 2023-06-30 DIAGNOSIS — M53.3 SACROILIAC JOINT PAIN: ICD-10-CM

## 2023-06-30 DIAGNOSIS — R10.32 LLQ ABDOMINAL PAIN: ICD-10-CM

## 2023-06-30 LAB
ALBUMIN SERPL BCP-MCNC: 3.9 G/DL (ref 3.5–5.2)
ALP SERPL-CCNC: 98 U/L (ref 55–135)
ALT SERPL W/O P-5'-P-CCNC: 19 U/L (ref 10–44)
ANION GAP SERPL CALC-SCNC: 12 MMOL/L (ref 8–16)
AST SERPL-CCNC: 27 U/L (ref 10–40)
BASOPHILS # BLD AUTO: 0.03 K/UL (ref 0–0.2)
BASOPHILS NFR BLD: 0.4 % (ref 0–1.9)
BILIRUB SERPL-MCNC: 0.3 MG/DL (ref 0.1–1)
BILIRUB UR QL STRIP: NEGATIVE
BNP SERPL-MCNC: <10 PG/ML (ref 0–99)
BUN SERPL-MCNC: 6 MG/DL (ref 8–23)
CALCIUM SERPL-MCNC: 9.5 MG/DL (ref 8.7–10.5)
CHLORIDE SERPL-SCNC: 101 MMOL/L (ref 95–110)
CLARITY UR: CLEAR
CO2 SERPL-SCNC: 23 MMOL/L (ref 23–29)
COLOR UR: COLORLESS
CREAT SERPL-MCNC: 0.8 MG/DL (ref 0.5–1.4)
CTP QC/QA: YES
DIFFERENTIAL METHOD: ABNORMAL
EOSINOPHIL # BLD AUTO: 0.1 K/UL (ref 0–0.5)
EOSINOPHIL NFR BLD: 0.7 % (ref 0–8)
ERYTHROCYTE [DISTWIDTH] IN BLOOD BY AUTOMATED COUNT: 14.3 % (ref 11.5–14.5)
EST. GFR  (NO RACE VARIABLE): >60 ML/MIN/1.73 M^2
GLUCOSE SERPL-MCNC: 128 MG/DL (ref 70–110)
GLUCOSE UR QL STRIP: NEGATIVE
HCT VFR BLD AUTO: 46 % (ref 37–48.5)
HGB BLD-MCNC: 14.4 G/DL (ref 12–16)
HGB UR QL STRIP: NEGATIVE
IMM GRANULOCYTES # BLD AUTO: 0.02 K/UL (ref 0–0.04)
IMM GRANULOCYTES NFR BLD AUTO: 0.3 % (ref 0–0.5)
KETONES UR QL STRIP: NEGATIVE
LACTATE SERPL-SCNC: 1.6 MMOL/L (ref 0.5–2.2)
LEUKOCYTE ESTERASE UR QL STRIP: NEGATIVE
LYMPHOCYTES # BLD AUTO: 1.6 K/UL (ref 1–4.8)
LYMPHOCYTES NFR BLD: 22.1 % (ref 18–48)
MAGNESIUM SERPL-MCNC: 2.1 MG/DL (ref 1.6–2.6)
MCH RBC QN AUTO: 27.4 PG (ref 27–31)
MCHC RBC AUTO-ENTMCNC: 31.3 G/DL (ref 32–36)
MCV RBC AUTO: 88 FL (ref 82–98)
MONOCYTES # BLD AUTO: 0.6 K/UL (ref 0.3–1)
MONOCYTES NFR BLD: 7.7 % (ref 4–15)
NEUTROPHILS # BLD AUTO: 5 K/UL (ref 1.8–7.7)
NEUTROPHILS NFR BLD: 68.8 % (ref 38–73)
NITRITE UR QL STRIP: NEGATIVE
NRBC BLD-RTO: 0 /100 WBC
PH UR STRIP: 7 [PH] (ref 5–8)
PLATELET # BLD AUTO: 248 K/UL (ref 150–450)
PMV BLD AUTO: 9.5 FL (ref 9.2–12.9)
POTASSIUM SERPL-SCNC: 4.7 MMOL/L (ref 3.5–5.1)
PROT SERPL-MCNC: 8.4 G/DL (ref 6–8.4)
PROT UR QL STRIP: NEGATIVE
RBC # BLD AUTO: 5.25 M/UL (ref 4–5.4)
SARS-COV-2 RDRP RESP QL NAA+PROBE: NEGATIVE
SODIUM SERPL-SCNC: 136 MMOL/L (ref 136–145)
SP GR UR STRIP: <1.005 (ref 1–1.03)
T4 FREE SERPL-MCNC: 0.99 NG/DL (ref 0.71–1.51)
TROPONIN I SERPL DL<=0.01 NG/ML-MCNC: <0.006 NG/ML (ref 0–0.03)
TSH SERPL DL<=0.005 MIU/L-ACNC: 0.22 UIU/ML (ref 0.4–4)
URN SPEC COLLECT METH UR: ABNORMAL
UROBILINOGEN UR STRIP-ACNC: NEGATIVE EU/DL
WBC # BLD AUTO: 7.23 K/UL (ref 3.9–12.7)

## 2023-06-30 PROCEDURE — 85025 COMPLETE CBC W/AUTO DIFF WBC: CPT | Performed by: NURSE PRACTITIONER

## 2023-06-30 PROCEDURE — 25000003 PHARM REV CODE 250: Performed by: NURSE PRACTITIONER

## 2023-06-30 PROCEDURE — 81003 URINALYSIS AUTO W/O SCOPE: CPT | Performed by: NURSE PRACTITIONER

## 2023-06-30 PROCEDURE — 84443 ASSAY THYROID STIM HORMONE: CPT | Performed by: NURSE PRACTITIONER

## 2023-06-30 PROCEDURE — 3078F DIAST BP <80 MM HG: CPT | Mod: CPTII,S$GLB,, | Performed by: NURSE PRACTITIONER

## 2023-06-30 PROCEDURE — 87635 SARS-COV-2 COVID-19 AMP PRB: CPT | Performed by: NURSE PRACTITIONER

## 2023-06-30 PROCEDURE — 96360 HYDRATION IV INFUSION INIT: CPT | Mod: 59

## 2023-06-30 PROCEDURE — 3077F PR MOST RECENT SYSTOLIC BLOOD PRESSURE >= 140 MM HG: ICD-10-PCS | Mod: CPTII,S$GLB,, | Performed by: NURSE PRACTITIONER

## 2023-06-30 PROCEDURE — 93005 ELECTROCARDIOGRAM TRACING: CPT

## 2023-06-30 PROCEDURE — 84439 ASSAY OF FREE THYROXINE: CPT | Performed by: NURSE PRACTITIONER

## 2023-06-30 PROCEDURE — 93010 EKG 12-LEAD: ICD-10-PCS | Mod: ,,, | Performed by: INTERNAL MEDICINE

## 2023-06-30 PROCEDURE — 99214 OFFICE O/P EST MOD 30 MIN: CPT | Mod: S$GLB,,, | Performed by: NURSE PRACTITIONER

## 2023-06-30 PROCEDURE — 99999 PR PBB SHADOW E&M-EST. PATIENT-LVL IV: ICD-10-PCS | Mod: PBBFAC,,, | Performed by: NURSE PRACTITIONER

## 2023-06-30 PROCEDURE — 1159F PR MEDICATION LIST DOCUMENTED IN MEDICAL RECORD: ICD-10-PCS | Mod: CPTII,S$GLB,, | Performed by: NURSE PRACTITIONER

## 2023-06-30 PROCEDURE — 1160F RVW MEDS BY RX/DR IN RCRD: CPT | Mod: CPTII,S$GLB,, | Performed by: NURSE PRACTITIONER

## 2023-06-30 PROCEDURE — 99999 PR PBB SHADOW E&M-EST. PATIENT-LVL IV: CPT | Mod: PBBFAC,,, | Performed by: NURSE PRACTITIONER

## 2023-06-30 PROCEDURE — 1125F PR PAIN SEVERITY QUANTIFIED, PAIN PRESENT: ICD-10-PCS | Mod: CPTII,S$GLB,, | Performed by: NURSE PRACTITIONER

## 2023-06-30 PROCEDURE — 83880 ASSAY OF NATRIURETIC PEPTIDE: CPT | Performed by: NURSE PRACTITIONER

## 2023-06-30 PROCEDURE — 96361 HYDRATE IV INFUSION ADD-ON: CPT

## 2023-06-30 PROCEDURE — 83605 ASSAY OF LACTIC ACID: CPT | Performed by: NURSE PRACTITIONER

## 2023-06-30 PROCEDURE — 83735 ASSAY OF MAGNESIUM: CPT | Performed by: NURSE PRACTITIONER

## 2023-06-30 PROCEDURE — 25500020 PHARM REV CODE 255: Performed by: NURSE PRACTITIONER

## 2023-06-30 PROCEDURE — 99214 PR OFFICE/OUTPT VISIT, EST, LEVL IV, 30-39 MIN: ICD-10-PCS | Mod: S$GLB,,, | Performed by: NURSE PRACTITIONER

## 2023-06-30 PROCEDURE — 99285 EMERGENCY DEPT VISIT HI MDM: CPT | Mod: 25

## 2023-06-30 PROCEDURE — 1159F MED LIST DOCD IN RCRD: CPT | Mod: CPTII,S$GLB,, | Performed by: NURSE PRACTITIONER

## 2023-06-30 PROCEDURE — 1160F PR REVIEW ALL MEDS BY PRESCRIBER/CLIN PHARMACIST DOCUMENTED: ICD-10-PCS | Mod: CPTII,S$GLB,, | Performed by: NURSE PRACTITIONER

## 2023-06-30 PROCEDURE — 3077F SYST BP >= 140 MM HG: CPT | Mod: CPTII,S$GLB,, | Performed by: NURSE PRACTITIONER

## 2023-06-30 PROCEDURE — 93010 ELECTROCARDIOGRAM REPORT: CPT | Mod: ,,, | Performed by: INTERNAL MEDICINE

## 2023-06-30 PROCEDURE — 84484 ASSAY OF TROPONIN QUANT: CPT | Performed by: NURSE PRACTITIONER

## 2023-06-30 PROCEDURE — 1125F AMNT PAIN NOTED PAIN PRSNT: CPT | Mod: CPTII,S$GLB,, | Performed by: NURSE PRACTITIONER

## 2023-06-30 PROCEDURE — 80053 COMPREHEN METABOLIC PANEL: CPT | Performed by: NURSE PRACTITIONER

## 2023-06-30 PROCEDURE — 3078F PR MOST RECENT DIASTOLIC BLOOD PRESSURE < 80 MM HG: ICD-10-PCS | Mod: CPTII,S$GLB,, | Performed by: NURSE PRACTITIONER

## 2023-06-30 RX ORDER — MECLIZINE HYDROCHLORIDE 25 MG/1
25 TABLET ORAL
Status: COMPLETED | OUTPATIENT
Start: 2023-06-30 | End: 2023-06-30

## 2023-06-30 RX ORDER — FAMOTIDINE 20 MG/1
40 TABLET, FILM COATED ORAL
Status: COMPLETED | OUTPATIENT
Start: 2023-06-30 | End: 2023-06-30

## 2023-06-30 RX ORDER — MECLIZINE HYDROCHLORIDE 25 MG/1
25 TABLET ORAL 3 TIMES DAILY PRN
Qty: 20 TABLET | Refills: 0 | Status: SHIPPED | OUTPATIENT
Start: 2023-06-30 | End: 2024-03-07

## 2023-06-30 RX ORDER — ACETAMINOPHEN 325 MG/1
650 TABLET ORAL
Status: COMPLETED | OUTPATIENT
Start: 2023-06-30 | End: 2023-06-30

## 2023-06-30 RX ADMIN — SODIUM CHLORIDE 1000 ML: 9 INJECTION, SOLUTION INTRAVENOUS at 04:06

## 2023-06-30 RX ADMIN — MECLIZINE HYDROCHLORIDE 25 MG: 25 TABLET ORAL at 04:06

## 2023-06-30 RX ADMIN — FAMOTIDINE 40 MG: 20 TABLET, FILM COATED ORAL at 04:06

## 2023-06-30 RX ADMIN — ACETAMINOPHEN 650 MG: 325 TABLET, FILM COATED ORAL at 07:06

## 2023-06-30 RX ADMIN — IOHEXOL 75 ML: 350 INJECTION, SOLUTION INTRAVENOUS at 06:06

## 2023-06-30 NOTE — ED TRIAGE NOTES
Pt presents to the ED w/ c/o generalized weakness, lightheadedness, dizziness, HA, and intermittent blurred vision x 1 week. Pt denies N/V. Hx of HTN.

## 2023-06-30 NOTE — ED PROVIDER NOTES
Source of History:  Patient    Chief complaint:  General Illness (C/o HA 8/10, lightheaded x 1 wk with no improvement. -change in vision/n/v. Denies any other complaints. VSS)      HPI:  Tamiko Youssef is a 82 y.o. female with PMH of HTN, prediabetes, HLD, asthma, vitamin B12 deficiency, vitamin D deficiency, osteopenia, anxiety, chronic pain, subclinical hyperthyroidism, history of vestibular schwannoma, chronic constipationpresenting with lightheadedness and dizziness for the past week.  Patient states that when she stands up she feels like the room is spinning.  She also states that when she lays down she has a similar sensation.  At rest she denies dizziness or lightheadedness.  She reports pain in her left ear for the past week.  She has inserted cotton with Vicks vapor rub.  No fever, chills, sinus congestion, sore throat, nasal discharge or other URI symptoms.  She was seen in pain management clinic today and instructed to present to the emergency room for further evaluation given complaints of dizziness, abdominal pain and generally feeling unwell.  She is reporting left lower quadrant abdominal pain.  She states that she suffers from chronic constipation but had a bowel movement yesterday.  Denies blood in her stool.  No nausea or vomiting.  She is also reporting intermittent reproducible chest pain but denies shortness of breath.  Patient has been taking her daily medications and her pain meds has tried no other medications for dizziness or abdominal pain.    This is the extent to the patients complaints today here in the emergency department.    ROS:   As per HPI       Review of patient's allergies indicates:   Allergen Reactions    Mobic [meloxicam] Rash    Tramadol     Naprosyn [naproxen]      GI upset       PMH:  As per HPI and below:  Past Medical History:   Diagnosis Date    Asthma     Cataract     Glaucoma     Hypertension      Past Surgical History:   Procedure Laterality Date    CATARACT  EXTRACTION W/  INTRAOCULAR LENS IMPLANT Bilateral     CHOLECYSTECTOMY      COLONOSCOPY N/A 12/5/2022    Procedure: COLONOSCOPY;  Surgeon: Daniel Mckeon MD;  Location: [facility] ENDO (4TH FLR);  Service: Endoscopy;  Laterality: N/A;  fully vaccinated - sm  extended constipation mirilax prep   instructions mailed - sm    CYST REMOVAL      on neck    ESOPHAGOGASTRODUODENOSCOPY N/A 12/5/2022    Procedure: EGD (ESOPHAGOGASTRODUODENOSCOPY);  Surgeon: Daniel Mckeon MD;  Location: [facility] ENDO (4TH FLR);  Service: Endoscopy;  Laterality: N/A;    EYE SURGERY      HYSTERECTOMY      2/2 AUB, partial    INJECTION OF JOINT Bilateral 05/25/2022    Procedure: INJECTION, JOINT, BILATERAL SACROILIAC (SI);  Surgeon: Obdulio Leon MD;  Location: [facility] PAIN MGT;  Service: Pain Management;  Laterality: Bilateral;    INJECTION, SACROILIAC JOINT Bilateral 6/7/2023    Procedure: INJECTION,SACROILIAC JOINT, BILATERAL;  Surgeon: Obdulio Leon MD;  Location: [facility] PAIN MGT;  Service: Pain Management;  Laterality: Bilateral;    TRANSFORAMINAL EPIDURAL INJECTION OF STEROID Bilateral 01/08/2020    Procedure: INJECTION, STEROID, EPIDURAL, TRANSFORAMINAL APPROACH;  Surgeon: Obdulio Leon MD;  Location: [facility] PAIN MGT;  Service: Pain Management;  Laterality: Bilateral;  B/L TFESI L4    TRANSFORAMINAL EPIDURAL INJECTION OF STEROID Bilateral 10/14/2020    Procedure: INJECTION, STEROID, EPIDURAL, TRANSFORAMINAL APPROACH, L4-L5 need consent;  Surgeon: Obdulio Leon MD;  Location: [facility] PAIN MGT;  Service: Pain Management;  Laterality: Bilateral;    TRANSFORAMINAL EPIDURAL INJECTION OF STEROID Left 04/14/2021    Procedure: INJECTION, STEROID, EPIDURAL, TRANSFORAMINAL APPROACH  left L3/4 and L4/5;  Surgeon: Obdulio Leon MD;  Location: [facility] PAIN MGT;  Service: Pain Management;  Laterality: Left;  consent needed       Social History     Tobacco Use    Smoking status: Never    Smokeless tobacco: Never   Substance Use Topics    Alcohol  "use: No    Drug use: Never       Physical Exam:    /62   Pulse 97   Temp 97.8 °F (36.6 °C) (Oral)   Resp 15   Ht 5' 2" (1.575 m)   Wt 78 kg (172 lb)   SpO2 96%   BMI 31.46 kg/m²   Nursing note and vital signs reviewed.  Appearance: No acute distress.  Eyes: No conjunctival injection. No nystagmus  ENT: Oropharynx clear.  Left TM with serous otitis media, no erythema or bulging. Right TM translucent without erythema or bulging  Chest/ Respiratory: Clear to auscultation bilaterally.  Good air movement.  No wheezes.  No rhonchi. No rales. No accessory muscle use.  Cardiovascular: Regular rate and rhythm.  No murmurs. No gallops. No rubs.  Abdomen: Soft.  Distended.  TTP in LLQ  No guarding.  No rebound. Non-peritoneal.  Musculoskeletal: Good range of motion all joints.  No deformities.  Neck supple.  No meningismus.  Skin: No rashes seen.  Good turgor.  No abrasions.  No ecchymoses.  Neurologic: Motor intact.  Sensation intact.  Cerebellar intact.  Cranial nerves intact.  Mental Status:  Alert and oriented x 3.  Appropriate, conversant    Labs that have been ordered have been independently reviewed and interpreted by myself.        Labs Reviewed   TSH - Abnormal; Notable for the following components:       Result Value    TSH 0.219 (*)     All other components within normal limits   URINALYSIS, REFLEX TO URINE CULTURE - Abnormal; Notable for the following components:    Color, UA Colorless (*)     Specific Gravity, UA <1.005 (*)     All other components within normal limits    Narrative:     Specimen Source->Urine   COMPREHENSIVE METABOLIC PANEL - Abnormal; Notable for the following components:    Glucose 128 (*)     BUN 6 (*)     All other components within normal limits   CBC W/ AUTO DIFFERENTIAL - Abnormal; Notable for the following components:    MCHC 31.3 (*)     All other components within normal limits   TROPONIN I   MAGNESIUM   B-TYPE NATRIURETIC PEPTIDE   LACTIC ACID, PLASMA   T4, FREE "   SARS-COV-2 RDRP GENE       Imaging Results              CT Abdomen Pelvis With Contrast (Final result)  Result time 06/30/23 18:54:05      Final result by Chris Elkins MD (06/30/23 18:54:05)                   Impression:      1. No acute intra-abdominal abnormalities identified.  2. Sigmoid diverticulosis with no evidence of acute diverticulitis.  3. Postsurgical changes of cholecystectomy and hysterectomy.  4. Additional findings as detailed above.      Electronically signed by: Chris Elkins MD  Date:    06/30/2023  Time:    18:54               Narrative:    EXAMINATION:  CT ABDOMEN PELVIS WITH CONTRAST    CLINICAL HISTORY:  LLQ abdominal pain;    TECHNIQUE:  Low dose axial images, sagittal and coronal reformations were obtained from the lung bases to the pubic symphysis following the IV administration of 75 mL of Omnipaque 350 .  Oral contrast was not given.    COMPARISON:  CT abdomen and pelvis from October 2022.    FINDINGS:  The visualized portion of the heart is unremarkable.  Minimal bibasilar atelectatic changes are seen.    No significant hepatic abnormalities are identified.  Gallbladder has been removed.  There is stable chronic common bile duct dilatation.  The stomach, pancreas, spleen, and adrenal glands are unremarkable.    Kidneys enhance normally with no evidence of hydronephrosis.  Bilateral renal cysts are seen, the largest of which measures 4 cm on the left.  No abnormalities are seen along the ureteral courses.  Urinary bladder is unremarkable.  Uterus has been removed.    Appendix is visualized and is unremarkable.  The visualized loops of small and large bowel show no evidence of obstruction or inflammation.  Diverticulosis is seen of the proximal sigmoid colon.  No free air or free fluid.    Aorta tapers normally.    No acute osseous abnormality identified.  Mild degenerative changes are seen spine with multilevel vacuum disc phenomenon.  There is lower lumbar facet arthropathy.   Fusion is seen of the T9 and T10 vertebral bodies and T11 and T12 vertebral bodies.  Subcutaneous soft tissues show no significant abnormalities.                                       CT Head Without Contrast (Final result)  Result time 06/30/23 18:40:20      Final result by Chris Elkins MD (06/30/23 18:40:20)                   Impression:      No acute intracranial abnormalities identified.      Electronically signed by: Chris Elkins MD  Date:    06/30/2023  Time:    18:40               Narrative:    EXAMINATION:  CT HEAD WITHOUT CONTRAST    CLINICAL HISTORY:  Dizziness, persistent/recurrent, cardiac or vascular cause suspected;    TECHNIQUE:  Low dose axial images were obtained through the head.  Coronal and sagittal reformations were also performed. Contrast was not administered.    COMPARISON:  CT head and MRI brain from April 2021.    FINDINGS:  There is chronic microvascular ischemic disease and mild generalized cerebral volume loss.  No evidence of acute/recent major vascular distribution cerebral infarction, intraparenchymal hemorrhage, or intra-axial space occupying lesion. The ventricular system is normal in size and configuration with no evidence of hydrocephalus. No effacement of the skull-base cisterns. No abnormal extra-axial fluid collections or blood products. Visualized paranasal sinuses and mastoid air cells are clear. The calvarium shows no significant abnormality.                                      Initial Impression/ Differential Dx:  Urgent evaluation of 82 y.o. female presenting with lightheadedness, intermittent reproducible chest pain and left lower quadrant abdominal pain. Patient is afebrile, not toxic appearing and hemodynamically stable.  Patient reports dizziness with movement and changing positions, likely vertical or peripheral.  Lower suspicion for central source of dizziness, however given age and risk factors, will obtain CT of the head.  She is neurologically intact  without deficits.  Low suspicion for CVA.  Will give meclizine, IV fluids.  Chest pain is intermittent and reproducible, unlikely to be ACS, however will obtain troponin, BNP and EKG.  Patient suffers from chronic constipation and left lower quadrant abdominal pain and this may be her chronic pain, however given tenderness in left lower quadrant and malaise, diverticulitis concern thus will obtain CT abdomen pelvis.  Will also trial Pepcid if reflux a contributing factor to presentation.    Differential Diagnosis includes, but is not limited to:  Peripheral vertigo (labyrinthitis, vestibular neuritis, BPPV, Meniere's disease), cerebellar stroke/CVA, TIA, SAH, carotid artery dissection, intracranial mass, medication reaction/noncompliance, substance abuse, depression, electrolyte abnormality, anemia, hemorrhage, renal failure, hepatic failure, sepsis/infection, UTI, viral illness, arrhythmia, CHF, thyroid disease, dehydration, depression, chronic disease.      MDM:        ED Course as of 06/30/23 2209 Fri Jun 30, 2023   1637 EKG independently interpreted sinus arrhythmia at rate 86bpm with first degree AV block, no STEMI. When compared to EKG from November 2022, first degree AV block in new [CU]   1643 SARS-CoV-2 RNA, Amplification, Qual: Negative [CU]   1707 CBC Auto Differential(!)  No leukocytosis or shift, normal H&H [CU]   1720 BNP: <10 [CU]   1746 Comprehensive Metabolic Panel(!)  No significant electrolyte abnormalities, normal kidney function, no elevation of LFTs [CU]   1747 Magnesium: 2.1 [CU]   1758 TSH(!): 0.219 [CU]   1800 Free T4: 0.99 [CU]   1829 Lactate, Brent: 1.6 [CU]   1829 Called lab regarding troponin that has yet to result.  States that is unsure why it was not completed.  She will put it on the machine and it should result around 20 minutes. [CU]   1914 Troponin I: <0.006 [CU]   1914 Urinalysis, Reflex to Urine Culture Urine, Clean Catch(!)  No evidence of infection or significant dehydration  [CU]   1914 CT Head Without Contrast  No acute intracranial abnormalities identified. [CU]   1914 CT Abdomen Pelvis With Contrast  No acute intra-abdominal abnormalities to explain source of patient's pain therefore given chronic left lower quadrant abdominal pain and constipation believe this is likely the source of symptoms. [CU]   1921 At bedside to reassess patient.  She reports resolution of her dizziness and ambulated independently with her walker to the bathroom without return of dizziness.  Dizziness is most consistent with vertigo, relieved with meclizine.  Patient does report mild headache but states that it may be due to the fact that she is hungry and has not eaten anything for hours.  Will give patient a dose of Tylenol and discharged home with meclizine and PCP follow-up. Patient educated on signs and symptoms to monitor for and when to return to ED. Patient verbalized understanding agrees with treatment plan. All questions and concerns addressed.      [CU]      ED Course User Index  [CU] Bridgette Hsu NP                   Diagnostic Impression:    1. Vertigo    2. Lightheadedness    3. LLQ abdominal pain         ED Disposition Condition    Discharge Good            ED Prescriptions       Medication Sig Dispense Start Date End Date Auth. Provider    meclizine (ANTIVERT) 25 mg tablet Take 1 tablet (25 mg total) by mouth 3 (three) times daily as needed for Dizziness. 20 tablet 6/30/2023 -- Bridgette Hsu NP          Follow-up Information       Follow up With Specialties Details Why Contact Info    Jeanna Ham MD Internal Medicine Schedule an appointment as soon as possible for a visit in 2 days  [id number]  [facility] [address]  [phone]               Bridgette Hsu NP  06/30/23 6750

## 2023-06-30 NOTE — PROGRESS NOTES
Chronic patient Established Note (Follow up visit)        SUBJECTIVE:    Interval History 6/30/2023:  The patient returns to clinic today for follow up of back pain. She is s/p bilateral SI joint injections on 6/7/2023. She reports 50% relief of her pain. She continues to report low back pain. She describes this pain as aching in nature. Her pain is tolerable at this time. She is taking Gabapentin at the lower dose with benefit. She continues to take Norco as needed with benefit and without adverse effects. Of note, she states that she does not feel good today. She feels dizzy and weak today. She also reports stomach pain. She denies any fever, nausea or vomiting. Her pain today is 8/10.    Interval History 5/4/23:  Patient returns to clinic today for follow-up of chronic low back and bilateral knee pain.  She was taking Norco 5-325 mg twice daily which provided good relief for her.  Her prescription was refilled following her visit on 3/27/2023; however, the prescription was sent to the wrong pharmacy, and she was not able to have filled.  She feels that the gabapentin 600 mg 3 times daily is over sedating for her, and she wishes to return to 300 mg 3 times daily.  Today, she reports pain in her low back that radiates into her buttocks. She had bilateral SI joint injections in May 2022 which she feels gave her good relief.  She would like to repeat these if possible.  She is also having bilateral knee pain.  She had corticosteroid injections 4-5 years ago in her knees from an outside doctor that gave her good relief. She is interested in repeating these as well.      Interval History 3/27/2023  Mrs Youssef presents for follow up of chronic pain complaints. She was started on Norco 5/325mg prior visit. She states this was beneficial but did not return or call to request refills over interval. She is  requesting refill as this did provide relief and improved functioning.     Interval History 11/14/2022:  Tamiko Youssef  presents to the clinic for a follow-up appointment for chronic low back pain. Since the last visit, Tamiko Youssef states the pain has been worsening. Current pain intensity is 6/10. Pain is localized to the low back with radiating into the right leg. Radiating pain is predominately in the bilateral calves. Also with localized pain to right knee. Reports she has had steroid injections in the right knee in the past with good benefit. Most recently 3 years ago. Patient ambulates with a Rolator and reports most recent fall about 6 months ago when she fell on her right knee. Did not seek MD care at that time.   No new onset weakness, new onset sensation changes, saddle anesthesia, or bowel/bladder changes.    Interval History 10/3/2022:  The patient returns to clinic today for follow up of low back pain. She continues to report low back pain that radiates into the posterior aspect both legs to her ankles. Her pain is worse with activity. She continues to take Gabapentin with some benefit. She did try Lyrica but this caused a rash. She is not interested in further procedures at this time. She asks about Norco today, as this has helped her pain in the past. She denies any other health changes. Her pain today is 8/10.    Interval History 9/1/2022:  The patient returns to clinic today for follow up of low back pain. She continues to report low back pain that radiates into the posterior aspect of both legs to her ankles. Her pain is worse with standing and walking. She also reports increased right knee pain. She is currently taking Gabapentin with limited relief. She also reports that this makes her sleepy. She is not interested in further procedures at this time. She denies any other health changes. Her pain today is 8/10.    Interval History 6/8/2022:  The patient returns to clinic today for follow up of low back pain. She is s/p bilateral SI joint injections on 5/25/2022. She reports no relief. She continues to report low  back pain that radiates into her buttocks into the posterior aspect of both legs to her ankles. She also reports radiates pain into the anterior aspect of her left leg. She is not interested in further procedures at this time. She reports increased bilateral knee and ankle pain. She would like to establish care with an Orthopedic. She also reports that her PCP has left. She would like to establish care here at [facility]. She continues to take Gabapentin. She denies any other health changes. Her pain today is 6/10.    Interval History 4/20/2022:  The patient returns to clinic today for follow up of low back pain. She has not been seen in over a year. She reports that previous SYLVIA in 4/2021 provided no relief. She continues to report low back pain that radiates into both hips and buttocks. She reports intermittent radiating pain into the posterior aspect of her left leg to under her foot. Her pain is worse with prolonged sitting and standing. She continues to take Gabapentin with limited relief. She denies any other health changes. Her pain today is 8/10.    Interval History 1/14/2021:  The patient returns to clinic today for follow up of low back pain. She continues to report low back pain that radiates into the lateral aspect of left leg to her ankle. She denies any right leg pain. She did not start Lyrica given at last visit due to concern for side effects. She has not started physical therapy yet. She continues to take Gabapentin and Zanaflex. She denies any other health changes. Her pain today is 6/10.    Interval history 12/10/2020:  Returns for follow up of her low back pain. She continues to report low back pain that radiates into the lateral aspect of her left leg to her ankle. Reports that gabapentin and Zanaflex are not helping with pain. Her pain is worse with standing, walking, and activity. Her pain today is 5/10.    Interval History 11/13/2020:  The patient returns to clinic today for follow up of back  pain. She reports limited relief with previous SYLVIA. She continues to report low back pain that radiates into the lateral aspect of her left leg to her ankle. She denies any right leg pain today. Her pain is worse with standing, walking, and activity. She is frustrated with her continued pain. She did increase Gabapentin to BID. She is unsure of relief at this time. She does take Adams from her PCP. She states that her PCP would like our office to take over this medication. She asks for a refill. She denies any other health changes. Her pain today is 2/10.    Interval History 10/30/2020:  The patient returns to clinic today via audio visit for follow up of back pain. She is s/p bilateral L4/5 TF SYLVIA on 10/14/2020. She reports limited relief of her pain. She reports increased low back pain that radiates down the lateral aspect of her left leg to her ankle. She denies any right leg pain today. Her pain is worse with prolonged standing, walking, and activity. She denies any weakness. She is currently taking Gabapentin once a day. She denies any other health changes.     Interval History 7/24/2020:  The patient returns to clinic today for follow up of low back pain. She reports increased pain over the last two months. She reports low back pain that radiates down the lateral aspect of both legs to her knees, left greater than right. Her pain is worse with standing and walking. She continues to take Gabapentin. She denies any other health changes. Her pain today is 9/10.    Interval History 1/28/2020:  Tamiko Youssef presents to the clinic for a follow-up appointment for lower back pain. She is s/p bilateral L4/5 TF SYLVIA on 1/8/2020. She reports 80% relief of his low back and leg pain. She continues to report low back pain that is aching in nature. She denies any radiating leg pain today. Her pain is worse with prolonged standing and walking. She continues to take Gabapentin with benefit. She continues to perform a home  exercise routine. She denies any other health changes. Her pain today is 8/10.      Pain Disability Index Review:  Last 3 PDI Scores 6/30/2023 5/4/2023 3/27/2023   Pain Disability Index (PDI) 30 40 40       Pain Medications:  Gabapentin     Opioid Contract: yes     report:  Reviewed and consistent with medication use as prescribed.    Pain Procedures:   3/28/14, 3/13/13 Bilateral L4 TF SYLVIA  8/22/18 Bilateral L4 TF SYLVIA- 75% relief  1/8/2020- Bilateral L4/5 TF SYLVIA - 80% relief   10/14/2020- Bilateral L4/5 TF SYLVIA    Physical Therapy/Home Exercise: no    Imaging:   MRI Lumbar Spine 11/18/2020:  COMPARISON:  11/18/2020     FINDINGS:  Lumbar spine alignment is within normal limits. Vertebral body heights preserved.  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     The conus is normal in appearance.  The adjacent soft tissue structures show no significant abnormalities.     L1-L2: No evidence of a disc herniation.No significant central canal or neural foraminal narrowing.     L2-L3: There is no focal disc herniation. No central canal or foraminal stenosis.  Prominent facet arthritis.     L3-L4: There is some broad-based disc bulging and superimposed spondylitic spurring which is asymmetric to the left.  Prominent facet arthritis is noted.  No central canal or foraminal stenosis.     L4-L5: Broad-based disc bulging and facet arthritis present.  No significant central canal or neural foraminal narrowing.     L5-S1: Broad-based disc bulging and facet arthritis are present.  No focal disc herniation.  No significant central canal or neural foraminal narrowing.     Impression:     Multilevel nonstenotic degenerative change mostly affecting the posterior elements.    Xray Lumbar Spine 11/18/2020:  COMPARISON:  Multiple priors, most recent 08/09/2018     FINDINGS:  Levoscoliosis of the lumbar spine.  Grade 1 anterolisthesis of L3 on L4, L4 on L5, and L5 on S1 similar  to prior.  No acute, displaced fracture.  Vertebral body heights are maintained.  No evidence of dynamic instability.  Multilevel lumbar facet arthrosis.  No aggressive osseous abnormality.     Impression:     Mild multilevel lumbar spondylosis with unchanged grade 1 anterolisthesis of L3 on L4 through L5 on S1.    Xray Hips 11/18/2020:  COMPARISON:  Multiple priors, most recent 08/09/2018     FINDINGS:  Sacroiliac joints are symmetric.  Pubic symphysis is not widened.  The femoral heads are well seated within the acetabula.  Degenerative changes of the lower lumbar spine.  Mild joint space narrowing, subchondral sclerosis and minimal osteophytosis of the hip joints.  No acute, displaced fracture.  No aggressive osseous abnormality.     Impression:     No acute osseous abnormality.        Allergies:   Review of patient's allergies indicates:   Allergen Reactions    Naprosyn [naproxen]     Norco [hydrocodone-acetaminophen]     Tramadol        Current Medications:   Current Outpatient Medications   Medication Sig Dispense Refill    albuterol (PROVENTIL) 2.5 mg /3 mL (0.083 %) nebulizer solution Take 2.5 mg by nebulization every 6 (six) hours as needed.      albuterol (PROVENTIL/VENTOLIN HFA) 90 mcg/actuation inhaler Inhale TWO to FOUR puffs by mouth FOUR times daily when needed.      ALPRAZolam (XANAX) 0.25 MG tablet TK 1 T PO QD PRA 30 tablet 0    amLODIPine (NORVASC) 10 MG tablet       clotrimazole (MYCELEX) 10 mg john Take 1 tablet (10 mg total) by mouth 5 (five) times daily. Dissolve one lozenge in the mouth 5 times a day. 50 John 0    diclofenac sodium (VOLTAREN) 1 % Gel Apply 2 g topically 4 (four) times daily. 1 each 2    dicyclomine (BENTYL) 10 MG capsule Take 1 capsule (10 mg total) by mouth as needed (abdominal cramping). 30 capsule 0    fluticasone propionate (FLONASE) 50 mcg/actuation nasal spray 2 sprays in each nostril every evening. 16 g 1    fluticasone-salmeterol diskus inhaler 250-50 mcg Inhale 1  puff into the lungs.      furosemide (LASIX) 20 MG tablet TK 1 T PO QD PRF FLUID  2    gabapentin (NEURONTIN) 300 MG capsule Take 1 capsule (300 mg total) by mouth 3 (three) times daily. Take 1 capsule at night for 1 week, then take 1 capsule twice a day for a week, then take 1 capsule 3x/day, thereafter. 90 capsule 2    latanoprost 0.005 % ophthalmic solution Place 1 drop into both eyes every evening. 3 Bottle 3    mirtazapine (REMERON) 30 MG tablet Take 30 mg by mouth nightly.  3    polyethylene glycol (GLYCOLAX) 17 gram/dose powder Take 17 g by mouth daily as needed (Constipation). 1700 g 1    pravastatin (PRAVACHOL) 20 MG tablet Take 1 tablet (20 mg total) by mouth every evening. 90 tablet 3    tiZANidine (ZANAFLEX) 4 MG tablet Take 1 tablet (4 mg total) by mouth nightly as needed. 90 tablet 0    walker Misc 1 application by Misc.(Non-Drug; Combo Route) route once daily. 1 each 0     No current facility-administered medications for this visit.       REVIEW OF SYSTEMS:    Constitutional: Positive for unexpected weight change.   HENT: Positive for headache.   Respiratory: Positive for intermittent shortness of breath and wheezing.  (asthma)  Gastrointestinal: Positive for constipation.   Musculoskeletal: Positive for back pain, gait problem and stiffness.       Past Medical History:  Past Medical History:   Diagnosis Date    Asthma     Cataract     Glaucoma     Hypertension        Past Surgical History:  Past Surgical History:   Procedure Laterality Date    CATARACT EXTRACTION W/  INTRAOCULAR LENS IMPLANT Bilateral     CHOLECYSTECTOMY      COLONOSCOPY N/A 12/5/2022    Procedure: COLONOSCOPY;  Surgeon: Daniel Mckeon MD;  Location: [address];  Service: Endoscopy;  Laterality: N/A;  fully vaccinated - sm  extended constipation mirilax prep   instructions mailed - sm    CYST REMOVAL      on neck    ESOPHAGOGASTRODUODENOSCOPY N/A 12/5/2022    Procedure: EGD (ESOPHAGOGASTRODUODENOSCOPY);  Surgeon: Daniel WILLIAMSON  "MD Linda;  Location: [facility] ENDO (4TH FLR);  Service: Endoscopy;  Laterality: N/A;    EYE SURGERY      HYSTERECTOMY      2/2 AUB, partial    INJECTION OF JOINT Bilateral 05/25/2022    Procedure: INJECTION, JOINT, BILATERAL SACROILIAC (SI);  Surgeon: Obdulio Leon MD;  Location: [facility] PAIN MGT;  Service: Pain Management;  Laterality: Bilateral;    INJECTION, SACROILIAC JOINT Bilateral 6/7/2023    Procedure: INJECTION,SACROILIAC JOINT, BILATERAL;  Surgeon: Obdulio Leon MD;  Location: [facility] PAIN MGT;  Service: Pain Management;  Laterality: Bilateral;    TRANSFORAMINAL EPIDURAL INJECTION OF STEROID Bilateral 01/08/2020    Procedure: INJECTION, STEROID, EPIDURAL, TRANSFORAMINAL APPROACH;  Surgeon: Obdulio Leon MD;  Location: [facility] PAIN MGT;  Service: Pain Management;  Laterality: Bilateral;  B/L TFESI L4    TRANSFORAMINAL EPIDURAL INJECTION OF STEROID Bilateral 10/14/2020    Procedure: INJECTION, STEROID, EPIDURAL, TRANSFORAMINAL APPROACH, L4-L5 need consent;  Surgeon: Obdulio Leon MD;  Location: [facility] PAIN MGT;  Service: Pain Management;  Laterality: Bilateral;    TRANSFORAMINAL EPIDURAL INJECTION OF STEROID Left 04/14/2021    Procedure: INJECTION, STEROID, EPIDURAL, TRANSFORAMINAL APPROACH  left L3/4 and L4/5;  Surgeon: Obdulio Leon MD;  Location: [facility] PAIN MGT;  Service: Pain Management;  Laterality: Left;  consent needed       Family History:  Family History   Problem Relation Age of Onset    Cataracts Son     Glaucoma Son     No Known Problems Mother     No Known Problems Father     Macular degeneration Neg Hx        Social History:  Social History     Socioeconomic History    Marital status: Single   Tobacco Use    Smoking status: Never    Smokeless tobacco: Never   Substance and Sexual Activity    Alcohol use: No    Drug use: Never       OBJECTIVE:    BP (!) 142/77 (BP Location: Right arm, Patient Position: Sitting, BP Method: Small (Automatic))   Pulse 96   Resp 18   Ht 5' 2" (1.575 " m)   Wt 78.7 kg (173 lb 8 oz)   SpO2 99%   BMI 31.73 kg/m²     PHYSICAL EXAMINATION:    GENERAL: Well appearing, in no acute distress, alert and oriented x3.  PSYCH:  Mood and affect appropriate.  SKIN: Skin color, texture, turgor normal, no rashes or lesions.  HEAD/FACE:  Normocephalic, atraumatic. Cranial nerves grossly intact.  CV: RRR with palpation of the radial artery.  PULM: No evidence of respiratory difficulty, symmetric chest rise.  GI:  Soft and non-tender.  BACK: Straight leg raising in the sitting position is negative for radicular pain bilaterally.  Limited ROM with pain on flexion and extension.    EXTREMITIES: No deformities, edema, or skin discoloration. Good capillary refill.  MUSCULOSKELETAL:  5/5 strength in right ankle with plantar and dorsiflexion. 5/5 strength in left ankle with plantar and dorsiflexion. 4/5 strength with right knee flexion and extension. 4/5 strength with left knee flexion and extension.  No atrophy or tone abnormalities are noted.  NEURO: Bilateral lower extremity coordination and muscle stretch reflexes are physiologic and symmetric.  Plantar response are downgoing. No clonus.  No loss of sensation is noted.  GAIT: Antalgic- ambulates with walker.      ASSESSMENT: 82 y.o. year old female with lower back pain consistent with the following diagnoses:     1. Chronic pain syndrome        2. Lumbar radiculopathy        3. Lumbar spondylosis        4. DDD (degenerative disc disease), lumbar        5. Sacroiliac joint pain        6. Encounter for therapeutic drug monitoring                    PLAN:     - Previous imaging was reviewed and discussed with the patient today. Labs reviewed.     - She is s/p bilateral SI joint injections with some benefit.     - Continue Gabapentin and Zanaflex.     - Pain contract signed today.     - Continue Norco 5/325 mg BID PRN, #60, refill provided.     - The patient is here today for a refill of current pain medications and they believe these  provide effective pain control and improvements in quality of life.  The patient notes no serious side effects, and feels the benefits outweigh the risks.  The patient was reminded of the pain contract that they signed previously as well as the risks and benefits of the medication including possible death.  The updated [place] Board  Pharmacy prescription monitoring program was reviewed, and the patient has been found to be compliant with current treatment plan. Medication management provided by Dr. Leon.     - UDS next visit.     - I have stressed the importance of physical activity and a home exercise plan to help with pain and improve health.    - RTC in 3 months.     ADDENDUM: Patient was provided with water and crackers. She still complains of feeling weak and dizzy. /91,  then down to 102. Patient wished to be evaluated in the ER. She was escorted to the ER via wheelchair.     The above plan and management options were discussed at length with patient. Patient is in agreement with the above and verbalized understanding.    Marina Gallegos NP  06/30/2023                      negative...

## 2023-07-01 RX ORDER — HYDROCODONE BITARTRATE AND ACETAMINOPHEN 5; 325 MG/1; MG/1
1 TABLET ORAL EVERY 12 HOURS PRN
Qty: 60 TABLET | Refills: 0 | Status: SHIPPED | OUTPATIENT
Start: 2023-07-01 | End: 2023-07-31

## 2023-07-13 ENCOUNTER — TELEPHONE (OUTPATIENT)
Dept: OTOLARYNGOLOGY | Facility: CLINIC | Age: 83
End: 2023-07-13
Payer: MEDICARE

## 2023-07-13 NOTE — TELEPHONE ENCOUNTER
----- Message from Daren Kent MA sent at 7/13/2023  1:49 PM CDT -----  Regarding: FW: ear pain    ----- Message -----  From: Rafaela Marquis  Sent: 7/13/2023   1:44 PM CDT  To: [facility] Staff  Subject: ear pain                                         Name of caller: estella maradiaga       What is the requesting detail: pt is requesting a sooner appt. The first available is in feb, but she feels she needs to be seen sooner than that. Please advise       Can the clinic reply by [facility]:       What number to call back:[phone]

## 2023-07-20 ENCOUNTER — TELEPHONE (OUTPATIENT)
Dept: PAIN MEDICINE | Facility: CLINIC | Age: 83
End: 2023-07-20
Payer: MEDICARE

## 2023-07-20 NOTE — TELEPHONE ENCOUNTER
----- Message from Missy Rivera sent at 7/20/2023  3:14 PM CDT -----      Name of Who is Calling: GARY VILLANUEVA [[id number]]      What is the request in detail: Pt called said she canceled her appt with [facility].Please contact to further discuss and advise.          Can the clinic reply by [facility]: N      What Number to Call Back if not in [facility]: [phone]

## 2023-07-21 RX ORDER — GABAPENTIN 300 MG/1
CAPSULE ORAL
Qty: 90 CAPSULE | Refills: 11 | Status: SHIPPED | OUTPATIENT
Start: 2023-07-21

## 2023-08-05 DIAGNOSIS — E78.5 HYPERLIPIDEMIA, UNSPECIFIED HYPERLIPIDEMIA TYPE: ICD-10-CM

## 2023-08-06 NOTE — TELEPHONE ENCOUNTER
Care Due:                  Date            Visit Type   Department     Provider  --------------------------------------------------------------------------------                                EP -                              PRIMARY      [facility] INTERNAL  Last Visit: 11-      CARE ([facility])   MEDICINE       Jeanna Ham  Next Visit: None Scheduled  None         None Found                                                            Last  Test          Frequency    Reason                     Performed    Due Date  --------------------------------------------------------------------------------    Office Visit  12 months..  pravastatin..............  11-   10-    Lipid Panel.  12 months..  pravastatin..............  08- 07-    [employer] Embedded Care Due Messages. Reference number: [id number].   8/05/2023 9:15:36 PM CDT

## 2023-08-07 RX ORDER — PRAVASTATIN SODIUM 20 MG/1
20 TABLET ORAL NIGHTLY
Qty: 90 TABLET | Refills: 3 | Status: SHIPPED | OUTPATIENT
Start: 2023-08-07

## 2023-08-23 ENCOUNTER — LAB VISIT (OUTPATIENT)
Dept: LAB | Facility: OTHER | Age: 83
End: 2023-08-23
Attending: OTOLARYNGOLOGY
Payer: MEDICARE

## 2023-08-23 ENCOUNTER — OFFICE VISIT (OUTPATIENT)
Dept: OTOLARYNGOLOGY | Facility: CLINIC | Age: 83
End: 2023-08-23
Payer: MEDICARE

## 2023-08-23 VITALS
TEMPERATURE: 98 F | HEART RATE: 105 BPM | WEIGHT: 174.88 LBS | DIASTOLIC BLOOD PRESSURE: 81 MMHG | BODY MASS INDEX: 32.18 KG/M2 | SYSTOLIC BLOOD PRESSURE: 150 MMHG | HEIGHT: 62 IN

## 2023-08-23 DIAGNOSIS — H92.02 LEFT EAR PAIN: ICD-10-CM

## 2023-08-23 DIAGNOSIS — Z87.09 HISTORY OF ASTHMA: ICD-10-CM

## 2023-08-23 DIAGNOSIS — D33.3 VESTIBULAR SCHWANNOMA: ICD-10-CM

## 2023-08-23 DIAGNOSIS — H90.3 ASYMMETRICAL SENSORINEURAL HEARING LOSS: ICD-10-CM

## 2023-08-23 DIAGNOSIS — Z97.4 WEARS HEARING AID: ICD-10-CM

## 2023-08-23 DIAGNOSIS — H91.90 HEARING DISORDER, UNSPECIFIED LATERALITY: Primary | ICD-10-CM

## 2023-08-23 DIAGNOSIS — B37.0 THRUSH: ICD-10-CM

## 2023-08-23 DIAGNOSIS — M26.622 ARTHRALGIA OF LEFT TEMPOROMANDIBULAR JOINT: ICD-10-CM

## 2023-08-23 DIAGNOSIS — K21.9 GASTROESOPHAGEAL REFLUX DISEASE, UNSPECIFIED WHETHER ESOPHAGITIS PRESENT: ICD-10-CM

## 2023-08-23 LAB
CREAT SERPL-MCNC: 0.8 MG/DL (ref 0.5–1.4)
EST. GFR  (NO RACE VARIABLE): >60 ML/MIN/1.73 M^2

## 2023-08-23 PROCEDURE — 1159F MED LIST DOCD IN RCRD: CPT | Mod: CPTII,S$GLB,, | Performed by: OTOLARYNGOLOGY

## 2023-08-23 PROCEDURE — 82565 ASSAY OF CREATININE: CPT | Performed by: OTOLARYNGOLOGY

## 2023-08-23 PROCEDURE — 1160F RVW MEDS BY RX/DR IN RCRD: CPT | Mod: CPTII,S$GLB,, | Performed by: OTOLARYNGOLOGY

## 2023-08-23 PROCEDURE — 99214 OFFICE O/P EST MOD 30 MIN: CPT | Mod: S$GLB,,, | Performed by: OTOLARYNGOLOGY

## 2023-08-23 PROCEDURE — 99214 PR OFFICE/OUTPT VISIT, EST, LEVL IV, 30-39 MIN: ICD-10-PCS | Mod: S$GLB,,, | Performed by: OTOLARYNGOLOGY

## 2023-08-23 PROCEDURE — 36415 COLL VENOUS BLD VENIPUNCTURE: CPT | Performed by: OTOLARYNGOLOGY

## 2023-08-23 PROCEDURE — 3079F DIAST BP 80-89 MM HG: CPT | Mod: CPTII,S$GLB,, | Performed by: OTOLARYNGOLOGY

## 2023-08-23 PROCEDURE — 3079F PR MOST RECENT DIASTOLIC BLOOD PRESSURE 80-89 MM HG: ICD-10-PCS | Mod: CPTII,S$GLB,, | Performed by: OTOLARYNGOLOGY

## 2023-08-23 PROCEDURE — 1160F PR REVIEW ALL MEDS BY PRESCRIBER/CLIN PHARMACIST DOCUMENTED: ICD-10-PCS | Mod: CPTII,S$GLB,, | Performed by: OTOLARYNGOLOGY

## 2023-08-23 PROCEDURE — 3077F PR MOST RECENT SYSTOLIC BLOOD PRESSURE >= 140 MM HG: ICD-10-PCS | Mod: CPTII,S$GLB,, | Performed by: OTOLARYNGOLOGY

## 2023-08-23 PROCEDURE — 3077F SYST BP >= 140 MM HG: CPT | Mod: CPTII,S$GLB,, | Performed by: OTOLARYNGOLOGY

## 2023-08-23 PROCEDURE — 1159F PR MEDICATION LIST DOCUMENTED IN MEDICAL RECORD: ICD-10-PCS | Mod: CPTII,S$GLB,, | Performed by: OTOLARYNGOLOGY

## 2023-08-23 RX ORDER — CLOTRIMAZOLE 10 MG/1
10 LOZENGE ORAL; TOPICAL
Qty: 50 TROCHE | Refills: 0 | Status: SHIPPED | OUTPATIENT
Start: 2023-08-23 | End: 2024-03-07

## 2023-08-23 NOTE — PATIENT INSTRUCTIONS
Take generic Mycelex lozenges as prescribed.    Rinse and gargle after using inhaler.    TMJ measures as reviewed.    Reflux precautions as reviewed.    Proceed with blood test and MRI.    Follow up in 2 weeks.

## 2023-09-06 ENCOUNTER — HOSPITAL ENCOUNTER (OUTPATIENT)
Dept: RADIOLOGY | Facility: OTHER | Age: 83
Discharge: HOME | End: 2023-09-06
Attending: OTOLARYNGOLOGY
Payer: MEDICARE

## 2023-09-06 DIAGNOSIS — D33.3 VESTIBULAR SCHWANNOMA: ICD-10-CM

## 2023-09-06 PROCEDURE — 70553 MRI BRAIN STEM W/O & W/DYE: CPT | Mod: TC

## 2023-09-06 PROCEDURE — 70553 MRI IAC/TEMPORAL BONES W W/O CONTRAST: ICD-10-PCS | Mod: 26,,, | Performed by: RADIOLOGY

## 2023-09-06 PROCEDURE — A9585 GADOBUTROL INJECTION: HCPCS | Performed by: OTOLARYNGOLOGY

## 2023-09-06 PROCEDURE — 70553 MRI BRAIN STEM W/O & W/DYE: CPT | Mod: 26,,, | Performed by: RADIOLOGY

## 2023-09-06 PROCEDURE — 25500020 PHARM REV CODE 255: Performed by: OTOLARYNGOLOGY

## 2023-09-06 RX ORDER — GADOBUTROL 604.72 MG/ML
8 INJECTION INTRAVENOUS
Status: COMPLETED | OUTPATIENT
Start: 2023-09-06 | End: 2023-09-06

## 2023-09-06 RX ADMIN — GADOBUTROL 7.5 ML: 604.72 INJECTION INTRAVENOUS at 12:09

## 2023-09-14 ENCOUNTER — OFFICE VISIT (OUTPATIENT)
Dept: GASTROENTEROLOGY | Facility: CLINIC | Age: 83
End: 2023-09-14
Payer: MEDICARE

## 2023-09-14 VITALS
SYSTOLIC BLOOD PRESSURE: 137 MMHG | WEIGHT: 176.81 LBS | HEART RATE: 104 BPM | BODY MASS INDEX: 32.54 KG/M2 | DIASTOLIC BLOOD PRESSURE: 80 MMHG | HEIGHT: 62 IN

## 2023-09-14 DIAGNOSIS — R93.3 ABNORMAL MAGNETIC RESONANCE CHOLANGIOPANCREATOGRAPHY (MRCP): ICD-10-CM

## 2023-09-14 DIAGNOSIS — K83.8 DILATED BILE DUCT: Primary | ICD-10-CM

## 2023-09-14 PROCEDURE — 1101F PR PT FALLS ASSESS DOC 0-1 FALLS W/OUT INJ PAST YR: ICD-10-PCS | Mod: CPTII,S$GLB,, | Performed by: INTERNAL MEDICINE

## 2023-09-14 PROCEDURE — 99999 PR PBB SHADOW E&M-EST. PATIENT-LVL IV: CPT | Mod: PBBFAC,,, | Performed by: INTERNAL MEDICINE

## 2023-09-14 PROCEDURE — 1160F PR REVIEW ALL MEDS BY PRESCRIBER/CLIN PHARMACIST DOCUMENTED: ICD-10-PCS | Mod: CPTII,S$GLB,, | Performed by: INTERNAL MEDICINE

## 2023-09-14 PROCEDURE — 3075F SYST BP GE 130 - 139MM HG: CPT | Mod: CPTII,S$GLB,, | Performed by: INTERNAL MEDICINE

## 2023-09-14 PROCEDURE — 1125F PR PAIN SEVERITY QUANTIFIED, PAIN PRESENT: ICD-10-PCS | Mod: CPTII,S$GLB,, | Performed by: INTERNAL MEDICINE

## 2023-09-14 PROCEDURE — 1125F AMNT PAIN NOTED PAIN PRSNT: CPT | Mod: CPTII,S$GLB,, | Performed by: INTERNAL MEDICINE

## 2023-09-14 PROCEDURE — 99213 OFFICE O/P EST LOW 20 MIN: CPT | Mod: S$GLB,,, | Performed by: INTERNAL MEDICINE

## 2023-09-14 PROCEDURE — 3079F PR MOST RECENT DIASTOLIC BLOOD PRESSURE 80-89 MM HG: ICD-10-PCS | Mod: CPTII,S$GLB,, | Performed by: INTERNAL MEDICINE

## 2023-09-14 PROCEDURE — 1101F PT FALLS ASSESS-DOCD LE1/YR: CPT | Mod: CPTII,S$GLB,, | Performed by: INTERNAL MEDICINE

## 2023-09-14 PROCEDURE — 99999 PR PBB SHADOW E&M-EST. PATIENT-LVL IV: ICD-10-PCS | Mod: PBBFAC,,, | Performed by: INTERNAL MEDICINE

## 2023-09-14 PROCEDURE — 3075F PR MOST RECENT SYSTOLIC BLOOD PRESS GE 130-139MM HG: ICD-10-PCS | Mod: CPTII,S$GLB,, | Performed by: INTERNAL MEDICINE

## 2023-09-14 PROCEDURE — 1160F RVW MEDS BY RX/DR IN RCRD: CPT | Mod: CPTII,S$GLB,, | Performed by: INTERNAL MEDICINE

## 2023-09-14 PROCEDURE — 3288F PR FALLS RISK ASSESSMENT DOCUMENTED: ICD-10-PCS | Mod: CPTII,S$GLB,, | Performed by: INTERNAL MEDICINE

## 2023-09-14 PROCEDURE — 3288F FALL RISK ASSESSMENT DOCD: CPT | Mod: CPTII,S$GLB,, | Performed by: INTERNAL MEDICINE

## 2023-09-14 PROCEDURE — 99213 PR OFFICE/OUTPT VISIT, EST, LEVL III, 20-29 MIN: ICD-10-PCS | Mod: S$GLB,,, | Performed by: INTERNAL MEDICINE

## 2023-09-14 PROCEDURE — 1159F MED LIST DOCD IN RCRD: CPT | Mod: CPTII,S$GLB,, | Performed by: INTERNAL MEDICINE

## 2023-09-14 PROCEDURE — 1159F PR MEDICATION LIST DOCUMENTED IN MEDICAL RECORD: ICD-10-PCS | Mod: CPTII,S$GLB,, | Performed by: INTERNAL MEDICINE

## 2023-09-14 PROCEDURE — 3079F DIAST BP 80-89 MM HG: CPT | Mod: CPTII,S$GLB,, | Performed by: INTERNAL MEDICINE

## 2023-09-14 RX ORDER — OMEPRAZOLE 20 MG/1
CAPSULE, DELAYED RELEASE ORAL
COMMUNITY
Start: 2023-06-05

## 2023-09-14 RX ORDER — TIZANIDINE 2 MG/1
TABLET ORAL
COMMUNITY
Start: 2023-06-05 | End: 2023-12-06 | Stop reason: SDUPTHER

## 2023-09-14 RX ORDER — MELOXICAM 15 MG/1
TABLET ORAL
COMMUNITY
Start: 2023-06-05 | End: 2024-03-07

## 2023-09-14 NOTE — PROGRESS NOTES
Advanced Endoscopy / Pancreaticobiliary Service    Reason for visit (Chief Complaint):  Dilated bile duct on MRCP.    Referring provider/PCP: Daniel Mckeon MD  [address] [place] [address]    History of Present Illness: Patient presents for above.  She has been previously seen by Dr. Mckeon for left-sided abdominal pain.  Part of her evaluation included a CT scan in October 2022 which showed a dilated biliary system.  An MRI was then performed in late October 2022, and this showed a dilated bile duct measuring up to 15 mm without any obstructive stone or lesion.  Of note, her gallbladder was removed about 40 years ago.  Of note, she has had a prior CT scan performed in January 2013 which also showed a dilated bile duct measuring up to 15 mm.    She denies any epigastric or right upper quadrant pain.  She denies jaundice.  She has had liver tests performed in June which were normal.  Her weight has been stable.    She does report having had some left-sided pain that originates more near her back and flank.  This has no relation to oral intake or bowel movements.  She has been seeing pain management for this in the past and has had some relief previously with injection treatment.  She continues to follow-up with pain management as needed for this.      Physical Exam:  General: Well-developed, well-appearing, no acute distress  Abdomen: soft, non-tender      Laboratory:   Reviewed     Imaging:  Reviewed images from MRI from October 2022.     Assessment/Plan:  Biliary dilation- the caliber of biliary dilation appears to be stable at least on imaging dating back to 2013.  I suspect this is all related to prior cholecystectomy state.  Given the stability of the dilation, lack of biliary colic, and recent normal liver tests, I do not think this needs further investigation.  The patient is in agreement with this plan.        Deandre Esteban MD, LEELEE   - Department of Gastroenterology  [facility]

## 2023-09-19 NOTE — PROGRESS NOTES
Subjective:       Patient ID: Tamiko Youssef is a 82 y.o. female.    Chief Complaint: Follow-up (and ear pain, had hearing tested a month ago has new hearing aids)    States here today to have her ears checked.  Got updated audiogram and new hearing aids about 3 months ago from outside audiologist at [facility].  States prior hearing aids 2 or 3 years old.  Has been experiencing ear pain AS on and off for the past month.  Present with hearing aids in as well as without.  Maybe worse with jaw movement.  Dentures seem okay.  Also some throat irritation past week or 10 days.  Has been gargling without relief.  On inhalers as well as nebulizations for asthma.  Positive history of GERD with occasional belching.  No swallowing complaints.  Had ED visit on 06/30/2023 for dizziness as well as chest pain, abdominal pain, malaise.  Questioned about symptoms and reports lightheadedness on standing as well as head feeling full/swimming at times.  Per ED note due to follow-up with PCP for this.  No associated otologic symptoms including aural fullness, fluctuations in hearing or tinnitus.          Review of Systems     Constitutional: Negative for fever.      HENT: Positive for ear pain and hearing loss.  Negative for ear discharge, ringing in the ears, stuffy nose, trouble swallowing and voice change.      Respiratory:  Negative for cough and shortness of breath.      Cardiovascular:  Negative for chest pain.     Gastrointestinal:  Positive for acid reflux.     Neurological: Negative for dizziness and headaches.      Hematologic: Negative for swollen glands.                Objective:        Vitals:    08/23/23 1125   BP: (!) 150/81   Pulse: 105   Temp: 98.1 °F (36.7 °C)     Body mass index is 31.99 kg/m².  Physical Exam  Constitutional:       General: She is not in acute distress.  HENT:      Head: Normocephalic and atraumatic.      Right Ear: Tympanic membrane, ear canal and external ear normal.      Left Ear: Tympanic  membrane, ear canal and external ear normal.      Ears:      Comments:   Tender left TMJ, increased with movement.     Nose: No nasal deformity, mucosal edema or rhinorrhea.      Mouth/Throat:      Mouth: Mucous membranes are moist.      Pharynx: No pharyngeal swelling, oropharyngeal exudate or posterior oropharyngeal erythema.      Comments:   Few white flecks of thrush at uvula and soft palate.        Neck:      Trachea: Phonation normal.   Pulmonary:      Effort: Pulmonary effort is normal. No respiratory distress.   Lymphadenopathy:      Cervical: No cervical adenopathy.   Skin:     General: Skin is warm and dry.   Neurological:      Mental Status: She is alert and oriented to person, place, and time.   Psychiatric:         Speech: Speech normal.         Behavior: Behavior normal.         Tests / Results:    CC of outside audiogram done 04/27/2023 reviewed which notes:     Right ear thresholds range from 25-40 dB at 250 to 6000 hertz then decrease to 50 dB at 8000 hertz with 100% speech discrimination right.      Left ear thresholds range from 60-80 dB with 0% speech discrimination left.        MRI of IAC's/temporal bones done April 2019, August 2019, March 2020 reports again reviewed which states stable 5-6 mm nodular enhancing lesion left IAC fundus consistent with vestibular schwannoma and no apparent interval growth.          Assessment:       1. Vestibular schwannoma    2. Asymmetrical sensorineural hearing loss    3. Wears hearing aid    4. Left ear pain    5. Arthralgia of left temporomandibular joint    6. Thrush    7. History of asthma    8. Gastroesophageal reflux disease, unspecified whether esophagitis present        Plan:       Reviewed all above and considerations, options, recommendations, answered questions.  Would like to proceed as follows:     Take generic Mycelex lozenges as prescribed.    Rinse and gargle after using inhaler.    TMJ measures as reviewed.    Reflux precautions as reviewed.     Proceed with MRI as ordered.    Follow-up next few weeks.

## 2023-09-20 ENCOUNTER — OFFICE VISIT (OUTPATIENT)
Dept: OTOLARYNGOLOGY | Facility: CLINIC | Age: 83
End: 2023-09-20
Payer: MEDICARE

## 2023-09-20 VITALS
DIASTOLIC BLOOD PRESSURE: 76 MMHG | SYSTOLIC BLOOD PRESSURE: 128 MMHG | WEIGHT: 176 LBS | TEMPERATURE: 98 F | BODY MASS INDEX: 32.39 KG/M2 | HEIGHT: 62 IN | HEART RATE: 95 BPM

## 2023-09-20 DIAGNOSIS — J45.20 MILD INTERMITTENT ASTHMA WITHOUT COMPLICATION: ICD-10-CM

## 2023-09-20 DIAGNOSIS — Z86.19 HISTORY OF THRUSH: ICD-10-CM

## 2023-09-20 DIAGNOSIS — D33.3 VESTIBULAR SCHWANNOMA: ICD-10-CM

## 2023-09-20 DIAGNOSIS — K21.9 GASTROESOPHAGEAL REFLUX DISEASE, UNSPECIFIED WHETHER ESOPHAGITIS PRESENT: ICD-10-CM

## 2023-09-20 DIAGNOSIS — J39.2 THROAT IRRITATION: ICD-10-CM

## 2023-09-20 DIAGNOSIS — R68.2 DRY MOUTH: ICD-10-CM

## 2023-09-20 DIAGNOSIS — M26.622 ARTHRALGIA OF LEFT TEMPOROMANDIBULAR JOINT: ICD-10-CM

## 2023-09-20 PROCEDURE — 3078F PR MOST RECENT DIASTOLIC BLOOD PRESSURE < 80 MM HG: ICD-10-PCS | Mod: CPTII,S$GLB,, | Performed by: OTOLARYNGOLOGY

## 2023-09-20 PROCEDURE — 3074F SYST BP LT 130 MM HG: CPT | Mod: CPTII,S$GLB,, | Performed by: OTOLARYNGOLOGY

## 2023-09-20 PROCEDURE — 1160F RVW MEDS BY RX/DR IN RCRD: CPT | Mod: CPTII,S$GLB,, | Performed by: OTOLARYNGOLOGY

## 2023-09-20 PROCEDURE — 99214 PR OFFICE/OUTPT VISIT, EST, LEVL IV, 30-39 MIN: ICD-10-PCS | Mod: S$GLB,,, | Performed by: OTOLARYNGOLOGY

## 2023-09-20 PROCEDURE — 1159F MED LIST DOCD IN RCRD: CPT | Mod: CPTII,S$GLB,, | Performed by: OTOLARYNGOLOGY

## 2023-09-20 PROCEDURE — 3078F DIAST BP <80 MM HG: CPT | Mod: CPTII,S$GLB,, | Performed by: OTOLARYNGOLOGY

## 2023-09-20 PROCEDURE — 99214 OFFICE O/P EST MOD 30 MIN: CPT | Mod: S$GLB,,, | Performed by: OTOLARYNGOLOGY

## 2023-09-20 PROCEDURE — 1159F PR MEDICATION LIST DOCUMENTED IN MEDICAL RECORD: ICD-10-PCS | Mod: CPTII,S$GLB,, | Performed by: OTOLARYNGOLOGY

## 2023-09-20 PROCEDURE — 1160F PR REVIEW ALL MEDS BY PRESCRIBER/CLIN PHARMACIST DOCUMENTED: ICD-10-PCS | Mod: CPTII,S$GLB,, | Performed by: OTOLARYNGOLOGY

## 2023-09-20 PROCEDURE — 3074F PR MOST RECENT SYSTOLIC BLOOD PRESSURE < 130 MM HG: ICD-10-PCS | Mod: CPTII,S$GLB,, | Performed by: OTOLARYNGOLOGY

## 2023-09-20 RX ORDER — BENZONATATE 200 MG/1
200 CAPSULE ORAL 3 TIMES DAILY PRN
Qty: 30 CAPSULE | Refills: 1 | Status: SHIPPED | OUTPATIENT
Start: 2023-09-20

## 2023-09-20 NOTE — PATIENT INSTRUCTIONS
Use OTC Biotene products as discussed for dry mouth.    Take benzonaate 3 times a day as needed for cough.    Reflux precautions.    Follow up next few weeks for recheck and scope.

## 2023-09-20 NOTE — PROGRESS NOTES
"Subjective:       Patient ID: Tamiko Youssef is a 82 y.o. female.    Chief Complaint: Follow-up (Follow-up of multiple complaints.)    Returns for follow-up of multiple complaints last seen 08/23/2023, notes reviewed.  Since last visit had MRI of IAC's/temporal bones with and without contrast to follow-up known vestibular schwannoma and here to review.  Left TMJ pain much better since last visit with TMJ measures and now only occasional.  Tolerating new hearing aids from outside audiologist which she has had for the past few months.  Flecks of palatal thrush were noted at last visit for which a course of Mycelex was given.  Knows to rinse and gargle after using inhalers.  Throat still feels irritated and scratchy primarily when first awakens.  Better with liquid washes.  Mouth tends to feel dry much of the time.  Takes an over-the-counter "squirt" and helps her mouth and throat.  Positive history of GERD with occasional belching and no acid reducer.  States unable to tolerate when tried in the past.  No voice or swallowing complaints and no new complaints.  No tobacco or alcohol ever.          Review of Systems     Constitutional: Negative for fever.      HENT: Positive for hearing loss.  Negative for ear discharge, ringing in the ears, stuffy nose, trouble swallowing and voice change.      Respiratory:  Negative for cough and shortness of breath.      Cardiovascular:  Negative for chest pain.     Gastrointestinal:  Positive for acid reflux.     Neurological: Negative for dizziness and headaches.      Hematologic: Negative for swollen glands.                Objective:        Vitals:    09/20/23 1155   BP: 128/76   Pulse: 95   Temp: 97.9 °F (36.6 °C)     Body mass index is 32.19 kg/m².  Physical Exam  Constitutional:       General: She is not in acute distress.  HENT:      Head: Normocephalic and atraumatic.      Right Ear: Tympanic membrane, ear canal and external ear normal.      Left Ear: Tympanic membrane, ear " canal and external ear normal.      Nose: No nasal deformity, mucosal edema or rhinorrhea.      Mouth/Throat:      Mouth: Mucous membranes are moist. No oral lesions.      Pharynx: No pharyngeal swelling, oropharyngeal exudate or posterior oropharyngeal erythema.      Comments:        Neck:      Trachea: Phonation normal.   Pulmonary:      Effort: Pulmonary effort is normal. No respiratory distress.      Breath sounds: Normal breath sounds.   Lymphadenopathy:      Cervical: No cervical adenopathy.   Skin:     General: Skin is warm and dry.   Neurological:      Mental Status: She is alert and oriented to person, place, and time.   Psychiatric:         Speech: Speech normal.         Behavior: Behavior normal.         Tests / Results:    MRI of IAC's/temporal bone done 09/06/2023 report reviewed/discussed which notes stable 5-6 mm nodular enhancing lesion left IAC fundus, presumed vestibular schwannoma with no apparent interval growth or extension into the labyrinthine structures.  Mild chronic small-vessel ischemic change and generalized cerebral volume loss.          Assessment:       1. Vestibular schwannoma    2. Arthralgia of left temporomandibular joint    3. Throat irritation    4. Dry mouth    5. History of thrush    6. Mild intermittent asthma without complication    7. Gastroesophageal reflux disease, unspecified whether esophagitis present        Plan:       Reviewed all above and considerations and recommendations and answered questions in detail.  Will proceed as follows:    Use OTC Biotene products as discussed for dry mouth.      Take benzonatate 3 times a day as needed for cough.      Reflux precautions as reviewed.      Follow-up next few weeks for recheck and scope.

## 2023-11-17 ENCOUNTER — TELEPHONE (OUTPATIENT)
Dept: PAIN MEDICINE | Facility: CLINIC | Age: 83
End: 2023-11-17
Payer: MEDICARE

## 2023-11-17 NOTE — TELEPHONE ENCOUNTER
Moved patient appointment up to December 6th with Hima Nava. Patient was requesting refill on hydrocodone and had not been seen since June 30, 2023.

## 2023-11-17 NOTE — TELEPHONE ENCOUNTER
----- Message from Kevin Coelho sent at 11/17/2023 11:14 AM CST -----  Who Called:        Refill or New Rx: refill         RX Name and Strength:  hydrocodone 5 mg-acetaminophen 325 mg tablet          Is this a 30 day or 90 day RX        Preferred Pharmacy with phone number:  [id number] [place] [id number] [facility] AT [address]        Local or Mail Order: local           Would the patient rather a call back or a response via [facility]? Call back         Best Call Back Number:        Additional Information:

## 2023-12-06 ENCOUNTER — LAB VISIT (OUTPATIENT)
Dept: LAB | Facility: HOSPITAL | Age: 83
End: 2023-12-06
Attending: NURSE PRACTITIONER
Payer: MEDICARE

## 2023-12-06 ENCOUNTER — OFFICE VISIT (OUTPATIENT)
Dept: PAIN MEDICINE | Facility: CLINIC | Age: 83
End: 2023-12-06
Payer: MEDICARE

## 2023-12-06 VITALS
SYSTOLIC BLOOD PRESSURE: 153 MMHG | WEIGHT: 178.38 LBS | TEMPERATURE: 98 F | RESPIRATION RATE: 18 BRPM | DIASTOLIC BLOOD PRESSURE: 83 MMHG | BODY MASS INDEX: 32.62 KG/M2 | OXYGEN SATURATION: 98 % | HEART RATE: 76 BPM

## 2023-12-06 DIAGNOSIS — Z51.81 ENCOUNTER FOR THERAPEUTIC DRUG MONITORING: ICD-10-CM

## 2023-12-06 DIAGNOSIS — F11.90 CHRONIC, CONTINUOUS USE OF OPIOIDS: ICD-10-CM

## 2023-12-06 DIAGNOSIS — M54.16 LUMBAR RADICULOPATHY: ICD-10-CM

## 2023-12-06 DIAGNOSIS — G89.4 CHRONIC PAIN SYNDROME: Primary | ICD-10-CM

## 2023-12-06 DIAGNOSIS — M51.36 DDD (DEGENERATIVE DISC DISEASE), LUMBAR: ICD-10-CM

## 2023-12-06 PROCEDURE — 3079F DIAST BP 80-89 MM HG: CPT | Mod: CPTII,S$GLB,, | Performed by: NURSE PRACTITIONER

## 2023-12-06 PROCEDURE — 99999 PR PBB SHADOW E&M-EST. PATIENT-LVL V: CPT | Mod: PBBFAC,,, | Performed by: NURSE PRACTITIONER

## 2023-12-06 PROCEDURE — 1160F PR REVIEW ALL MEDS BY PRESCRIBER/CLIN PHARMACIST DOCUMENTED: ICD-10-PCS | Mod: CPTII,S$GLB,, | Performed by: NURSE PRACTITIONER

## 2023-12-06 PROCEDURE — 80326 AMPHETAMINES 5 OR MORE: CPT | Performed by: NURSE PRACTITIONER

## 2023-12-06 PROCEDURE — 99214 OFFICE O/P EST MOD 30 MIN: CPT | Mod: S$GLB,,, | Performed by: NURSE PRACTITIONER

## 2023-12-06 PROCEDURE — 3288F PR FALLS RISK ASSESSMENT DOCUMENTED: ICD-10-PCS | Mod: CPTII,S$GLB,, | Performed by: NURSE PRACTITIONER

## 2023-12-06 PROCEDURE — 1159F MED LIST DOCD IN RCRD: CPT | Mod: CPTII,S$GLB,, | Performed by: NURSE PRACTITIONER

## 2023-12-06 PROCEDURE — 3077F SYST BP >= 140 MM HG: CPT | Mod: CPTII,S$GLB,, | Performed by: NURSE PRACTITIONER

## 2023-12-06 PROCEDURE — 1160F RVW MEDS BY RX/DR IN RCRD: CPT | Mod: CPTII,S$GLB,, | Performed by: NURSE PRACTITIONER

## 2023-12-06 PROCEDURE — 1159F PR MEDICATION LIST DOCUMENTED IN MEDICAL RECORD: ICD-10-PCS | Mod: CPTII,S$GLB,, | Performed by: NURSE PRACTITIONER

## 2023-12-06 PROCEDURE — 1125F AMNT PAIN NOTED PAIN PRSNT: CPT | Mod: CPTII,S$GLB,, | Performed by: NURSE PRACTITIONER

## 2023-12-06 PROCEDURE — 3079F PR MOST RECENT DIASTOLIC BLOOD PRESSURE 80-89 MM HG: ICD-10-PCS | Mod: CPTII,S$GLB,, | Performed by: NURSE PRACTITIONER

## 2023-12-06 PROCEDURE — 3077F PR MOST RECENT SYSTOLIC BLOOD PRESSURE >= 140 MM HG: ICD-10-PCS | Mod: CPTII,S$GLB,, | Performed by: NURSE PRACTITIONER

## 2023-12-06 PROCEDURE — 80307 DRUG TEST PRSMV CHEM ANLYZR: CPT | Performed by: NURSE PRACTITIONER

## 2023-12-06 PROCEDURE — 1101F PT FALLS ASSESS-DOCD LE1/YR: CPT | Mod: CPTII,S$GLB,, | Performed by: NURSE PRACTITIONER

## 2023-12-06 PROCEDURE — 3288F FALL RISK ASSESSMENT DOCD: CPT | Mod: CPTII,S$GLB,, | Performed by: NURSE PRACTITIONER

## 2023-12-06 PROCEDURE — 1101F PR PT FALLS ASSESS DOC 0-1 FALLS W/OUT INJ PAST YR: ICD-10-PCS | Mod: CPTII,S$GLB,, | Performed by: NURSE PRACTITIONER

## 2023-12-06 PROCEDURE — 99999 PR PBB SHADOW E&M-EST. PATIENT-LVL V: ICD-10-PCS | Mod: PBBFAC,,, | Performed by: NURSE PRACTITIONER

## 2023-12-06 PROCEDURE — 1125F PR PAIN SEVERITY QUANTIFIED, PAIN PRESENT: ICD-10-PCS | Mod: CPTII,S$GLB,, | Performed by: NURSE PRACTITIONER

## 2023-12-06 PROCEDURE — 99214 PR OFFICE/OUTPT VISIT, EST, LEVL IV, 30-39 MIN: ICD-10-PCS | Mod: S$GLB,,, | Performed by: NURSE PRACTITIONER

## 2023-12-06 RX ORDER — HYDROCODONE BITARTRATE AND ACETAMINOPHEN 5; 325 MG/1; MG/1
1 TABLET ORAL EVERY 12 HOURS PRN
Qty: 60 TABLET | Refills: 0 | Status: SHIPPED | OUTPATIENT
Start: 2023-12-06 | End: 2024-01-05

## 2023-12-06 RX ORDER — TIZANIDINE 2 MG/1
2 TABLET ORAL NIGHTLY
Qty: 30 TABLET | Refills: 2 | Status: SHIPPED | OUTPATIENT
Start: 2023-12-06 | End: 2024-03-07

## 2023-12-06 NOTE — PROGRESS NOTES
Chronic patient Established Note (Follow up visit)        SUBJECTIVE:    Interval history 12/06/2023:  82-year-old female that presents today for a follow-up she has a history of chronic low back pain.  Aching and throbbing in nature she does report that it is tolerable at times.  Currently on chronic opioid therapy that consists of Norco 5-325 b.i.d. which she reports provides her with 50-60% relief more and improvement in her functionality.  She is also taking tizanidine 2 mg q.h.s. to and muscular pain she also reports spasms.  She denies any adverse side effects with these medications.     Interval History 6/30/2023:  The patient returns to clinic today for follow up of back pain. She is s/p bilateral SI joint injections on 6/7/2023. She reports 50% relief of her pain. She continues to report low back pain. She describes this pain as aching in nature. Her pain is tolerable at this time. She is taking Gabapentin at the lower dose with benefit. She continues to take Norco as needed with benefit and without adverse effects. Of note, she states that she does not feel good today. She feels dizzy and weak today. She also reports stomach pain. She denies any fever, nausea or vomiting. Her pain today is 8/10.    Interval History 5/4/23:  Patient returns to clinic today for follow-up of chronic low back and bilateral knee pain.  She was taking Norco 5-325 mg twice daily which provided good relief for her.  Her prescription was refilled following her visit on 3/27/2023; however, the prescription was sent to the wrong pharmacy, and she was not able to have filled.  She feels that the gabapentin 600 mg 3 times daily is over sedating for her, and she wishes to return to 300 mg 3 times daily.  Today, she reports pain in her low back that radiates into her buttocks. She had bilateral SI joint injections in May 2022 which she feels gave her good relief.  She would like to repeat these if possible.  She is also having bilateral  knee pain.  She had corticosteroid injections 4-5 years ago in her knees from an outside doctor that gave her good relief. She is interested in repeating these as well.      Interval History 3/27/2023  Mrs Youssef presents for follow up of chronic pain complaints. She was started on Norco 5/325mg prior visit. She states this was beneficial but did not return or call to request refills over interval. She is  requesting refill as this did provide relief and improved functioning.     Interval History 11/14/2022:  Tamiko Youssef presents to the clinic for a follow-up appointment for chronic low back pain. Since the last visit, Tamiko Youssef states the pain has been worsening. Current pain intensity is 6/10. Pain is localized to the low back with radiating into the right leg. Radiating pain is predominately in the bilateral calves. Also with localized pain to right knee. Reports she has had steroid injections in the right knee in the past with good benefit. Most recently 3 years ago. Patient ambulates with a Rolator and reports most recent fall about 6 months ago when she fell on her right knee. Did not seek MD care at that time.   No new onset weakness, new onset sensation changes, saddle anesthesia, or bowel/bladder changes.    Interval History 10/3/2022:  The patient returns to clinic today for follow up of low back pain. She continues to report low back pain that radiates into the posterior aspect both legs to her ankles. Her pain is worse with activity. She continues to take Gabapentin with some benefit. She did try Lyrica but this caused a rash. She is not interested in further procedures at this time. She asks about Norco today, as this has helped her pain in the past. She denies any other health changes. Her pain today is 8/10.    Interval History 9/1/2022:  The patient returns to clinic today for follow up of low back pain. She continues to report low back pain that radiates into the posterior aspect of both legs  to her ankles. Her pain is worse with standing and walking. She also reports increased right knee pain. She is currently taking Gabapentin with limited relief. She also reports that this makes her sleepy. She is not interested in further procedures at this time. She denies any other health changes. Her pain today is 8/10.    Interval History 6/8/2022:  The patient returns to clinic today for follow up of low back pain. She is s/p bilateral SI joint injections on 5/25/2022. She reports no relief. She continues to report low back pain that radiates into her buttocks into the posterior aspect of both legs to her ankles. She also reports radiates pain into the anterior aspect of her left leg. She is not interested in further procedures at this time. She reports increased bilateral knee and ankle pain. She would like to establish care with an Orthopedic. She also reports that her PCP has left. She would like to establish care here at [facility]. She continues to take Gabapentin. She denies any other health changes. Her pain today is 6/10.    Interval History 4/20/2022:  The patient returns to clinic today for follow up of low back pain. She has not been seen in over a year. She reports that previous SYLVIA in 4/2021 provided no relief. She continues to report low back pain that radiates into both hips and buttocks. She reports intermittent radiating pain into the posterior aspect of her left leg to under her foot. Her pain is worse with prolonged sitting and standing. She continues to take Gabapentin with limited relief. She denies any other health changes. Her pain today is 8/10.    Interval History 1/14/2021:  The patient returns to clinic today for follow up of low back pain. She continues to report low back pain that radiates into the lateral aspect of left leg to her ankle. She denies any right leg pain. She did not start Lyrica given at last visit due to concern for side effects. She has not started physical therapy yet.  She continues to take Gabapentin and Zanaflex. She denies any other health changes. Her pain today is 6/10.    Interval history 12/10/2020:  Returns for follow up of her low back pain. She continues to report low back pain that radiates into the lateral aspect of her left leg to her ankle. Reports that gabapentin and Zanaflex are not helping with pain. Her pain is worse with standing, walking, and activity. Her pain today is 5/10.    Interval History 11/13/2020:  The patient returns to clinic today for follow up of back pain. She reports limited relief with previous SYLVIA. She continues to report low back pain that radiates into the lateral aspect of her left leg to her ankle. She denies any right leg pain today. Her pain is worse with standing, walking, and activity. She is frustrated with her continued pain. She did increase Gabapentin to BID. She is unsure of relief at this time. She does take [place] from her PCP. She states that her PCP would like our office to take over this medication. She asks for a refill. She denies any other health changes. Her pain today is 2/10.    Interval History 10/30/2020:  The patient returns to clinic today via audio visit for follow up of back pain. She is s/p bilateral L4/5 TF SYLVIA on 10/14/2020. She reports limited relief of her pain. She reports increased low back pain that radiates down the lateral aspect of her left leg to her ankle. She denies any right leg pain today. Her pain is worse with prolonged standing, walking, and activity. She denies any weakness. She is currently taking Gabapentin once a day. She denies any other health changes.     Interval History 7/24/2020:  The patient returns to clinic today for follow up of low back pain. She reports increased pain over the last two months. She reports low back pain that radiates down the lateral aspect of both legs to her knees, left greater than right. Her pain is worse with standing and walking. She continues to take  Gabapentin. She denies any other health changes. Her pain today is 9/10.    Interval History 1/28/2020:  Tamiko Youssef presents to the clinic for a follow-up appointment for lower back pain. She is s/p bilateral L4/5 TF SYLVIA on 1/8/2020. She reports 80% relief of his low back and leg pain. She continues to report low back pain that is aching in nature. She denies any radiating leg pain today. Her pain is worse with prolonged standing and walking. She continues to take Gabapentin with benefit. She continues to perform a home exercise routine. She denies any other health changes. Her pain today is 8/10.      Pain Disability Index Review:      12/6/2023     1:14 PM 6/30/2023     1:39 PM 5/4/2023    10:09 AM   Last 3 PDI Scores   Pain Disability Index (PDI) 42 30 40       Pain Medications:  Gabapentin     Opioid Contract: yes     report:  Reviewed and consistent with medication use as prescribed.    Pain Procedures:   3/28/14, 3/13/13 Bilateral L4 TF SYLVIA  8/22/18 Bilateral L4 TF SYLVIA- 75% relief  1/8/2020- Bilateral L4/5 TF SYLVIA - 80% relief   10/14/2020- Bilateral L4/5 TF SYLVIA    Physical Therapy/Home Exercise: no    Imaging:   MRI Lumbar Spine 11/18/2020:  COMPARISON:  11/18/2020     FINDINGS:  Lumbar spine alignment is within normal limits. Vertebral body heights preserved.  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     The conus is normal in appearance.  The adjacent soft tissue structures show no significant abnormalities.     L1-L2: No evidence of a disc herniation.No significant central canal or neural foraminal narrowing.     L2-L3: There is no focal disc herniation. No central canal or foraminal stenosis.  Prominent facet arthritis.     L3-L4: There is some broad-based disc bulging and superimposed spondylitic spurring which is asymmetric to the left.  Prominent facet arthritis is noted.  No central canal or foraminal stenosis.     L4-L5:  Broad-based disc bulging and facet arthritis present.  No significant central canal or neural foraminal narrowing.     L5-S1: Broad-based disc bulging and facet arthritis are present.  No focal disc herniation.  No significant central canal or neural foraminal narrowing.     Impression:     Multilevel nonstenotic degenerative change mostly affecting the posterior elements.    Xray Lumbar Spine 11/18/2020:  COMPARISON:  Multiple priors, most recent 08/09/2018     FINDINGS:  Levoscoliosis of the lumbar spine.  Grade 1 anterolisthesis of L3 on L4, L4 on L5, and L5 on S1 similar to prior.  No acute, displaced fracture.  Vertebral body heights are maintained.  No evidence of dynamic instability.  Multilevel lumbar facet arthrosis.  No aggressive osseous abnormality.     Impression:     Mild multilevel lumbar spondylosis with unchanged grade 1 anterolisthesis of L3 on L4 through L5 on S1.    Xray Hips 11/18/2020:  COMPARISON:  Multiple priors, most recent 08/09/2018     FINDINGS:  Sacroiliac joints are symmetric.  Pubic symphysis is not widened.  The femoral heads are well seated within the acetabula.  Degenerative changes of the lower lumbar spine.  Mild joint space narrowing, subchondral sclerosis and minimal osteophytosis of the hip joints.  No acute, displaced fracture.  No aggressive osseous abnormality.     Impression:     No acute osseous abnormality.        Allergies:   Review of patient's allergies indicates:   Allergen Reactions    Naprosyn [naproxen]     Norco [hydrocodone-acetaminophen]     Tramadol        Current Medications:   Current Outpatient Medications   Medication Sig Dispense Refill    albuterol (PROVENTIL) 2.5 mg /3 mL (0.083 %) nebulizer solution Take 2.5 mg by nebulization every 6 (six) hours as needed.      albuterol (PROVENTIL/VENTOLIN HFA) 90 mcg/actuation inhaler Inhale TWO to FOUR puffs by mouth FOUR times daily when needed.      ALPRAZolam (XANAX) 0.25 MG tablet TK 1 T PO QD PRA 30 tablet 0     amLODIPine (NORVASC) 10 MG tablet       benzonatate (TESSALON) 200 MG capsule Take 1 capsule (200 mg total) by mouth 3 (three) times daily as needed for Cough. 30 capsule 1    clotrimazole (MYCELEX) 10 mg john Take 1 tablet (10 mg total) by mouth 5 (five) times daily. Dissolve one lozenge in the mouth 5 times a day. 50 John 0    clotrimazole (MYCELEX) 10 mg john Take 1 tablet (10 mg total) by mouth 5 (five) times daily. Dissolve one lozenge in the mouth 5 times a day. 50 John 0    diclofenac sodium (VOLTAREN) 1 % Gel Apply 2 g topically 4 (four) times daily. 1 each 2    dicyclomine (BENTYL) 10 MG capsule Take 1 capsule (10 mg total) by mouth as needed (abdominal cramping). 30 capsule 0    fluticasone propionate (FLONASE) 50 mcg/actuation nasal spray 2 sprays in each nostril every evening. 16 g 1    fluticasone-salmeterol diskus inhaler 250-50 mcg Inhale 1 puff into the lungs.      furosemide (LASIX) 20 MG tablet TK 1 T PO QD PRF FLUID  2    gabapentin (NEURONTIN) 300 MG capsule TAKE 1 CAPSULE BY MOUTH AT NIGHT FOR 1 WEEK , 1 CAPSULE TWICE  DAILY FOR 1 WEEK, THEN 1 CAPSULE 3 TIMES DAILY THEREAFTER 90 capsule 11    latanoprost 0.005 % ophthalmic solution Place 1 drop into both eyes every evening. 3 Bottle 3    meclizine (ANTIVERT) 25 mg tablet Take 1 tablet (25 mg total) by mouth 3 (three) times daily as needed for Dizziness. 20 tablet 0    meloxicam (MOBIC) 15 MG tablet Take by mouth.      mirtazapine (REMERON) 30 MG tablet Take 30 mg by mouth nightly.  3    omeprazole (PRILOSEC) 20 MG capsule Take by mouth.      polyethylene glycol (GLYCOLAX) 17 gram/dose powder Take 17 g by mouth daily as needed (Constipation). 1700 g 1    pravastatin (PRAVACHOL) 20 MG tablet TAKE 1 TABLET BY MOUTH IN THE  EVENING 90 tablet 3    walker Misc 1 application by Misc.(Non-Drug; Combo Route) route once daily. 1 each 0    tiZANidine (ZANAFLEX) 2 MG tablet Take 1 tablet (2 mg total) by mouth nightly. 30 tablet 2     No current  facility-administered medications for this visit.       REVIEW OF SYSTEMS:    Constitutional: Positive for unexpected weight change.   HENT: Positive for headache.   Respiratory: Positive for intermittent shortness of breath and wheezing.  (asthma)  Gastrointestinal: Positive for constipation.   Musculoskeletal: Positive for back pain, gait problem and stiffness.       Past Medical History:  Past Medical History:   Diagnosis Date    Asthma     Cataract     Glaucoma     Hypertension        Past Surgical History:  Past Surgical History:   Procedure Laterality Date    CATARACT EXTRACTION W/  INTRAOCULAR LENS IMPLANT Bilateral     CHOLECYSTECTOMY      COLONOSCOPY N/A 12/5/2022    Procedure: COLONOSCOPY;  Surgeon: Daniel Mckeon MD;  Location: [facility] ([address] [place] [address]);  Service: Endoscopy;  Laterality: N/A;  fully vaccinated - sm  extended constipation mirilax prep   instructions mailed - sm    CYST REMOVAL      on neck    ESOPHAGOGASTRODUODENOSCOPY N/A 12/5/2022    Procedure: EGD (ESOPHAGOGASTRODUODENOSCOPY);  Surgeon: Daniel Mckeon MD;  Location: [facility] ([address] [place] [address]);  Service: Endoscopy;  Laterality: N/A;    EYE SURGERY      HYSTERECTOMY      2/2 AUB, partial    INJECTION OF JOINT Bilateral 05/25/2022    Procedure: INJECTION, JOINT, BILATERAL SACROILIAC (SI);  Surgeon: Obdulio Leon MD;  Location: [facility];  Service: Pain Management;  Laterality: Bilateral;    INJECTION, SACROILIAC JOINT Bilateral 6/7/2023    Procedure: INJECTION,SACROILIAC JOINT, BILATERAL;  Surgeon: Obdulio Leon MD;  Location: [facility];  Service: Pain Management;  Laterality: Bilateral;    TRANSFORAMINAL EPIDURAL INJECTION OF STEROID Bilateral 01/08/2020    Procedure: INJECTION, STEROID, EPIDURAL, TRANSFORAMINAL APPROACH;  Surgeon: Obdulio Leon MD;  Location: [facility];  Service: Pain Management;  Laterality: Bilateral;  B/L TFESI L4    TRANSFORAMINAL EPIDURAL INJECTION OF STEROID Bilateral 10/14/2020    Procedure:  INJECTION, STEROID, EPIDURAL, TRANSFORAMINAL APPROACH, L4-L5 need consent;  Surgeon: Obdulio Leon MD;  Location: [facility] PAIN T;  Service: Pain Management;  Laterality: Bilateral;    TRANSFORAMINAL EPIDURAL INJECTION OF STEROID Left 04/14/2021    Procedure: INJECTION, STEROID, EPIDURAL, TRANSFORAMINAL APPROACH  left L3/4 and L4/5;  Surgeon: Obdulio Leon MD;  Location: [facility] PAIN T;  Service: Pain Management;  Laterality: Left;  consent needed       Family History:  Family History   Problem Relation Age of Onset    Cataracts Son     Glaucoma Son     No Known Problems Mother     No Known Problems Father     Macular degeneration Neg Hx        Social History:  Social History     Socioeconomic History    Marital status: Single   Tobacco Use    Smoking status: Never    Smokeless tobacco: Never   Substance and Sexual Activity    Alcohol use: No    Drug use: Never       OBJECTIVE:    BP (!) 153/83   Pulse 76   Temp 98.3 °F (36.8 °C)   Resp 18   Wt 80.9 kg (178 lb 5.6 oz)   SpO2 98%   BMI 32.62 kg/m²     PHYSICAL EXAMINATION:    GENERAL: Well appearing, in no acute distress, alert and oriented x3.  PSYCH:  Mood and affect appropriate.  SKIN: Skin color, texture, turgor normal, no rashes or lesions.  HEAD/FACE:  Normocephalic, atraumatic. Cranial nerves grossly intact.  CV: RRR with palpation of the radial artery.  PULM: No evidence of respiratory difficulty, symmetric chest rise.  GI:  Soft and non-tender.  BACK: Straight leg raising in the sitting position is negative for radicular pain bilaterally.  Limited ROM with pain on flexion and extension.    EXTREMITIES: No deformities, edema, or skin discoloration. Good capillary refill.  MUSCULOSKELETAL:  5/5 strength in right ankle with plantar and dorsiflexion. 5/5 strength in left ankle with plantar and dorsiflexion. 4/5 strength with right knee flexion and extension. 4/5 strength with left knee flexion and extension.  No atrophy or tone abnormalities are  noted.  NEURO: Bilateral lower extremity coordination and muscle stretch reflexes are physiologic and symmetric.  Plantar response are downgoing. No clonus.  No loss of sensation is noted.  GAIT: Antalgic- ambulates with walker.      ASSESSMENT: 82 y.o. year old female with lower back pain consistent with the following diagnoses:     1. Chronic pain syndrome        2. Lumbar radiculopathy        3. DDD (degenerative disc disease), lumbar        4. Encounter for therapeutic drug monitoring  Pain Clinic Drug Screen                  PLAN:     - Previous imaging was reviewed and discussed with the patient today. Labs reviewed.     - no procedures recommended at this time    - Continue Gabapentin and Zanaflex     -Zanaflex 2 mg q.h.s. refill today only    - Pain contract signed and filed    - Continue and refill Norco 5/325 mg BID PRN, #60, we will pend to appropriate provider    - The patient is here today for a refill of current pain medications and they believe these provide effective pain control and improvements in quality of life.  The patient notes no serious side effects, and feels the benefits outweigh the risks.  The patient was reminded of the pain contract that they signed previously as well as the risks and benefits of the medication including possible death.  The updated [place] Board [employer] prescription monitoring program was reviewed, and the patient has been found to be compliant with current treatment plan. Medication management provided by Dr. Leon.     - UDS today    - I have stressed the importance of physical activity and a home exercise plan to help with pain and improve health.    - RTC in 3 months.     LIZZIE Faulkner  Interventional Pain Management    12/06/2023

## 2023-12-08 ENCOUNTER — OFFICE VISIT (OUTPATIENT)
Dept: URGENT CARE | Facility: CLINIC | Age: 83
End: 2023-12-08
Payer: MEDICARE

## 2023-12-08 VITALS
TEMPERATURE: 99 F | HEIGHT: 62 IN | BODY MASS INDEX: 32.76 KG/M2 | RESPIRATION RATE: 16 BRPM | WEIGHT: 178 LBS | SYSTOLIC BLOOD PRESSURE: 131 MMHG | OXYGEN SATURATION: 98 % | HEART RATE: 94 BPM | DIASTOLIC BLOOD PRESSURE: 60 MMHG

## 2023-12-08 DIAGNOSIS — L03.115 CELLULITIS OF RIGHT LOWER LEG: ICD-10-CM

## 2023-12-08 DIAGNOSIS — M79.661 PAIN IN RIGHT SHIN: Primary | ICD-10-CM

## 2023-12-08 PROCEDURE — 99213 OFFICE O/P EST LOW 20 MIN: CPT | Mod: S$GLB,,, | Performed by: PHYSICIAN ASSISTANT

## 2023-12-08 PROCEDURE — 73590 XR TIBIA FIBULA 2 VIEW RIGHT: ICD-10-PCS | Mod: RT,S$GLB,, | Performed by: RADIOLOGY

## 2023-12-08 PROCEDURE — 73590 X-RAY EXAM OF LOWER LEG: CPT | Mod: RT,S$GLB,, | Performed by: RADIOLOGY

## 2023-12-08 PROCEDURE — 99213 PR OFFICE/OUTPT VISIT, EST, LEVL III, 20-29 MIN: ICD-10-PCS | Mod: S$GLB,,, | Performed by: PHYSICIAN ASSISTANT

## 2023-12-08 RX ORDER — CEPHALEXIN 500 MG/1
500 CAPSULE ORAL EVERY 6 HOURS
Qty: 28 CAPSULE | Refills: 0 | Status: SHIPPED | OUTPATIENT
Start: 2023-12-08 | End: 2023-12-15

## 2023-12-08 RX ORDER — IPRATROPIUM BROMIDE AND ALBUTEROL SULFATE 2.5; .5 MG/3ML; MG/3ML
SOLUTION RESPIRATORY (INHALATION)
COMMUNITY

## 2023-12-08 RX ORDER — CICLOPIROX 80 MG/ML
SOLUTION TOPICAL
COMMUNITY
Start: 2023-11-19

## 2023-12-08 RX ORDER — MINERAL OIL
1 ENEMA (ML) RECTAL DAILY
COMMUNITY
Start: 2023-09-21

## 2023-12-08 NOTE — PROGRESS NOTES
"Subjective:      Patient ID: Tamiko Youssef is a 82 y.o. female.    Vitals:  height is 5' 2" (1.575 m) and weight is 80.7 kg (178 lb). Her oral temperature is 98.5 °F (36.9 °C). Her blood pressure is 131/60 and her pulse is 94. Her respiration is 16 and oxygen saturation is 98%.     Chief Complaint: Leg Pain    Pt is an 82-year-old female who presents today for evaluation of pain of her right shin.  Patient states this began about 1 week ago unprovoked.  Denies any injury or trauma recently.  Patient states that this is an ongoing intermittent pain that will flare up every now and then for the past few years, she thinks after she fell a few years ago.  I saw patient 1 year ago for pain in the same spot.  However, this time she has swelling and redness to the area.  Patient states that redness and swelling is localized to right anterior lower leg/right shin which is localized area of pain.  Denies any posterior leg pain or calf pain or other swelling to rest of leg.  Patient states that the redness and swelling has been present for the past week but seems to be improving.  Generally this has improved over the past few days, but her pain management specialist recommended her to follow up with her PCP regarding this, her PCP is out of office to they directed her here.  Patient states that it is very tender to touch, also has some pain to the area with walking.  Patient has been using lidocaine patches and regular chronic pain medication Norco and gabapentin.    Leg Pain   The incident occurred 5 to 7 days ago. The pain is present in the right leg (right sheen). The pain is at a severity of 8/10. The pain is severe. The pain has been Improving since onset. Associated symptoms include an inability to bear weight. Pertinent negatives include no loss of motion, loss of sensation, muscle weakness, numbness or tingling. She reports no foreign bodies present. The symptoms are aggravated by movement, weight bearing and " palpation. Treatments tried: Lidocaine patch. The treatment provided mild relief.       Constitution: Negative for chills, sweating, fatigue and fever.   HENT:  Negative for congestion and sore throat.    Neck: Negative for neck pain and neck stiffness.   Cardiovascular:  Negative for chest pain, leg swelling and palpitations.   Eyes:  Negative for eye itching, eye pain and eye redness.   Respiratory:  Negative for cough, sputum production and shortness of breath.    Gastrointestinal:  Negative for abdominal pain, nausea, vomiting and diarrhea.   Genitourinary:  Negative for dysuria, frequency and urgency.   Musculoskeletal:  Positive for pain. Negative for trauma and joint swelling.   Skin:  Positive for color change and erythema. Negative for rash.   Neurological:  Negative for dizziness, headaches, disorientation, altered mental status, numbness and tingling.   Psychiatric/Behavioral:  Negative for altered mental status and disorientation.       Objective:     Physical Exam   Constitutional: She is oriented to person, place, and time. She appears well-developed.  Non-toxic appearance. She does not appear ill. No distress.   HENT:   Head: Normocephalic and atraumatic. Head is without abrasion, without contusion and without laceration.   Ears:   Right Ear: External ear normal.   Left Ear: External ear normal.   Nose: Nose normal.   Mouth/Throat: Oropharynx is clear and moist and mucous membranes are normal.   Eyes: Conjunctivae, EOM and lids are normal. Pupils are equal, round, and reactive to light. Right eye exhibits no discharge. Left eye exhibits no discharge. No scleral icterus.   Neck: Trachea normal and phonation normal. Neck supple.   Cardiovascular: Normal rate, regular rhythm and normal heart sounds.   Pulmonary/Chest: Effort normal and breath sounds normal. No stridor. No respiratory distress. She has no wheezes.   Musculoskeletal: Normal range of motion.         General: Normal range of motion.         Legs:    Neurological: She is alert and oriented to person, place, and time.   Skin: Skin is warm, dry, intact, not diaphoretic and no rash. Capillary refill takes less than 2 seconds. erythema No abrasion, No burn, No bruising and No ecchymosis   Psychiatric: Her speech is normal and behavior is normal. Judgment and thought content normal.   Nursing note and vitals reviewed.    XR TIBIA FIBULA 2 VIEW RIGHT    Result Date: 12/8/2023  EXAMINATION: XR TIBIA FIBULA 2 VIEW RIGHT CLINICAL HISTORY: Pain in right lower leg TECHNIQUE: AP and lateral views of the right tibia and fibula were performed. COMPARISON: None. FINDINGS: No fracture or dislocation.  No bone destruction identified     See above Electronically signed by: Nish Ovalles MD Date:    12/08/2023 Time:    12:29     Assessment:     1. Pain in right shin    2. Cellulitis of right lower leg        Plan:     Discussed differentials include cellulitis, abscess, superficial phlebitis, overuse soft tissue injury sprain/strain/tendinitis/shin splints, among others considered in differential.  With redness and swelling, will cover with Keflex, though it is improving.  Instructed to monitor closely, seek medical attention for new or worsening symptoms.  At this time there is no evidence of DVT.  Discussed differentials with patient, home care, treatment options, and follow-up precautions and patient expresses understanding, agrees with plan of care.  Pain in right shin  -     XR TIBIA FIBULA 2 VIEW RIGHT; Future; Expected date: 12/08/2023    Cellulitis of right lower leg  -     cephALEXin (KEFLEX) 500 MG capsule; Take 1 capsule (500 mg total) by mouth every 6 (six) hours. for 7 days  Dispense: 28 capsule; Refill: 0      Patient Instructions   - Rest.    - Drink plenty of fluids.      - as discussed, due to the redness and swelling, we will cover for infection    - You have been given an antibiotic to treat your condition today.    - Please complete the antibiotic as  directed on the bottle.   - If you are female and on oral birth control pills, use additional methods to prevent pregnancy while on antibiotics and for one cycle after.   - you can take otc probiotic to limit upset stomach    - Ice for 15-20 minutes at a time for the next 24-48 hours.    - Elevate when possible.     - watch for signs of worsening infection including increased redness, swelling,  increased pain, red streaking, fever.  Seek medical attention immediately if these occur.    - Follow up with your PCP or specialty clinic as directed in the next 1-2 weeks if not improved or as needed.  You can call [phone] to schedule an appointment with the appropriate provider.    - Go to the ER or seek medical attention immediately if you develop new or worsening symptoms.     - You must understand that you have received an Urgent Care treatment only and that you may be released before all of your medical problems are known or treated.   - You, the patient, will arrange for follow up care as instructed.   - If your condition worsens or fails to improve we recommend that you receive another evaluation at the ER immediately or contact your PCP to discuss your concerns or return here.

## 2023-12-08 NOTE — PATIENT INSTRUCTIONS
- Rest.    - Drink plenty of fluids.      - as discussed, due to the redness and swelling, we will cover for infection    - You have been given an antibiotic to treat your condition today.    - Please complete the antibiotic as directed on the bottle.   - If you are female and on oral birth control pills, use additional methods to prevent pregnancy while on antibiotics and for one cycle after.   - you can take otc probiotic to limit upset stomach    - Ice for 15-20 minutes at a time for the next 24-48 hours.    - Elevate when possible.     - watch for signs of worsening infection including increased redness, swelling,  increased pain, red streaking, fever.  Seek medical attention immediately if these occur.    - Follow up with your PCP or specialty clinic as directed in the next 1-2 weeks if not improved or as needed.  You can call [phone] to schedule an appointment with the appropriate provider.    - Go to the ER or seek medical attention immediately if you develop new or worsening symptoms.     - You must understand that you have received an Urgent Care treatment only and that you may be released before all of your medical problems are known or treated.   - You, the patient, will arrange for follow up care as instructed.   - If your condition worsens or fails to improve we recommend that you receive another evaluation at the ER immediately or contact your PCP to discuss your concerns or return here.

## 2023-12-12 LAB
6MAM UR QL: NOT DETECTED
7AMINOCLONAZEPAM UR QL: NOT DETECTED
A-OH ALPRAZ UR QL: NOT DETECTED
ALPHA-OH-MIDAZOLAM: NOT DETECTED
ALPRAZ UR QL: NOT DETECTED
AMPHET UR QL SCN: NOT DETECTED
ANNOTATION COMMENT IMP: NORMAL
BARBITURATES UR QL: NEGATIVE
BUPRENORPHINE UR QL: NOT DETECTED
BZE UR QL: NEGATIVE
CARBOXYTHC UR QL: NEGATIVE
CARISOPRODOL UR QL: NEGATIVE
CLONAZEPAM UR QL: NOT DETECTED
CODEINE UR QL: NOT DETECTED
CREAT UR-MCNC: 41.8 MG/DL (ref 20–400)
DIAZEPAM UR QL: NOT DETECTED
ETHYL GLUCURONIDE UR QL: NEGATIVE
FENTANYL UR QL: NOT DETECTED
GABAPENTIN: PRESENT
HYDROCODONE UR QL: NOT DETECTED
HYDROMORPHONE UR QL: PRESENT
LORAZEPAM UR QL: NOT DETECTED
MDA UR QL: NOT DETECTED
MDEA UR QL: NOT DETECTED
MDMA UR QL: NOT DETECTED
ME-PHENIDATE UR QL: NOT DETECTED
METHADONE UR QL: NEGATIVE
METHAMPHET UR QL: NOT DETECTED
MIDAZOLAM UR QL SCN: NOT DETECTED
MORPHINE UR QL: NOT DETECTED
NALOXONE: NOT DETECTED
NORBUPRENORPHINE UR QL CFM: NOT DETECTED
NORDIAZEPAM UR QL: NOT DETECTED
NORFENTANYL UR QL: NOT DETECTED
NORHYDROCODONE UR QL CFM: NOT DETECTED
NORMEPERIDINE UR QL CFM: NOT DETECTED
NOROXYCODONE UR QL CFM: NOT DETECTED
NOROXYMORPHONE UR QL SCN: NOT DETECTED
OXAZEPAM UR QL: NOT DETECTED
OXYCODONE UR QL: NOT DETECTED
OXYMORPHONE UR QL: NOT DETECTED
PATHOLOGY STUDY: NORMAL
PCP UR QL: NEGATIVE
PHENTERMINE UR QL: NOT DETECTED
PREGABALIN: NOT DETECTED
SERVICE CMNT-IMP: NORMAL
TAPENTADOL UR QL SCN: NOT DETECTED
TAPENTADOL UR QL SCN: NOT DETECTED
TEMAZEPAM UR QL: NOT DETECTED
TRAMADOL UR QL: NEGATIVE
ZOLPIDEM METABOLITE: NOT DETECTED
ZOLPIDEM UR QL: NOT DETECTED

## 2024-02-02 ENCOUNTER — TELEPHONE (OUTPATIENT)
Dept: PAIN MEDICINE | Facility: CLINIC | Age: 84
End: 2024-02-02
Payer: MEDICARE

## 2024-02-02 NOTE — TELEPHONE ENCOUNTER
----- Message from Lexy Cohen sent at 2/1/2024 10:31 AM CST -----  Regarding: sooner appt request  Name of Who is Calling: pt        What is the request in detail: pt would like a call back to get a sooner appt than 3/7, schedule is closed tome, please advise.        Can the clinic reply by [facility]: no          What Number to Call Back if not in [facility]: [phone]

## 2024-02-22 ENCOUNTER — TELEPHONE (OUTPATIENT)
Dept: INTERNAL MEDICINE | Facility: CLINIC | Age: 84
End: 2024-02-22
Payer: MEDICARE

## 2024-03-07 ENCOUNTER — OFFICE VISIT (OUTPATIENT)
Dept: PAIN MEDICINE | Facility: CLINIC | Age: 84
End: 2024-03-07
Payer: MEDICARE

## 2024-03-07 VITALS
HEART RATE: 104 BPM | OXYGEN SATURATION: 98 % | TEMPERATURE: 98 F | WEIGHT: 176.13 LBS | SYSTOLIC BLOOD PRESSURE: 147 MMHG | BODY MASS INDEX: 32.22 KG/M2 | RESPIRATION RATE: 18 BRPM | DIASTOLIC BLOOD PRESSURE: 83 MMHG

## 2024-03-07 DIAGNOSIS — M47.816 LUMBAR SPONDYLOSIS: ICD-10-CM

## 2024-03-07 DIAGNOSIS — M51.36 DDD (DEGENERATIVE DISC DISEASE), LUMBAR: ICD-10-CM

## 2024-03-07 DIAGNOSIS — M54.16 LUMBAR RADICULOPATHY: ICD-10-CM

## 2024-03-07 DIAGNOSIS — G89.4 CHRONIC PAIN SYNDROME: Primary | ICD-10-CM

## 2024-03-07 DIAGNOSIS — M53.3 SACROILIAC JOINT PAIN: ICD-10-CM

## 2024-03-07 DIAGNOSIS — Z51.81 ENCOUNTER FOR THERAPEUTIC DRUG MONITORING: ICD-10-CM

## 2024-03-07 DIAGNOSIS — M79.18 MYOFASCIAL PAIN: ICD-10-CM

## 2024-03-07 PROCEDURE — 99999 PR PBB SHADOW E&M-EST. PATIENT-LVL IV: CPT | Mod: PBBFAC,,, | Performed by: NURSE PRACTITIONER

## 2024-03-07 PROCEDURE — 99214 OFFICE O/P EST MOD 30 MIN: CPT | Mod: S$GLB,,, | Performed by: NURSE PRACTITIONER

## 2024-03-07 RX ORDER — HYDROCODONE BITARTRATE AND ACETAMINOPHEN 5; 325 MG/1; MG/1
1 TABLET ORAL EVERY 12 HOURS PRN
Qty: 60 TABLET | Refills: 0 | Status: SHIPPED | OUTPATIENT
Start: 2024-03-07 | End: 2024-04-06

## 2024-03-07 RX ORDER — TIZANIDINE 4 MG/1
4 TABLET ORAL NIGHTLY PRN
Qty: 90 TABLET | Refills: 1 | Status: SHIPPED | OUTPATIENT
Start: 2024-03-07 | End: 2024-06-17

## 2024-03-07 NOTE — PROGRESS NOTES
Chronic patient Established Note (Follow up visit)        SUBJECTIVE:    Interval History 3/7/2024:  The patient returns to clinic today for follow up of back pain. She reports low back and buttock pain. She denies any radicular leg pain. Her pain is worse with sitting and walking. She also notes increased pain with turning over in bed. She is taking Gabapentin twice a day. She is taking Zanaflex at bedtime. She also takes Norco as needed with benefit and without adverse effects. She asks about a TENs unit today. She denies any other health changes. Her pain today is 8/10.    Interval history 12/06/2023:  82-year-old female that presents today for a follow-up she has a history of chronic low back pain.  Aching and throbbing in nature she does report that it is tolerable at times.  Currently on chronic opioid therapy that consists of Norco 5-325 b.i.d. which she reports provides her with 50-60% relief more and improvement in her functionality.  She is also taking tizanidine 2 mg q.h.s. to and muscular pain she also reports spasms.  She denies any adverse side effects with these medications.     Interval History 6/30/2023:  The patient returns to clinic today for follow up of back pain. She is s/p bilateral SI joint injections on 6/7/2023. She reports 50% relief of her pain. She continues to report low back pain. She describes this pain as aching in nature. Her pain is tolerable at this time. She is taking Gabapentin at the lower dose with benefit. She continues to take Norco as needed with benefit and without adverse effects. Of note, she states that she does not feel good today. She feels dizzy and weak today. She also reports stomach pain. She denies any fever, nausea or vomiting. Her pain today is 8/10.    Interval History 5/4/23:  Patient returns to clinic today for follow-up of chronic low back and bilateral knee pain.  She was taking Norco 5-325 mg twice daily which provided good relief for her.  Her  prescription was refilled following her visit on 3/27/2023; however, the prescription was sent to the wrong pharmacy, and she was not able to have filled.  She feels that the gabapentin 600 mg 3 times daily is over sedating for her, and she wishes to return to 300 mg 3 times daily.  Today, she reports pain in her low back that radiates into her buttocks. She had bilateral SI joint injections in May 2022 which she feels gave her good relief.  She would like to repeat these if possible.  She is also having bilateral knee pain.  She had corticosteroid injections 4-5 years ago in her knees from an outside doctor that gave her good relief. She is interested in repeating these as well.      Interval History 3/27/2023  Mrs Youssef presents for follow up of chronic pain complaints. She was started on Norco 5/325mg prior visit. She states this was beneficial but did not return or call to request refills over interval. She is  requesting refill as this did provide relief and improved functioning.     Interval History 11/14/2022:  Tamiko Youssef presents to the clinic for a follow-up appointment for chronic low back pain. Since the last visit, Tamiko Youssef states the pain has been worsening. Current pain intensity is 6/10. Pain is localized to the low back with radiating into the right leg. Radiating pain is predominately in the bilateral calves. Also with localized pain to right knee. Reports she has had steroid injections in the right knee in the past with good benefit. Most recently 3 years ago. Patient ambulates with a Rolator and reports most recent fall about 6 months ago when she fell on her right knee. Did not seek MD care at that time.   No new onset weakness, new onset sensation changes, saddle anesthesia, or bowel/bladder changes.    Interval History 10/3/2022:  The patient returns to clinic today for follow up of low back pain. She continues to report low back pain that radiates into the posterior aspect both legs  to her ankles. Her pain is worse with activity. She continues to take Gabapentin with some benefit. She did try Lyrica but this caused a rash. She is not interested in further procedures at this time. She asks about Norco today, as this has helped her pain in the past. She denies any other health changes. Her pain today is 8/10.    Interval History 9/1/2022:  The patient returns to clinic today for follow up of low back pain. She continues to report low back pain that radiates into the posterior aspect of both legs to her ankles. Her pain is worse with standing and walking. She also reports increased right knee pain. She is currently taking Gabapentin with limited relief. She also reports that this makes her sleepy. She is not interested in further procedures at this time. She denies any other health changes. Her pain today is 8/10.    Interval History 6/8/2022:  The patient returns to clinic today for follow up of low back pain. She is s/p bilateral SI joint injections on 5/25/2022. She reports no relief. She continues to report low back pain that radiates into her buttocks into the posterior aspect of both legs to her ankles. She also reports radiates pain into the anterior aspect of her left leg. She is not interested in further procedures at this time. She reports increased bilateral knee and ankle pain. She would like to establish care with an Orthopedic. She also reports that her PCP has left. She would like to establish care here at [facility]. She continues to take Gabapentin. She denies any other health changes. Her pain today is 6/10.    Interval History 4/20/2022:  The patient returns to clinic today for follow up of low back pain. She has not been seen in over a year. She reports that previous SYLVIA in 4/2021 provided no relief. She continues to report low back pain that radiates into both hips and buttocks. She reports intermittent radiating pain into the posterior aspect of her left leg to under her foot.  Her pain is worse with prolonged sitting and standing. She continues to take Gabapentin with limited relief. She denies any other health changes. Her pain today is 8/10.    Interval History 1/14/2021:  The patient returns to clinic today for follow up of low back pain. She continues to report low back pain that radiates into the lateral aspect of left leg to her ankle. She denies any right leg pain. She did not start Lyrica given at last visit due to concern for side effects. She has not started physical therapy yet. She continues to take Gabapentin and Zanaflex. She denies any other health changes. Her pain today is 6/10.    Interval history 12/10/2020:  Returns for follow up of her low back pain. She continues to report low back pain that radiates into the lateral aspect of her left leg to her ankle. Reports that gabapentin and Zanaflex are not helping with pain. Her pain is worse with standing, walking, and activity. Her pain today is 5/10.    Interval History 11/13/2020:  The patient returns to clinic today for follow up of back pain. She reports limited relief with previous SYLVIA. She continues to report low back pain that radiates into the lateral aspect of her left leg to her ankle. She denies any right leg pain today. Her pain is worse with standing, walking, and activity. She is frustrated with her continued pain. She did increase Gabapentin to BID. She is unsure of relief at this time. She does take [place] from her PCP. She states that her PCP would like our office to take over this medication. She asks for a refill. She denies any other health changes. Her pain today is 2/10.    Interval History 10/30/2020:  The patient returns to clinic today via audio visit for follow up of back pain. She is s/p bilateral L4/5 TF SYLVIA on 10/14/2020. She reports limited relief of her pain. She reports increased low back pain that radiates down the lateral aspect of her left leg to her ankle. She denies any right leg pain  today. Her pain is worse with prolonged standing, walking, and activity. She denies any weakness. She is currently taking Gabapentin once a day. She denies any other health changes.     Interval History 7/24/2020:  The patient returns to clinic today for follow up of low back pain. She reports increased pain over the last two months. She reports low back pain that radiates down the lateral aspect of both legs to her knees, left greater than right. Her pain is worse with standing and walking. She continues to take Gabapentin. She denies any other health changes. Her pain today is 9/10.    Interval History 1/28/2020:  Tamiko Youssef presents to the clinic for a follow-up appointment for lower back pain. She is s/p bilateral L4/5 TF SYLVIA on 1/8/2020. She reports 80% relief of his low back and leg pain. She continues to report low back pain that is aching in nature. She denies any radiating leg pain today. Her pain is worse with prolonged standing and walking. She continues to take Gabapentin with benefit. She continues to perform a home exercise routine. She denies any other health changes. Her pain today is 8/10.      Pain Disability Index Review:      12/6/2023     1:14 PM 6/30/2023     1:39 PM 5/4/2023    10:09 AM   Last 3 PDI Scores   Pain Disability Index (PDI) 42 30 40       Pain Medications:  Gabapentin     Opioid Contract: yes     report:  Reviewed and consistent with medication use as prescribed.    Pain Procedures:   3/28/14, 3/13/13 Bilateral L4 TF SYLVIA  8/22/18 Bilateral L4 TF SYLVIA- 75% relief  1/8/2020- Bilateral L4/5 TF SYLVIA - 80% relief   10/14/2020- Bilateral L4/5 TF SYLVIA  6/7/2023- Bilateral SI joint injections- 50% relief    Physical Therapy/Home Exercise: no    Imaging:   MRI Lumbar Spine 11/18/2020:  COMPARISON:  11/18/2020     FINDINGS:  Lumbar spine alignment is within normal limits. Vertebral body heights preserved.  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     The conus is normal in appearance.  The adjacent soft tissue structures show no significant abnormalities.     L1-L2: No evidence of a disc herniation.No significant central canal or neural foraminal narrowing.     L2-L3: There is no focal disc herniation. No central canal or foraminal stenosis.  Prominent facet arthritis.     L3-L4: There is some broad-based disc bulging and superimposed spondylitic spurring which is asymmetric to the left.  Prominent facet arthritis is noted.  No central canal or foraminal stenosis.     L4-L5: Broad-based disc bulging and facet arthritis present.  No significant central canal or neural foraminal narrowing.     L5-S1: Broad-based disc bulging and facet arthritis are present.  No focal disc herniation.  No significant central canal or neural foraminal narrowing.     Impression:     Multilevel nonstenotic degenerative change mostly affecting the posterior elements.    Xray Lumbar Spine 11/18/2020:  COMPARISON:  Multiple priors, most recent 08/09/2018     FINDINGS:  Levoscoliosis of the lumbar spine.  Grade 1 anterolisthesis of L3 on L4, L4 on L5, and L5 on S1 similar to prior.  No acute, displaced fracture.  Vertebral body heights are maintained.  No evidence of dynamic instability.  Multilevel lumbar facet arthrosis.  No aggressive osseous abnormality.     Impression:     Mild multilevel lumbar spondylosis with unchanged grade 1 anterolisthesis of L3 on L4 through L5 on S1.    Xray Hips 11/18/2020:  COMPARISON:  Multiple priors, most recent 08/09/2018     FINDINGS:  Sacroiliac joints are symmetric.  Pubic symphysis is not widened.  The femoral heads are well seated within the acetabula.  Degenerative changes of the lower lumbar spine.  Mild joint space narrowing, subchondral sclerosis and minimal osteophytosis of the hip joints.  No acute, displaced fracture.  No aggressive osseous abnormality.     Impression:     No acute osseous  abnormality.        Allergies:   Review of patient's allergies indicates:   Allergen Reactions    Naprosyn [naproxen]     Norco [hydrocodone-acetaminophen]     Tramadol        Current Medications:   Current Outpatient Medications   Medication Sig Dispense Refill    albuterol (PROVENTIL) 2.5 mg /3 mL (0.083 %) nebulizer solution Take 2.5 mg by nebulization every 6 (six) hours as needed.      albuterol (PROVENTIL/VENTOLIN HFA) 90 mcg/actuation inhaler Inhale TWO to FOUR puffs by mouth FOUR times daily when needed.      albuterol-ipratropium (DUO-NEB) 2.5 mg-0.5 mg/3 mL nebulizer solution SMARTSIG:3 Milliliter(s) Via Nebulizer Every 6 Hours PRN      ALPRAZolam (XANAX) 0.25 MG tablet TK 1 T PO QD PRA (Patient not taking: Reported on 12/8/2023) 30 tablet 0    amLODIPine (NORVASC) 10 MG tablet       benzonatate (TESSALON) 200 MG capsule Take 1 capsule (200 mg total) by mouth 3 (three) times daily as needed for Cough. 30 capsule 1    ciclopirox (PENLAC) 8 % Soln Apply topically.      clotrimazole (MYCELEX) 10 mg john Take 1 tablet (10 mg total) by mouth 5 (five) times daily. Dissolve one lozenge in the mouth 5 times a day. (Patient not taking: Reported on 12/8/2023) 50 John 0    clotrimazole (MYCELEX) 10 mg john Take 1 tablet (10 mg total) by mouth 5 (five) times daily. Dissolve one lozenge in the mouth 5 times a day. (Patient not taking: Reported on 12/8/2023) 50 John 0    diclofenac sodium (VOLTAREN) 1 % Gel Apply 2 g topically 4 (four) times daily. 1 each 2    dicyclomine (BENTYL) 10 MG capsule Take 1 capsule (10 mg total) by mouth as needed (abdominal cramping). 30 capsule 0    fexofenadine (ALLEGRA) 180 MG tablet Take 1 tablet by mouth once daily.      fluticasone propionate (FLONASE) 50 mcg/actuation nasal spray 2 sprays in each nostril every evening. 16 g 1    fluticasone-salmeterol diskus inhaler 250-50 mcg Inhale 1 puff into the lungs.      furosemide (LASIX) 20 MG tablet TK 1 T PO QD PRF FLUID  2     gabapentin (NEURONTIN) 300 MG capsule TAKE 1 CAPSULE BY MOUTH AT NIGHT FOR 1 WEEK , 1 CAPSULE TWICE  DAILY FOR 1 WEEK, THEN 1 CAPSULE 3 TIMES DAILY THEREAFTER 90 capsule 11    latanoprost 0.005 % ophthalmic solution Place 1 drop into both eyes every evening. 3 Bottle 3    meclizine (ANTIVERT) 25 mg tablet Take 1 tablet (25 mg total) by mouth 3 (three) times daily as needed for Dizziness. (Patient not taking: Reported on 12/8/2023) 20 tablet 0    meloxicam (MOBIC) 15 MG tablet Take by mouth.      mirtazapine (REMERON) 30 MG tablet Take 30 mg by mouth nightly.  3    omeprazole (PRILOSEC) 20 MG capsule Take by mouth.      polyethylene glycol (GLYCOLAX) 17 gram/dose powder Take 17 g by mouth daily as needed (Constipation). 1700 g 1    pravastatin (PRAVACHOL) 20 MG tablet TAKE 1 TABLET BY MOUTH IN THE  EVENING 90 tablet 3    walker Misc 1 application by Misc.(Non-Drug; Combo Route) route once daily. 1 each 0     No current facility-administered medications for this visit.       REVIEW OF SYSTEMS:    Constitutional: Positive for unexpected weight change.   HENT: Positive for headache.   Respiratory: Positive for intermittent shortness of breath and wheezing.  (asthma)  Gastrointestinal: Positive for constipation.   Musculoskeletal: Positive for back pain, gait problem and stiffness.       Past Medical History:  Past Medical History:   Diagnosis Date    Asthma     Cataract     Glaucoma     Hypertension        Past Surgical History:  Past Surgical History:   Procedure Laterality Date    CATARACT EXTRACTION W/  INTRAOCULAR LENS IMPLANT Bilateral     CHOLECYSTECTOMY      COLONOSCOPY N/A 12/5/2022    Procedure: COLONOSCOPY;  Surgeon: Daniel Mckeon MD;  Location: [address];  Service: Endoscopy;  Laterality: N/A;  fully vaccinated - sm  extended constipation mirilax prep   instructions mailed - sm    CYST REMOVAL      on neck    ESOPHAGOGASTRODUODENOSCOPY N/A 12/5/2022    Procedure: EGD  (ESOPHAGOGASTRODUODENOSCOPY);  Surgeon: Daniel Mckeon MD;  Location: [facility] ENDO (4TH FLR);  Service: Endoscopy;  Laterality: N/A;    EYE SURGERY      HYSTERECTOMY      2/2 AUB, partial    INJECTION OF JOINT Bilateral 05/25/2022    Procedure: INJECTION, JOINT, BILATERAL SACROILIAC (SI);  Surgeon: Obdulio Leon MD;  Location: [facility] PAIN MGT;  Service: Pain Management;  Laterality: Bilateral;    INJECTION, SACROILIAC JOINT Bilateral 6/7/2023    Procedure: INJECTION,SACROILIAC JOINT, BILATERAL;  Surgeon: Obdulio Leon MD;  Location: [facility] PAIN MGT;  Service: Pain Management;  Laterality: Bilateral;    TRANSFORAMINAL EPIDURAL INJECTION OF STEROID Bilateral 01/08/2020    Procedure: INJECTION, STEROID, EPIDURAL, TRANSFORAMINAL APPROACH;  Surgeon: Obdulio Leon MD;  Location: [facility] PAIN MGT;  Service: Pain Management;  Laterality: Bilateral;  B/L TFESI L4    TRANSFORAMINAL EPIDURAL INJECTION OF STEROID Bilateral 10/14/2020    Procedure: INJECTION, STEROID, EPIDURAL, TRANSFORAMINAL APPROACH, L4-L5 need consent;  Surgeon: Obdulio Leon MD;  Location: [facility] PAIN MGT;  Service: Pain Management;  Laterality: Bilateral;    TRANSFORAMINAL EPIDURAL INJECTION OF STEROID Left 04/14/2021    Procedure: INJECTION, STEROID, EPIDURAL, TRANSFORAMINAL APPROACH  left L3/4 and L4/5;  Surgeon: Obdulio Leon MD;  Location: [facility] PAIN MGT;  Service: Pain Management;  Laterality: Left;  consent needed       Family History:  Family History   Problem Relation Age of Onset    Cataracts Son     Glaucoma Son     No Known Problems Mother     No Known Problems Father     Macular degeneration Neg Hx        Social History:  Social History     Socioeconomic History    Marital status: Single   Tobacco Use    Smoking status: Never    Smokeless tobacco: Never   Substance and Sexual Activity    Alcohol use: No    Drug use: Never       OBJECTIVE:    BP (!) 147/83   Pulse 104   Temp 98 °F (36.7 °C)   Resp 18   Wt 79.9 kg (176 lb 2.4  oz)   SpO2 98%   BMI 32.22 kg/m²     PHYSICAL EXAMINATION:    GENERAL: Well appearing, in no acute distress, alert and oriented x3.  PSYCH:  Mood and affect appropriate.  SKIN: Skin color, texture, turgor normal, no rashes or lesions.  HEAD/FACE:  Normocephalic, atraumatic. Cranial nerves grossly intact.  CV: RRR with palpation of the radial artery.  PULM: No evidence of respiratory difficulty, symmetric chest rise.  BACK: Straight leg raising in the sitting position is negative for radicular pain bilaterally. There is pain with palpation over lumbar facet joints bilaterally.  Limited ROM with pain on flexion and extension.    EXTREMITIES: No deformities, edema, or skin discoloration. Good capillary refill.  MUSCULOSKELETAL: There is pain with palpation over bilateral SI joints. Positive FABERs, Gaenslen's, and SI compression bilaterally.  5/5 strength in right ankle with plantar and dorsiflexion. 5/5 strength in left ankle with plantar and dorsiflexion. 4/5 strength with right knee flexion and extension. 4/5 strength with left knee flexion and extension.  No atrophy or tone abnormalities are noted.  NEURO:   No loss of sensation is noted.  GAIT: Antalgic- ambulates with walker.      ASSESSMENT: 83 y.o. year old female with lower back pain consistent with the following diagnoses:     1. Chronic pain syndrome        2. Sacroiliac joint pain  HME - OTHER      3. Lumbar radiculopathy        4. Lumbar spondylosis        5. DDD (degenerative disc disease), lumbar        6. Myofascial pain  tiZANidine (ZANAFLEX) 4 MG tablet      7. Encounter for therapeutic drug monitoring            PLAN:     - Previous imaging reviewed today. Labs reviewed.     - Schedule for bilateral SI joint injections.     - Continue Gabapentin.    - Continue Zanaflex 4 mg nightly PRN, refill provided.     - Pain contract signed and filed    - Continue and refill Norco 5/325 mg BID PRN, #60.     - The patient is here today for a refill of current  pain medications and they believe these provide effective pain control and improvements in quality of life.  The patient notes no serious side effects, and feels the benefits outweigh the risks.  The patient was reminded of the pain contract that they signed previously as well as the risks and benefits of the medication including possible death.  The updated [place] Board  Pharmacy prescription monitoring program was reviewed, and the patient has been found to be compliant with current treatment plan. Medication management provided by Dr. Leon.     - UDS from 12/6/2023 reviewed and consistent with medications.     - I have stressed the importance of physical activity and a home exercise plan to help with pain and improve health.    - E order for TENs unit provided.     - RTC 2 weeks after above procedure.      The above plan and management options were discussed at length with patient. Patient is in agreement with the above and verbalized understanding.     Marina Gallegos NP  03/07/2024

## 2024-04-02 ENCOUNTER — OFFICE VISIT (OUTPATIENT)
Dept: URGENT CARE | Facility: CLINIC | Age: 84
End: 2024-04-02
Payer: MEDICARE

## 2024-04-02 VITALS
RESPIRATION RATE: 16 BRPM | SYSTOLIC BLOOD PRESSURE: 153 MMHG | DIASTOLIC BLOOD PRESSURE: 80 MMHG | HEIGHT: 62 IN | TEMPERATURE: 99 F | HEART RATE: 96 BPM | BODY MASS INDEX: 32.39 KG/M2 | WEIGHT: 176 LBS | OXYGEN SATURATION: 98 %

## 2024-04-02 DIAGNOSIS — H57.89 IRRITATION OF BOTH EYES: Primary | ICD-10-CM

## 2024-04-02 PROCEDURE — 99212 OFFICE O/P EST SF 10 MIN: CPT | Mod: S$GLB,,,

## 2024-04-02 RX ORDER — ALBUTEROL SULFATE 0.83 MG/ML
2.5 SOLUTION RESPIRATORY (INHALATION)
COMMUNITY

## 2024-04-02 RX ORDER — ALBUTEROL SULFATE 90 UG/1
2 AEROSOL, METERED RESPIRATORY (INHALATION)
COMMUNITY
Start: 2023-03-30

## 2024-04-02 RX ORDER — FLUTICASONE PROPIONATE AND SALMETEROL 250; 50 UG/1; UG/1
1 POWDER RESPIRATORY (INHALATION) 2 TIMES DAILY
COMMUNITY
Start: 2023-03-28

## 2024-04-02 RX ORDER — CODEINE PHOSPHATE AND GUAIFENESIN 10; 100 MG/5ML; MG/5ML
5 SOLUTION ORAL
COMMUNITY
Start: 2024-03-21

## 2024-04-02 RX ORDER — OLOPATADINE HYDROCHLORIDE 1 MG/ML
1 SOLUTION/ DROPS OPHTHALMIC 2 TIMES DAILY
Qty: 5 ML | Refills: 0 | Status: SHIPPED | OUTPATIENT
Start: 2024-04-02 | End: 2025-04-02

## 2024-04-02 NOTE — PATIENT INSTRUCTIONS
- Use eye drops as prescribed  - You can also  Artifical Tears over the counter to help with eye irritation    - Follow up with your PCP or specialty clinic as directed in the next 1-2 weeks if not improved or as needed.  You can call [phone] to schedule an appointment with the appropriate provider.    - Go to the ER or seek medical attention immediately if you develop new or worsening symptoms.    - You must understand that you have received an Urgent Care treatment only and that you may be released before all of your medical problems are known or treated.   - You, the patient, will arrange for follow up care as instructed.   - If your condition worsens or fails to improve we recommend that you receive another evaluation at the ER immediately or contact your PCP to discuss your concerns or return here.

## 2024-04-02 NOTE — PROGRESS NOTES
"Subjective:      Patient ID: Tamiko Youssef is a 83 y.o. female.    Vitals:  height is 5' 2" (1.575 m) and weight is 79.8 kg (176 lb). Her oral temperature is 98.6 °F (37 °C). Her blood pressure is 153/80 (abnormal) and her pulse is 96. Her respiration is 16 and oxygen saturation is 98%.     Chief Complaint: Eye Problem    Pt is an 82 y/o F who presents with BL eye irritation x2 weeks. Associated with occasional eye redness, blurry vision, clear discharge, foreign body sensation. Hx of glaucoma and cataract surgery. Wears eye glasses. Tried OTC eye drops without relief. Denies fever, rash, eyelid swelling, headache, n/v, dizziness, pain with EOMs, eye trauma, double vision, or loss of vision.    Eye Problem   Both eyes are affected. This is a new problem. The current episode started 1 to 4 weeks ago. The problem occurs constantly. The problem has been unchanged. There was no injury mechanism. The pain is at a severity of 10/10. The pain is severe. There is No known exposure to pink eye. She Does not wear contacts. Associated symptoms include blurred vision, an eye discharge, eye redness, a foreign body sensation and itching. Pertinent negatives include no double vision, fever, nausea, photophobia, recent URI or vomiting. Treatments tried: polymyxin and trimethoprim solution. The treatment provided no relief.       Constitution: Negative for chills and fever.   Eyes:  Positive for eye discharge, eye itching, eye pain, eye redness and blurred vision. Negative for eye trauma, foreign body in eye, photophobia, vision loss, double vision and eyelid swelling.   Respiratory:  Negative for cough.    Gastrointestinal:  Negative for nausea and vomiting.   Skin:  Negative for rash.   Neurological:  Negative for dizziness and headaches.      Objective:     Physical Exam   Constitutional: She is oriented to person, place, and time. She appears well-developed.   HENT:   Head: Normocephalic and atraumatic.   Ears:   Right Ear: " External ear normal.   Left Ear: External ear normal.   Nose: Nose normal.   Mouth/Throat: Oropharynx is clear and moist.   Eyes: Conjunctivae, EOM and lids are normal. Pupils are equal, round, and reactive to light. Lids are everted and swept, no foreign bodies found. Right eye exhibits no chemosis, no discharge and no hordeolum. No foreign body present in the right eye. Left eye exhibits no chemosis, no discharge and no hordeolum. No foreign body present in the left eye. Right conjunctiva is not injected. Left conjunctiva is not injected. Extraocular movement intact      Comments: Vision Screening  Right eye - With correction: 20/25  Left eye - With correction: 20/25  Both eyes - With correction: 20/25     Neck: Trachea normal and phonation normal. Neck supple.   Musculoskeletal: Normal range of motion.         General: Normal range of motion.   Neurological: She is alert and oriented to person, place, and time.   Skin: Skin is warm, dry and intact.   Psychiatric: Her speech is normal and behavior is normal. Judgment and thought content normal.   Nursing note and vitals reviewed.      Assessment:     1. Irritation of both eyes          Plan:       Irritation of both eyes  -     olopatadine (PATANOL) 0.1 % ophthalmic solution; Place 1 drop into both eyes 2 (two) times daily.  Dispense: 5 mL; Refill: 0    OTC Artificial tears          Patient Instructions   - Use eye drops as prescribed  - You can also  Artifical Tears over the counter to help with eye irritation    - Follow up with your PCP or specialty clinic as directed in the next 1-2 weeks if not improved or as needed.  You can call [phone] to schedule an appointment with the appropriate provider.    - Go to the ER or seek medical attention immediately if you develop new or worsening symptoms.    - You must understand that you have received an Urgent Care treatment only and that you may be released before all of your medical problems are known or  treated.   - You, the patient, will arrange for follow up care as instructed.   - If your condition worsens or fails to improve we recommend that you receive another evaluation at the ER immediately or contact your PCP to discuss your concerns or return here.

## 2024-04-04 ENCOUNTER — TELEPHONE (OUTPATIENT)
Dept: PAIN MEDICINE | Facility: CLINIC | Age: 84
End: 2024-04-04
Payer: MEDICARE

## 2024-04-04 NOTE — TELEPHONE ENCOUNTER
----- Message from Arleth Snider MA sent at 4/4/2024  2:39 PM CDT -----  Name of Who is Calling:GARY VILLANUEVA [[id number]]                What is the request in detail: Pt is requesting a call back to discuss getting scheduled for an injection in her back. The office was suppose to call her. Please assist.                Can the clinic reply by [facility]: No                What [facility] Back [facility]: [phone]

## 2024-04-08 DIAGNOSIS — M53.3 SACROILIAC JOINT PAIN: Primary | ICD-10-CM

## 2024-04-09 ENCOUNTER — TELEPHONE (OUTPATIENT)
Dept: INTERNAL MEDICINE | Facility: CLINIC | Age: 84
End: 2024-04-09
Payer: MEDICARE

## 2024-04-09 DIAGNOSIS — M53.3 SACROILIAC JOINT PAIN: Primary | ICD-10-CM

## 2024-04-09 NOTE — TELEPHONE ENCOUNTER
----- Message from Arya Ordonez sent at 4/9/2024  1:40 PM CDT -----  Name Of Caller: Tamiko         Provider Name: Jeanna Ham        Does patient feel the need to be seen today? no        Relationship to the Pt?: patient        Contact Preference?: [phone]          What is the nature of the call?: Patient states that she would like to speak with someone in the office to notify  them that she is no longer a patient of Dr Ham and would like for the reminder messages that she has been receiving to be stopped.

## 2024-04-11 ENCOUNTER — TELEPHONE (OUTPATIENT)
Dept: OPTOMETRY | Facility: CLINIC | Age: 84
End: 2024-04-11
Payer: MEDICARE

## 2024-04-11 ENCOUNTER — OFFICE VISIT (OUTPATIENT)
Dept: URGENT CARE | Facility: CLINIC | Age: 84
End: 2024-04-11
Payer: MEDICARE

## 2024-04-11 VITALS
HEART RATE: 95 BPM | DIASTOLIC BLOOD PRESSURE: 79 MMHG | RESPIRATION RATE: 18 BRPM | BODY MASS INDEX: 32.37 KG/M2 | SYSTOLIC BLOOD PRESSURE: 150 MMHG | HEIGHT: 62 IN | OXYGEN SATURATION: 100 % | TEMPERATURE: 98 F | WEIGHT: 175.94 LBS

## 2024-04-11 DIAGNOSIS — H53.8 BLURRED VISION, RIGHT EYE: ICD-10-CM

## 2024-04-11 DIAGNOSIS — H57.11 ACUTE RIGHT EYE PAIN: Primary | ICD-10-CM

## 2024-04-11 PROCEDURE — 99213 OFFICE O/P EST LOW 20 MIN: CPT | Mod: S$GLB,,, | Performed by: NURSE PRACTITIONER

## 2024-04-11 NOTE — PATIENT INSTRUCTIONS
- You must understand that you have received an Urgent Care treatment only and that you may be released before all of your medical problems are known or treated.   - You, the patient, will arrange for follow up care as instructed.   - If your condition worsens or fails to improve we recommend that you receive another evaluation at the ER immediately or contact your PCP to discuss your concerns.   - You can call [phone] or [phone] to help schedule an appointment with the appropriate provider.      Follow up with Ophthalmology as soon as possible, preferably today! They will call you with availability.   Strict ER precautions for new or worsening symptoms

## 2024-04-11 NOTE — PROGRESS NOTES
"Subjective:      Patient ID: Tamiko Youssef is a 83 y.o. female.    Vitals:  height is 5' 2" (1.575 m) and weight is 79.8 kg (175 lb 14.8 oz). Her temperature is 98.3 °F (36.8 °C). Her blood pressure is 150/79 (abnormal) and her pulse is 95. Her respiration is 18 and oxygen saturation is 100%.     Chief Complaint: Eye Problem    Pt is an 82 yo female w/ c/o a severe sharp pain in the right eye and blurry vision since Tuesday. Pt sates she put a patch on her right eye to help with the glare she had in her vision. Pt took a pain pill which helped relieve the pain. Pt does have a hx of primary open-angle glaucoma.     Eye Problem   The right eye is affected. This is a new problem. The injury mechanism is unknown. The pain is at a severity of 10/10. The pain is severe. Associated symptoms include blurred vision. Pertinent negatives include no eye discharge, double vision, eye redness, fever, foreign body sensation, itching, nausea, photophobia, recent URI or vomiting. She has tried eye drops for the symptoms.       Constitution: Negative for fever.   Eyes:  Positive for blurred vision. Negative for eye discharge, eye itching, eye redness, photophobia and double vision.   Gastrointestinal:  Negative for nausea and vomiting.      Objective:     Physical Exam   Constitutional: She is oriented to person, place, and time.   HENT:   Head: Normocephalic.   Ears:   Right Ear: External ear normal.   Left Ear: External ear normal.   Nose: Nose normal.   Mouth/Throat: Mucous membranes are moist.   Eyes: Conjunctivae and lids are normal. Right eye exhibits no chemosis, no discharge and no exudate. No foreign body present in the right eye. Left eye exhibits no chemosis, no discharge and no exudate. No foreign body present in the left eye. Right conjunctiva is not injected. Left conjunctiva is not injected. Right eye exhibits normal extraocular motion and no nystagmus. Left eye exhibits normal extraocular motion and no nystagmus. " Right pupil is round, reactive and not sluggish. Left pupil is round, reactive and not sluggish. Pupils are equal.   Fundoscopic exam:       The right eye shows no exudate. The right eye shows red reflex present.        The left eye shows no exudate. The left eye shows red reflex present. vision grossly intact   Cardiovascular: Normal rate.   Pulmonary/Chest: Effort normal.   Musculoskeletal: Normal range of motion.         General: Normal range of motion.   Neurological: She is alert and oriented to person, place, and time.   Skin: Skin is dry.   Psychiatric: Her behavior is normal.   Nursing note and vitals reviewed.    Vision Screening    Right eye Left eye Both eyes   Without correction      With correction 20/50 20/50 20/25       Assessment:     1. Acute right eye pain    2. Blurred vision, right eye        Plan:   Discussed w/ pt inability to fully evaluate her symptoms. Based on pt's hx, urgent referral placed to Ophthalmology, clinic  unable to schedule appointment today for me, but message will be sent for urgent appointment, they will contact pt. Pt instructed to go to the Emergency Department if she is unable to get a same day appointment. Pt verbalized understanding. Pt's Ophthalmologist is with an outside facility, pt may go to a facility associated with her doctor.     Acute right eye pain  -     Ambulatory referral/consult to Ophthalmology    Blurred vision, right eye  -     Ambulatory referral/consult to Ophthalmology      Patient Instructions   - You must understand that you have received an Urgent Care treatment only and that you may be released before all of your medical problems are known or treated.   - You, the patient, will arrange for follow up care as instructed.   - If your condition worsens or fails to improve we recommend that you receive another evaluation at the ER immediately or contact your PCP to discuss your concerns.   - You can call [phone] or [phone] to  help schedule an appointment with the appropriate provider.      Follow up with Ophthalmology as soon as possible, preferably today! They will call you with availability.   Strict ER precautions for new or worsening symptoms

## 2024-04-23 ENCOUNTER — OFFICE VISIT (OUTPATIENT)
Dept: OPTOMETRY | Facility: CLINIC | Age: 84
End: 2024-04-23
Payer: MEDICARE

## 2024-04-23 DIAGNOSIS — H40.1131 PRIMARY OPEN ANGLE GLAUCOMA (POAG) OF BOTH EYES, MILD STAGE: ICD-10-CM

## 2024-04-23 DIAGNOSIS — S05.01XA ABRASION OF RIGHT CORNEA, INITIAL ENCOUNTER: Primary | ICD-10-CM

## 2024-04-23 PROCEDURE — 1160F RVW MEDS BY RX/DR IN RCRD: CPT | Mod: CPTII,S$GLB,, | Performed by: OPTOMETRIST

## 2024-04-23 PROCEDURE — 1101F PT FALLS ASSESS-DOCD LE1/YR: CPT | Mod: CPTII,S$GLB,, | Performed by: OPTOMETRIST

## 2024-04-23 PROCEDURE — 3288F FALL RISK ASSESSMENT DOCD: CPT | Mod: CPTII,S$GLB,, | Performed by: OPTOMETRIST

## 2024-04-23 PROCEDURE — 1125F AMNT PAIN NOTED PAIN PRSNT: CPT | Mod: CPTII,S$GLB,, | Performed by: OPTOMETRIST

## 2024-04-23 PROCEDURE — 99203 OFFICE O/P NEW LOW 30 MIN: CPT | Mod: S$GLB,,, | Performed by: OPTOMETRIST

## 2024-04-23 PROCEDURE — 1159F MED LIST DOCD IN RCRD: CPT | Mod: CPTII,S$GLB,, | Performed by: OPTOMETRIST

## 2024-04-23 PROCEDURE — 99999 PR PBB SHADOW E&M-EST. PATIENT-LVL III: CPT | Mod: PBBFAC,,, | Performed by: OPTOMETRIST

## 2024-04-23 RX ORDER — ERYTHROMYCIN 5 MG/G
OINTMENT OPHTHALMIC NIGHTLY
Qty: 1 EACH | Refills: 0 | Status: SHIPPED | OUTPATIENT
Start: 2024-04-23

## 2024-04-23 NOTE — PROGRESS NOTES
HPI    JUANA: 02/06/2021   Chief complaint (CC): 84 yo F presents for urgent care visit. Patient   having sharp pain in her right eye. Patient stated its been going on since   4/9/2024. Patient went to the ER on 4/11/2024 and was not prescribed   anything.  Glasses? Yes   Contacts? No  H/o eye surgery, injections or laser: cataract sx 20+ years ago OU  H/o eye injury: No  Known eye conditions?    Primary open angle glaucoma (POAG) of both eyes, mild stage   Dry eye OU  Family h/o eye conditions? No  Eye gtts? Latanoprost at bed time OU      (-) Flashes (-)  Floaters (+) Mucous   (+)  Tearing (+) Itching (+) Burning   (+) Headaches (+) Eye Pain/discomfort (+) Irritation   (+)  Redness (-) Double vision (-) Blurry vision    Diabetic? no  A1c? Lab Results       Component                Value               Date                       HGBA1C                   6.4 (H)             08/04/2022                 Last edited by Miquel Blanco, OD on 4/23/2024  3:28 PM.            Assessment /Plan     For exam results, see Encounter Report.        Abrasion of right cornea, initial encounter  Primary open angle glaucoma (POAG) of both eyes, mild stage  - Abrasion resolving  - Initiate AT's Q1hr OD and Erythromycin robby QHS OU   - RTC for f/u and VF 24-2 SF OU/ OCT RNFL OU/ DFE in 1-2 weeks

## 2024-04-29 RX ORDER — GABAPENTIN 300 MG/1
CAPSULE ORAL
Qty: 90 CAPSULE | Refills: 11 | Status: SHIPPED | OUTPATIENT
Start: 2024-04-29

## 2024-05-01 ENCOUNTER — HOSPITAL ENCOUNTER (OUTPATIENT)
Facility: OTHER | Age: 84
Discharge: HOME | End: 2024-05-01
Attending: ANESTHESIOLOGY | Admitting: ANESTHESIOLOGY
Payer: MEDICARE

## 2024-05-01 VITALS
SYSTOLIC BLOOD PRESSURE: 141 MMHG | HEART RATE: 87 BPM | WEIGHT: 178 LBS | RESPIRATION RATE: 16 BRPM | DIASTOLIC BLOOD PRESSURE: 66 MMHG | BODY MASS INDEX: 32.76 KG/M2 | TEMPERATURE: 97 F | OXYGEN SATURATION: 94 % | HEIGHT: 62 IN

## 2024-05-01 DIAGNOSIS — G89.29 CHRONIC PAIN: ICD-10-CM

## 2024-05-01 DIAGNOSIS — M46.1 SACROILIITIS: Primary | ICD-10-CM

## 2024-05-01 PROCEDURE — 25000003 PHARM REV CODE 250: Performed by: ANESTHESIOLOGY

## 2024-05-01 PROCEDURE — 27096 INJECT SACROILIAC JOINT: CPT | Mod: 50,,, | Performed by: ANESTHESIOLOGY

## 2024-05-01 PROCEDURE — 63600175 PHARM REV CODE 636 W HCPCS: Performed by: ANESTHESIOLOGY

## 2024-05-01 PROCEDURE — 27096 INJECT SACROILIAC JOINT: CPT | Mod: 50 | Performed by: ANESTHESIOLOGY

## 2024-05-01 PROCEDURE — 25500020 PHARM REV CODE 255: Performed by: ANESTHESIOLOGY

## 2024-05-01 RX ORDER — TRIAMCINOLONE ACETONIDE 40 MG/ML
INJECTION, SUSPENSION INTRA-ARTICULAR; INTRAMUSCULAR
Status: DISCONTINUED | OUTPATIENT
Start: 2024-05-01 | End: 2024-05-01 | Stop reason: HOSPADM

## 2024-05-01 RX ORDER — BUPIVACAINE HYDROCHLORIDE 2.5 MG/ML
INJECTION, SOLUTION EPIDURAL; INFILTRATION; INTRACAUDAL
Status: DISCONTINUED | OUTPATIENT
Start: 2024-05-01 | End: 2024-05-01 | Stop reason: HOSPADM

## 2024-05-01 RX ORDER — LIDOCAINE HYDROCHLORIDE 20 MG/ML
INJECTION, SOLUTION INFILTRATION; PERINEURAL
Status: DISCONTINUED | OUTPATIENT
Start: 2024-05-01 | End: 2024-05-01 | Stop reason: HOSPADM

## 2024-05-01 RX ORDER — SODIUM CHLORIDE 9 MG/ML
INJECTION, SOLUTION INTRAVENOUS CONTINUOUS
Status: DISCONTINUED | OUTPATIENT
Start: 2024-05-01 | End: 2024-05-01 | Stop reason: HOSPADM

## 2024-05-01 NOTE — OP NOTE
Sacroiliac Joint Injection under Fluoroscopic Guidance    The procedure, risks, benefits, and options were discussed with the patient. There are no contraindications to the procedure. The patent expressed understanding and agreed to the procedure. Informed written consent was obtained prior to the start of the procedure and can be found in the patient's chart.    PATIENT NAME: Tamiko Youssef   MRN: [id number]     DATE OF PROCEDURE: 05/01/2024    PROCEDURE: Bilateral Sacroiliac Joint Injection under Fluoroscopic Guidance    PRE-OP DIAGNOSIS: Sacroiliac joint pain [M53.3]  Sacroiliitis     POST-OP DIAGNOSIS: Same    PHYSICIAN: Obdulio Leon MD    ASSISTANTS: None     MEDICATIONS INJECTED: Preservative-free Kenalog 40mg with 3cc of Bupivacine 0.25%     LOCAL ANESTHETIC INJECTED: Xylocaine 2%     SEDATION: None    ESTIMATED BLOOD LOSS: None    COMPLICATIONS: None    TECHNIQUE: Time-out was performed to identify the patient and procedure to be performed. With the patient laying in a prone position, the surgical area was prepped and draped in the usual sterile fashion using ChloraPrep and a fenestrated drape. The sacroiliac joint was determined under fluoroscopy guidance. Skin anesthesia was achieved by injecting Lidocaine 2% over the injection site. The sacroiliac joint was  then approached with a 22 gauge, 3.5 inch spinal quinke needle that was introduced under fluoroscopic guidance in the AP and Lateral views. Once the needle tip was in the area of the joint, and there was no blood, contrast dye Omnipaque (300mg/mL) was injected to confirm placement and there was no vascular runoff. Fluoroscopic imaging in the AP and lateral views revealed a clear outline of the joint space. 4 mL of the medication mixture listed above was injected slowly intraarticular and sera-articular. Displacement of the radio opaque contrast after injection of the medication confirmed that the medication went into the area of the joint. The needles  were removed and bleeding was nil. The same procedure was repeated on the contralateral side. A sterile dressing was applied. No specimens collected. The patient tolerated the procedure well.     The patient was monitored after the procedure in the recovery area. They were given post-procedure and discharge instructions to follow at home. The patient was discharged in a stable condition.    Tevin Rehman MD    I reviewed and edited the fellow's note. I conducted my own interview and physical examination. I agree with the findings. I was present and supervising all critical portions of the procedure.    Obdulio Leon MD

## 2024-05-01 NOTE — H&P
HPI  Patient presenting for Procedure(s) (LRB):  INJECTION, JOINT BILATERAL SI (Bilateral)     Patient on Anti-coagulation No    No health changes since previous encounter    Past Medical History:   Diagnosis Date    Asthma     Cataract     Glaucoma     Hypertension      Past Surgical History:   Procedure Laterality Date    CATARACT EXTRACTION W/  INTRAOCULAR LENS IMPLANT Bilateral     CHOLECYSTECTOMY      COLONOSCOPY N/A 12/5/2022    Procedure: COLONOSCOPY;  Surgeon: Daniel Mckeon MD;  Location: [facility] ENDO (4TH FLR);  Service: Endoscopy;  Laterality: N/A;  fully vaccinated - sm  extended constipation mirilax prep   instructions mailed - sm    CYST REMOVAL      on neck    ESOPHAGOGASTRODUODENOSCOPY N/A 12/5/2022    Procedure: EGD (ESOPHAGOGASTRODUODENOSCOPY);  Surgeon: Daniel Mckeon MD;  Location: [facility] ENDO (4TH FLR);  Service: Endoscopy;  Laterality: N/A;    EYE SURGERY      HYSTERECTOMY      2/2 AUB, partial    INJECTION OF JOINT Bilateral 05/25/2022    Procedure: INJECTION, JOINT, BILATERAL SACROILIAC (SI);  Surgeon: Obdulio Leon MD;  Location: [facility] PAIN MGT;  Service: Pain Management;  Laterality: Bilateral;    INJECTION, SACROILIAC JOINT Bilateral 6/7/2023    Procedure: INJECTION,SACROILIAC JOINT, BILATERAL;  Surgeon: Obdulio Leon MD;  Location: [facility] PAIN MGT;  Service: Pain Management;  Laterality: Bilateral;    TRANSFORAMINAL EPIDURAL INJECTION OF STEROID Bilateral 01/08/2020    Procedure: INJECTION, STEROID, EPIDURAL, TRANSFORAMINAL APPROACH;  Surgeon: Obdulio Leon MD;  Location: [facility] PAIN MGT;  Service: Pain Management;  Laterality: Bilateral;  B/L TFESI L4    TRANSFORAMINAL EPIDURAL INJECTION OF STEROID Bilateral 10/14/2020    Procedure: INJECTION, STEROID, EPIDURAL, TRANSFORAMINAL APPROACH, L4-L5 need consent;  Surgeon: Obdulio Leon MD;  Location: [facility] PAIN MGT;  Service: Pain Management;  Laterality: Bilateral;    TRANSFORAMINAL EPIDURAL INJECTION OF STEROID Left 04/14/2021     Procedure: INJECTION, STEROID, EPIDURAL, TRANSFORAMINAL APPROACH  left L3/4 and L4/5;  Surgeon: Obdulio Leon MD;  [facility];  Service: Pain Management;  Laterality: Left;  consent needed     Review of patient's allergies indicates:   Allergen Reactions    Mobic [meloxicam] Rash    Tramadol     Naprosyn [naproxen]      GI upset      Current Facility-Administered Medications   Medication Dose Route Frequency Provider Last Rate Last Admin    0.9%  NaCl infusion   Intravenous Continuous Tevin Rehman MD           PMHx, PSHx, Allergies, Medications reviewed in epic    ROS negative except pain complaints in HPI    OBJECTIVE:    Breastfeeding No     PHYSICAL EXAMINATION:    GENERAL: Well appearing, in no acute distress, alert and oriented x3.  PSYCH:  Mood and affect appropriate.  SKIN: Skin color, texture, turgor normal, no rashes or lesions which will impact the procedure.  CV: RRR with palpation of the radial artery.  PULM: No evidence of respiratory difficulty, symmetric chest rise. Clear to auscultation.  NEURO: Cranial nerves grossly intact.    Plan:    Proceed with procedure as planned Procedure(s) (LRB):  INJECTION, JOINT BILATERAL SI (Bilateral)    Tevin Rehman M.D.  PGY-5  Interventional Pain Management Fellow  [facility]  Pager: [phone]    05/01/2024

## 2024-05-01 NOTE — DISCHARGE SUMMARY
Discharge Note  Short Stay      SUMMARY     Admit Date: 5/1/2024    Attending Physician: Obdulio Leon MD    Discharge Physician: Tevin Rehman MD      Discharge Date: 5/1/2024 3:13 PM    Procedure(s) (LRB):  INJECTION, JOINT BILATERAL SI (Bilateral)    Final Diagnosis: Sacroiliac joint pain [M53.3]    Disposition: Home or self care    Patient Instructions:   Current Discharge Medication List        CONTINUE these medications which have NOT CHANGED    Details   !! albuterol (PROVENTIL) 2.5 mg /3 mL (0.083 %) nebulizer solution Take 2.5 mg by nebulization every 6 (six) hours as needed.      !! albuterol (PROVENTIL) 2.5 mg /3 mL (0.083 %) nebulizer solution Inhale 2.5 mg into the lungs.      !! albuterol (PROVENTIL/VENTOLIN HFA) 90 mcg/actuation inhaler Inhale TWO to FOUR puffs by mouth FOUR times daily when needed.      !! albuterol (PROVENTIL/VENTOLIN HFA) 90 mcg/actuation inhaler Inhale 2 puffs into the lungs.      albuterol-ipratropium (DUO-NEB) 2.5 mg-0.5 mg/3 mL nebulizer solution SMARTSIG:3 Milliliter(s) Via Nebulizer Every 6 Hours PRN      ALPRAZolam (XANAX) 0.25 MG tablet TK 1 T PO QD PRA  Qty: 30 tablet, Refills: 0    Associated Diagnoses: Anxiety and depression      amLODIPine (NORVASC) 10 MG tablet     Comments: .      benzonatate (TESSALON) 200 MG capsule Take 1 capsule (200 mg total) by mouth 3 (three) times daily as needed for Cough.  Qty: 30 capsule, Refills: 1      ciclopirox (PENLAC) 8 % Soln Apply topically.      diclofenac sodium (VOLTAREN) 1 % Gel Apply 2 g topically 4 (four) times daily.  Qty: 1 each, Refills: 2    Associated Diagnoses: DDD (degenerative disc disease), lumbar; Chronic pain syndrome      dicyclomine (BENTYL) 10 MG capsule Take 1 capsule (10 mg total) by mouth as needed (abdominal cramping).  Qty: 30 capsule, Refills: 0    Associated Diagnoses: Cramp, abdominal      erythromycin (ROMYCIN) ophthalmic ointment Place into the right eye every evening.  Qty: 1 each, Refills: 0     Associated Diagnoses: Abrasion of right cornea, initial encounter      fexofenadine (ALLEGRA) 180 MG tablet Take 1 tablet by mouth once daily.      fluticasone propionate (FLONASE) 50 mcg/actuation nasal spray 2 sprays in each nostril every evening.  Qty: 16 g, Refills: 1      !! fluticasone-salmeterol diskus inhaler 250-50 mcg Inhale 1 puff into the lungs.      !! fluticasone-salmeterol diskus inhaler 250-50 mcg Take 1 puff by mouth 2 (two) times daily.      furosemide (LASIX) 20 MG tablet TK 1 T PO QD PRF FLUID  Refills: 2      gabapentin (NEURONTIN) 300 MG capsule TAKE 1 CAPSULE BY MOUTH 3 TIMES  DAILY  Qty: 90 capsule, Refills: 11    Comments: Requesting 1 year supply      guaiFENesin-codeine 100-10 mg/5 ml (TUSSI-ORGANIDIN NR)  mg/5 mL syrup Take 5 mLs by mouth.      latanoprost 0.005 % ophthalmic solution Place 1 drop into both eyes every evening.  Qty: 3 Bottle, Refills: 3    Associated Diagnoses: Primary open angle glaucoma (POAG) of both eyes, mild stage      mirtazapine (REMERON) 30 MG tablet Take 30 mg by mouth nightly.  Refills: 3      olopatadine (PATANOL) 0.1 % ophthalmic solution Place 1 drop into both eyes 2 (two) times daily.  Qty: 5 mL, Refills: 0    Associated Diagnoses: Irritation of both eyes      omeprazole (PRILOSEC) 20 MG capsule Take by mouth.      polyethylene glycol (GLYCOLAX) 17 gram/dose powder Take 17 g by mouth daily as needed (Constipation).  Qty: 1700 g, Refills: 1    Associated Diagnoses: Constipation, unspecified constipation type      pravastatin (PRAVACHOL) 20 MG tablet TAKE 1 TABLET BY MOUTH IN THE  EVENING  Qty: 90 tablet, Refills: 3    Comments: Please send a replace/new response with 100-Day Supply if appropriate to maximize member benefit. Requesting 1 year supply.  Associated Diagnoses: Hyperlipidemia, unspecified hyperlipidemia type      tiZANidine (ZANAFLEX) 4 MG tablet Take 1 tablet (4 mg total) by mouth nightly as needed (muscle pain).  Qty: 90 tablet, Refills: 1     Associated Diagnoses: Myofascial pain      walker Misc 1 application by Misc.(Non-Drug; Combo Route) route once daily.  Qty: 1 each, Refills: 0    Comments: lumbosacral neuritis wheelchair walker with a seat       !! - Potential duplicate medications found. Please discuss with provider.              Discharge Diagnosis: Sacroiliac joint pain [M53.3]  Condition on Discharge: Stable with no complications to procedure   Diet on Discharge: Same as before.  Activity: as per instruction sheet.  Discharge to: Home with a responsible adult.  Follow up: 2-4 weeks       Please call my office or pager at [phone] if experienced any weakness or loss of sensation, fever > 101.5, pain uncontrolled with oral medications, persistent nausea/vomiting/or diarrhea, redness or drainage from the incisions, or any other worrisome concerns. If physician on call was not reached or could not communicate with our office for any reason please go to the nearest emergency department      Tevin Rehman M.D.  PGY-5  Interventional Pain Management Fellow  [facility]  Pager: [phone]    05/01/2024

## 2024-05-01 NOTE — DISCHARGE INSTRUCTIONS

## 2024-05-09 ENCOUNTER — OFFICE VISIT (OUTPATIENT)
Dept: OPTOMETRY | Facility: CLINIC | Age: 84
End: 2024-05-09
Payer: MEDICARE

## 2024-05-09 ENCOUNTER — CLINICAL SUPPORT (OUTPATIENT)
Dept: OPHTHALMOLOGY | Facility: CLINIC | Age: 84
End: 2024-05-09
Payer: MEDICARE

## 2024-05-09 DIAGNOSIS — H52.13 MYOPIA OF BOTH EYES WITH ASTIGMATISM AND PRESBYOPIA: ICD-10-CM

## 2024-05-09 DIAGNOSIS — H52.4 MYOPIA OF BOTH EYES WITH ASTIGMATISM AND PRESBYOPIA: ICD-10-CM

## 2024-05-09 DIAGNOSIS — H52.203 MYOPIA OF BOTH EYES WITH ASTIGMATISM AND PRESBYOPIA: ICD-10-CM

## 2024-05-09 DIAGNOSIS — H40.1131 PRIMARY OPEN ANGLE GLAUCOMA (POAG) OF BOTH EYES, MILD STAGE: Primary | ICD-10-CM

## 2024-05-09 DIAGNOSIS — S05.01XA ABRASION OF RIGHT CORNEA, INITIAL ENCOUNTER: ICD-10-CM

## 2024-05-09 PROCEDURE — 1159F MED LIST DOCD IN RCRD: CPT | Mod: CPTII,S$GLB,, | Performed by: OPTOMETRIST

## 2024-05-09 PROCEDURE — 92012 INTRM OPH EXAM EST PATIENT: CPT | Mod: S$GLB,,, | Performed by: OPTOMETRIST

## 2024-05-09 PROCEDURE — 92083 EXTENDED VISUAL FIELD XM: CPT | Mod: S$GLB,,, | Performed by: OPTOMETRIST

## 2024-05-09 PROCEDURE — 1126F AMNT PAIN NOTED NONE PRSNT: CPT | Mod: CPTII,S$GLB,, | Performed by: OPTOMETRIST

## 2024-05-09 PROCEDURE — 1160F RVW MEDS BY RX/DR IN RCRD: CPT | Mod: CPTII,S$GLB,, | Performed by: OPTOMETRIST

## 2024-05-09 PROCEDURE — 92133 CPTRZD OPH DX IMG PST SGM ON: CPT | Mod: S$GLB,,, | Performed by: OPTOMETRIST

## 2024-05-09 PROCEDURE — 1101F PT FALLS ASSESS-DOCD LE1/YR: CPT | Mod: CPTII,S$GLB,, | Performed by: OPTOMETRIST

## 2024-05-09 PROCEDURE — 92015 DETERMINE REFRACTIVE STATE: CPT | Mod: S$GLB,,, | Performed by: OPTOMETRIST

## 2024-05-09 PROCEDURE — 3288F FALL RISK ASSESSMENT DOCD: CPT | Mod: CPTII,S$GLB,, | Performed by: OPTOMETRIST

## 2024-05-09 PROCEDURE — 99999 PR PBB SHADOW E&M-EST. PATIENT-LVL III: CPT | Mod: PBBFAC,,, | Performed by: OPTOMETRIST

## 2024-05-09 NOTE — PROGRESS NOTES
HPI    JUANA: 4/23/24 Dr. Blanco  Chief complaint (CC): 82 yo F here for f/u VF 24-2/OCT RNFL and follow up.   Pt reports no changes since last visit.  Glasses? Yes   Contacts? No  H/o eye surgery, injections or laser: cataract sx 20+ years ago OU  H/o eye injury: No  Known eye conditions?               Primary open angle glaucoma (POAG) of both eyes, mild stage              Dry eye OU  Family h/o eye conditions? No  Eye gtts?    Latanoprost at bed time OU   Artificial Tears OD   Erythromycin OU     (-) Flashes (-)  Floaters (+) Mucous   (+)  Tearing (+) Itching (+) Burning   (+) Headaches (+) Eye Pain/discomfort (+) Irritation   (+)  Redness (-) Double vision (-) Blurry vision     Diabetic? no  A1c? Lab Results       Component                Value               Date                       HGBA1C                   6.4 (H)             08/04/2022                             Last edited by Miquel Blanco, OD on 5/9/2024 11:17 AM.            Assessment /Plan     For exam results, see Encounter Report.          Primary open angle glaucoma (POAG) of both eyes, mild stage  - IOP adequate OU (11/11)  - OCT today (5/9/2024) OD Significant superior thinning; borderline inferior   OS significant thinning inferior>> superior   - VF today (5/9/2024) OD poor reliability secondary to increased FL and FP; scattered nonspecific defects   OS Moderate reliability; superior cecocentral defect  - Continue Latanoprost QHS OU   - RTC next available with glaucoma specialist for evaluation      Abrasion of right cornea, initial encounter   - Resolved. Pt doing well. Monitor.   - D/c Erythromycin robby    Myopia of both eyes with astigmatism and presbyopia   - New Spectacle Rx given, discussed different options for glasses.

## 2024-05-09 NOTE — PROGRESS NOTES
Visual field test done.  Patient stated no latex allergies used coverlet    Fixation poor od fair os

## 2024-05-10 ENCOUNTER — TELEPHONE (OUTPATIENT)
Dept: OPHTHALMOLOGY | Facility: CLINIC | Age: 84
End: 2024-05-10
Payer: MEDICARE

## 2024-05-10 NOTE — TELEPHONE ENCOUNTER
Called patient to let her know that her HVF/OCT is scheduled for 6/18/2024 at 11:15am. Glaucoma evaluation is scheduled for 1:20pm with Dr. Arshad. Appointments are located at the [facility], 10th floor.

## 2024-05-21 ENCOUNTER — OFFICE VISIT (OUTPATIENT)
Dept: PAIN MEDICINE | Facility: CLINIC | Age: 84
End: 2024-05-21
Payer: MEDICARE

## 2024-05-21 VITALS
RESPIRATION RATE: 12 BRPM | HEART RATE: 91 BPM | SYSTOLIC BLOOD PRESSURE: 133 MMHG | OXYGEN SATURATION: 100 % | BODY MASS INDEX: 31.64 KG/M2 | DIASTOLIC BLOOD PRESSURE: 75 MMHG | HEIGHT: 62 IN | WEIGHT: 171.94 LBS

## 2024-05-21 DIAGNOSIS — M79.18 MYOFASCIAL PAIN: ICD-10-CM

## 2024-05-21 DIAGNOSIS — M17.0 BILATERAL PRIMARY OSTEOARTHRITIS OF KNEE: ICD-10-CM

## 2024-05-21 DIAGNOSIS — M51.36 DDD (DEGENERATIVE DISC DISEASE), LUMBAR: ICD-10-CM

## 2024-05-21 DIAGNOSIS — M54.16 LUMBAR RADICULOPATHY, CHRONIC: ICD-10-CM

## 2024-05-21 DIAGNOSIS — M47.816 LUMBAR SPONDYLOSIS: ICD-10-CM

## 2024-05-21 DIAGNOSIS — G89.4 CHRONIC PAIN SYNDROME: Primary | ICD-10-CM

## 2024-05-21 DIAGNOSIS — M53.3 SACROILIAC JOINT PAIN: ICD-10-CM

## 2024-05-21 PROCEDURE — 99999 PR PBB SHADOW E&M-EST. PATIENT-LVL V: CPT | Mod: PBBFAC,,, | Performed by: NURSE PRACTITIONER

## 2024-05-21 PROCEDURE — 3078F DIAST BP <80 MM HG: CPT | Mod: CPTII,S$GLB,, | Performed by: NURSE PRACTITIONER

## 2024-05-21 PROCEDURE — 1160F RVW MEDS BY RX/DR IN RCRD: CPT | Mod: CPTII,S$GLB,, | Performed by: NURSE PRACTITIONER

## 2024-05-21 PROCEDURE — 3288F FALL RISK ASSESSMENT DOCD: CPT | Mod: CPTII,S$GLB,, | Performed by: NURSE PRACTITIONER

## 2024-05-21 PROCEDURE — 99214 OFFICE O/P EST MOD 30 MIN: CPT | Mod: S$GLB,,, | Performed by: NURSE PRACTITIONER

## 2024-05-21 PROCEDURE — 1101F PT FALLS ASSESS-DOCD LE1/YR: CPT | Mod: CPTII,S$GLB,, | Performed by: NURSE PRACTITIONER

## 2024-05-21 PROCEDURE — 1159F MED LIST DOCD IN RCRD: CPT | Mod: CPTII,S$GLB,, | Performed by: NURSE PRACTITIONER

## 2024-05-21 PROCEDURE — 3075F SYST BP GE 130 - 139MM HG: CPT | Mod: CPTII,S$GLB,, | Performed by: NURSE PRACTITIONER

## 2024-05-21 PROCEDURE — 1125F AMNT PAIN NOTED PAIN PRSNT: CPT | Mod: CPTII,S$GLB,, | Performed by: NURSE PRACTITIONER

## 2024-05-21 RX ORDER — DICLOFENAC SODIUM 10 MG/G
2 GEL TOPICAL 4 TIMES DAILY
Qty: 1 EACH | Refills: 2 | Status: SHIPPED | OUTPATIENT
Start: 2024-05-21

## 2024-05-21 NOTE — PROGRESS NOTES
Chronic patient Established Note (Follow up visit)        SUBJECTIVE:    Interval History 5/21/2024:  The patient returns to clinic today for follow up of back pain. She is s/p bilateral SI joint injections on 5/1/2024. She reports limited relief of her pain. She continues to report low back pain. She reports radiating pain into the posterior aspect of both legs to the bottom of her feet. Her pain is constant. Her pain is worse with activity. She is taking Gabapentin and Zanaflex. She is using Voltaren gel. She also takes Norco as needed with benefit. She denies any adverse effects. She is out of this medication. She denies any other health changes. Her pain today is 8/10.    Interval History 3/7/2024:  The patient returns to clinic today for follow up of back pain. She reports low back and buttock pain. She denies any radicular leg pain. Her pain is worse with sitting and walking. She also notes increased pain with turning over in bed. She is taking Gabapentin twice a day. She is taking Zanaflex at bedtime. She also takes Norco as needed with benefit and without adverse effects. She asks about a TENs unit today. She denies any other health changes. Her pain today is 8/10.    Interval history 12/06/2023:  82-year-old female that presents today for a follow-up she has a history of chronic low back pain.  Aching and throbbing in nature she does report that it is tolerable at times.  Currently on chronic opioid therapy that consists of Norco 5-325 b.i.d. which she reports provides her with 50-60% relief more and improvement in her functionality.  She is also taking tizanidine 2 mg q.h.s. to and muscular pain she also reports spasms.  She denies any adverse side effects with these medications.     Interval History 6/30/2023:  The patient returns to clinic today for follow up of back pain. She is s/p bilateral SI joint injections on 6/7/2023. She reports 50% relief of her pain. She continues to report low back pain. She  describes this pain as aching in nature. Her pain is tolerable at this time. She is taking Gabapentin at the lower dose with benefit. She continues to take Norco as needed with benefit and without adverse effects. Of note, she states that she does not feel good today. She feels dizzy and weak today. She also reports stomach pain. She denies any fever, nausea or vomiting. Her pain today is 8/10.    Interval History 5/4/23:  Patient returns to clinic today for follow-up of chronic low back and bilateral knee pain.  She was taking Norco 5-325 mg twice daily which provided good relief for her.  Her prescription was refilled following her visit on 3/27/2023; however, the prescription was sent to the wrong pharmacy, and she was not able to have filled.  She feels that the gabapentin 600 mg 3 times daily is over sedating for her, and she wishes to return to 300 mg 3 times daily.  Today, she reports pain in her low back that radiates into her buttocks. She had bilateral SI joint injections in May 2022 which she feels gave her good relief.  She would like to repeat these if possible.  She is also having bilateral knee pain.  She had corticosteroid injections 4-5 years ago in her knees from an outside doctor that gave her good relief. She is interested in repeating these as well.      Interval History 3/27/2023  Mrs Youssef presents for follow up of chronic pain complaints. She was started on Norco 5/325mg prior visit. She states this was beneficial but did not return or call to request refills over interval. She is  requesting refill as this did provide relief and improved functioning.     Interval History 11/14/2022:  Tamiko Youssef presents to the clinic for a follow-up appointment for chronic low back pain. Since the last visit, Tamiko Youssef states the pain has been worsening. Current pain intensity is 6/10. Pain is localized to the low back with radiating into the right leg. Radiating pain is predominately in the  bilateral calves. Also with localized pain to right knee. Reports she has had steroid injections in the right knee in the past with good benefit. Most recently 3 years ago. Patient ambulates with a Rolator and reports most recent fall about 6 months ago when she fell on her right knee. Did not seek MD care at that time.   No new onset weakness, new onset sensation changes, saddle anesthesia, or bowel/bladder changes.    Interval History 10/3/2022:  The patient returns to clinic today for follow up of low back pain. She continues to report low back pain that radiates into the posterior aspect both legs to her ankles. Her pain is worse with activity. She continues to take Gabapentin with some benefit. She did try Lyrica but this caused a rash. She is not interested in further procedures at this time. She asks about Norco today, as this has helped her pain in the past. She denies any other health changes. Her pain today is 8/10.    Interval History 9/1/2022:  The patient returns to clinic today for follow up of low back pain. She continues to report low back pain that radiates into the posterior aspect of both legs to her ankles. Her pain is worse with standing and walking. She also reports increased right knee pain. She is currently taking Gabapentin with limited relief. She also reports that this makes her sleepy. She is not interested in further procedures at this time. She denies any other health changes. Her pain today is 8/10.    Interval History 6/8/2022:  The patient returns to clinic today for follow up of low back pain. She is s/p bilateral SI joint injections on 5/25/2022. She reports no relief. She continues to report low back pain that radiates into her buttocks into the posterior aspect of both legs to her ankles. She also reports radiates pain into the anterior aspect of her left leg. She is not interested in further procedures at this time. She reports increased bilateral knee and ankle pain. She would  like to establish care with an Orthopedic. She also reports that her PCP has left. She would like to establish care here at [facility]. She continues to take Gabapentin. She denies any other health changes. Her pain today is 6/10.    Interval History 4/20/2022:  The patient returns to clinic today for follow up of low back pain. She has not been seen in over a year. She reports that previous SYLVIA in 4/2021 provided no relief. She continues to report low back pain that radiates into both hips and buttocks. She reports intermittent radiating pain into the posterior aspect of her left leg to under her foot. Her pain is worse with prolonged sitting and standing. She continues to take Gabapentin with limited relief. She denies any other health changes. Her pain today is 8/10.    Interval History 1/14/2021:  The patient returns to clinic today for follow up of low back pain. She continues to report low back pain that radiates into the lateral aspect of left leg to her ankle. She denies any right leg pain. She did not start Lyrica given at last visit due to concern for side effects. She has not started physical therapy yet. She continues to take Gabapentin and Zanaflex. She denies any other health changes. Her pain today is 6/10.    Interval history 12/10/2020:  Returns for follow up of her low back pain. She continues to report low back pain that radiates into the lateral aspect of her left leg to her ankle. Reports that gabapentin and Zanaflex are not helping with pain. Her pain is worse with standing, walking, and activity. Her pain today is 5/10.    Interval History 11/13/2020:  The patient returns to clinic today for follow up of back pain. She reports limited relief with previous SYLVIA. She continues to report low back pain that radiates into the lateral aspect of her left leg to her ankle. She denies any right leg pain today. Her pain is worse with standing, walking, and activity. She is frustrated with her continued  pain. She did increase Gabapentin to BID. She is unsure of relief at this time. She does take [place] from her PCP. She states that her PCP would like our office to take over this medication. She asks for a refill. She denies any other health changes. Her pain today is 2/10.    Interval History 10/30/2020:  The patient returns to clinic today via audio visit for follow up of back pain. She is s/p bilateral L4/5 TF SYLVIA on 10/14/2020. She reports limited relief of her pain. She reports increased low back pain that radiates down the lateral aspect of her left leg to her ankle. She denies any right leg pain today. Her pain is worse with prolonged standing, walking, and activity. She denies any weakness. She is currently taking Gabapentin once a day. She denies any other health changes.     Interval History 7/24/2020:  The patient returns to clinic today for follow up of low back pain. She reports increased pain over the last two months. She reports low back pain that radiates down the lateral aspect of both legs to her knees, left greater than right. Her pain is worse with standing and walking. She continues to take Gabapentin. She denies any other health changes. Her pain today is 9/10.    Interval History 1/28/2020:  Tamiko Youssef presents to the clinic for a follow-up appointment for lower back pain. She is s/p bilateral L4/5 TF SYLVIA on 1/8/2020. She reports 80% relief of his low back and leg pain. She continues to report low back pain that is aching in nature. She denies any radiating leg pain today. Her pain is worse with prolonged standing and walking. She continues to take Gabapentin with benefit. She continues to perform a home exercise routine. She denies any other health changes. Her pain today is 8/10.      Pain Disability Index Review:      5/21/2024     2:50 PM 12/6/2023     1:14 PM 6/30/2023     1:39 PM   Last 3 PDI Scores   Pain Disability Index (PDI) 40 42 30       Pain Medications:  Gabapentin     Opioid  Contract: yes     report:  Reviewed and consistent with medication use as prescribed.    Pain Procedures:   3/28/14, 3/13/13 Bilateral L4 TF SYLVIA  8/22/18 Bilateral L4 TF SYLVIA- 75% relief  1/8/2020- Bilateral L4/5 TF SYLVIA - 80% relief   10/14/2020- Bilateral L4/5 TF SYLVIA  6/7/2023- Bilateral SI joint injections- 50% relief    Physical Therapy/Home Exercise: no    Imaging:   MRI Lumbar Spine 11/18/2020:  COMPARISON:  11/18/2020     FINDINGS:  Lumbar spine alignment is within normal limits. Vertebral body heights preserved.  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     The conus is normal in appearance.  The adjacent soft tissue structures show no significant abnormalities.     L1-L2: No evidence of a disc herniation.No significant central canal or neural foraminal narrowing.     L2-L3: There is no focal disc herniation. No central canal or foraminal stenosis.  Prominent facet arthritis.     L3-L4: There is some broad-based disc bulging and superimposed spondylitic spurring which is asymmetric to the left.  Prominent facet arthritis is noted.  No central canal or foraminal stenosis.     L4-L5: Broad-based disc bulging and facet arthritis present.  No significant central canal or neural foraminal narrowing.     L5-S1: Broad-based disc bulging and facet arthritis are present.  No focal disc herniation.  No significant central canal or neural foraminal narrowing.     Impression:     Multilevel nonstenotic degenerative change mostly affecting the posterior elements.    Xray Lumbar Spine 11/18/2020:  COMPARISON:  Multiple priors, most recent 08/09/2018     FINDINGS:  Levoscoliosis of the lumbar spine.  Grade 1 anterolisthesis of L3 on L4, L4 on L5, and L5 on S1 similar to prior.  No acute, displaced fracture.  Vertebral body heights are maintained.  No evidence of dynamic instability.  Multilevel lumbar facet arthrosis.  No aggressive osseous abnormality.      Impression:     Mild multilevel lumbar spondylosis with unchanged grade 1 anterolisthesis of L3 on L4 through L5 on S1.    Xray Hips 11/18/2020:  COMPARISON:  Multiple priors, most recent 08/09/2018     FINDINGS:  Sacroiliac joints are symmetric.  Pubic symphysis is not widened.  The femoral heads are well seated within the acetabula.  Degenerative changes of the lower lumbar spine.  Mild joint space narrowing, subchondral sclerosis and minimal osteophytosis of the hip joints.  No acute, displaced fracture.  No aggressive osseous abnormality.     Impression:     No acute osseous abnormality.        Allergies:   Review of patient's allergies indicates:   Allergen Reactions    Naprosyn [naproxen]     Norco [hydrocodone-acetaminophen]     Tramadol        Current Medications:   Current Outpatient Medications   Medication Sig Dispense Refill    albuterol (PROVENTIL) 2.5 mg /3 mL (0.083 %) nebulizer solution Take 2.5 mg by nebulization every 6 (six) hours as needed.      albuterol (PROVENTIL) 2.5 mg /3 mL (0.083 %) nebulizer solution Inhale 2.5 mg into the lungs.      albuterol (PROVENTIL/VENTOLIN HFA) 90 mcg/actuation inhaler Inhale TWO to FOUR puffs by mouth FOUR times daily when needed.      albuterol (PROVENTIL/VENTOLIN HFA) 90 mcg/actuation inhaler Inhale 2 puffs into the lungs.      albuterol-ipratropium (DUO-NEB) 2.5 mg-0.5 mg/3 mL nebulizer solution SMARTSIG:3 Milliliter(s) Via Nebulizer Every 6 Hours PRN      ALPRAZolam (XANAX) 0.25 MG tablet TK 1 T PO QD PRA 30 tablet 0    amLODIPine (NORVASC) 10 MG tablet       benzonatate (TESSALON) 200 MG capsule Take 1 capsule (200 mg total) by mouth 3 (three) times daily as needed for Cough. 30 capsule 1    ciclopirox (PENLAC) 8 % Soln Apply topically.      diclofenac sodium (VOLTAREN) 1 % Gel Apply 2 g topically 4 (four) times daily. 1 each 2    dicyclomine (BENTYL) 10 MG capsule Take 1 capsule (10 mg total) by mouth as needed (abdominal cramping). 30 capsule 0     erythromycin (ROMYCIN) ophthalmic ointment Place into the right eye every evening. 1 each 0    fexofenadine (ALLEGRA) 180 MG tablet Take 1 tablet by mouth once daily.      fluticasone propionate (FLONASE) 50 mcg/actuation nasal spray 2 sprays in each nostril every evening. 16 g 1    fluticasone-salmeterol diskus inhaler 250-50 mcg Inhale 1 puff into the lungs.      fluticasone-salmeterol diskus inhaler 250-50 mcg Take 1 puff by mouth 2 (two) times daily.      furosemide (LASIX) 20 MG tablet TK 1 T PO QD PRF FLUID  2    gabapentin (NEURONTIN) 300 MG capsule TAKE 1 CAPSULE BY MOUTH 3 TIMES  DAILY 90 capsule 11    guaiFENesin-codeine 100-10 mg/5 ml (TUSSI-ORGANIDIN NR)  mg/5 mL syrup Take 5 mLs by mouth.      latanoprost 0.005 % ophthalmic solution Place 1 drop into both eyes every evening. 3 Bottle 3    mirtazapine (REMERON) 30 MG tablet Take 30 mg by mouth nightly.  3    olopatadine (PATANOL) 0.1 % ophthalmic solution Place 1 drop into both eyes 2 (two) times daily. 5 mL 0    omeprazole (PRILOSEC) 20 MG capsule Take by mouth.      polyethylene glycol (GLYCOLAX) 17 gram/dose powder Take 17 g by mouth daily as needed (Constipation). 1700 g 1    pravastatin (PRAVACHOL) 20 MG tablet TAKE 1 TABLET BY MOUTH IN THE  EVENING 90 tablet 3    tiZANidine (ZANAFLEX) 4 MG tablet Take 1 tablet (4 mg total) by mouth nightly as needed (muscle pain). 90 tablet 1    walker Misc 1 application by Misc.(Non-Drug; Combo Route) route once daily. 1 each 0     No current facility-administered medications for this visit.       REVIEW OF SYSTEMS:    Constitutional: Positive for unexpected weight change.   HENT: Positive for headache.   Respiratory: Positive for intermittent shortness of breath and wheezing.  (asthma)  Gastrointestinal: Positive for constipation.   Musculoskeletal: Positive for back pain, gait problem and stiffness.       Past Medical History:  Past Medical History:   Diagnosis Date    Asthma     Cataract     Glaucoma      Hypertension        Past Surgical History:  Past Surgical History:   Procedure Laterality Date    CATARACT EXTRACTION W/  INTRAOCULAR LENS IMPLANT Bilateral     CHOLECYSTECTOMY      COLONOSCOPY N/A 12/5/2022    Procedure: COLONOSCOPY;  Surgeon: Daniel Mckeon MD;  Location: [facility] ENDO (4TH FLR);  Service: Endoscopy;  Laterality: N/A;  fully vaccinated - sm  extended constipation mirilax prep   instructions mailed - sm    CYST REMOVAL      on neck    ESOPHAGOGASTRODUODENOSCOPY N/A 12/5/2022    Procedure: EGD (ESOPHAGOGASTRODUODENOSCOPY);  Surgeon: Daniel Mckeon MD;  Location: [facility] ENDO (4TH FLR);  Service: Endoscopy;  Laterality: N/A;    EYE SURGERY      HYSTERECTOMY      2/2 AUB, partial    INJECTION OF JOINT Bilateral 05/25/2022    Procedure: INJECTION, JOINT, BILATERAL SACROILIAC (SI);  Surgeon: Obdulio Leon MD;  Location: [facility] PAIN MGT;  Service: Pain Management;  Laterality: Bilateral;    INJECTION OF JOINT Bilateral 5/1/2024    Procedure: INJECTION, JOINT BILATERAL SI;  Surgeon: bOdulio Leon MD;  Location: [facility] PAIN MGT;  Service: Pain Management;  Laterality: Bilateral;  [phone]  2 WK F/U AUGUSTINE    INJECTION, SACROILIAC JOINT Bilateral 6/7/2023    Procedure: INJECTION,SACROILIAC JOINT, BILATERAL;  Surgeon: Obdulio Leon MD;  Location: [facility] PAIN MGT;  Service: Pain Management;  Laterality: Bilateral;    TRANSFORAMINAL EPIDURAL INJECTION OF STEROID Bilateral 01/08/2020    Procedure: INJECTION, STEROID, EPIDURAL, TRANSFORAMINAL APPROACH;  Surgeon: Obdulio Leon MD;  Location: [facility] PAIN MGT;  Service: Pain Management;  Laterality: Bilateral;  B/L TFESI L4    TRANSFORAMINAL EPIDURAL INJECTION OF STEROID Bilateral 10/14/2020    Procedure: INJECTION, STEROID, EPIDURAL, TRANSFORAMINAL APPROACH, L4-L5 need consent;  Surgeon: Obdulio Leon MD;  Location: [facility] PAIN MGT;  Service: Pain Management;  Laterality: Bilateral;    TRANSFORAMINAL EPIDURAL INJECTION OF STEROID Left 04/14/2021     "Procedure: INJECTION, STEROID, EPIDURAL, TRANSFORAMINAL APPROACH  left L3/4 and L4/5;  Surgeon: Obdulio Leon MD;  Location: [facility];  Service: Pain Management;  Laterality: Left;  consent needed       Family History:  Family History   Problem Relation Name Age of Onset    Cataracts Son      Glaucoma Son      No Known Problems Mother      No Known Problems Father      Macular degeneration Neg Hx         Social History:  Social History     Socioeconomic History    Marital status: Single   Tobacco Use    Smoking status: Never     Passive exposure: Never    Smokeless tobacco: Never   Substance and Sexual Activity    Alcohol use: No    Drug use: Never       OBJECTIVE:    /75 (BP Location: Right arm, Patient Position: Sitting, BP Method: Small (Automatic))   Pulse 91   Resp 12   Ht 5' 2" (1.575 m)   Wt 78 kg (171 lb 15.3 oz)   SpO2 100%   BMI 31.45 kg/m²     PHYSICAL EXAMINATION:    GENERAL: Well appearing, in no acute distress, alert and oriented x3.  PSYCH:  Mood and affect appropriate.  SKIN: Skin color, texture, turgor normal, no rashes or lesions.  HEAD/FACE:  Normocephalic, atraumatic. Cranial nerves grossly intact.  CV: RRR with palpation of the radial artery.  PULM: No evidence of respiratory difficulty, symmetric chest rise.  BACK: Straight leg raising in the sitting position is negative for radicular pain bilaterally. There is pain with palpation over lumbar facet joints bilaterally.  Limited ROM with pain on flexion and extension.    EXTREMITIES: No deformities, edema, or skin discoloration. Good capillary refill.  MUSCULOSKELETAL: There is pain with palpation over bilateral SI joints.  5/5 strength in right ankle with plantar and dorsiflexion. 5/5 strength in left ankle with plantar and dorsiflexion. 4/5 strength with right knee flexion and extension. 4/5 strength with left knee flexion and extension.  No atrophy or tone abnormalities are noted.  NEURO:   No loss of sensation is " noted.  GAIT: Antalgic- ambulates with walker.      ASSESSMENT: 83 y.o. year old female with lower back pain consistent with the following diagnoses:     1. Chronic pain syndrome  diclofenac sodium (VOLTAREN) 1 % Gel      2. Lumbar radiculopathy, chronic  X-Ray Lumbar Complete Including Flex And Ext    MRI Lumbar Spine Without Contrast      3. Lumbar spondylosis        4. DDD (degenerative disc disease), lumbar        5. Sacroiliac joint pain        6. Myofascial pain        7. Bilateral primary osteoarthritis of knee  Ambulatory referral/consult to Orthopedics    diclofenac sodium (VOLTAREN) 1 % Gel            PLAN:     - Previous imaging reviewed today. Labs reviewed.     - Obtain updated lumbar MRI.     - Consider SYLVIA in the future.    - Continue Voltaren gel topically to knees PRN, refill provided.    - Continue Gabapentin.    - Continue Zanaflex 4 mg nightly PRN.    - Pain contract signed and filed    - Continue  Norco 5/325 mg BID PRN, #60. Refill provided.    - The patient is here today for a refill of current pain medications and they believe these provide effective pain control and improvements in quality of life.  The patient notes no serious side effects, and feels the benefits outweigh the risks.  The patient was reminded of the pain contract that they signed previously as well as the risks and benefits of the medication including possible death.  The updated [place] [employer] prescription monitoring program was reviewed, and the patient has been found to be compliant with current treatment plan. Medication management provided by Dr. Leon.     - UDS from 12/6/2023 reviewed and consistent with medications. Repeat next visit as she has been out of medication.     - I have stressed the importance of physical activity and a home exercise plan to help with pain and improve health.    - RTC after imaging.       The above plan and management options were discussed at length with patient. Patient  is in agreement with the above and verbalized understanding.     Marina Gallegos, WHITNEY  05/21/2024

## 2024-05-22 ENCOUNTER — TELEPHONE (OUTPATIENT)
Dept: ORTHOPEDICS | Facility: CLINIC | Age: 84
End: 2024-05-22
Payer: MEDICARE

## 2024-05-22 RX ORDER — HYDROCODONE BITARTRATE AND ACETAMINOPHEN 5; 325 MG/1; MG/1
1 TABLET ORAL EVERY 12 HOURS PRN
Qty: 60 TABLET | Refills: 0 | Status: SHIPPED | OUTPATIENT
Start: 2024-05-22 | End: 2024-06-17 | Stop reason: SDUPTHER

## 2024-05-30 ENCOUNTER — HOSPITAL ENCOUNTER (OUTPATIENT)
Dept: RADIOLOGY | Facility: HOSPITAL | Age: 84
Discharge: HOME | End: 2024-05-30
Attending: NURSE PRACTITIONER
Payer: MEDICARE

## 2024-05-30 ENCOUNTER — OFFICE VISIT (OUTPATIENT)
Dept: ORTHOPEDICS | Facility: CLINIC | Age: 84
End: 2024-05-30
Payer: MEDICARE

## 2024-05-30 DIAGNOSIS — M17.0 BILATERAL PRIMARY OSTEOARTHRITIS OF KNEE: ICD-10-CM

## 2024-05-30 DIAGNOSIS — M17.0 BILATERAL PRIMARY OSTEOARTHRITIS OF KNEE: Primary | ICD-10-CM

## 2024-05-30 PROCEDURE — 73564 X-RAY EXAM KNEE 4 OR MORE: CPT | Mod: 26,50,, | Performed by: RADIOLOGY

## 2024-05-30 PROCEDURE — 1159F MED LIST DOCD IN RCRD: CPT | Mod: CPTII,S$GLB,, | Performed by: NURSE PRACTITIONER

## 2024-05-30 PROCEDURE — 99214 OFFICE O/P EST MOD 30 MIN: CPT | Mod: S$GLB,,, | Performed by: NURSE PRACTITIONER

## 2024-05-30 PROCEDURE — 73564 X-RAY EXAM KNEE 4 OR MORE: CPT | Mod: TC,50

## 2024-05-30 PROCEDURE — 99999 PR PBB SHADOW E&M-EST. PATIENT-LVL II: CPT | Mod: PBBFAC,,, | Performed by: NURSE PRACTITIONER

## 2024-05-30 NOTE — PROGRESS NOTES
SUBJECTIVE:     Chief Complaint & History of Present Illness:  Tamiko Youssef is a Established 83 y.o. year old female patient here with a history of intermittent bilateral knee pain which started year ago.  There is not a history of trauma.  The pain is located in the global aspect of the knee.  The pain is described as achy, 5-6/10.  There is radiation.  There is not catching or locking.  Aggravating factors include going up and down stairs, rising after sitting, and walking.  Associated symptoms include none.  There is not numbness or tingling of the lower extremity.  There is back pain.  Sees pain management for chronic back pain.  Previous treatments include Lidocaine ointment and injections (> 15 years ago at [facility]) which have provided adequate relief.  There is not a history of previous injury or surgery to the knee.  The patient does use an assistive device.    Review of patient's allergies indicates:   Allergen Reactions    Mobic [meloxicam] Rash    Tramadol     Naprosyn [naproxen]      GI upset         Current Outpatient Medications   Medication Sig Dispense Refill    albuterol (PROVENTIL) 2.5 mg /3 mL (0.083 %) nebulizer solution Take 2.5 mg by nebulization every 6 (six) hours as needed.      albuterol (PROVENTIL) 2.5 mg /3 mL (0.083 %) nebulizer solution Inhale 2.5 mg into the lungs.      albuterol (PROVENTIL/VENTOLIN HFA) 90 mcg/actuation inhaler Inhale TWO to FOUR puffs by mouth FOUR times daily when needed.      albuterol (PROVENTIL/VENTOLIN HFA) 90 mcg/actuation inhaler Inhale 2 puffs into the lungs.      albuterol-ipratropium (DUO-NEB) 2.5 mg-0.5 mg/3 mL nebulizer solution SMARTSIG:3 Milliliter(s) Via Nebulizer Every 6 Hours PRN      ALPRAZolam (XANAX) 0.25 MG tablet TK 1 T PO QD PRA 30 tablet 0    amLODIPine (NORVASC) 10 MG tablet       benzonatate (TESSALON) 200 MG capsule Take 1 capsule (200 mg total) by mouth 3 (three) times daily as needed for Cough. 30 capsule 1    ciclopirox (PENLAC) 8 %  Soln Apply topically.      diclofenac sodium (VOLTAREN) 1 % Gel Apply 2 g topically 4 (four) times daily. 1 each 2    dicyclomine (BENTYL) 10 MG capsule Take 1 capsule (10 mg total) by mouth as needed (abdominal cramping). 30 capsule 0    erythromycin (ROMYCIN) ophthalmic ointment Place into the right eye every evening. 1 each 0    fexofenadine (ALLEGRA) 180 MG tablet Take 1 tablet by mouth once daily.      fluticasone propionate (FLONASE) 50 mcg/actuation nasal spray 2 sprays in each nostril every evening. 16 g 1    fluticasone-salmeterol diskus inhaler 250-50 mcg Inhale 1 puff into the lungs.      fluticasone-salmeterol diskus inhaler 250-50 mcg Take 1 puff by mouth 2 (two) times daily.      furosemide (LASIX) 20 MG tablet TK 1 T PO QD PRF FLUID  2    gabapentin (NEURONTIN) 300 MG capsule TAKE 1 CAPSULE BY MOUTH 3 TIMES  DAILY 90 capsule 11    guaiFENesin-codeine 100-10 mg/5 ml (TUSSI-ORGANIDIN NR)  mg/5 mL syrup Take 5 mLs by mouth.      HYDROcodone-acetaminophen (NORCO) 5-325 mg per tablet Take 1 tablet by mouth every 12 (twelve) hours as needed for Pain. 60 tablet 0    latanoprost 0.005 % ophthalmic solution Place 1 drop into both eyes every evening. 3 Bottle 3    mirtazapine (REMERON) 30 MG tablet Take 30 mg by mouth nightly.  3    olopatadine (PATANOL) 0.1 % ophthalmic solution Place 1 drop into both eyes 2 (two) times daily. 5 mL 0    omeprazole (PRILOSEC) 20 MG capsule Take by mouth.      polyethylene glycol (GLYCOLAX) 17 gram/dose powder Take 17 g by mouth daily as needed (Constipation). 1700 g 1    pravastatin (PRAVACHOL) 20 MG tablet TAKE 1 TABLET BY MOUTH IN THE  EVENING 90 tablet 3    tiZANidine (ZANAFLEX) 4 MG tablet Take 1 tablet (4 mg total) by mouth nightly as needed (muscle pain). 90 tablet 1    walker Misc 1 application by Misc.(Non-Drug; Combo Route) route once daily. 1 each 0     No current facility-administered medications for this visit.       Past Medical History:   Diagnosis Date     Asthma     Cataract     Glaucoma     Hypertension        Past Surgical History:   Procedure Laterality Date    CATARACT EXTRACTION W/  INTRAOCULAR LENS IMPLANT Bilateral     CHOLECYSTECTOMY      COLONOSCOPY N/A 12/5/2022    Procedure: COLONOSCOPY;  Surgeon: Daniel Mckeon MD;  Location: [facility] ENDO (4TH FLR);  Service: Endoscopy;  Laterality: N/A;  fully vaccinated - sm  extended constipation mirilax prep   instructions mailed - sm    CYST REMOVAL      on neck    ESOPHAGOGASTRODUODENOSCOPY N/A 12/5/2022    Procedure: EGD (ESOPHAGOGASTRODUODENOSCOPY);  Surgeon: Daniel Mckeon MD;  Location: [facility] ENDO (4TH FLR);  Service: Endoscopy;  Laterality: N/A;    EYE SURGERY      HYSTERECTOMY      2/2 AUB, partial    INJECTION OF JOINT Bilateral 05/25/2022    Procedure: INJECTION, JOINT, BILATERAL SACROILIAC (SI);  Surgeon: Obdulio Leon MD;  Location: [facility], [place], operated by [facility] PAIN MGT;  Service: Pain Management;  Laterality: Bilateral;    INJECTION OF JOINT Bilateral 5/1/2024    Procedure: INJECTION, JOINT BILATERAL SI;  Surgeon: Obdulio Leon MD;  Location: [facility], [place], operated by [facility] PAIN [facility]GT;  Service: Pain Management;  Laterality: Bilateral;  [phone]  2 WK F/U AUGUSTNIE    INJECTION, SACROILIAC JOINT Bilateral 6/7/2023    Procedure: INJECTION,SACROILIAC JOINT, BILATERAL;  Surgeon: Obdulio Leon MD;  Location: [facility], [place], operated by [facility] PAIN [facility]GT;  Service: Pain Management;  Laterality: Bilateral;    TRANSFORAMINAL EPIDURAL INJECTION OF STEROID Bilateral 01/08/2020    Procedure: INJECTION, STEROID, EPIDURAL, TRANSFORAMINAL APPROACH;  Surgeon: Obdulio Leon MD;  Location: [facility], [place], operated by [facility]IN [facility];  Service: Pain Management;  Laterality: Bilateral;  B/L TFESI L4    TRANSFORAMINAL EPIDURAL INJECTION OF STEROID Bilateral 10/14/2020    Procedure: INJECTION, STEROID, EPIDURAL, TRANSFORAMINAL APPROACH, L4-L5 need consent;  Surgeon: Obdulio Leon MD;  Location: [facility], [place], operated by [facility] PAIN [facility]GT;  Service: Pain Management;  Laterality: Bilateral;    TRANSFORAMINAL EPIDURAL INJECTION OF STEROID Left  "04/14/2021    Procedure: INJECTION, STEROID, EPIDURAL, TRANSFORAMINAL APPROACH  left L3/4 and L4/5;  Surgeon: Obdulio Leon MD;  Location: [facility];  Service: Pain Management;  Laterality: Left;  consent needed       Family History   Problem Relation Name Age of Onset    Cataracts Son      Glaucoma Son      No Known Problems Mother      No Known Problems Father      Macular degeneration Neg Hx           Review of Systems:  ROS:  Constitutional: no fever or chills  Eyes: no visual changes  ENT: no nasal congestion or sore throat  Respiratory: no cough or shortness of breath  Cardiovascular: no chest pain or palpitations  Gastrointestinal: no nausea or vomiting, tolerating diet  Genitourinary: no hematuria or dysuria  Integument/Breast: no rash or pruritis  Hematologic/Lymphatic: no easy bruising or lymphadenopathy  Musculoskeletal: no arthralgias or myalgias  Neurological: no seizures or tremors  Behavioral/Psych: no auditory or visual hallucinations  Endocrine: no heat or cold intolerance      OBJECTIVE:     PHYSICAL EXAM:  Vital Signs (Most Recent)  There were no vitals filed for this visit.     ,   Estimated body mass index is 31.45 kg/m² as calculated from the following:    Height as of 5/21/24: 5' 2" (1.575 m).    Weight as of 5/21/24: 78 kg (171 lb 15.3 oz).   General Appearance: Well nourished, well developed, in no acute distress.  HENT: Normal cephalic, oropharynx pink and moist  Eyes: PERRLA bilaterally and EOM x 4  Respiratory: Even and unlabored  Skin: Warm and Dry.   Psychiatric: AAO x 4, Mood & affect are normal.    bilateral  Knee Exam:  Knee Range of Motion:normal   Effusion:none  Condition of skin:intact  Location of tenderness:Medial joint line and Lateral joint line   Strength:4 of 5 quadriceps strength and 4 of 5 hamstring strength  Stability:  stable to testing, Lachman: stable, LCL: stable, MCL: stable, and PCL: stable  Varus /Valgus stress:  normal  Kerrie:   " negative    RADIOGRAPHS:  Bilateral knee x-rays were obtained, findings shows no acute fracture.  She has tricompartment osteoarthritis to a moderate to severe degree on the right and mild-to-moderate degree on the left.  All radiographs were personally reviewed by me.    ASSESSMENT/PLAN:       ICD-10-CM ICD-9-CM   1. Bilateral primary osteoarthritis of knee  M17.0 715.16       -Tamiko Youssef presents to clinic today with c/c bilateral knee pain for the past several years no trauma.  -X-ray as above.  -Recommend RICE therapy.    While discussing treatment options with the patient she received a phone call indicating that her ride could no longer wait for her and she needed to leave.  I offered the patient injections which she would like to obtain but have to reschedule for another date and time.  In the meantime recommend that she continue using the lidocaine rub.  She may also try Aspercreme with lidocaine and/or Tylenol p.r.n..  She has an allergy to NSAIDs and therefore can not use.    I advised the patient to continue working with her home exercise program that she was previously given for knee conditioning.    Advised the patient to follow up in the clinic p.r.n..

## 2024-06-06 ENCOUNTER — HOSPITAL ENCOUNTER (OUTPATIENT)
Dept: RADIOLOGY | Facility: OTHER | Age: 84
Discharge: HOME | End: 2024-06-06
Attending: NURSE PRACTITIONER
Payer: MEDICARE

## 2024-06-06 DIAGNOSIS — M54.16 LUMBAR RADICULOPATHY, CHRONIC: ICD-10-CM

## 2024-06-06 PROCEDURE — 72114 X-RAY EXAM L-S SPINE BENDING: CPT | Mod: 26,,, | Performed by: RADIOLOGY

## 2024-06-06 PROCEDURE — 72148 MRI LUMBAR SPINE W/O DYE: CPT | Mod: TC

## 2024-06-06 PROCEDURE — 72114 X-RAY EXAM L-S SPINE BENDING: CPT | Mod: TC,FY

## 2024-06-06 PROCEDURE — 72148 MRI LUMBAR SPINE W/O DYE: CPT | Mod: 26,,, | Performed by: RADIOLOGY

## 2024-06-13 ENCOUNTER — TELEPHONE (OUTPATIENT)
Dept: SPINE | Facility: CLINIC | Age: 84
End: 2024-06-13
Payer: MEDICARE

## 2024-06-17 ENCOUNTER — OFFICE VISIT (OUTPATIENT)
Dept: SPINE | Facility: CLINIC | Age: 84
End: 2024-06-17
Attending: ANESTHESIOLOGY
Payer: MEDICARE

## 2024-06-17 VITALS
SYSTOLIC BLOOD PRESSURE: 139 MMHG | RESPIRATION RATE: 18 BRPM | WEIGHT: 171.94 LBS | HEIGHT: 62 IN | BODY MASS INDEX: 31.64 KG/M2 | DIASTOLIC BLOOD PRESSURE: 63 MMHG | HEART RATE: 85 BPM | OXYGEN SATURATION: 100 %

## 2024-06-17 DIAGNOSIS — M17.0 BILATERAL PRIMARY OSTEOARTHRITIS OF KNEE: ICD-10-CM

## 2024-06-17 DIAGNOSIS — M51.36 DDD (DEGENERATIVE DISC DISEASE), LUMBAR: ICD-10-CM

## 2024-06-17 DIAGNOSIS — M46.1 SACROILIITIS: ICD-10-CM

## 2024-06-17 DIAGNOSIS — M54.17 LUMBOSACRAL RADICULOPATHY: Primary | ICD-10-CM

## 2024-06-17 DIAGNOSIS — G89.4 CHRONIC PAIN SYNDROME: ICD-10-CM

## 2024-06-17 DIAGNOSIS — M47.816 LUMBAR SPONDYLOSIS: ICD-10-CM

## 2024-06-17 DIAGNOSIS — M47.819 SPONDYLOSIS WITHOUT MYELOPATHY: ICD-10-CM

## 2024-06-17 DIAGNOSIS — M54.16 LUMBAR RADICULOPATHY, CHRONIC: ICD-10-CM

## 2024-06-17 DIAGNOSIS — M47.896 OTHER OSTEOARTHRITIS OF SPINE, LUMBAR REGION: ICD-10-CM

## 2024-06-17 DIAGNOSIS — M53.3 SACROILIAC JOINT PAIN: ICD-10-CM

## 2024-06-17 PROCEDURE — 3078F DIAST BP <80 MM HG: CPT | Mod: CPTII,S$GLB,, | Performed by: ANESTHESIOLOGY

## 2024-06-17 PROCEDURE — 1160F RVW MEDS BY RX/DR IN RCRD: CPT | Mod: CPTII,S$GLB,, | Performed by: ANESTHESIOLOGY

## 2024-06-17 PROCEDURE — 1101F PT FALLS ASSESS-DOCD LE1/YR: CPT | Mod: CPTII,S$GLB,, | Performed by: ANESTHESIOLOGY

## 2024-06-17 PROCEDURE — 99214 OFFICE O/P EST MOD 30 MIN: CPT | Mod: GC,S$GLB,, | Performed by: ANESTHESIOLOGY

## 2024-06-17 PROCEDURE — 3075F SYST BP GE 130 - 139MM HG: CPT | Mod: CPTII,S$GLB,, | Performed by: ANESTHESIOLOGY

## 2024-06-17 PROCEDURE — 1125F AMNT PAIN NOTED PAIN PRSNT: CPT | Mod: CPTII,S$GLB,, | Performed by: ANESTHESIOLOGY

## 2024-06-17 PROCEDURE — 1159F MED LIST DOCD IN RCRD: CPT | Mod: CPTII,S$GLB,, | Performed by: ANESTHESIOLOGY

## 2024-06-17 PROCEDURE — 99999 PR PBB SHADOW E&M-EST. PATIENT-LVL V: CPT | Mod: PBBFAC,,, | Performed by: ANESTHESIOLOGY

## 2024-06-17 PROCEDURE — 3288F FALL RISK ASSESSMENT DOCD: CPT | Mod: CPTII,S$GLB,, | Performed by: ANESTHESIOLOGY

## 2024-06-17 RX ORDER — METHOCARBAMOL 500 MG/1
500 TABLET, FILM COATED ORAL 3 TIMES DAILY
Qty: 90 TABLET | Refills: 0 | Status: SHIPPED | OUTPATIENT
Start: 2024-06-17 | End: 2024-07-17

## 2024-06-17 RX ORDER — HYDROCODONE BITARTRATE AND ACETAMINOPHEN 5; 325 MG/1; MG/1
1 TABLET ORAL EVERY 12 HOURS PRN
Qty: 60 TABLET | Refills: 0 | Status: SHIPPED | OUTPATIENT
Start: 2024-06-21 | End: 2024-07-21

## 2024-06-17 RX ORDER — CELECOXIB 100 MG/1
100 CAPSULE ORAL 2 TIMES DAILY
Qty: 60 CAPSULE | Refills: 0 | Status: SHIPPED | OUTPATIENT
Start: 2024-06-17 | End: 2024-07-17

## 2024-06-17 NOTE — PROGRESS NOTES
Chronic patient Established Note (Follow up visit)      SUBJECTIVE:    Pain Disability Index Review:      5/21/2024     2:50 PM 12/6/2023     1:14 PM 6/30/2023     1:39 PM   Last 3 PDI Scores   Pain Disability Index (PDI) 40 42 30     Interval History (6/17/2024):  Tamiko Youssef presents to the clinic for a follow-up appointment for low back pain. Patient continues to endorse sharp, stabbing pain that radiates to her bilateral lower extremities with associated numbness.  Since the last visit, Tamiko Youssef states the pain has been worsening. Pain exacerbated by sitting or standing for extended periods of time, ambulation, extension, flexion, lifting. Pain improved with rest, HEP, medications. Current medication includes Norco 5-325mg PRN, Gabapentin 300mg TID, Tizanadine.  Current pain intensity is 8/10. Patient denies red flags including weakness, unexpected weight loss/gain, night sweats/fevers, saddle anesthesia, and symptoms of ELITO.    Interval History 5/21/2024:  The patient returns to clinic today for follow up of back pain. She is s/p bilateral SI joint injections on 5/1/2024. She reports limited relief of her pain. She continues to report low back pain. She reports radiating pain into the posterior aspect of both legs to the bottom of her feet. Her pain is constant. Her pain is worse with activity. She is taking Gabapentin and Zanaflex. She is using Voltaren gel. She also takes Norco as needed with benefit. She denies any adverse effects. She is out of this medication. She denies any other health changes. Her pain today is 8/10.     Interval History 3/7/2024:  The patient returns to clinic today for follow up of back pain. She reports low back and buttock pain. She denies any radicular leg pain. Her pain is worse with sitting and walking. She also notes increased pain with turning over in bed. She is taking Gabapentin twice a day. She is taking Zanaflex at bedtime. She also takes Norco as needed with  benefit and without adverse effects. She asks about a TENs unit today. She denies any other health changes. Her pain today is 8/10.     Interval history 12/06/2023:  82-year-old female that presents today for a follow-up she has a history of chronic low back pain.  Aching and throbbing in nature she does report that it is tolerable at times.  Currently on chronic opioid therapy that consists of Norco 5-325 b.i.d. which she reports provides her with 50-60% relief more and improvement in her functionality.  She is also taking tizanidine 2 mg q.h.s. to and muscular pain she also reports spasms.  She denies any adverse side effects with these medications.      Interval History 6/30/2023:  The patient returns to clinic today for follow up of back pain. She is s/p bilateral SI joint injections on 6/7/2023. She reports 50% relief of her pain. She continues to report low back pain. She describes this pain as aching in nature. Her pain is tolerable at this time. She is taking Gabapentin at the lower dose with benefit. She continues to take Norco as needed with benefit and without adverse effects. Of note, she states that she does not feel good today. She feels dizzy and weak today. She also reports stomach pain. She denies any fever, nausea or vomiting. Her pain today is 8/10.     Interval History 5/4/23:  Patient returns to clinic today for follow-up of chronic low back and bilateral knee pain.  She was taking Norco 5-325 mg twice daily which provided good relief for her.  Her prescription was refilled following her visit on 3/27/2023; however, the prescription was sent to the wrong pharmacy, and she was not able to have filled.  She feels that the gabapentin 600 mg 3 times daily is over sedating for her, and she wishes to return to 300 mg 3 times daily.  Today, she reports pain in her low back that radiates into her buttocks. She had bilateral SI joint injections in May 2022 which she feels gave her good relief.  She would  like to repeat these if possible.  She is also having bilateral knee pain.  She had corticosteroid injections 4-5 years ago in her knees from an outside doctor that gave her good relief. She is interested in repeating these as well.      Interval History 3/27/2023  Mrs Youssef presents for follow up of chronic pain complaints. She was started on Norco 5/325mg prior visit. She states this was beneficial but did not return or call to request refills over interval. She is  requesting refill as this did provide relief and improved functioning.     Interval History 11/14/2022:  Tamiko Youssef presents to the clinic for a follow-up appointment for chronic low back pain. Since the last visit, Tamiko Youssef states the pain has been worsening. Current pain intensity is 6/10. Pain is localized to the low back with radiating into the right leg. Radiating pain is predominately in the bilateral calves. Also with localized pain to right knee. Reports she has had steroid injections in the right knee in the past with good benefit. Most recently 3 years ago. Patient ambulates with a Rolator and reports most recent fall about 6 months ago when she fell on her right knee. Did not seek MD care at that time.   No new onset weakness, new onset sensation changes, saddle anesthesia, or bowel/bladder changes.     Interval History 10/3/2022:  The patient returns to clinic today for follow up of low back pain. She continues to report low back pain that radiates into the posterior aspect both legs to her ankles. Her pain is worse with activity. She continues to take Gabapentin with some benefit. She did try Lyrica but this caused a rash. She is not interested in further procedures at this time. She asks about Norco today, as this has helped her pain in the past. She denies any other health changes. Her pain today is 8/10.     Interval History 9/1/2022:  The patient returns to clinic today for follow up of low back pain. She continues to report  low back pain that radiates into the posterior aspect of both legs to her ankles. Her pain is worse with standing and walking. She also reports increased right knee pain. She is currently taking Gabapentin with limited relief. She also reports that this makes her sleepy. She is not interested in further procedures at this time. She denies any other health changes. Her pain today is 8/10.     Interval History 6/8/2022:  The patient returns to clinic today for follow up of low back pain. She is s/p bilateral SI joint injections on 5/25/2022. She reports no relief. She continues to report low back pain that radiates into her buttocks into the posterior aspect of both legs to her ankles. She also reports radiates pain into the anterior aspect of her left leg. She is not interested in further procedures at this time. She reports increased bilateral knee and ankle pain. She would like to establish care with an Orthopedic. She also reports that her PCP has left. She would like to establish care here at [facility]. She continues to take Gabapentin. She denies any other health changes. Her pain today is 6/10.     Interval History 4/20/2022:  The patient returns to clinic today for follow up of low back pain. She has not been seen in over a year. She reports that previous SYLVIA in 4/2021 provided no relief. She continues to report low back pain that radiates into both hips and buttocks. She reports intermittent radiating pain into the posterior aspect of her left leg to under her foot. Her pain is worse with prolonged sitting and standing. She continues to take Gabapentin with limited relief. She denies any other health changes. Her pain today is 8/10.     Interval History 1/14/2021:  The patient returns to clinic today for follow up of low back pain. She continues to report low back pain that radiates into the lateral aspect of left leg to her ankle. She denies any right leg pain. She did not start Lyrica given at last visit due  to concern for side effects. She has not started physical therapy yet. She continues to take Gabapentin and Zanaflex. She denies any other health changes. Her pain today is 6/10.     Interval history 12/10/2020:  Returns for follow up of her low back pain. She continues to report low back pain that radiates into the lateral aspect of her left leg to her ankle. Reports that gabapentin and Zanaflex are not helping with pain. Her pain is worse with standing, walking, and activity. Her pain today is 5/10.     Interval History 11/13/2020:  The patient returns to clinic today for follow up of back pain. She reports limited relief with previous SYLVIA. She continues to report low back pain that radiates into the lateral aspect of her left leg to her ankle. She denies any right leg pain today. Her pain is worse with standing, walking, and activity. She is frustrated with her continued pain. She did increase Gabapentin to BID. She is unsure of relief at this time. She does take Rumsey from her PCP. She states that her PCP would like our office to take over this medication. She asks for a refill. She denies any other health changes. Her pain today is 2/10.     Interval History 10/30/2020:  The patient returns to clinic today via audio visit for follow up of back pain. She is s/p bilateral L4/5 TF SYLVIA on 10/14/2020. She reports limited relief of her pain. She reports increased low back pain that radiates down the lateral aspect of her left leg to her ankle. She denies any right leg pain today. Her pain is worse with prolonged standing, walking, and activity. She denies any weakness. She is currently taking Gabapentin once a day. She denies any other health changes.      Interval History 7/24/2020:  The patient returns to clinic today for follow up of low back pain. She reports increased pain over the last two months. She reports low back pain that radiates down the lateral aspect of both legs to her knees, left greater than right.  Her pain is worse with standing and walking. She continues to take Gabapentin. She denies any other health changes. Her pain today is 9/10.     Interval History 1/28/2020:  Tamiko Youssef presents to the clinic for a follow-up appointment for lower back pain. She is s/p bilateral L4/5 TF SYLVIA on 1/8/2020. She reports 80% relief of his low back and leg pain. She continues to report low back pain that is aching in nature. She denies any radiating leg pain today. Her pain is worse with prolonged standing and walking. She continues to take Gabapentin with benefit. She continues to perform a home exercise routine. She denies any other health changes. Her pain today is 8/10.        Pain Disability Index Review:       5/21/2024     2:50 PM 12/6/2023     1:14 PM 6/30/2023     1:39 PM   Last 3 PDI Scores   Pain Disability Index (PDI) 40 42 30         Pain Medications:  Gabapentin      Opioid Contract: yes      report:  Reviewed and consistent with medication use as prescribed.     Pain Procedures:   3/28/14, 3/13/13 Bilateral L4 TF SYLVIA  8/22/18 Bilateral L4 TF SYLVIA- 75% relief  1/8/2020- Bilateral L4/5 TF SYLVIA - 80% relief   10/14/2020- Bilateral L4/5 TF SYLVIA  6/7/2023- Bilateral SI joint injections- 50% relief     Physical Therapy/Home Exercise: no     Imaging:   X-Ray Lumbar Complete Including Flex And Ext  Order: [id number]  Status: Final result       Visible to patient: No (inaccessible in Patient Portal)       Next appt: 06/18/2024 at 11:15 AM in Ophthalmology ([facility])       Dx: Lumbar radiculopathy, chronic    0 Result Notes  Details    Reading Physician Reading Date Result Priority   Kina Keller MD  [phone] 6/6/2024 Routine     Narrative & Impression  EXAMINATION:  XR LUMBAR SPINE 5 VIEW WITH FLEX AND EXT     CLINICAL HISTORY:  Radiculopathy, lumbar region     TECHNIQUE:  AP, lateral, and oblique images are performed through the lumbar spine.     COMPARISON:  03/10/2021      FINDINGS:  Lumbar vertebral body heights are maintained.     Disc space narrowing and endplate changes most pronounced L5-S1.  Facet arthropathy L3-4 through L5-S1.     Grade 1 anterolisthesis L3-4 and L4-5 with no significant change with flexion or extension.     Impression:     Degenerative change as above.     MRI Lumbar Spine Without Contrast  Order: [id number]  Status: Final result       Visible to patient: No (inaccessible in Patient Portal)       Next appt: 06/18/2024 at 11:15 AM in Ophthalmology ([facility])       Dx: Lumbar radiculopathy, chronic    0 Result Notes  Details    Reading Physician Reading Date Result Priority   Kina Keller MD  [phone] 6/6/2024 Routine     Narrative & Impression  EXAMINATION:  MRI LUMBAR SPINE WITHOUT CONTRAST     CLINICAL HISTORY:  Lumbar radiculopathy, symptoms persist with conservative treatment; Radiculopathy, lumbar region     TECHNIQUE:  Multiplanar, multisequence MR images were acquired from the thoracolumbar junction to the sacrum without the administration of contrast.     COMPARISON:  11/18/2020     FINDINGS:  The marrow demonstrates homogeneous signal.  Vertebral body heights are maintained.  T11 and T12 vertebra are fused.     Disc space narrowing and endplate changes most pronounced L1-2.  Modic 1 changes at this level.     Slight grade 1 anterolisthesis L4-5.     Conus terminates appropriately at L1-2.     Multilevel degenerative change as diesel below:     T12-L1: Posterior circumferential disc bulge.  Severe right and moderate left neural foraminal narrowing.     L1-2: No focal disc abnormality.  No significant canal or neural foraminal narrowing.     L2-3: Facet arthropathy.  No significant canal or neural foraminal narrowing.     L3-4: Facet arthropathy with thickening of the ligamentum flavum.  No central canal or neural foraminal narrowing.     L4-5: Small posterior circumferential disc bulge.  Facet arthropathy with a small  effusion on the right.  Thickening of the ligamentum flavum.  No significant canal or neural foraminal narrowing.     L5-S1: Small posterior circumferential disc bulge.  Facet arthropathy, left greater than right.  No significant canal or neural foraminal narrowing.     Foci of T2 signal hyperintensity in both kidneys have the appearance of cysts, similar compared to abdominal CT June 2023.     Impression:     Multilevel degenerative change, predominantly on the basis of facet arthropathy.  Central canal and neural foramina well maintained at lumbar levels.  Neural foraminal narrowing T12-L1.       MRI Lumbar Spine 11/18/2020:  COMPARISON:  11/18/2020     FINDINGS:  Lumbar spine alignment is within normal limits. Vertebral body heights preserved.  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     The conus is normal in appearance.  The adjacent soft tissue structures show no significant abnormalities.     L1-L2: No evidence of a disc herniation.No significant central canal or neural foraminal narrowing.     L2-L3: There is no focal disc herniation. No central canal or foraminal stenosis.  Prominent facet arthritis.     L3-L4: There is some broad-based disc bulging and superimposed spondylitic spurring which is asymmetric to the left.  Prominent facet arthritis is noted.  No central canal or foraminal stenosis.     L4-L5: Broad-based disc bulging and facet arthritis present.  No significant central canal or neural foraminal narrowing.     L5-S1: Broad-based disc bulging and facet arthritis are present.  No focal disc herniation.  No significant central canal or neural foraminal narrowing.     Impression:     Multilevel nonstenotic degenerative change mostly affecting the posterior elements.     Xray Lumbar Spine 11/18/2020:  COMPARISON:  Multiple priors, most recent 08/09/2018     FINDINGS:  Levoscoliosis of the lumbar spine.  Grade 1 anterolisthesis of L3 on  L4, L4 on L5, and L5 on S1 similar to prior.  No acute, displaced fracture.  Vertebral body heights are maintained.  No evidence of dynamic instability.  Multilevel lumbar facet arthrosis.  No aggressive osseous abnormality.     Impression:     Mild multilevel lumbar spondylosis with unchanged grade 1 anterolisthesis of L3 on L4 through L5 on S1.     Xray Hips 11/18/2020:  COMPARISON:  Multiple priors, most recent 08/09/2018     FINDINGS:  Sacroiliac joints are symmetric.  Pubic symphysis is not widened.  The femoral heads are well seated within the acetabula.  Degenerative changes of the lower lumbar spine.  Mild joint space narrowing, subchondral sclerosis and minimal osteophytosis of the hip joints.  No acute, displaced fracture.  No aggressive osseous abnormality.     Impression:     No acute osseous abnormality.       Allergies:   Review of patient's allergies indicates:   Allergen Reactions    Mobic [meloxicam] Rash    Tramadol     Naprosyn [naproxen]      GI upset       Current Medications:   Current Outpatient Medications   Medication Sig Dispense Refill    albuterol (PROVENTIL) 2.5 mg /3 mL (0.083 %) nebulizer solution Take 2.5 mg by nebulization every 6 (six) hours as needed.      albuterol (PROVENTIL) 2.5 mg /3 mL (0.083 %) nebulizer solution Inhale 2.5 mg into the lungs.      albuterol (PROVENTIL/VENTOLIN HFA) 90 mcg/actuation inhaler Inhale TWO to FOUR puffs by mouth FOUR times daily when needed.      albuterol (PROVENTIL/VENTOLIN HFA) 90 mcg/actuation inhaler Inhale 2 puffs into the lungs.      albuterol-ipratropium (DUO-NEB) 2.5 mg-0.5 mg/3 mL nebulizer solution SMARTSIG:3 Milliliter(s) Via Nebulizer Every 6 Hours PRN      ALPRAZolam (XANAX) 0.25 MG tablet TK 1 T PO QD PRA 30 tablet 0    amLODIPine (NORVASC) 10 MG tablet       benzonatate (TESSALON) 200 MG capsule Take 1 capsule (200 mg total) by mouth 3 (three) times daily as needed for Cough. 30 capsule 1    ciclopirox (PENLAC) 8 % Soln Apply  topically.      diclofenac sodium (VOLTAREN) 1 % Gel Apply 2 g topically 4 (four) times daily. 1 each 2    dicyclomine (BENTYL) 10 MG capsule Take 1 capsule (10 mg total) by mouth as needed (abdominal cramping). 30 capsule 0    erythromycin (ROMYCIN) ophthalmic ointment Place into the right eye every evening. 1 each 0    fexofenadine (ALLEGRA) 180 MG tablet Take 1 tablet by mouth once daily.      fluticasone propionate (FLONASE) 50 mcg/actuation nasal spray 2 sprays in each nostril every evening. 16 g 1    fluticasone-salmeterol diskus inhaler 250-50 mcg Inhale 1 puff into the lungs.      fluticasone-salmeterol diskus inhaler 250-50 mcg Take 1 puff by mouth 2 (two) times daily.      furosemide (LASIX) 20 MG tablet TK 1 T PO QD PRF FLUID  2    gabapentin (NEURONTIN) 300 MG capsule TAKE 1 CAPSULE BY MOUTH 3 TIMES  DAILY 90 capsule 11    guaiFENesin-codeine 100-10 mg/5 ml (TUSSI-ORGANIDIN NR)  mg/5 mL syrup Take 5 mLs by mouth.      latanoprost 0.005 % ophthalmic solution Place 1 drop into both eyes every evening. 3 Bottle 3    mirtazapine (REMERON) 30 MG tablet Take 30 mg by mouth nightly.  3    olopatadine (PATANOL) 0.1 % ophthalmic solution Place 1 drop into both eyes 2 (two) times daily. 5 mL 0    omeprazole (PRILOSEC) 20 MG capsule Take by mouth.      polyethylene glycol (GLYCOLAX) 17 gram/dose powder Take 17 g by mouth daily as needed (Constipation). 1700 g 1    pravastatin (PRAVACHOL) 20 MG tablet TAKE 1 TABLET BY MOUTH IN THE  EVENING 90 tablet 3    walker Misc 1 application by Misc.(Non-Drug; Combo Route) route once daily. 1 each 0    celecoxib (CELEBREX) 100 MG capsule Take 1 capsule (100 mg total) by mouth 2 (two) times daily. 60 capsule 0    [START ON 6/21/2024] HYDROcodone-acetaminophen (NORCO) 5-325 mg per tablet Take 1 tablet by mouth every 12 (twelve) hours as needed for Pain. 60 tablet 0    methocarbamoL (ROBAXIN) 500 MG Tab Take 1 tablet (500 mg total) by mouth 3 (three) times daily. 90 tablet 0      No current facility-administered medications for this visit.       REVIEW OF SYSTEMS:    GENERAL:  No weight loss, malaise or fevers.  HEENT:  Negative for frequent or significant headaches.  NECK:  Negative for lumps, goiter, pain and significant neck swelling.  RESPIRATORY:  Negative for cough, wheezing or shortness of breath.  CARDIOVASCULAR:  Negative for chest pain, leg swelling or palpitations.  GI:  Negative for abdominal discomfort, blood in stools or black stools or change in bowel habits.  MUSCULOSKELETAL:  See HPI.  SKIN:  Negative for lesions, rash, and itching.  PSYCH:  + for sleep disturbance, mood disorder and recent psychosocial stressors.  HEMATOLOGY/LYMPHOLOGY:  Negative for prolonged bleeding, bruising easily or swollen nodes.  NEURO:   No history of headaches, syncope, paralysis, seizures or tremors.  All other reviewed and negative other than HPI.    Past Medical History:  Past Medical History:   Diagnosis Date    Asthma     Cataract     Glaucoma     Hypertension        Past Surgical History:  Past Surgical History:   Procedure Laterality Date    CATARACT EXTRACTION W/  INTRAOCULAR LENS IMPLANT Bilateral     CHOLECYSTECTOMY      COLONOSCOPY N/A 12/5/2022    Procedure: COLONOSCOPY;  Surgeon: Daniel Mckeon MD;  Location: [address]);  Service: Endoscopy;  Laterality: N/A;  fully vaccinated - sm  extended constipation mirilax prep   instructions mailed - sm    CYST REMOVAL      on neck    ESOPHAGOGASTRODUODENOSCOPY N/A 12/5/2022    Procedure: EGD (ESOPHAGOGASTRODUODENOSCOPY);  Surgeon: Daniel Mckeon MD;  Location: [address]);  Service: Endoscopy;  Laterality: N/A;    EYE SURGERY      HYSTERECTOMY      2/2 AUB, partial    INJECTION OF JOINT Bilateral 05/25/2022    Procedure: INJECTION, JOINT, BILATERAL SACROILIAC (SI);  Surgeon: Obdulio Leon MD;  Location: [facility];  Service: Pain Management;  Laterality: Bilateral;    INJECTION OF JOINT Bilateral 5/1/2024     "Procedure: INJECTION, JOINT BILATERAL SI;  Surgeon: Obdulio Leon MD;  Location: [facility];  Service: Pain Management;  Laterality: Bilateral;  [phone]  2 WK F/U AUGUSTINE    INJECTION, SACROILIAC JOINT Bilateral 6/7/2023    Procedure: INJECTION,SACROILIAC JOINT, BILATERAL;  Surgeon: Obdulio Leon MD;  Location: [facility];  Service: Pain Management;  Laterality: Bilateral;    TRANSFORAMINAL EPIDURAL INJECTION OF STEROID Bilateral 01/08/2020    Procedure: INJECTION, STEROID, EPIDURAL, TRANSFORAMINAL APPROACH;  Surgeon: Obdulio Leon MD;  Location: [facility];  Service: Pain Management;  Laterality: Bilateral;  B/L TFESI L4    TRANSFORAMINAL EPIDURAL INJECTION OF STEROID Bilateral 10/14/2020    Procedure: INJECTION, STEROID, EPIDURAL, TRANSFORAMINAL APPROACH, L4-L5 need consent;  Surgeon: Obdulio Leon MD;  Location: [facility];  Service: Pain Management;  Laterality: Bilateral;    TRANSFORAMINAL EPIDURAL INJECTION OF STEROID Left 04/14/2021    Procedure: INJECTION, STEROID, EPIDURAL, TRANSFORAMINAL APPROACH  left L3/4 and L4/5;  Surgeon: Obdulio Leon MD;  Location: [facility];  Service: Pain Management;  Laterality: Left;  consent needed       Family History:  Family History   Problem Relation Name Age of Onset    Cataracts Son      Glaucoma Son      No Known Problems Mother      No Known Problems Father      Macular degeneration Neg Hx         Social History:  Social History     Socioeconomic History    Marital status: Single   Tobacco Use    Smoking status: Never     Passive exposure: Never    Smokeless tobacco: Never   Substance and Sexual Activity    Alcohol use: No    Drug use: Never       OBJECTIVE:    /63 (BP Location: Right arm, Patient Position: Sitting, BP Method: Medium (Automatic))   Pulse 85   Resp 18   Ht 5' 2" (1.575 m)   Wt 78 kg (171 lb 15.3 oz)   SpO2 100%   BMI 31.45 kg/m²     PHYSICAL EXAMINATION:    General appearance: Well appearing, in no " acute distress, alert and oriented x3.  Psych:  Mood and affect appropriate.  Skin: Skin color, texture, turgor normal, no rashes or lesions, in both upper and lower body.  Head/face:  Atraumatic, normocephalic. No palpable lymph nodes  Neck: No pain to palpation over the cervical paraspinous muscles. Spurling Negative. No pain with neck flexion, extension, or lateral flexion. .  Cor: RRR  Pulm: CTA  GI: Abdomen soft and non-tender.  Back: Straight leg raising in the sitting and supine positions is positive to radicular pain. Mild pain to palpation over the lumbosacral spine and lumbar paraspinal muscles. Decreased range of motion pain reproduction on flexion and extension. Positive axial loading test bilateral. Positive tenderness over both SIJ. Negative FABERE and FADIR  Extremities: Peripheral joint ROM is full and pain free without obvious instability or laxity in all four extremities. No deformities, edema, or skin discoloration. Good capillary refill.  Musculoskeletal: Shoulder, hip, and knee provocative maneuvers are negative. Bilateral upper and lower extremity strength is normal and symmetric.  No atrophy or tone abnormalities are noted.  Neuro: Bilateral upper and lower extremity coordination and muscle stretch reflexes are physiologic and symmetric.  Plantar response are downgoing. No loss of sensation is noted.  Gait: Antalgic.    ASSESSMENT: 83 y.o. year old female with low back  pain, consistent with:     1. Lumbosacral radiculopathy  Procedure Request Order for Pain Management    X-Ray Hips Bilateral 2 View Incl AP Pelvis      2. DDD (degenerative disc disease), lumbar  Procedure Request Order for Pain Management    X-Ray Hips Bilateral 2 View Incl AP Pelvis      3. Chronic pain syndrome        4. Other osteoarthritis of spine, lumbar region        5. Spondylosis without myelopathy        6. Sacroiliitis            PLAN:   - Previous records and imaging reviewed  - Update imaging: Xray BL Hips  including pelvis  - We discussed back pain and the nature of back pain.  We discussed that it is not one thing that causes the pain but an accumulation of multiple things that we do.    - We discussed posture sitting and the importance of trying to sit better.    - We discussed the benefits of therapy and exercise and continuing to move.   - Continue HEP  - Consider return to formal Physical Therapy/Healthy Back Program for core strengthening, lumbar stabilization, and further development of Home Exercise Program  - Schedule for Bilateral TFESI at L4/5  - Refill Norco on 6/21/24  -Rx celebrex 100 mg bid  -Rx roboxin 500 mg tid   - Patient can continue with medications for now since they are providing benefits, using them appropriately, and without side effects.  - F/u with PCP for renal cysts  - RTC 3-4 weeks after procedure or sooner for follow up  - Counseled patient regarding the importance of activity modification, constant sleeping habits, and physical therapy.    The above plan and management options were discussed at length with patient. Patient is in agreement with the above and verbalized understanding.    Tevin Rehman M.D.  PGY-5  Interventional Pain Management Fellow  [facility]  Pager: [phone]     I have personally reviewed the history and exam of this patient and agree with the resident/fellow/NPs note as stated above.    Obdulio Leon MD    06/17/2024

## 2024-06-18 ENCOUNTER — CLINICAL SUPPORT (OUTPATIENT)
Dept: OPHTHALMOLOGY | Facility: CLINIC | Age: 84
End: 2024-06-18
Payer: MEDICARE

## 2024-06-18 ENCOUNTER — OFFICE VISIT (OUTPATIENT)
Dept: OPHTHALMOLOGY | Facility: CLINIC | Age: 84
End: 2024-06-18
Payer: MEDICARE

## 2024-06-18 DIAGNOSIS — H35.3132 INTERMEDIATE STAGE NONEXUDATIVE AGE-RELATED MACULAR DEGENERATION OF BOTH EYES: ICD-10-CM

## 2024-06-18 DIAGNOSIS — H40.1122 PRIMARY OPEN ANGLE GLAUCOMA (POAG) OF LEFT EYE, MODERATE STAGE: ICD-10-CM

## 2024-06-18 DIAGNOSIS — H40.1111 PRIMARY OPEN ANGLE GLAUCOMA (POAG) OF RIGHT EYE, MILD STAGE: Primary | ICD-10-CM

## 2024-06-18 DIAGNOSIS — H04.123 DRY EYE SYNDROME OF BOTH EYES: ICD-10-CM

## 2024-06-18 DIAGNOSIS — Z96.1 PSEUDOPHAKIA OF BOTH EYES: ICD-10-CM

## 2024-06-18 PROCEDURE — 92020 GONIOSCOPY: CPT | Mod: S$GLB,,, | Performed by: STUDENT IN AN ORGANIZED HEALTH CARE EDUCATION/TRAINING PROGRAM

## 2024-06-18 PROCEDURE — 1126F AMNT PAIN NOTED NONE PRSNT: CPT | Mod: CPTII,S$GLB,, | Performed by: STUDENT IN AN ORGANIZED HEALTH CARE EDUCATION/TRAINING PROGRAM

## 2024-06-18 PROCEDURE — 92083 EXTENDED VISUAL FIELD XM: CPT | Mod: S$GLB,,, | Performed by: STUDENT IN AN ORGANIZED HEALTH CARE EDUCATION/TRAINING PROGRAM

## 2024-06-18 PROCEDURE — G2211 COMPLEX E/M VISIT ADD ON: HCPCS | Mod: S$GLB,,, | Performed by: STUDENT IN AN ORGANIZED HEALTH CARE EDUCATION/TRAINING PROGRAM

## 2024-06-18 PROCEDURE — 76514 ECHO EXAM OF EYE THICKNESS: CPT | Mod: S$GLB,,, | Performed by: STUDENT IN AN ORGANIZED HEALTH CARE EDUCATION/TRAINING PROGRAM

## 2024-06-18 PROCEDURE — 1159F MED LIST DOCD IN RCRD: CPT | Mod: CPTII,S$GLB,, | Performed by: STUDENT IN AN ORGANIZED HEALTH CARE EDUCATION/TRAINING PROGRAM

## 2024-06-18 PROCEDURE — 3288F FALL RISK ASSESSMENT DOCD: CPT | Mod: CPTII,S$GLB,, | Performed by: STUDENT IN AN ORGANIZED HEALTH CARE EDUCATION/TRAINING PROGRAM

## 2024-06-18 PROCEDURE — 1101F PT FALLS ASSESS-DOCD LE1/YR: CPT | Mod: CPTII,S$GLB,, | Performed by: STUDENT IN AN ORGANIZED HEALTH CARE EDUCATION/TRAINING PROGRAM

## 2024-06-18 PROCEDURE — 92133 CPTRZD OPH DX IMG PST SGM ON: CPT | Mod: S$GLB,,, | Performed by: STUDENT IN AN ORGANIZED HEALTH CARE EDUCATION/TRAINING PROGRAM

## 2024-06-18 PROCEDURE — 99999 PR PBB SHADOW E&M-EST. PATIENT-LVL I: CPT | Mod: PBBFAC,,, | Performed by: STUDENT IN AN ORGANIZED HEALTH CARE EDUCATION/TRAINING PROGRAM

## 2024-06-18 PROCEDURE — 99204 OFFICE O/P NEW MOD 45 MIN: CPT | Mod: S$GLB,,, | Performed by: STUDENT IN AN ORGANIZED HEALTH CARE EDUCATION/TRAINING PROGRAM

## 2024-06-18 NOTE — PROGRESS NOTES
HVF/OCT rel/fix coop good OU/chart checked for latex allergy/-0.50 + 0.50 x 177 OD -0.25 + 0.50 x 161 OS - BJ

## 2024-06-18 NOTE — PROGRESS NOTES
Subjective:  HPI    Chief complaint: 83 y.o. female presents for glaucoma evaluation, referred   by Dr. Blanco. Patient states she was never diagnosed with glaucoma but   has been on latanoprost for about 10 years. Denies worsening of vision   over time. Complains of occasional sharp pain OD x 1-2 months. Has   floaters OU. Denies flashes and headaches.    Past medical history? HTN   Past ocular history?    Primary Open Angle Glaucoma, mild OU   Pseudophakia OU    Glaucoma history: latanoprost use for 10+ years  Diagnosed with glaucoma when? Was never told she has glaucoma   Hx eye surgery? CEIOL OU 20+ years ago  Hx eye lasers? none  History of low blood pressure? no  History of migraines? no  History of blunt trauma to eye? no  History of steroid use? Albuterol INH 10+ years   Family history of glaucoma? Son, became blind  What is the highest your eye pressure has been? unknown    Eye drops?   Latanoprost qhs OU  Refresh PRN OU    Last edited by Valerie Torrez on 6/18/2024  1:26 PM.        Exam:  See encounter report for full exam    Assessment:  1. Primary open angle glaucoma (POAG) of right eye, mild stage  2. Primary open angle glaucoma (POAG) of left eye, moderate stage  - Synopsis: Dr. Blanco referral. Previous patient of Dr. Lizarraga.  - Surg hx: phaco/IOL OU   - Laser hx: none  - Glaucoma FHx: son (blindness)  -  / 530  - Gonio:  OU (Coulon 6/2024)  - Tmax: 17/17 here on Latanoprost  - Target IOP: teens OU  - Med adverse effects: n/a    Baseline HVF 5/2024  Baseline RNFL 5/2024  Baseline photos pending    06/18/2024  IOP 14/16 on Xal qhs OU  HVF: poorly reliable, central depressed points, VFI 99 OD  reliable, central depressed poitns SN, VFI 93 OS -- baseline #2  OCT RNFL: ST>IT thinning, avg 78 OD  ST/IT thinning, avg 63 OS -- baseline    3. Intermediate stage nonexudative age-related macular degeneration of both eyes  - Amsler grid  - Retina PRN    4. Pseudophakia of both eyes  -  Monitor    5. Dry eye syndrome of both eyes  - Continue PFATs 6x/day OU    Plan:  Worse thinning OS with slightly higher IOP OS. Rec SLT.  - Continue Latanoprost qhs OU  - Continue PFATs 6x/day OU    Today's visit is associated with current and anticipated ongoing medical care related to this patient's single serious/complex condition (glaucoma). Follow up is to be continued indefinitely to monitor the condition.    Return SLT OS July    Liz Arshad MD  [facility], Gl[facility]

## 2024-07-02 DIAGNOSIS — G89.4 CHRONIC PAIN SYNDROME: ICD-10-CM

## 2024-07-02 RX ORDER — TIZANIDINE 4 MG/1
4 TABLET ORAL NIGHTLY PRN
Qty: 90 TABLET | Refills: 0 | Status: SHIPPED | OUTPATIENT
Start: 2024-07-02

## 2024-07-10 ENCOUNTER — HOSPITAL ENCOUNTER (OUTPATIENT)
Dept: RADIOLOGY | Facility: OTHER | Age: 84
Discharge: HOME | End: 2024-07-10
Attending: NURSE PRACTITIONER
Payer: MEDICARE

## 2024-07-10 DIAGNOSIS — M54.17 LUMBOSACRAL RADICULOPATHY: Primary | ICD-10-CM

## 2024-07-10 DIAGNOSIS — M54.17 LUMBOSACRAL RADICULOPATHY: ICD-10-CM

## 2024-07-10 PROCEDURE — 73521 X-RAY EXAM HIPS BI 2 VIEWS: CPT | Mod: 26,,, | Performed by: RADIOLOGY

## 2024-07-10 PROCEDURE — 73521 X-RAY EXAM HIPS BI 2 VIEWS: CPT | Mod: TC,FY

## 2024-07-16 DIAGNOSIS — H40.1131 PRIMARY OPEN ANGLE GLAUCOMA (POAG) OF BOTH EYES, MILD STAGE: ICD-10-CM

## 2024-07-16 RX ORDER — LATANOPROST 50 UG/ML
1 SOLUTION/ DROPS OPHTHALMIC NIGHTLY
Qty: 7.5 ML | Refills: 3 | Status: SHIPPED | OUTPATIENT
Start: 2024-07-16

## 2024-07-16 RX ORDER — LATANOPROST 50 UG/ML
1 SOLUTION/ DROPS OPHTHALMIC NIGHTLY
Qty: 7.5 ML | Refills: 3 | Status: SHIPPED | OUTPATIENT
Start: 2024-07-16 | End: 2024-07-16 | Stop reason: SDUPTHER

## 2024-07-17 ENCOUNTER — HOSPITAL ENCOUNTER (OUTPATIENT)
Facility: OTHER | Age: 84
Discharge: HOME | End: 2024-07-17
Attending: ANESTHESIOLOGY | Admitting: ANESTHESIOLOGY
Payer: MEDICARE

## 2024-07-17 VITALS
HEIGHT: 62 IN | DIASTOLIC BLOOD PRESSURE: 61 MMHG | RESPIRATION RATE: 18 BRPM | TEMPERATURE: 99 F | HEART RATE: 86 BPM | SYSTOLIC BLOOD PRESSURE: 136 MMHG | OXYGEN SATURATION: 94 % | WEIGHT: 178 LBS | BODY MASS INDEX: 32.76 KG/M2

## 2024-07-17 DIAGNOSIS — M54.16 LUMBAR RADICULOPATHY: Primary | ICD-10-CM

## 2024-07-17 DIAGNOSIS — G89.29 CHRONIC PAIN: ICD-10-CM

## 2024-07-17 PROCEDURE — 64483 NJX AA&/STRD TFRM EPI L/S 1: CPT | Mod: 50,,, | Performed by: ANESTHESIOLOGY

## 2024-07-17 PROCEDURE — 63600175 PHARM REV CODE 636 W HCPCS: Performed by: ANESTHESIOLOGY

## 2024-07-17 PROCEDURE — 64483 NJX AA&/STRD TFRM EPI L/S 1: CPT | Mod: 50 | Performed by: ANESTHESIOLOGY

## 2024-07-17 PROCEDURE — 25500020 PHARM REV CODE 255: Performed by: ANESTHESIOLOGY

## 2024-07-17 PROCEDURE — 25000003 PHARM REV CODE 250: Performed by: STUDENT IN AN ORGANIZED HEALTH CARE EDUCATION/TRAINING PROGRAM

## 2024-07-17 PROCEDURE — 25000003 PHARM REV CODE 250: Performed by: ANESTHESIOLOGY

## 2024-07-17 RX ORDER — LIDOCAINE HYDROCHLORIDE 20 MG/ML
INJECTION, SOLUTION INFILTRATION; PERINEURAL
Status: DISCONTINUED | OUTPATIENT
Start: 2024-07-17 | End: 2024-07-17 | Stop reason: HOSPADM

## 2024-07-17 RX ORDER — FENTANYL CITRATE 50 UG/ML
INJECTION, SOLUTION INTRAMUSCULAR; INTRAVENOUS
Status: DISCONTINUED | OUTPATIENT
Start: 2024-07-17 | End: 2024-07-17 | Stop reason: HOSPADM

## 2024-07-17 RX ORDER — LIDOCAINE HYDROCHLORIDE 10 MG/ML
INJECTION, SOLUTION EPIDURAL; INFILTRATION; INTRACAUDAL; PERINEURAL
Status: DISCONTINUED | OUTPATIENT
Start: 2024-07-17 | End: 2024-07-17 | Stop reason: HOSPADM

## 2024-07-17 RX ORDER — SODIUM CHLORIDE 9 MG/ML
INJECTION, SOLUTION INTRAVENOUS CONTINUOUS
Status: DISCONTINUED | OUTPATIENT
Start: 2024-07-17 | End: 2024-07-17 | Stop reason: HOSPADM

## 2024-07-17 RX ORDER — DEXAMETHASONE SODIUM PHOSPHATE 10 MG/ML
INJECTION INTRAMUSCULAR; INTRAVENOUS
Status: DISCONTINUED | OUTPATIENT
Start: 2024-07-17 | End: 2024-07-17 | Stop reason: HOSPADM

## 2024-07-17 RX ORDER — MIDAZOLAM HYDROCHLORIDE 1 MG/ML
INJECTION INTRAMUSCULAR; INTRAVENOUS
Status: DISCONTINUED | OUTPATIENT
Start: 2024-07-17 | End: 2024-07-17 | Stop reason: HOSPADM

## 2024-07-17 NOTE — DISCHARGE SUMMARY
Discharge Note  Short Stay      SUMMARY     Admit Date: 7/17/2024    Attending Physician: Obdulio Leon MD    Discharge Physician: Obdulio Leon MD      Discharge Date: 7/17/2024 10:48 AM    Procedure(s) (LRB):  LUMBAR TRANSFORAMINAL BILATERAL L4/5 (Bilateral)    Final Diagnosis: Lumbosacral radiculopathy [M54.17]    Disposition: Home or self care    Patient Instructions:   Current Discharge Medication List        CONTINUE these medications which have NOT CHANGED    Details   !! albuterol (PROVENTIL) 2.5 mg /3 mL (0.083 %) nebulizer solution Take 2.5 mg by nebulization every 6 (six) hours as needed.      !! albuterol (PROVENTIL) 2.5 mg /3 mL (0.083 %) nebulizer solution Inhale 2.5 mg into the lungs.      !! albuterol (PROVENTIL/VENTOLIN HFA) 90 mcg/actuation inhaler Inhale TWO to FOUR puffs by mouth FOUR times daily when needed.      !! albuterol (PROVENTIL/VENTOLIN HFA) 90 mcg/actuation inhaler Inhale 2 puffs into the lungs.      albuterol-ipratropium (DUO-NEB) 2.5 mg-0.5 mg/3 mL nebulizer solution SMARTSIG:3 Milliliter(s) Via Nebulizer Every 6 Hours PRN      ALPRAZolam (XANAX) 0.25 MG tablet TK 1 T PO QD PRA  Qty: 30 tablet, Refills: 0    Associated Diagnoses: Anxiety and depression      amLODIPine (NORVASC) 10 MG tablet     Comments: .      benzonatate (TESSALON) 200 MG capsule Take 1 capsule (200 mg total) by mouth 3 (three) times daily as needed for Cough.  Qty: 30 capsule, Refills: 1      celecoxib (CELEBREX) 100 MG capsule Take 1 capsule (100 mg total) by mouth 2 (two) times daily.  Qty: 60 capsule, Refills: 0      ciclopirox (PENLAC) 8 % Soln Apply topically.      diclofenac sodium (VOLTAREN) 1 % Gel Apply 2 g topically 4 (four) times daily.  Qty: 1 each, Refills: 2    Associated Diagnoses: Chronic pain syndrome; Bilateral primary osteoarthritis of knee      dicyclomine (BENTYL) 10 MG capsule Take 1 capsule (10 mg total) by mouth as needed (abdominal cramping).  Qty: 30 capsule, Refills: 0     Associated Diagnoses: Cramp, abdominal      erythromycin (ROMYCIN) ophthalmic ointment Place into the right eye every evening.  Qty: 1 each, Refills: 0    Associated Diagnoses: Abrasion of right cornea, initial encounter      fexofenadine (ALLEGRA) 180 MG tablet Take 1 tablet by mouth once daily.      fluticasone propionate (FLONASE) 50 mcg/actuation nasal spray 2 sprays in each nostril every evening.  Qty: 16 g, Refills: 1      !! fluticasone-salmeterol diskus inhaler 250-50 mcg Inhale 1 puff into the lungs.      !! fluticasone-salmeterol diskus inhaler 250-50 mcg Take 1 puff by mouth 2 (two) times daily.      furosemide (LASIX) 20 MG tablet TK 1 T PO QD PRF FLUID  Refills: 2      gabapentin (NEURONTIN) 300 MG capsule TAKE 1 CAPSULE BY MOUTH 3 TIMES  DAILY  Qty: 90 capsule, Refills: 11    Comments: Requesting 1 year supply      guaiFENesin-codeine 100-10 mg/5 ml (TUSSI-ORGANIDIN NR)  mg/5 mL syrup Take 5 mLs by mouth.      HYDROcodone-acetaminophen (NORCO) 5-325 mg per tablet Take 1 tablet by mouth every 12 (twelve) hours as needed for Pain.  Qty: 60 tablet, Refills: 0    Comments: Quantity prescribed more than 7 day supply? Yes, quantity medically necessary  Associated Diagnoses: Chronic pain syndrome; Lumbar radiculopathy, chronic; Lumbar spondylosis; DDD (degenerative disc disease), lumbar; Sacroiliac joint pain; Bilateral primary osteoarthritis of knee      latanoprost 0.005 % ophthalmic solution Place 1 drop into both eyes every evening.  Qty: 7.5 mL, Refills: 3    Associated Diagnoses: Primary open angle glaucoma (POAG) of both eyes, mild stage      methocarbamoL (ROBAXIN) 500 MG Tab Take 1 tablet (500 mg total) by mouth 3 (three) times daily.  Qty: 90 tablet, Refills: 0      mirtazapine (REMERON) 30 MG tablet Take 30 mg by mouth nightly.  Refills: 3      olopatadine (PATANOL) 0.1 % ophthalmic solution Place 1 drop into both eyes 2 (two) times daily.  Qty: 5 mL, Refills: 0    Associated Diagnoses:  Irritation of both eyes      omeprazole (PRILOSEC) 20 MG capsule Take by mouth.      polyethylene glycol (GLYCOLAX) 17 gram/dose powder Take 17 g by mouth daily as needed (Constipation).  Qty: 1700 g, Refills: 1    Associated Diagnoses: Constipation, unspecified constipation type      pravastatin (PRAVACHOL) 20 MG tablet TAKE 1 TABLET BY MOUTH IN THE  EVENING  Qty: 90 tablet, Refills: 3    Comments: Please send a replace/new response with 100-Day Supply if appropriate to maximize member benefit. Requesting 1 year supply.  Associated Diagnoses: Hyperlipidemia, unspecified hyperlipidemia type      tiZANidine (ZANAFLEX) 4 MG tablet TAKE 1 TABLET BY MOUTH AT NIGHT  AS NEEDED  Qty: 90 tablet, Refills: 0    Comments: Please send a replace/new response with 100-Day Supply if appropriate to maximize member benefit. Requesting 1 year supply.  Associated Diagnoses: Chronic pain syndrome      walker Misc 1 application by Misc.(Non-Drug; Combo Route) route once daily.  Qty: 1 each, Refills: 0    Comments: lumbosacral neuritis wheelchair walker with a seat       !! - Potential duplicate medications found. Please discuss with provider.              Discharge Diagnosis: Lumbosacral radiculopathy [M54.17]  Condition on Discharge: Stable with no complications to procedure   Diet on Discharge: Same as before.  Activity: as per instruction sheet.  Discharge to: Home with a responsible adult.  Follow up: 2-4 weeks       Please call my office or pager at [phone] if experienced any weakness or loss of sensation, fever > 101.5, pain uncontrolled with oral medications, persistent nausea/vomiting/or diarrhea, redness or drainage from the incisions, or any other worrisome concerns. If physician on call was not reached or could not communicate with our office for any reason please go to the nearest emergency department     Esteban Franklin M.D.  PGY-5  Interventional Pain Management Fellow  [facility]  Pager: [phone]  [phone]

## 2024-07-17 NOTE — DISCHARGE INSTRUCTIONS

## 2024-07-17 NOTE — OP NOTE
Lumbar Transforaminal Epidural Steroid Injection under Fluoroscopic Guidance    The procedure, risks, benefits, and options were discussed with the patient. There are no contraindications to the procedure. The patent expressed understanding and agreed to the procedure. Informed written consent was obtained prior to the start of the procedure and can be found in the patient's chart.    PATIENT NAME: Tamiko Youssef   MRN: [id number]     DATE OF PROCEDURE: 07/17/2024    PROCEDURE:  Bilateral  L4/5 Lumbar Transforaminal Epidural Steroid Injection under Fluoroscopic Guidance    PRE-OP DIAGNOSIS: Lumbosacral radiculopathy [M54.17] Lumbar radiculopathy [M54.16]    POST-OP DIAGNOSIS: Same    PHYSICIAN: Obdulio Leon MD    ASSISTANTS: Esteban Franklin MD  [facility]       MEDICATIONS INJECTED: Preservative-free Decadron 10mg with 5cc of Lidocaine 1% MPF     LOCAL ANESTHETIC INJECTED: Xylocaine 2%     SEDATION: Versed 2mg and Fentanyl 100mcg                                                                                                                                                                                     Conscious sedation ordered by M.D. Patient re-evaluation prior to administration of conscious sedation. No changes noted in patient's status from initial evaluation. The patient's vital signs were monitored by RN and patient remained hemodynamically stable throughout the procedure.    Event Time In   Sedation Start 1056   Sedation End 1101       ESTIMATED BLOOD LOSS: None    COMPLICATIONS: None    TECHNIQUE: Time-out was performed to identify the patient and procedure to be performed. With the patient laying in a prone position, the surgical area was prepped and draped in the usual sterile fashion using ChloraPrep and a fenestrated drape.The levels were determined under fluoroscopy guidance. Skin anesthesia was achieved by injecting Lidocaine 2% over the injection sites. The transforaminal spaces were then  approached with a 22 gauge, 5 inch spinal quinke needle that was introduced under fluoroscopic guidance in the AP and Lateral views. Once the needle tip was in the area of the transforaminal space, and there was no blood, CSF or paraesthesias, contrast dye Omnipaque (300mg/mL) was injected to confirm placement and there was no vascular runoff. Fluoroscopic imaging in the AP and lateral views revealed a clear outline of the spinal nerve with proximal spread of agent through the neural foramen into the epidural space. 3 mL of the medication mixture listed above was injected slowly at each site. Displacement of the radio opaque contrast after injection of the medication confirmed that the medication went into the area of the transforaminal spaces. The needles were removed and bleeding was nil. A sterile dressing was applied. No specimens collected. The patient tolerated the procedure well.       The patient was monitored after the procedure in the recovery area. They were given post-procedure and discharge instructions to follow at home. The patient was discharged in a stable condition.    Esteban Franklin MD    I reviewed and edited the fellow's note. I conducted my own interview and physical examination. I agree with the findings. I was present and supervising all critical portions of the procedure.  Obdulio Leon MD

## 2024-07-23 ENCOUNTER — OFFICE VISIT (OUTPATIENT)
Dept: OPHTHALMOLOGY | Facility: CLINIC | Age: 84
End: 2024-07-23
Payer: MEDICARE

## 2024-07-23 DIAGNOSIS — H40.1122 PRIMARY OPEN ANGLE GLAUCOMA (POAG) OF LEFT EYE, MODERATE STAGE: Primary | ICD-10-CM

## 2024-07-23 DIAGNOSIS — H40.1111 PRIMARY OPEN ANGLE GLAUCOMA (POAG) OF RIGHT EYE, MILD STAGE: ICD-10-CM

## 2024-07-23 PROCEDURE — 99999 PR PBB SHADOW E&M-EST. PATIENT-LVL III: CPT | Mod: PBBFAC,,, | Performed by: STUDENT IN AN ORGANIZED HEALTH CARE EDUCATION/TRAINING PROGRAM

## 2024-07-23 PROCEDURE — 99499 UNLISTED E&M SERVICE: CPT | Mod: S$GLB,,, | Performed by: STUDENT IN AN ORGANIZED HEALTH CARE EDUCATION/TRAINING PROGRAM

## 2024-07-23 PROCEDURE — 65855 TRABECULOPLASTY LASER SURG: CPT | Mod: LT,S$GLB,, | Performed by: STUDENT IN AN ORGANIZED HEALTH CARE EDUCATION/TRAINING PROGRAM

## 2024-07-23 NOTE — PROGRESS NOTES
Subjective:  HPI    DLS: 6/18/24 w/ Dr. Arshad    83 y.o. female presents for SLT OS today. Patient denies changes to VA,   headaches, and flashes. Has floaters OU. Complains of occasional mild,   sharp pain OU, comes and goes.     Meds:  Latanoprost qhs OU  PF AT's QID OU  Last edited by Valerie Torrez on 7/23/2024  9:14 AM.        Exam:  See encounter report for full exam    Assessment:  1. Primary open angle glaucoma (POAG) of right eye, mild stage  2. Primary open angle glaucoma (POAG) of left eye, moderate stage  - Synopsis: Dr. Blanco referral. Previous patient of Dr. Lizarraga.  - Surg hx: phaco/IOL OU   - Laser hx: none  - Glaucoma FHx: son (blindness)  -  / 530  - Gonio:  OU (Jeancarlos 6/2024)  - Tmax: 17/17 here on Latanoprost  - Target IOP: teens OU  - Med adverse effects: n/a    Baseline HVF 5/2024  Baseline RNFL 5/2024  Baseline photos pending    Last:  HVF: poorly reliable, central depressed points, VFI 99 OD  reliable, central depressed poitns SN, VFI 93 OS -- baseline #2  OCT RNFL: ST>IT thinning, avg 78 OD  ST/IT thinning, avg 63 OS -- baseline    07/23/2024  SLT OS completed today without complication    3. Intermediate stage nonexudative age-related macular degeneration of both eyes  - Amsler grid  - Retina PRN    4. Pseudophakia of both eyes  - Monitor    5. Dry eye syndrome of both eyes  - Continue PFATs 6x/day OU    Plan:  Worse thinning OS with slightly higher IOP OS. Rec SLT.    Today: SLT OS completed today without complication  - Start Acular 0/4 x 1 week then stop  - Continue Latanoprost qhs OU  - Continue PFATs 6x/day OU    Today's visit is associated with current and anticipated ongoing medical care related to this patient's single serious/complex condition (glaucoma). Follow up is to be continued indefinitely to monitor the condition.    Return 6 weeks -- VA, IOP    Liz Arshad MD  [facility], Gl[facility]

## 2024-08-06 ENCOUNTER — HOSPITAL ENCOUNTER (OUTPATIENT)
Dept: RADIOLOGY | Facility: OTHER | Age: 84
Discharge: HOME | End: 2024-08-06
Attending: NURSE PRACTITIONER
Payer: MEDICARE

## 2024-08-06 ENCOUNTER — HOSPITAL ENCOUNTER (OUTPATIENT)
Dept: RADIOLOGY | Facility: OTHER | Age: 84
Discharge: HOME | End: 2024-08-06
Attending: ANESTHESIOLOGY
Payer: MEDICARE

## 2024-08-06 DIAGNOSIS — Z12.31 BREAST CANCER SCREENING BY MAMMOGRAM: ICD-10-CM

## 2024-08-06 DIAGNOSIS — M54.17 LUMBOSACRAL RADICULOPATHY: ICD-10-CM

## 2024-08-06 DIAGNOSIS — M51.36 DDD (DEGENERATIVE DISC DISEASE), LUMBAR: ICD-10-CM

## 2024-08-06 PROCEDURE — 73521 X-RAY EXAM HIPS BI 2 VIEWS: CPT | Mod: 26,,, | Performed by: RADIOLOGY

## 2024-08-06 PROCEDURE — 73521 X-RAY EXAM HIPS BI 2 VIEWS: CPT | Mod: TC,FY

## 2024-08-06 PROCEDURE — 77067 SCR MAMMO BI INCL CAD: CPT | Mod: TC

## 2024-09-04 ENCOUNTER — OFFICE VISIT (OUTPATIENT)
Dept: OPHTHALMOLOGY | Facility: CLINIC | Age: 84
End: 2024-09-04
Payer: MEDICARE

## 2024-09-04 DIAGNOSIS — Z96.1 PSEUDOPHAKIA OF BOTH EYES: ICD-10-CM

## 2024-09-04 DIAGNOSIS — H40.1131 PRIMARY OPEN ANGLE GLAUCOMA (POAG) OF BOTH EYES, MILD STAGE: Primary | ICD-10-CM

## 2024-09-04 DIAGNOSIS — H04.123 DRY EYE SYNDROME OF BOTH EYES: ICD-10-CM

## 2024-09-04 DIAGNOSIS — H40.1122 PRIMARY OPEN ANGLE GLAUCOMA (POAG) OF LEFT EYE, MODERATE STAGE: ICD-10-CM

## 2024-09-04 DIAGNOSIS — H35.3132 INTERMEDIATE STAGE NONEXUDATIVE AGE-RELATED MACULAR DEGENERATION OF BOTH EYES: ICD-10-CM

## 2024-09-04 PROCEDURE — G2211 COMPLEX E/M VISIT ADD ON: HCPCS | Mod: S$GLB,,, | Performed by: STUDENT IN AN ORGANIZED HEALTH CARE EDUCATION/TRAINING PROGRAM

## 2024-09-04 PROCEDURE — 99214 OFFICE O/P EST MOD 30 MIN: CPT | Mod: S$GLB,,, | Performed by: STUDENT IN AN ORGANIZED HEALTH CARE EDUCATION/TRAINING PROGRAM

## 2024-09-04 PROCEDURE — 1101F PT FALLS ASSESS-DOCD LE1/YR: CPT | Mod: CPTII,S$GLB,, | Performed by: STUDENT IN AN ORGANIZED HEALTH CARE EDUCATION/TRAINING PROGRAM

## 2024-09-04 PROCEDURE — 1159F MED LIST DOCD IN RCRD: CPT | Mod: CPTII,S$GLB,, | Performed by: STUDENT IN AN ORGANIZED HEALTH CARE EDUCATION/TRAINING PROGRAM

## 2024-09-04 PROCEDURE — 1125F AMNT PAIN NOTED PAIN PRSNT: CPT | Mod: CPTII,S$GLB,, | Performed by: STUDENT IN AN ORGANIZED HEALTH CARE EDUCATION/TRAINING PROGRAM

## 2024-09-04 PROCEDURE — 3288F FALL RISK ASSESSMENT DOCD: CPT | Mod: CPTII,S$GLB,, | Performed by: STUDENT IN AN ORGANIZED HEALTH CARE EDUCATION/TRAINING PROGRAM

## 2024-09-04 PROCEDURE — 99999 PR PBB SHADOW E&M-EST. PATIENT-LVL III: CPT | Mod: PBBFAC,,, | Performed by: STUDENT IN AN ORGANIZED HEALTH CARE EDUCATION/TRAINING PROGRAM

## 2024-09-04 RX ORDER — LATANOPROST 50 UG/ML
1 SOLUTION/ DROPS OPHTHALMIC NIGHTLY
Qty: 7.5 ML | Refills: 3 | Status: SHIPPED | OUTPATIENT
Start: 2024-09-04

## 2024-09-04 NOTE — PROGRESS NOTES
Subjective:  HPI     Post-op Evaluation     Additional comments: Patient Tamiko Youssef is an 83 year old female.  S/p SLT laser OS: 07/23/2024           Comments    Pt here for 6 week SLT laser OS post-op visit. Pt states that she has been   having 8/10 eye pain OD since last visit. Pt denies any eye pain OS since   SLT laser. Pt states that she has been suffering from vertigo for the past   1 month.     DLS: 07/23/2024 with Dr. Arshad    Meds:  1. Latanoprost qhs OU  2. PF AT's 6 times daily OU          Last edited by Melanie Adams on 9/4/2024  9:24 AM.        Exam:  See encounter report for full exam    Assessment:  1. Primary open angle glaucoma (POAG) of right eye, mild stage  2. Primary open angle glaucoma (POAG) of left eye, moderate stage  - Synopsis: Dr. lBanco referral. Previous patient of Dr. Lizarraga.  - Surg hx: phaco/IOL OU   - Laser hx: SLT OS 7/23/24  - Glaucoma FHx: son (blindness)  -  / 530  - Gonio:  OU (Jeancarlos 6/2024)  - Tmax: 17/17 here on Latanoprost  - Target IOP: teens OU  - Med adverse effects: n/a    Baseline HVF 5/2024  Baseline RNFL 5/2024  Baseline photos pending    Last:  HVF: poorly reliable, central depressed points, VFI 99 OD  reliable, central depressed poitns SN, VFI 93 OS -- baseline #2  OCT RNFL: ST>IT thinning, avg 78 OD  ST/IT thinning, avg 63 OS -- baseline    09/04/2024  SLT OS completed 7/23/24 (16 -> 12)  Doing well    3. Intermediate stage nonexudative age-related macular degeneration of both eyes  - Amsler grid  - Retina PRN    4. Pseudophakia of both eyes  - Monitor    5. Dry eye syndrome of both eyes  - Continue PFATs 6x/day OU    Plan:  Worse thinning OS with slightly higher IOP OS. Rec SLT.    Today: SLT OS 16->12, doing well  - Continue Latanoprost qhs OU  - Increase to PFATs 6x/day OU    Today's visit is associated with current and anticipated ongoing medical care related to this patient's single serious/complex condition (glaucoma). Follow up is to  be continued indefinitely to monitor the condition.    Return 3 months -- OCT, VA, IOP, sooner PRN    Liz Arshad MD  [facility], Gl[facility]

## 2024-12-13 ENCOUNTER — TELEPHONE (OUTPATIENT)
Dept: PAIN MEDICINE | Facility: OTHER | Age: 84
End: 2024-12-13
Payer: MEDICARE

## 2024-12-13 NOTE — TELEPHONE ENCOUNTER
----- Message from Francesca sent at 12/13/2024  9:52 AM CST -----  Regarding: fax  Type:  Patient Returning Call      Name of who is calling:Nicole/[facility]        What is request in detail:Nicole is requesting a call back in regards to quality of care and medical records request fax sent on 12/10. Just wants to make sure the fax was received by office.        Can clinic reply by [facility]:no        What [facility]:[phone]

## 2024-12-30 ENCOUNTER — TELEPHONE (OUTPATIENT)
Dept: PAIN MEDICINE | Facility: CLINIC | Age: 84
End: 2024-12-30
Payer: MEDICARE

## 2024-12-30 NOTE — TELEPHONE ENCOUNTER
----- Message from Jyothi sent at 12/30/2024  1:26 PM CST -----  Regarding: Paperwork  [facility]            What is the request in detail:[facility] called because they sent over paperwork to be sent back for [place] of care service concern. They need a response. Fax to: [phone] Include case number  [id number]. If you can please call and confirm it was received and will be sent back.            Can the clinic reply by [facility]:No            What Number to Call Back if not in [facility]:Call back [phone] Ref case [id number].

## 2025-01-06 ENCOUNTER — TELEPHONE (OUTPATIENT)
Dept: PAIN MEDICINE | Facility: CLINIC | Age: 85
End: 2025-01-06
Payer: MEDICARE

## 2025-01-06 NOTE — TELEPHONE ENCOUNTER
----- Message from Wale sent at 1/6/2025  3:24 PM CST -----  Regarding:   Name of Who is Calling:  Laquita calling from [facility]          What is the request in detail: Please call Laquita she would like to speak with the  she stated about a letter and records. Laquita did not want to speak with medical records            Can the clinic reply by [facility]: No            What Number to Call Back if not in [facility]: [phone]

## 2025-01-09 ENCOUNTER — OFFICE VISIT (OUTPATIENT)
Dept: OPHTHALMOLOGY | Facility: CLINIC | Age: 85
End: 2025-01-09
Payer: MEDICARE

## 2025-01-09 DIAGNOSIS — H35.3132 INTERMEDIATE STAGE NONEXUDATIVE AGE-RELATED MACULAR DEGENERATION OF BOTH EYES: ICD-10-CM

## 2025-01-09 DIAGNOSIS — H04.123 DRY EYE SYNDROME OF BOTH EYES: ICD-10-CM

## 2025-01-09 DIAGNOSIS — H40.1131 PRIMARY OPEN ANGLE GLAUCOMA (POAG) OF BOTH EYES, MILD STAGE: Primary | ICD-10-CM

## 2025-01-09 DIAGNOSIS — H40.1122 PRIMARY OPEN ANGLE GLAUCOMA (POAG) OF LEFT EYE, MODERATE STAGE: ICD-10-CM

## 2025-01-09 DIAGNOSIS — Z96.1 PSEUDOPHAKIA OF BOTH EYES: ICD-10-CM

## 2025-01-09 PROCEDURE — 99999 PR PBB SHADOW E&M-EST. PATIENT-LVL III: CPT | Mod: PBBFAC,,, | Performed by: STUDENT IN AN ORGANIZED HEALTH CARE EDUCATION/TRAINING PROGRAM

## 2025-01-09 PROCEDURE — G2211 COMPLEX E/M VISIT ADD ON: HCPCS | Mod: S$GLB,,, | Performed by: STUDENT IN AN ORGANIZED HEALTH CARE EDUCATION/TRAINING PROGRAM

## 2025-01-09 PROCEDURE — 99214 OFFICE O/P EST MOD 30 MIN: CPT | Mod: S$GLB,,, | Performed by: STUDENT IN AN ORGANIZED HEALTH CARE EDUCATION/TRAINING PROGRAM

## 2025-01-09 PROCEDURE — 3288F FALL RISK ASSESSMENT DOCD: CPT | Mod: CPTII,S$GLB,, | Performed by: STUDENT IN AN ORGANIZED HEALTH CARE EDUCATION/TRAINING PROGRAM

## 2025-01-09 PROCEDURE — 1101F PT FALLS ASSESS-DOCD LE1/YR: CPT | Mod: CPTII,S$GLB,, | Performed by: STUDENT IN AN ORGANIZED HEALTH CARE EDUCATION/TRAINING PROGRAM

## 2025-01-09 PROCEDURE — 1159F MED LIST DOCD IN RCRD: CPT | Mod: CPTII,S$GLB,, | Performed by: STUDENT IN AN ORGANIZED HEALTH CARE EDUCATION/TRAINING PROGRAM

## 2025-01-09 PROCEDURE — 1126F AMNT PAIN NOTED NONE PRSNT: CPT | Mod: CPTII,S$GLB,, | Performed by: STUDENT IN AN ORGANIZED HEALTH CARE EDUCATION/TRAINING PROGRAM

## 2025-01-09 PROCEDURE — 92133 CPTRZD OPH DX IMG PST SGM ON: CPT | Mod: S$GLB,,, | Performed by: STUDENT IN AN ORGANIZED HEALTH CARE EDUCATION/TRAINING PROGRAM

## 2025-01-09 NOTE — PROGRESS NOTES
Subjective:  HPI    DLS 09/04/2024  POAG   PC IOL OU   S/P SLT OS       Latanoprost Q HS OU   PF artificial tears 4-6 times a day  Last edited by Hortencia Hamilton MA on 1/9/2025 10:59 AM.        Exam:  See encounter report for full exam    Assessment:  1. Primary open angle glaucoma (POAG) of right eye, mild stage  2. Primary open angle glaucoma (POAG) of left eye, moderate stage  - Synopsis: Dr. Blanco referral. Previous patient of Dr. Lizarraga.  - Surg hx: phaco/IOL OU   - Laser hx:   SLT OS 7/23/24 (16 -> 12)  - Glaucoma FHx: son (blindness)  -  / 530  - Gonio:  OU (Jeancarlos 6/2024)  - Tmax: 17/17 here on Latanoprost  - Target IOP: teens OU  - Med adverse effects: n/a    Baseline HVF 5/2024  Baseline RNFL 5/2024  Baseline photos pending    Last:  HVF: poorly reliable, central depressed points, VFI 99 OD  reliable, central depressed poitns SN, VFI 93 OS -- baseline #2  OCT RNFL: ST>IT thinning, avg 78 OD  ST/IT thinning, avg 63 OS -- baseline    01/09/2025  SLT OS completed 7/23/24  IOP at goal  OCT RNFL: ST>IT thinning, avg 76 OD  ST/IT thinning, avg 63 OS -- stable c/w 6/2024    3. Intermediate stage nonexudative age-related macular degeneration of both eyes  - Amsler grid  - Retina PRN    4. Pseudophakia of both eyes  - Monitor    5. Dry eye syndrome of both eyes  - Continue PFATs 6x/day OU    Plan:  Worse thinning OS with slightly higher IOP OS. Rec SLT.    Today: IOP at goal, testing stable, cpm  - Continue Latanoprost qhs OU  - Continue PFATs 6x/day OU    Today's visit is associated with current and anticipated ongoing medical care related to this patient's single serious/complex condition (glaucoma). Follow up is to be continued indefinitely to monitor the condition.    Return August -- OCT, VA, IOP    Liz Arshad MD  [facility]

## 2025-01-31 RX ORDER — GABAPENTIN 300 MG/1
CAPSULE ORAL
Qty: 90 CAPSULE | Refills: 6 | Status: SHIPPED | OUTPATIENT
Start: 2025-01-31

## 2025-02-18 ENCOUNTER — OFFICE VISIT (OUTPATIENT)
Dept: URGENT CARE | Facility: CLINIC | Age: 85
End: 2025-02-18
Payer: MEDICARE

## 2025-02-18 VITALS
OXYGEN SATURATION: 95 % | BODY MASS INDEX: 36.64 KG/M2 | SYSTOLIC BLOOD PRESSURE: 132 MMHG | TEMPERATURE: 99 F | HEIGHT: 59 IN | RESPIRATION RATE: 16 BRPM | HEART RATE: 99 BPM | DIASTOLIC BLOOD PRESSURE: 75 MMHG | WEIGHT: 181.75 LBS

## 2025-02-18 DIAGNOSIS — M54.2 NECK PAIN: Primary | ICD-10-CM

## 2025-02-18 DIAGNOSIS — M62.838 TRAPEZIUS MUSCLE SPASM: ICD-10-CM

## 2025-02-18 LAB
OHS QRS DURATION: 76 MS
OHS QTC CALCULATION: 438 MS

## 2025-02-18 RX ORDER — ACETAMINOPHEN 500 MG
1000 TABLET ORAL EVERY 8 HOURS PRN
Qty: 21 TABLET | Refills: 0 | Status: SHIPPED | OUTPATIENT
Start: 2025-02-18 | End: 2025-02-25

## 2025-02-18 RX ORDER — METHOCARBAMOL 500 MG/1
500 TABLET, FILM COATED ORAL 2 TIMES DAILY PRN
Qty: 10 TABLET | Refills: 0 | Status: SHIPPED | OUTPATIENT
Start: 2025-02-18 | End: 2025-02-23

## 2025-02-18 RX ORDER — PREDNISONE 20 MG/1
20 TABLET ORAL DAILY
Qty: 5 TABLET | Refills: 0 | Status: SHIPPED | OUTPATIENT
Start: 2025-02-18 | End: 2025-02-23

## 2025-02-18 NOTE — PATIENT INSTRUCTIONS
Do not take tizanidine while you are taking methocarbomol.     Go to the ER if you develop numbness, tingling, or inability to move the left upper extremity.

## 2025-02-18 NOTE — PROGRESS NOTES
"Subjective:      Patient ID: Tamiko Youssef is a 84 y.o. female.    Vitals:  height is 4' 11.45" (1.51 m) and weight is 82.5 kg (181 lb 12.3 oz). Her oral temperature is 98.7 °F (37.1 °C). Her blood pressure is 132/75 and her pulse is 99. Her respiration is 16 and oxygen saturation is 95%.     Chief Complaint: Neck Pain    84 y.o female presents to clinic c.o left neck and shoulder pain x 2 days. Pt states her left ear hurts as well. Pt states she was experiencing heaviness in her shoulder which goes down to her elbow. This has gone away over past couple of days. But the pain has continued. She denies any trauma. No rash.     Neck Pain   This is a new problem. The current episode started in the past 7 days. The problem has been gradually worsening. The pain is associated with an unknown factor. The pain is present in the left side. The quality of the pain is described as aching. The pain is at a severity of 6/10. The pain is mild. Pertinent negatives include no chest pain, fever, headaches, leg pain, numbness, pain with swallowing, paresis, photophobia, syncope, tingling, trouble swallowing, visual change, weakness or weight loss. She has tried nothing for the symptoms. The treatment provided no relief.       Constitution: Negative for fever.   HENT:  Negative for trouble swallowing.    Neck: Positive for neck pain.   Cardiovascular:  Negative for chest pain and passing out.   Eyes:  Negative for photophobia.   Neurological:  Negative for headaches and numbness.      Objective:     Physical Exam   Constitutional: She is oriented to person, place, and time.  Non-toxic appearance. She does not appear ill. No distress.   HENT:   Head: Normocephalic and atraumatic.   Ears:   Right Ear: External ear normal.   Left Ear: External ear normal.   Nose: Nose normal. No congestion.   Eyes: No visual field deficit is present.   Neck: Carotid bruit is not present.          Comments: There is focal tenderness in the area marked in " red with some spasm/knot.  No neck rigidity present.   Pulmonary/Chest: No respiratory distress.   Abdominal: Normal appearance.   Musculoskeletal:      Cervical back: She exhibits tenderness.   Neurological: She is alert and oriented to person, place, and time. She has normal motor skills and normal sensation. She displays no weakness, facial symmetry and no dysarthria. No cranial nerve deficit or sensory deficit. She exhibits normal muscle tone. Coordination normal. Gait (uses walker, but gait is uniform) abnormal.      Comments: There is no sensory or motor deficit of the left upper extremity.    Skin: Skin is warm, dry and not diaphoretic.   Psychiatric: Her behavior is normal. Mood, judgment and thought content normal.   Vitals reviewed.      EKG reviewed, NSR, no stemi,     Assessment:     1. Neck pain    2. Trapezius muscle spasm        Plan:       Neck pain  -     EKG 12-lead; Future    Trapezius muscle spasm  -     methocarbamoL (ROBAXIN) 500 MG Tab; Take 1 tablet (500 mg total) by mouth 2 (two) times daily as needed (spasm).  Dispense: 10 tablet; Refill: 0  -     predniSONE (DELTASONE) 20 MG tablet; Take 1 tablet (20 mg total) by mouth once daily. for 5 days  Dispense: 5 tablet; Refill: 0  -     acetaminophen (TYLENOL) 500 MG tablet; Take 2 tablets (1,000 mg total) by mouth every 8 (eight) hours as needed for Pain.  Dispense: 21 tablet; Refill: 0    I considered CVA, atypical presentation of MI, nerve impingement, trauma, muscular spasm. She has no trauma, no neurologic deficits on my exam. EKG is normal. Will treat for muscle spasm/strain.

## 2025-03-11 ENCOUNTER — OFFICE VISIT (OUTPATIENT)
Dept: URGENT CARE | Facility: CLINIC | Age: 85
End: 2025-03-11
Payer: MEDICARE

## 2025-03-11 VITALS
DIASTOLIC BLOOD PRESSURE: 73 MMHG | RESPIRATION RATE: 20 BRPM | OXYGEN SATURATION: 97 % | HEIGHT: 59 IN | TEMPERATURE: 98 F | HEART RATE: 103 BPM | BODY MASS INDEX: 36.67 KG/M2 | SYSTOLIC BLOOD PRESSURE: 144 MMHG | WEIGHT: 181.88 LBS

## 2025-03-11 DIAGNOSIS — M46.1 SACROILIITIS: Primary | ICD-10-CM

## 2025-03-11 DIAGNOSIS — M54.50 CHRONIC LEFT-SIDED LOW BACK PAIN, UNSPECIFIED WHETHER SCIATICA PRESENT: ICD-10-CM

## 2025-03-11 DIAGNOSIS — M54.50 ACUTE LEFT-SIDED LOW BACK PAIN, UNSPECIFIED WHETHER SCIATICA PRESENT: ICD-10-CM

## 2025-03-11 DIAGNOSIS — G89.29 CHRONIC LEFT-SIDED LOW BACK PAIN, UNSPECIFIED WHETHER SCIATICA PRESENT: ICD-10-CM

## 2025-03-11 PROCEDURE — 99213 OFFICE O/P EST LOW 20 MIN: CPT | Mod: S$GLB,,,

## 2025-03-11 RX ORDER — PREDNISONE 20 MG/1
40 TABLET ORAL DAILY
Qty: 6 TABLET | Refills: 0 | Status: SHIPPED | OUTPATIENT
Start: 2025-03-11 | End: 2025-03-14

## 2025-03-11 RX ORDER — METHOCARBAMOL 500 MG/1
500 TABLET, FILM COATED ORAL 4 TIMES DAILY
Qty: 40 TABLET | Refills: 0 | Status: SHIPPED | OUTPATIENT
Start: 2025-03-11 | End: 2025-03-21

## 2025-03-11 NOTE — PROGRESS NOTES
"Subjective:      Patient ID: Tamiko Youssef is a 84 y.o. female.    Vitals:  height is 4' 11" (1.499 m) and weight is 82.5 kg (181 lb 14.1 oz). Her oral temperature is 98.3 °F (36.8 °C). Her blood pressure is 144/73 (abnormal) and her pulse is 103. Her respiration is 20 and oxygen saturation is 97%.     Chief Complaint: Back Pain    84 year old female c/o back pain. Pt states having back lower(lumbar) back pain that started Sunday morning. Pt states the pain radiates from the back to the L hip and down the L leg. Pt took tylenol, last intake yesterday.     Provider note    83 yo F c/o chronic low back pain down the left side that flared up the past 2-3 days. Pain radiates down the right leg. Denies numbness and tingling. Hx of sacroiliitis. Followed by pain mgt, last injection July of last year states she never got any relief. She is looking for another pain mgt doc. Takes tylenol prn. Has not taken any since yesterday. She was seen here 2/18/25 for neck pain, given robaxin and prednisone which she reports did relieve the pain. States hydrocodone helps with the pain but she is out. Denies loss of bowel or bladder function. She ambulates with a walker at baseline.     Back Pain  This is a new problem. The current episode started in the past 7 days. The problem occurs constantly. The problem has been gradually worsening since onset. The pain is present in the lumbar spine. The quality of the pain is described as burning, aching and stabbing. Radiates to: L leg. The pain is at a severity of 8/10. The pain is severe. The pain is The same all the time. The symptoms are aggravated by bending, lying down, sitting, standing, twisting and position. Stiffness is present All day. Associated symptoms include leg pain. Pertinent negatives include no headaches, numbness, tingling or weakness. Risk factors include poor posture. Treatments tried: tylenol. The treatment provided mild relief.       Musculoskeletal:  Positive for pain " Impression: Type 2 diabetes mellitus w/ proliferative diabetic retinopathy w/o macular edema, bilateral: J70.4858. OU. Plan: OCT ordered and performed today. The clinical exam is consistent with proliferative diabetic retinopathy. Discussed diagnosis with patient. Recommend close observation at this time. Discussed risk of progression with the present condition. The patient was advised to maintain tight blood sugar control, blood pressure and lipid control. Patient agrees with plan. and back pain. Negative for trauma.   Neurological:  Negative for headaches, numbness and tingling.      Objective:     Physical Exam   Constitutional: She is oriented to person, place, and time.      Comments:Pt ambulates with walker     HENT:   Head: Normocephalic and atraumatic.   Eyes: Conjunctivae are normal.   Abdominal: Normal appearance.   Musculoskeletal:      Lumbar back: She exhibits tenderness.   Neurological: She is alert and oriented to person, place, and time.   Skin: Skin is warm and dry.       Assessment:     1. Sacroiliitis    2. Acute left-sided low back pain, unspecified whether sciatica present    3. Chronic left-sided low back pain, unspecified whether sciatica present        Plan:       Sacroiliitis  -     methocarbamoL (ROBAXIN) 500 MG Tab; Take 1 tablet (500 mg total) by mouth 4 (four) times daily. for 10 days  Dispense: 40 tablet; Refill: 0  -     predniSONE (DELTASONE) 20 MG tablet; Take 2 tablets (40 mg total) by mouth once daily. for 3 days  Dispense: 6 tablet; Refill: 0  -     Ambulatory referral/consult to Pain Clinic    Acute left-sided low back pain, unspecified whether sciatica present  -     methocarbamoL (ROBAXIN) 500 MG Tab; Take 1 tablet (500 mg total) by mouth 4 (four) times daily. for 10 days  Dispense: 40 tablet; Refill: 0  -     predniSONE (DELTASONE) 20 MG tablet; Take 2 tablets (40 mg total) by mouth once daily. for 3 days  Dispense: 6 tablet; Refill: 0  -     Ambulatory referral/consult to Pain Clinic    Chronic left-sided low back pain, unspecified whether sciatica present      Back Pain    - Icing a muscle strain is best for the first 24-48 hours after an injury. After that, alternating ice and low heat to areas of pain for 20 minutes at a time can help.    - Advised on Epson salt soaks, topical lidocaine and icy hot, gentle stretching.     - No heavy lifting.      - Resume activities as tolerated.  Bedrest is not advised.    - Go to the ER for any weakness or sensation  loss of the legs, or loss of bowel or bladder control.

## 2025-03-14 ENCOUNTER — TELEPHONE (OUTPATIENT)
Dept: PAIN MEDICINE | Facility: CLINIC | Age: 85
End: 2025-03-14
Payer: MEDICARE

## 2025-03-14 NOTE — TELEPHONE ENCOUNTER
A message was sent to our office to schedule pt appt with Dr. Heath. According to pt chart she is a pt of Dr. Leon. After speaking with Ms. Morrison she mentioned she no longer want to see Dr. Leon, and would like to change providers. When trying to schedule pt for an established extended appt with Dr. Heath, pt mentioned she will call our office when she's ready to schedule.

## 2025-05-10 ENCOUNTER — OFFICE VISIT (OUTPATIENT)
Dept: URGENT CARE | Facility: CLINIC | Age: 85
End: 2025-05-10
Payer: MEDICARE

## 2025-05-10 ENCOUNTER — OCHSNER VIRTUAL EMERGENCY DEPARTMENT (OUTPATIENT)
Facility: CLINIC | Age: 85
End: 2025-05-10
Payer: MEDICARE

## 2025-05-10 ENCOUNTER — PATIENT OUTREACH (OUTPATIENT)
Facility: OTHER | Age: 85
End: 2025-05-10
Payer: MEDICARE

## 2025-05-10 ENCOUNTER — HOSPITAL ENCOUNTER (EMERGENCY)
Facility: OTHER | Age: 85
Discharge: HOME | End: 2025-05-10
Attending: EMERGENCY MEDICINE
Payer: MEDICARE

## 2025-05-10 VITALS
BODY MASS INDEX: 32.94 KG/M2 | HEART RATE: 78 BPM | WEIGHT: 179 LBS | DIASTOLIC BLOOD PRESSURE: 82 MMHG | TEMPERATURE: 98 F | OXYGEN SATURATION: 100 % | SYSTOLIC BLOOD PRESSURE: 183 MMHG | HEIGHT: 62 IN | RESPIRATION RATE: 18 BRPM

## 2025-05-10 VITALS
SYSTOLIC BLOOD PRESSURE: 144 MMHG | DIASTOLIC BLOOD PRESSURE: 79 MMHG | TEMPERATURE: 98 F | WEIGHT: 179.44 LBS | RESPIRATION RATE: 24 BRPM | HEART RATE: 83 BPM | HEIGHT: 59 IN | OXYGEN SATURATION: 97 % | BODY MASS INDEX: 36.17 KG/M2

## 2025-05-10 DIAGNOSIS — R10.32 LLQ PAIN: Primary | ICD-10-CM

## 2025-05-10 DIAGNOSIS — R10.32 LEFT LOWER QUADRANT ABDOMINAL PAIN: Primary | ICD-10-CM

## 2025-05-10 DIAGNOSIS — M54.32 SCIATICA OF LEFT SIDE: Primary | ICD-10-CM

## 2025-05-10 LAB
ABSOLUTE EOSINOPHIL (OHS): 0.11 K/UL
ABSOLUTE MONOCYTE (OHS): 0.58 K/UL (ref 0.3–1)
ABSOLUTE NEUTROPHIL COUNT (OHS): 3.44 K/UL (ref 1.8–7.7)
ALBUMIN SERPL BCP-MCNC: 3.8 G/DL (ref 3.5–5.2)
ALP SERPL-CCNC: 100 UNIT/L (ref 40–150)
ALT SERPL W/O P-5'-P-CCNC: 15 UNIT/L (ref 10–44)
ANION GAP (OHS): 12 MMOL/L (ref 8–16)
AST SERPL-CCNC: 17 UNIT/L (ref 11–45)
BASOPHILS # BLD AUTO: 0.04 K/UL
BASOPHILS NFR BLD AUTO: 0.6 %
BILIRUB SERPL-MCNC: 0.4 MG/DL (ref 0.1–1)
BILIRUB UR QL STRIP.AUTO: NEGATIVE
BILIRUBIN, UA POC OHS: NEGATIVE
BLOOD, UA POC OHS: ABNORMAL
BUN SERPL-MCNC: 6 MG/DL (ref 8–23)
CALCIUM SERPL-MCNC: 9.4 MG/DL (ref 8.7–10.5)
CHLORIDE SERPL-SCNC: 108 MMOL/L (ref 95–110)
CLARITY UR: CLEAR
CLARITY, UA POC OHS: CLEAR
CO2 SERPL-SCNC: 21 MMOL/L (ref 23–29)
COLOR UR AUTO: YELLOW
COLOR, UA POC OHS: YELLOW
CREAT SERPL-MCNC: 0.7 MG/DL (ref 0.5–1.4)
ERYTHROCYTE [DISTWIDTH] IN BLOOD BY AUTOMATED COUNT: 14.2 % (ref 11.5–14.5)
GFR SERPLBLD CREATININE-BSD FMLA CKD-EPI: >60 ML/MIN/1.73/M2
GLUCOSE SERPL-MCNC: 99 MG/DL (ref 70–110)
GLUCOSE UR QL STRIP: NEGATIVE
GLUCOSE, UA POC OHS: NEGATIVE
HCT VFR BLD AUTO: 42.8 % (ref 37–48.5)
HGB BLD-MCNC: 13.8 GM/DL (ref 12–16)
HGB UR QL STRIP: NEGATIVE
HOLD SPECIMEN: NORMAL
IMM GRANULOCYTES # BLD AUTO: 0.03 K/UL (ref 0–0.04)
IMM GRANULOCYTES NFR BLD AUTO: 0.5 % (ref 0–0.5)
KETONES UR QL STRIP: NEGATIVE
KETONES, UA POC OHS: NEGATIVE
LEUKOCYTE ESTERASE UR QL STRIP: NEGATIVE
LEUKOCYTES, UA POC OHS: ABNORMAL
LIPASE SERPL-CCNC: 11 U/L (ref 4–60)
LYMPHOCYTES # BLD AUTO: 2.03 K/UL (ref 1–4.8)
MCH RBC QN AUTO: 27.6 PG (ref 27–31)
MCHC RBC AUTO-ENTMCNC: 32.2 G/DL (ref 32–36)
MCV RBC AUTO: 86 FL (ref 82–98)
NITRITE UR QL STRIP: NEGATIVE
NITRITE, UA POC OHS: NEGATIVE
NUCLEATED RBC (/100WBC) (OHS): 0 /100 WBC
PH UR STRIP: >8 [PH]
PH, UA POC OHS: 8
PLATELET # BLD AUTO: 282 K/UL (ref 150–450)
PMV BLD AUTO: 9.7 FL (ref 9.2–12.9)
POTASSIUM SERPL-SCNC: 4.1 MMOL/L (ref 3.5–5.1)
PROT SERPL-MCNC: 8 GM/DL (ref 6–8.4)
PROT UR QL STRIP: NEGATIVE
PROTEIN, UA POC OHS: ABNORMAL
RBC # BLD AUTO: 5 M/UL (ref 4–5.4)
RELATIVE EOSINOPHIL (OHS): 1.8 %
RELATIVE LYMPHOCYTE (OHS): 32.6 % (ref 18–48)
RELATIVE MONOCYTE (OHS): 9.3 % (ref 4–15)
RELATIVE NEUTROPHIL (OHS): 55.2 % (ref 38–73)
SODIUM SERPL-SCNC: 141 MMOL/L (ref 136–145)
SP GR UR STRIP: 1.01
SPECIFIC GRAVITY, UA POC OHS: 1.01
UROBILINOGEN UR STRIP-ACNC: NEGATIVE EU/DL
UROBILINOGEN, UA POC OHS: 0.2
WBC # BLD AUTO: 6.23 K/UL (ref 3.9–12.7)

## 2025-05-10 PROCEDURE — 85025 COMPLETE CBC W/AUTO DIFF WBC: CPT | Performed by: NURSE PRACTITIONER

## 2025-05-10 PROCEDURE — 96361 HYDRATE IV INFUSION ADD-ON: CPT

## 2025-05-10 PROCEDURE — 25000003 PHARM REV CODE 250: Performed by: NURSE PRACTITIONER

## 2025-05-10 PROCEDURE — 63600175 PHARM REV CODE 636 W HCPCS: Performed by: NURSE PRACTITIONER

## 2025-05-10 PROCEDURE — 96374 THER/PROPH/DIAG INJ IV PUSH: CPT | Mod: 59

## 2025-05-10 PROCEDURE — 99285 EMERGENCY DEPT VISIT HI MDM: CPT | Mod: 25

## 2025-05-10 PROCEDURE — 81003 URINALYSIS AUTO W/O SCOPE: CPT | Mod: QW,S$GLB,, | Performed by: NURSE PRACTITIONER

## 2025-05-10 PROCEDURE — 83690 ASSAY OF LIPASE: CPT | Performed by: NURSE PRACTITIONER

## 2025-05-10 PROCEDURE — 99215 OFFICE O/P EST HI 40 MIN: CPT | Mod: S$GLB,,, | Performed by: NURSE PRACTITIONER

## 2025-05-10 PROCEDURE — 80053 COMPREHEN METABOLIC PANEL: CPT | Performed by: NURSE PRACTITIONER

## 2025-05-10 PROCEDURE — 81003 URINALYSIS AUTO W/O SCOPE: CPT | Performed by: NURSE PRACTITIONER

## 2025-05-10 PROCEDURE — 25500020 PHARM REV CODE 255: Performed by: NURSE PRACTITIONER

## 2025-05-10 RX ORDER — METHOCARBAMOL 500 MG/1
500 TABLET, FILM COATED ORAL 3 TIMES DAILY
Qty: 15 TABLET | Refills: 0 | Status: SHIPPED | OUTPATIENT
Start: 2025-05-10 | End: 2025-05-15

## 2025-05-10 RX ORDER — FLUTICASONE PROPIONATE AND SALMETEROL 250; 50 UG/1; UG/1
POWDER RESPIRATORY (INHALATION)
COMMUNITY
End: 2025-05-10 | Stop reason: SDUPTHER

## 2025-05-10 RX ORDER — ALBUTEROL SULFATE 90 UG/1
INHALANT RESPIRATORY (INHALATION)
COMMUNITY
End: 2025-05-10 | Stop reason: ALTCHOICE

## 2025-05-10 RX ORDER — GABAPENTIN 300 MG/1
CAPSULE ORAL
COMMUNITY
End: 2025-05-10 | Stop reason: SDUPTHER

## 2025-05-10 RX ORDER — FUROSEMIDE 20 MG/1
TABLET ORAL
COMMUNITY

## 2025-05-10 RX ORDER — PRAVASTATIN SODIUM 20 MG/1
TABLET ORAL
COMMUNITY

## 2025-05-10 RX ORDER — LIDOCAINE 50 MG/G
1 PATCH TOPICAL DAILY
Qty: 15 PATCH | Refills: 0 | Status: SHIPPED | OUTPATIENT
Start: 2025-05-10

## 2025-05-10 RX ORDER — LIDOCAINE 50 MG/G
1 PATCH TOPICAL
Status: DISCONTINUED | OUTPATIENT
Start: 2025-05-10 | End: 2025-05-10 | Stop reason: HOSPADM

## 2025-05-10 RX ORDER — FLUTICASONE PROPIONATE AND SALMETEROL 250; 50 UG/1; UG/1
6 POWDER RESPIRATORY (INHALATION) 2 TIMES DAILY
COMMUNITY
Start: 2024-09-23 | End: 2026-03-24

## 2025-05-10 RX ORDER — AMLODIPINE BESYLATE 10 MG/1
TABLET ORAL
COMMUNITY

## 2025-05-10 RX ORDER — DICLOFENAC SODIUM 100 MG/1
TABLET, EXTENDED RELEASE ORAL
COMMUNITY

## 2025-05-10 RX ORDER — IPRATROPIUM BROMIDE AND ALBUTEROL SULFATE 2.5; .5 MG/3ML; MG/3ML
3 SOLUTION RESPIRATORY (INHALATION) EVERY 4 HOURS PRN
COMMUNITY
Start: 2024-09-23 | End: 2025-09-18

## 2025-05-10 RX ORDER — METHOCARBAMOL 500 MG/1
500 TABLET, FILM COATED ORAL
Status: COMPLETED | OUTPATIENT
Start: 2025-05-10 | End: 2025-05-10

## 2025-05-10 RX ORDER — KETOROLAC TROMETHAMINE 30 MG/ML
15 INJECTION, SOLUTION INTRAMUSCULAR; INTRAVENOUS
Status: COMPLETED | OUTPATIENT
Start: 2025-05-10 | End: 2025-05-10

## 2025-05-10 RX ORDER — MIRTAZAPINE 30 MG/1
30 TABLET, ORALLY DISINTEGRATING ORAL
COMMUNITY

## 2025-05-10 RX ADMIN — KETOROLAC TROMETHAMINE 15 MG: 30 INJECTION, SOLUTION INTRAMUSCULAR at 07:05

## 2025-05-10 RX ADMIN — METHOCARBAMOL 500 MG: 500 TABLET ORAL at 08:05

## 2025-05-10 RX ADMIN — IOHEXOL 100 ML: 350 INJECTION, SOLUTION INTRAVENOUS at 07:05

## 2025-05-10 RX ADMIN — LIDOCAINE 1 PATCH: 50 PATCH CUTANEOUS at 08:05

## 2025-05-10 RX ADMIN — SODIUM CHLORIDE, POTASSIUM CHLORIDE, SODIUM LACTATE AND CALCIUM CHLORIDE 500 ML: 600; 310; 30; 20 INJECTION, SOLUTION INTRAVENOUS at 07:05

## 2025-05-10 NOTE — PLAN OF CARE-OVED
[facility] Emergency Department Plan of Care Note  Referral Source: Urgent Care                               Chief Complaint   Patient presents with    Abdominal Pain       Recommendation: Emergency Department            Emergency Department: [facility]               Encounter Diagnosis   Name Primary?    Left lower quadrant abdominal pain Yes

## 2025-05-10 NOTE — PROGRESS NOTES
Patient seen in Urgent Care by NOVA Villa due to severe LLQ pain. Kenney VENTURA on call, Dr. Estuardo Abrams, consulted and disposition is ED. Follow up scheduled on 05/11/2025 to outreach the patient to assess for any additional needs/concerns following ED visit.

## 2025-05-10 NOTE — PROGRESS NOTES
"Subjective:      Patient ID: Tamiko Youssef is a 84 y.o. female.    Vitals:  height is 4' 11" (1.499 m) and weight is 81.4 kg (179 lb 7.3 oz). Her oral temperature is 98.3 °F (36.8 °C). Her blood pressure is 144/79 (abnormal) and her pulse is 83. Her respiration is 24 (abnormal) and oxygen saturation is 97%.     Chief Complaint: Pain    Pt is a 83 yo female presenting with severe LLQ pain (10/10) that started yesterday.  Pt states pain so severe that she almost called an ambulance last night but she did not want to miss her great-grandson's graduation today.  Pt denies N/V/D, constipation, pain relation to meals, fever, bloody/dark tarry stools, dysuria, urinary frequency/urgency, bloody or malodorous urine.     Pain  This is a new problem. The current episode started yesterday. The problem occurs constantly. The problem has been gradually worsening. Associated symptoms include abdominal pain. Pertinent negatives include no chest pain, chills, coughing, fatigue, fever, headaches, nausea or vomiting. Exacerbated by: moving. She has tried acetaminophen and position changes (salonpas) for the symptoms. The treatment provided no relief.       Constitution: Negative for chills, fatigue and fever.   Cardiovascular:  Negative for chest pain, leg swelling, palpitations and sob on exertion.   Respiratory:  Negative for cough, sputum production and shortness of breath.    Gastrointestinal:  Positive for abdominal pain. Negative for nausea, vomiting, constipation, diarrhea, bright red blood in stool, dark colored stools, rectal bleeding, rectal pain, hemorrhoids and heartburn.   Genitourinary:  Negative for dysuria, frequency, urgency, flank pain, hematuria, vaginal discharge and vaginal odor.   Neurological:  Negative for headaches.      Objective:     Physical Exam   Constitutional: She is oriented to person, place, and time.   Cardiovascular: Normal rate and regular rhythm.   Pulmonary/Chest: Effort normal and breath sounds " normal.   Abdominal: Bowel sounds are normal. She exhibits no shifting dullness, no distension, no fluid wave, no abdominal bruit, no pulsatile midline mass and no mass. Soft. flat abdomen There is no splenomegaly or hepatomegaly. There is abdominal tenderness in the left lower quadrant. There is guarding. There is no rebound, no tenderness at McBurney's point, negative David's sign, no left CVA tenderness, negative Rovsing's sign, negative psoas sign, no right CVA tenderness and negative obturator sign.     Neurological: She is alert and oriented to person, place, and time.   Skin: Skin is warm and dry.   Vitals reviewed.      Assessment:     1. LLQ pain      Collaborated with Estuardo Abrams DO.    Plan:       LLQ pain  -     POCT Urinalysis(Instrument)  -     Refer to Emergency Dept.      Patient Instructions   LLQ pain  -     POCT Urinalysis(Instrument)    -     Refer to Emergency Dept.    Pt will present to [facility] ED @ [facility]  [address]  (Daughter driving her)

## 2025-05-10 NOTE — PATIENT INSTRUCTIONS
LLQ pain  -     POCT Urinalysis(Instrument)    -     Refer to Emergency Dept.    Pt will present to [facility] ED @ [facility]  [address]  (Daughter driving her)

## 2025-05-11 NOTE — ED NOTES
Assumed care of pt sent here from  for L flank pain and B lower back pain x 3 days. Pt denies any urinary complaints. Pt A/O x 4 w/ ABCs intact, NAD.

## 2025-05-11 NOTE — ED PROVIDER NOTES
"Source of History:  Patient     Chief complaint:  Flank Pain (Pt reporting L flank pain and bilateral lower back pain x 1 day. Denies dysuria/hematuria. Sent from  "for my urine")      HPI:  Tamiko Youssef is a 84 y.o. female presenting to the emergency department reporting left flank, lower back and lower abdomen pain that began yesterday, described as severe and radiating down her leg.  Evaluated at urgent care prior to arrival and sent to the ED for further evaluation and possible diverticulitis.  She reports no history of diverticulitis.  Denies any dysuria.  Denies any nausea vomiting diarrhea or any fever/chills.  History of sciatica.  Ambulatory with the assistance of her walker.    This is the extent to the patients complaints today here in the emergency department.    PMH:  As per HPI and below:  Past Medical History:   Diagnosis Date    Asthma     Cataract     Glaucoma     Hypertension      Past Surgical History:   Procedure Laterality Date    CATARACT EXTRACTION W/  INTRAOCULAR LENS IMPLANT Bilateral     CHOLECYSTECTOMY      COLONOSCOPY N/A 12/5/2022    Procedure: COLONOSCOPY;  Surgeon: Daniel Mckeon MD;  Location: [facility] ([address] [place] [address]);  Service: Endoscopy;  Laterality: N/A;  fully vaccinated - sm  extended constipation mirilax prep   instructions mailed - sm    CYST REMOVAL      on neck    ESOPHAGOGASTRODUODENOSCOPY N/A 12/5/2022    Procedure: EGD (ESOPHAGOGASTRODUODENOSCOPY);  Surgeon: Daniel Mckeon MD;  Location: [address]);  Service: Endoscopy;  Laterality: N/A;    EYE SURGERY      HYSTERECTOMY      2/2 AUB, partial    INJECTION OF JOINT Bilateral 05/25/2022    Procedure: INJECTION, JOINT, BILATERAL SACROILIAC (SI);  Surgeon: Obdulio Leon MD;  Location: [facility];  Service: Pain Management;  Laterality: Bilateral;    INJECTION OF JOINT Bilateral 5/1/2024    Procedure: INJECTION, JOINT BILATERAL SI;  Surgeon: Obudlio Leon MD;  Location: [facility]IN [facility];  Service: Pain " Management;  Laterality: Bilateral;  [phone]  2 WK F/U AUGUSTINE    INJECTION, SACROILIAC JOINT Bilateral 6/7/2023    Procedure: INJECTION,SACROILIAC JOINT, BILATERAL;  Surgeon: Obdulio Leon MD;  Location: [facility];  Service: Pain Management;  Laterality: Bilateral;    TRANSFORAMINAL EPIDURAL INJECTION OF STEROID Bilateral 01/08/2020    Procedure: INJECTION, STEROID, EPIDURAL, TRANSFORAMINAL APPROACH;  Surgeon: Obdulio Leon MD;  Location: [facility];  Service: Pain Management;  Laterality: Bilateral;  B/L TFESI L4    TRANSFORAMINAL EPIDURAL INJECTION OF STEROID Bilateral 10/14/2020    Procedure: INJECTION, STEROID, EPIDURAL, TRANSFORAMINAL APPROACH, L4-L5 need consent;  Surgeon: Obdulio Leon MD;  Location: [facility];  Service: Pain Management;  Laterality: Bilateral;    TRANSFORAMINAL EPIDURAL INJECTION OF STEROID Left 04/14/2021    Procedure: INJECTION, STEROID, EPIDURAL, TRANSFORAMINAL APPROACH  left L3/4 and L4/5;  Surgeon: Obdulio Leon MD;  Location: [facility];  Service: Pain Management;  Laterality: Left;  consent needed    TRANSFORAMINAL EPIDURAL INJECTION OF STEROID Bilateral 7/17/2024    Procedure: LUMBAR TRANSFORAMINAL BILATERAL L4/5;  Surgeon: Obdulio Leon MD;  Location: [facility];  Service: Pain Management;  Laterality: Bilateral;  [phone]  4 WK F/U AUGUSTINE       Social History[1]    Review of patient's allergies indicates:   Allergen Reactions    Mobic [meloxicam] Rash    Norco [hydrocodone-acetaminophen]     Tramadol     Naprosyn [naproxen]      GI upset       ROS: As per HPI and below:  General: No  fever.  No chills.    ENT: No sore throat. No ear pain  Chest: No shortness of breath. No cough.    Cardiovascular: No chest pain.   Abdomen:  Left lower abdominal pain  Genito-Urinary: No abnormal urination.    Neurologic: No focal weakness.  No numbness.  No headache  MSK:  Left flank and left lower back pain.  Integument: No rashes or  "lesions.    Physical Exam:    BP (!) 183/82 (BP Location: Right arm, Patient Position: Sitting)   Pulse 78   Temp 98.2 °F (36.8 °C) (Oral)   Resp 18   Ht 5' 2" (1.575 m)   Wt 81.2 kg (179 lb)   SpO2 100%   BMI 32.74 kg/m²   Vitals:    05/10/25 1814 05/10/25 2106   BP: (!) 165/92 (!) 183/82   Pulse: 82 78   Resp: 17 18   Temp: 98.1 °F (36.7 °C) 98.2 °F (36.8 °C)   TempSrc: Oral Oral   SpO2: 98% 100%   Weight: 81.2 kg (179 lb)    Height: 5' 2" (1.575 m)        Nursing note and vital signs reviewed.  Appearance: No acute distress. Well-appearing. Non-toxic.    Eyes: No conjunctival injection.  Extraocular muscles are intact.  ENT:  Mucous membranes are pink and moist  Chest/ Respiratory: Clear to auscultation bilaterally.  Good air movement.  No wheezes.  No rhonchi. No rales. No accessory muscle use.  Cardiovascular: Regular rate and rhythm.  No murmurs. No gallops. No rubs.  Abdomen: Soft.  Nontender to palpation bowel sounds normal, no distention or guarding  Back:  Left lumbar paraspinal musculatures tender to palpation with spasms.  No bruising swelling or erythema.  No CVA tenderness.  Musculoskeletal: Good range of motion all joints.  No deformities.  Neck supple.  No meningismus.  Skin: No rashes seen.  Good turgor.  No abrasions.  No ecchymoses.  Neuro: alert and oriented x3,  no focal neurological deficits.  Positive left-sided straight leg test, no footdrop.  Psych: Appropriate, conversant    Abnormal Labs Reviewed   COMPREHENSIVE METABOLIC PANEL - Abnormal; Notable for the following components:       Result Value    CO2 21 (*)     BUN 6 (*)     All other components within normal limits   URINALYSIS, REFLEX TO URINE CULTURE - Abnormal; Notable for the following components:    pH, UA >8.0 (*)     All other components within normal limits       CT Abdomen Pelvis With IV Contrast NO Oral Contrast   Final Result      No acute abdominal or pelvic pathology CT of the abdomen and pelvis with contrast.    "   Intrahepatic and extrahepatic biliary ductal dilatation again seen.  Cholecystectomy.      Colonic diverticulosis without definite evidence of acute diverticulitis.      Mild perivesicular infiltration.  If urinary symptoms, consider urinalysis to evaluate for the possibility of acute cystitis.         Electronically signed by: Marion Munoz   Date:    05/10/2025   Time:    20:33            Initial Impression/ Differential Dx:  Differential diagnosis includes but not limited to:  UTI, nephrolithiasis, pyelonephritis, strain/sprain, sciatica, diverticulitis,    MDM:    Medical Decision Making  84-year-old female sent over from urgent care for evaluation of possible diverticulitis, states yesterday she developed some left flank left abdomen and left lower back pain.  No history of diverticulitis.  Not associated with any fever/chills.  No nausea vomiting diarrhea.  On exam she had no left lower abdominal tenderness.  There was some left-sided paraspinal musculatures tenderness with spasms.  No CVA tenderness.  Labs are reassuring with no leukocytosis, stable H&H, no significant electrolyte abnormalities, no HIPOLITO.  UA without evidence of infection.  CT abdomen negative for any acute abdominal or pelvic pathology including diverticulitis.  Symptoms likely related to sciatica has pain also radiating down her left leg.  Ambulatory with the assistance of her walker which she uses at baseline.  Neuro grossly intact, left-sided positive straight leg test.  Patient had improvement after treatment in the emergency department.  Will discharge home with lidocaine, Robaxin and discussed use of Tylenol.  Strict return precautions she develops any worsening symptoms or any other concerns she can return to emergency department for re-evaluation.  She stated understanding.    Amount and/or Complexity of Data Reviewed  Labs: ordered. Decision-making details documented in ED Course.  Radiology: ordered.    Risk  Prescription drug  management.        ED Course as of 05/11/25 1933   Sat May 10, 2025   1905 Leukocyte Esterase, UA: Negative [AS]   1905 NITRITE UA: Negative [AS]   1916 WBC: 6.23 [AS]   1917 Hemoglobin: 13.8 [AS]   1917 Hematocrit: 42.8 [AS]   1917 Platelet Count: 282 [AS]      ED Course User Index  [AS] Marina Ba FNP       Diagnostic Impression:    1. Sciatica of left side         ED Disposition Condition    Discharge Stable            ED Prescriptions       Medication Sig Dispense Start Date End Date Auth. Provider    LIDOcaine (LIDODERM) 5 % Place 1 patch onto the skin once daily. Remove & Discard patch within 12 hours or as directed by MD 15 patch 5/10/2025 -- Marina Ba FNP    methocarbamoL (ROBAXIN) 500 MG Tab Take 1 tablet (500 mg total) by mouth 3 (three) times daily. for 5 days 15 tablet 5/10/2025 5/15/2025 Marina Ba FNP          Follow-up Information       Follow up With Specialties Details Why Contact Info    Artem Fung Jr., MD Family Medicine Schedule an appointment as soon as possible for a visit in 3 days  [address] [facility] [address]  [address]  [facility] [address]  [phone]      [facility] - Emergency Dept Emergency Medicine Go to  If symptoms worsen [address] [facility] [id number] [phone]                   [1]   Social History  Tobacco Use    Smoking status: Never     Passive exposure: Never    Smokeless tobacco: Never   Substance Use Topics    Alcohol use: No    Drug use: Never        Marina Ba FNP  05/11/25 1933

## 2025-05-13 ENCOUNTER — PATIENT OUTREACH (OUTPATIENT)
Facility: OTHER | Age: 85
End: 2025-05-13
Payer: MEDICARE

## 2025-05-13 NOTE — PROGRESS NOTES
Patient was seen in the Emergency Department on 5/10/25. The After Visit Summary instructed the patient to follow up with primary care, but no appointment was noted. A follow-up call was made to assess additional needs; there was no answer, and a message was left requesting a return call. Assistance will be provided as needed if the patient returns the call.

## 2025-06-18 RX ORDER — GABAPENTIN 300 MG/1
CAPSULE ORAL
Qty: 90 CAPSULE | Refills: 0 | Status: SHIPPED | OUTPATIENT
Start: 2025-06-18

## 2025-07-28 DIAGNOSIS — H40.1131 PRIMARY OPEN ANGLE GLAUCOMA (POAG) OF BOTH EYES, MILD STAGE: ICD-10-CM

## 2025-07-29 RX ORDER — LATANOPROST 50 UG/ML
SOLUTION/ DROPS OPHTHALMIC
Qty: 7.5 ML | Refills: 3 | Status: SHIPPED | OUTPATIENT
Start: 2025-07-29

## 2025-09-03 ENCOUNTER — OFFICE VISIT (OUTPATIENT)
Dept: OPHTHALMOLOGY | Facility: CLINIC | Age: 85
End: 2025-09-03
Payer: MEDICARE

## 2025-09-03 DIAGNOSIS — H35.3132 INTERMEDIATE STAGE NONEXUDATIVE AGE-RELATED MACULAR DEGENERATION OF BOTH EYES: ICD-10-CM

## 2025-09-03 DIAGNOSIS — H40.1122 PRIMARY OPEN ANGLE GLAUCOMA (POAG) OF LEFT EYE, MODERATE STAGE: ICD-10-CM

## 2025-09-03 DIAGNOSIS — H04.123 DRY EYE SYNDROME OF BOTH EYES: ICD-10-CM

## 2025-09-03 DIAGNOSIS — H40.1131 PRIMARY OPEN ANGLE GLAUCOMA (POAG) OF BOTH EYES, MILD STAGE: Primary | ICD-10-CM

## 2025-09-03 DIAGNOSIS — Z96.1 PSEUDOPHAKIA OF BOTH EYES: ICD-10-CM

## 2025-09-03 PROCEDURE — 3288F FALL RISK ASSESSMENT DOCD: CPT | Mod: CPTII,S$GLB,, | Performed by: STUDENT IN AN ORGANIZED HEALTH CARE EDUCATION/TRAINING PROGRAM

## 2025-09-03 PROCEDURE — 99999 PR PBB SHADOW E&M-EST. PATIENT-LVL III: CPT | Mod: PBBFAC,,, | Performed by: STUDENT IN AN ORGANIZED HEALTH CARE EDUCATION/TRAINING PROGRAM

## 2025-09-03 PROCEDURE — G2211 COMPLEX E/M VISIT ADD ON: HCPCS | Mod: S$GLB,,, | Performed by: STUDENT IN AN ORGANIZED HEALTH CARE EDUCATION/TRAINING PROGRAM

## 2025-09-03 PROCEDURE — 99214 OFFICE O/P EST MOD 30 MIN: CPT | Mod: S$GLB,,, | Performed by: STUDENT IN AN ORGANIZED HEALTH CARE EDUCATION/TRAINING PROGRAM

## 2025-09-03 PROCEDURE — 1101F PT FALLS ASSESS-DOCD LE1/YR: CPT | Mod: CPTII,S$GLB,, | Performed by: STUDENT IN AN ORGANIZED HEALTH CARE EDUCATION/TRAINING PROGRAM

## 2025-09-03 PROCEDURE — 1159F MED LIST DOCD IN RCRD: CPT | Mod: CPTII,S$GLB,, | Performed by: STUDENT IN AN ORGANIZED HEALTH CARE EDUCATION/TRAINING PROGRAM

## 2025-09-03 PROCEDURE — 1126F AMNT PAIN NOTED NONE PRSNT: CPT | Mod: CPTII,S$GLB,, | Performed by: STUDENT IN AN ORGANIZED HEALTH CARE EDUCATION/TRAINING PROGRAM

## 2025-09-03 PROCEDURE — 92133 CPTRZD OPH DX IMG PST SGM ON: CPT | Mod: S$GLB,,, | Performed by: STUDENT IN AN ORGANIZED HEALTH CARE EDUCATION/TRAINING PROGRAM

## (undated) DEVICE — DRESSING LEUKOPLAST FLEX 1X3IN